# Patient Record
Sex: MALE | Race: WHITE | NOT HISPANIC OR LATINO | Employment: OTHER | URBAN - METROPOLITAN AREA
[De-identification: names, ages, dates, MRNs, and addresses within clinical notes are randomized per-mention and may not be internally consistent; named-entity substitution may affect disease eponyms.]

---

## 2017-01-19 DIAGNOSIS — G12.21 ALS (AMYOTROPHIC LATERAL SCLEROSIS): Primary | ICD-10-CM

## 2017-01-23 ENCOUNTER — HOSPITAL ENCOUNTER (OUTPATIENT)
Dept: INTERVENTIONAL RADIOLOGY/VASCULAR | Facility: HOSPITAL | Age: 47
Discharge: HOME | End: 2017-01-23
Attending: NURSE PRACTITIONER
Payer: MEDICARE

## 2017-01-23 DIAGNOSIS — G12.21 ALS (AMYOTROPHIC LATERAL SCLEROSIS): ICD-10-CM

## 2017-01-23 PROCEDURE — 49450 REPLACE G/C TUBE PERC: CPT

## 2017-01-23 PROCEDURE — 49450 REPLACE G/C TUBE PERC: CPT | Mod: ,,, | Performed by: RADIOLOGY

## 2017-01-23 PROCEDURE — 25500020 PHARM REV CODE 255: Performed by: RADIOLOGY

## 2017-01-23 RX ADMIN — IOHEXOL 10 ML: 300 INJECTION, SOLUTION INTRAVENOUS at 11:01

## 2017-01-23 NOTE — PROGRESS NOTES
Image guided gastrostomy tube exchange complete. Pt tolerated well. No signs or symptoms of distress noted. Discharge instructions provided. Pt verbalized understanding. Left facility via wheelchair.

## 2017-01-23 NOTE — PROGRESS NOTES
Per Dr Jordan, peg tube check will now be a image guided gastrostomy tube exchange. Pt and caregivers aware and agreeable of the same. WCTM.

## 2017-01-23 NOTE — H&P
Radiology History & Physical      SUBJECTIVE:     Chief Complaint: Gastrostomy tube exchange.    History of Present Illness:  Bayron Delgado is a 46 y.o. male who presents for image guided gastrostomy tube exchange.    Past Medical History   Diagnosis Date    ALS (amyotrophic lateral sclerosis)     Atrial fibrillation     CHF (congestive heart failure)     Neuropathy      Past Surgical History   Procedure Laterality Date    Appendectomy      Hernia repair         Home Meds:   Prior to Admission medications    Medication Sig Start Date End Date Taking? Authorizing Provider   atovaquone-proguanil (MALARONE) 250-100 mg Tab once daily.  4/25/16   Historical Provider, MD   BICILLIN L-A 2,400,000 unit/4 mL Syrg  5/25/16   Historical Provider, MD   carvedilol (COREG) 3.125 MG tablet Take 3.125 mg by mouth 2 (two) times daily.  4/4/16   Historical Provider, MD   CYANOCOBALAMIN/MECOBALAMIN (CYANOCOBALAMIN-METHYLCOBALAMIN SL) Inject 25 mg/day as directed once daily.    Historical Provider, MD   duloxetine (CYMBALTA) 60 MG capsule Take 60 mg by mouth. 9/1/15   Historical Provider, MD   furosemide (LASIX) 20 MG tablet TAKE 1 TABLET(20 MG) BY MOUTH EVERY DAY 6/29/16   Kike Fuentes MD   gabapentin (NEURONTIN) 300 MG capsule 600 mg 3 (three) times daily.  4/26/16   Historical Provider, MD   hydrocodone-acetaminophen 5-325mg (NORCO) 5-325 mg per tablet  5/7/16   Historical Provider, MD   loratadine (CLARITIN) 10 mg tablet Take 10 mg by mouth.    Historical Provider, MD   naloxegol (MOVANTIK) 12.5 mg Tab Take 12.5 mg by mouth once daily. 8/18/16   Kike Fuentes MD   potassium chloride SA (K-DUR,KLOR-CON) 10 MEQ tablet Take 2 tablets (20 mEq total) by mouth once daily. 6/1/16   Kike Fuentes MD     Anticoagulants/Antiplatelets: no anticoagulation    Allergies:   Review of patient's allergies indicates:   Allergen Reactions    Penicillins Other (See Comments)     Per wife- his mother stated he was allergic to  it     Sedation History:  no adverse reactions    Review of Systems:   Hematological: no known coagulopathies  Respiratory: no shortness of breath  Cardiovascular: no chest pain  Gastrointestinal: no abdominal pain  Genito-Urinary: no dysuria  Musculoskeletal: negative  Neurological: no TIA or stroke symptoms         OBJECTIVE:     Vital Signs (Most Recent)       Physical Exam:  ASA: 2  Mallampati: 2    General: no acute distress  Mental Status: alert and oriented to person, place and time  HEENT: normocephalic, atraumatic  Chest: unlabored breathing  Heart: regular heart rate  Abdomen: nondistended  Extremity: moves all extremities    Laboratory  Lab Results   Component Value Date    INR 1.1 07/29/2016       Lab Results   Component Value Date    WBC 5.86 07/25/2016    HGB 13.3 (L) 07/25/2016    HCT 38.9 (L) 07/25/2016    MCV 86 07/25/2016     07/25/2016      Lab Results   Component Value Date    GLU 94 07/25/2016     07/25/2016    K 3.8 07/25/2016     07/25/2016    CO2 24 07/25/2016    BUN 17 07/25/2016    CREATININE 0.5 07/25/2016    CALCIUM 9.9 07/25/2016    MG 2.0 07/25/2016    ALT 45 (H) 07/25/2016    AST 23 07/25/2016    ALBUMIN 3.4 (L) 07/25/2016    BILITOT 0.4 07/25/2016       ASSESSMENT/PLAN:     Sedation Plan: Local only.  Patient will undergo image guided gastrostomy tube exchange.    Juan Burton  Radiology  PGY-3

## 2017-01-23 NOTE — IP AVS SNAPSHOT
[facility]  [address]  [facility] [address]  Phone: [phone]           Patient Discharge Instructions     Our goal is to set you up for success. This packet includes information on your condition, medications, and your home care. It will help you to care for yourself so you don't get sicker and need to go back to the hospital.     Please ask your nurse if you have any questions.        There are many details to remember when preparing to leave the hospital. Here is what you will need to do:    1. Take your medicine. If you are prescribed medications, review your Medication List in the following pages. You may have new medications to  at the pharmacy and others that you'll need to stop taking. Review the instructions for how and when to take your medications. Talk with your doctor or nurses if you are unsure of what to do.     2. Go to your follow-up appointments. Specific follow-up information is listed in the following pages. Your may be contacted by a transition nurse or clinical provider about future appointments. Be sure we have all of the phone numbers to reach you, if needed. Please contact your provider's office if you are unable to make an appointment.     3. Watch for warning signs. Your doctor or nurse will give you detailed warning signs to watch for and when to call for assistance. These instructions may also include educational information about your condition. If you experience any of warning signs to your health, call your doctor.               [facility]  Unless otherwise directed by your provider, please contact [facility], our nurse care line that is available for 24/7 assistance.     [phone] (toll-free)    Registered nurses in the [facility] provide clinical advisement, health education, appointment booking, and other advisory services.                    ** Verify the list of medication(s) below is accurate and up  to date. Carry this with you in case of emergency. If your medications have changed, please notify your healthcare provider.             Medication List      TAKE these medications        Additional Info                      atovaquone-proguanil 250-100 mg Tab   Commonly known as:  MALARONE   Refills:  0    Instructions:  once daily.     Begin Date    AM    Noon    PM    Bedtime       BICILLIN L-A 2,400,000 unit/4 mL Syrg   Refills:  0   Generic drug:  penicillin G benzathine      Begin Date    AM    Noon    PM    Bedtime       carvedilol 3.125 MG tablet   Commonly known as:  COREG   Refills:  0   Dose:  3.125 mg    Instructions:  Take 3.125 mg by mouth 2 (two) times daily.     Begin Date    AM    Noon    PM    Bedtime       CYANOCOBALAMIN-METHYLCOBALAMIN SL   Refills:  0   Dose:  25 mg/day    Instructions:  Inject 25 mg/day as directed once daily.     Begin Date    AM    Noon    PM    Bedtime       duloxetine 60 MG capsule   Commonly known as:  CYMBALTA   Refills:  0   Dose:  60 mg    Instructions:  Take 60 mg by mouth.     Begin Date    AM    Noon    PM    Bedtime       furosemide 20 MG tablet   Commonly known as:  LASIX   Quantity:  90 tablet   Refills:  11   Comments:  **Patient requests 90 days supply**    Instructions:  TAKE 1 TABLET(20 MG) BY MOUTH EVERY DAY     Begin Date    AM    Noon    PM    Bedtime       gabapentin 300 MG capsule   Commonly known as:  NEURONTIN   Refills:  5   Dose:  600 mg   Indications:  Neuropathic Pain    Instructions:  600 mg 3 (three) times daily.     Begin Date    AM    Noon    PM    Bedtime       hydrocodone-acetaminophen 5-325mg 5-325 mg per tablet   Commonly known as:  NORCO   Refills:  0      Begin Date    AM    Noon    PM    Bedtime       loratadine 10 mg tablet   Commonly known as:  CLARITIN   Refills:  0   Dose:  10 mg    Instructions:  Take 10 mg by mouth.     Begin Date    AM    Noon    PM    Bedtime       naloxegol 12.5 mg Tab   Commonly known as:  MOVANTIK   Quantity:   30 tablet   Refills:  11   Dose:  12.5 mg    Instructions:  Take 12.5 mg by mouth once daily.     Begin Date    AM    Noon    PM    Bedtime       potassium chloride SA 10 MEQ tablet   Commonly known as:  K-DESMOND CRISTINAOR-CON   Quantity:  30 tablet   Refills:  11   Dose:  20 mEq    Instructions:  Take 2 tablets (20 mEq total) by mouth once daily.     Begin Date    AM    Noon    PM    Bedtime                  Please bring to all follow up appointments:    1. A copy of your discharge instructions.  2. All medicines you are currently taking in their original bottles.  3. Identification and insurance card.    Please arrive 15 minutes ahead of scheduled appointment time.    Please call 24 hours in advance if you must reschedule your appointment and/or time.            Admission Information     Date & Time Provider Department CSN    1/23/2017 10:30 AM CHUY Gonzalez [facility] [id number]      Care Providers     Provider Role Specialty Primary office phone    CHUY Gonzalez Attending Provider Radiology [phone]      Recent Lab Values     No lab values to display.      Allergies as of 1/23/2017        Reactions    Penicillins Other (See Comments)    Per wife- his mother stated he was allergic to it      Advance Directives     An advance directive is a document which, in the event you are no longer able to make decisions for yourself, tells your healthcare team what kind of treatment you do or do not want to receive, or who you would like to make those decisions for you.  If you do not currently have an advance directive, [facility] encourages you to create one.  For more information call:  [phone]), [phone] ([phone]),  or log on to [email or web address].        Language Assistance Services     ATTENTION: Language assistance services are available, free of charge. Please call [phone].      ATENCIÓN: Si charissala tino, tiene a griffith disposición servicios gratuitos  de asistencia lingüística. Stefania staples [phone].     ANDREW Ý: N?u b?n nói Ti?ng Vi?t, có các d?ch v? h? tr? ngôn ng? mi?n phí dành cho b?n. G?i s? [phone].         [facility] complies with applicable Federal civil rights laws and does not discriminate on the basis of race, color, national origin, age, disability, or sex.

## 2017-04-11 RX ORDER — POTASSIUM CHLORIDE 750 MG/1
TABLET, EXTENDED RELEASE ORAL
Qty: 30 TABLET | Refills: 0 | Status: SHIPPED | OUTPATIENT
Start: 2017-04-11 | End: 2017-04-29 | Stop reason: SDUPTHER

## 2017-04-29 RX ORDER — POTASSIUM CHLORIDE 750 MG/1
TABLET, EXTENDED RELEASE ORAL
Qty: 30 TABLET | Refills: 0 | Status: SHIPPED | OUTPATIENT
Start: 2017-04-29 | End: 2017-05-18 | Stop reason: SDUPTHER

## 2017-05-23 RX ORDER — POTASSIUM CHLORIDE 750 MG/1
TABLET, EXTENDED RELEASE ORAL
Qty: 30 TABLET | Refills: 0 | Status: SHIPPED | OUTPATIENT
Start: 2017-05-23 | End: 2017-07-25

## 2017-07-17 RX ORDER — POTASSIUM CHLORIDE 750 MG/1
TABLET, EXTENDED RELEASE ORAL
Qty: 30 TABLET | Refills: 0 | Status: SHIPPED | OUTPATIENT
Start: 2017-07-17 | End: 2017-07-25

## 2017-07-25 ENCOUNTER — TELEPHONE (OUTPATIENT)
Dept: PULMONOLOGY | Facility: CLINIC | Age: 47
End: 2017-07-25

## 2017-07-25 RX ORDER — FUROSEMIDE 20 MG/1
TABLET ORAL
Qty: 90 TABLET | Refills: 11 | Status: ON HOLD | OUTPATIENT
Start: 2017-07-25 | End: 2018-03-22 | Stop reason: HOSPADM

## 2017-07-25 RX ORDER — POTASSIUM CHLORIDE 750 MG/1
10 CAPSULE, EXTENDED RELEASE ORAL DAILY
Qty: 30 CAPSULE | Refills: 11 | Status: SHIPPED | OUTPATIENT
Start: 2017-07-25 | End: 2017-07-25 | Stop reason: SDUPTHER

## 2017-07-26 RX ORDER — POTASSIUM CHLORIDE 750 MG/1
CAPSULE, EXTENDED RELEASE ORAL
Qty: 90 CAPSULE | Refills: 11 | Status: ON HOLD | OUTPATIENT
Start: 2017-07-26 | End: 2018-03-22 | Stop reason: HOSPADM

## 2018-03-13 ENCOUNTER — HOSPITAL ENCOUNTER (EMERGENCY)
Facility: HOSPITAL | Age: 48
Discharge: ANOTHER HOSPITAL | End: 2018-03-14
Attending: EMERGENCY MEDICINE
Payer: MEDICARE

## 2018-03-13 VITALS
WEIGHT: 193 LBS | TEMPERATURE: 100 F | BODY MASS INDEX: 29.25 KG/M2 | HEIGHT: 68 IN | RESPIRATION RATE: 20 BRPM | SYSTOLIC BLOOD PRESSURE: 136 MMHG | DIASTOLIC BLOOD PRESSURE: 81 MMHG | OXYGEN SATURATION: 100 % | HEART RATE: 120 BPM

## 2018-03-13 DIAGNOSIS — J18.9 PNEUMONIA OF LEFT LOWER LOBE DUE TO INFECTIOUS ORGANISM: ICD-10-CM

## 2018-03-13 DIAGNOSIS — G12.21 ALS (AMYOTROPHIC LATERAL SCLEROSIS): Primary | ICD-10-CM

## 2018-03-13 DIAGNOSIS — A41.9 SEPSIS: ICD-10-CM

## 2018-03-13 DIAGNOSIS — J96.12 CHRONIC RESPIRATORY FAILURE WITH HYPERCAPNIA: ICD-10-CM

## 2018-03-13 PROBLEM — J96.90 RESPIRATORY FAILURE: Status: ACTIVE | Noted: 2018-03-13

## 2018-03-13 LAB
ALBUMIN SERPL BCP-MCNC: 3.2 G/DL
ALP SERPL-CCNC: 98 U/L
ALT SERPL W/O P-5'-P-CCNC: 86 U/L
ANION GAP SERPL CALC-SCNC: 9 MMOL/L
APTT BLDCRRT: 29.9 SEC
AST SERPL-CCNC: 35 U/L
BACTERIA #/AREA URNS HPF: ABNORMAL /HPF
BASOPHILS # BLD AUTO: 0 K/UL
BASOPHILS NFR BLD: 0.1 %
BILIRUB SERPL-MCNC: 1 MG/DL
BILIRUB UR QL STRIP: NEGATIVE
BNP SERPL-MCNC: 77 PG/ML
BUN SERPL-MCNC: 9 MG/DL
CALCIUM SERPL-MCNC: 9.9 MG/DL
CHLORIDE SERPL-SCNC: 98 MMOL/L
CLARITY UR: ABNORMAL
CO2 SERPL-SCNC: 30 MMOL/L
COLOR UR: YELLOW
CREAT SERPL-MCNC: 0.4 MG/DL
DIFFERENTIAL METHOD: ABNORMAL
EOSINOPHIL # BLD AUTO: 0.1 K/UL
EOSINOPHIL NFR BLD: 0.8 %
ERYTHROCYTE [DISTWIDTH] IN BLOOD BY AUTOMATED COUNT: 14.2 %
EST. GFR  (AFRICAN AMERICAN): >60 ML/MIN/1.73 M^2
EST. GFR  (NON AFRICAN AMERICAN): >60 ML/MIN/1.73 M^2
GLUCOSE SERPL-MCNC: 142 MG/DL
GLUCOSE UR QL STRIP: NEGATIVE
HCT VFR BLD AUTO: 33 %
HGB BLD-MCNC: 11 G/DL
HGB UR QL STRIP: ABNORMAL
HYALINE CASTS #/AREA URNS LPF: 0 /LPF
INR PPP: 1.1
KETONES UR QL STRIP: NEGATIVE
LACTATE SERPL-SCNC: 0.6 MMOL/L
LACTATE SERPL-SCNC: 1.3 MMOL/L
LEUKOCYTE ESTERASE UR QL STRIP: ABNORMAL
LYMPHOCYTES # BLD AUTO: 1.2 K/UL
LYMPHOCYTES NFR BLD: 9.4 %
MAGNESIUM SERPL-MCNC: 2.1 MG/DL
MCH RBC QN AUTO: 28.9 PG
MCHC RBC AUTO-ENTMCNC: 33.3 G/DL
MCV RBC AUTO: 87 FL
MICROSCOPIC COMMENT: ABNORMAL
MONOCYTES # BLD AUTO: 1.1 K/UL
MONOCYTES NFR BLD: 9.1 %
NEUTROPHILS # BLD AUTO: 10.1 K/UL
NEUTROPHILS NFR BLD: 80.6 %
NITRITE UR QL STRIP: NEGATIVE
PH UR STRIP: >8 [PH] (ref 5–8)
PHOSPHATE SERPL-MCNC: 1.7 MG/DL
PLATELET # BLD AUTO: 257 K/UL
PMV BLD AUTO: 8.1 FL
POTASSIUM SERPL-SCNC: 4 MMOL/L
PROCALCITONIN SERPL IA-MCNC: 0.42 NG/ML
PROT SERPL-MCNC: 6.9 G/DL
PROT UR QL STRIP: ABNORMAL
PROTHROMBIN TIME: 11 SEC
RBC # BLD AUTO: 3.8 M/UL
RBC #/AREA URNS HPF: 40 /HPF (ref 0–4)
SODIUM SERPL-SCNC: 137 MMOL/L
SP GR UR STRIP: 1.01 (ref 1–1.03)
SQUAMOUS #/AREA URNS HPF: 5 /HPF
TROPONIN I SERPL DL<=0.01 NG/ML-MCNC: 0.02 NG/ML
URN SPEC COLLECT METH UR: ABNORMAL
UROBILINOGEN UR STRIP-ACNC: 1 EU/DL
WBC # BLD AUTO: 12.5 K/UL
WBC #/AREA URNS HPF: 15 /HPF (ref 0–5)

## 2018-03-13 PROCEDURE — 51702 INSERT TEMP BLADDER CATH: CPT

## 2018-03-13 PROCEDURE — 85025 COMPLETE CBC W/AUTO DIFF WBC: CPT

## 2018-03-13 PROCEDURE — 80053 COMPREHEN METABOLIC PANEL: CPT

## 2018-03-13 PROCEDURE — 87070 CULTURE OTHR SPECIMN AEROBIC: CPT

## 2018-03-13 PROCEDURE — 87088 URINE BACTERIA CULTURE: CPT

## 2018-03-13 PROCEDURE — 93010 ELECTROCARDIOGRAM REPORT: CPT | Mod: ,,, | Performed by: INTERNAL MEDICINE

## 2018-03-13 PROCEDURE — 84100 ASSAY OF PHOSPHORUS: CPT

## 2018-03-13 PROCEDURE — 87040 BLOOD CULTURE FOR BACTERIA: CPT

## 2018-03-13 PROCEDURE — 96367 TX/PROPH/DG ADDL SEQ IV INF: CPT | Mod: 59

## 2018-03-13 PROCEDURE — 96375 TX/PRO/DX INJ NEW DRUG ADDON: CPT

## 2018-03-13 PROCEDURE — 96365 THER/PROPH/DIAG IV INF INIT: CPT

## 2018-03-13 PROCEDURE — 84484 ASSAY OF TROPONIN QUANT: CPT

## 2018-03-13 PROCEDURE — 83605 ASSAY OF LACTIC ACID: CPT

## 2018-03-13 PROCEDURE — 87077 CULTURE AEROBIC IDENTIFY: CPT

## 2018-03-13 PROCEDURE — 96361 HYDRATE IV INFUSION ADD-ON: CPT

## 2018-03-13 PROCEDURE — 87086 URINE CULTURE/COLONY COUNT: CPT

## 2018-03-13 PROCEDURE — 84145 PROCALCITONIN (PCT): CPT

## 2018-03-13 PROCEDURE — 81000 URINALYSIS NONAUTO W/SCOPE: CPT

## 2018-03-13 PROCEDURE — 83880 ASSAY OF NATRIURETIC PEPTIDE: CPT

## 2018-03-13 PROCEDURE — 36415 COLL VENOUS BLD VENIPUNCTURE: CPT

## 2018-03-13 PROCEDURE — 87186 SC STD MICRODIL/AGAR DIL: CPT

## 2018-03-13 PROCEDURE — 93005 ELECTROCARDIOGRAM TRACING: CPT

## 2018-03-13 PROCEDURE — 85730 THROMBOPLASTIN TIME PARTIAL: CPT

## 2018-03-13 PROCEDURE — 96366 THER/PROPH/DIAG IV INF ADDON: CPT

## 2018-03-13 PROCEDURE — 25000003 PHARM REV CODE 250: Performed by: EMERGENCY MEDICINE

## 2018-03-13 PROCEDURE — 63600175 PHARM REV CODE 636 W HCPCS: Performed by: EMERGENCY MEDICINE

## 2018-03-13 PROCEDURE — 87205 SMEAR GRAM STAIN: CPT

## 2018-03-13 PROCEDURE — 99285 EMERGENCY DEPT VISIT HI MDM: CPT | Mod: 25

## 2018-03-13 PROCEDURE — 85610 PROTHROMBIN TIME: CPT

## 2018-03-13 PROCEDURE — 83735 ASSAY OF MAGNESIUM: CPT

## 2018-03-13 PROCEDURE — S0073 INJECTION, AZTREONAM, 500 MG: HCPCS | Performed by: EMERGENCY MEDICINE

## 2018-03-13 RX ORDER — HYDROXYZINE HYDROCHLORIDE 25 MG/1
25 TABLET, FILM COATED ORAL 4 TIMES DAILY
Status: ON HOLD | COMMUNITY
End: 2018-03-22 | Stop reason: HOSPADM

## 2018-03-13 RX ORDER — POLYETHYLENE GLYCOL 3350 17 G/17G
17 POWDER, FOR SOLUTION ORAL 2 TIMES DAILY
Status: ON HOLD | COMMUNITY
End: 2018-03-22 | Stop reason: HOSPADM

## 2018-03-13 RX ORDER — ACETAMINOPHEN 500 MG
500 TABLET ORAL EVERY 6 HOURS PRN
Status: ON HOLD | COMMUNITY
End: 2018-03-22 | Stop reason: HOSPADM

## 2018-03-13 RX ORDER — TEMAZEPAM 15 MG/1
30 CAPSULE ORAL NIGHTLY
COMMUNITY
End: 2019-11-21 | Stop reason: SDUPTHER

## 2018-03-13 RX ORDER — DOXEPIN HYDROCHLORIDE 75 MG/1
75 CAPSULE ORAL 2 TIMES DAILY
Status: ON HOLD | COMMUNITY
End: 2018-03-22 | Stop reason: HOSPADM

## 2018-03-13 RX ORDER — ONDANSETRON HYDROCHLORIDE 4 MG/5ML
4 SOLUTION ORAL 4 TIMES DAILY
Status: ON HOLD | COMMUNITY
End: 2018-03-22 | Stop reason: HOSPADM

## 2018-03-13 RX ORDER — DIPHENHYDRAMINE HCL 50 MG
50 CAPSULE ORAL NIGHTLY
COMMUNITY
End: 2018-06-20

## 2018-03-13 RX ORDER — CLONAZEPAM 0.5 MG/1
0.5 TABLET ORAL 3 TIMES DAILY
Status: ON HOLD | COMMUNITY
End: 2018-03-22 | Stop reason: HOSPADM

## 2018-03-13 RX ORDER — SENNOSIDES 8.6 MG/1
7 TABLET ORAL 2 TIMES DAILY
COMMUNITY
End: 2018-06-20

## 2018-03-13 RX ORDER — DEXTROSE MONOHYDRATE, SODIUM CHLORIDE, AND POTASSIUM CHLORIDE 50; 1.49; 4.5 G/1000ML; G/1000ML; G/1000ML
1000 INJECTION, SOLUTION INTRAVENOUS
Status: COMPLETED | OUTPATIENT
Start: 2018-03-13 | End: 2018-03-13

## 2018-03-13 RX ORDER — LIDOCAINE 50 MG/G
1 PATCH TOPICAL DAILY PRN
Status: ON HOLD | COMMUNITY
End: 2018-03-22 | Stop reason: HOSPADM

## 2018-03-13 RX ORDER — PREGABALIN 100 MG/1
100 CAPSULE ORAL 3 TIMES DAILY
Status: ON HOLD | COMMUNITY
End: 2018-03-22 | Stop reason: HOSPADM

## 2018-03-13 RX ORDER — DIPHENHYDRAMINE HCL 25 MG
25 CAPSULE ORAL EVERY MORNING
COMMUNITY
End: 2018-06-20

## 2018-03-13 RX ORDER — MORPHINE SULFATE 20 MG/ML
0.25 SOLUTION ORAL
COMMUNITY
End: 2018-06-20

## 2018-03-13 RX ADMIN — VANCOMYCIN HYDROCHLORIDE 1750 MG: 1 INJECTION, POWDER, LYOPHILIZED, FOR SOLUTION INTRAVENOUS at 03:03

## 2018-03-13 RX ADMIN — DEXTROSE, SODIUM CHLORIDE, AND POTASSIUM CHLORIDE 1000 ML: 5; .45; .15 INJECTION INTRAVENOUS at 05:03

## 2018-03-13 RX ADMIN — SODIUM CHLORIDE 1000 ML: 0.9 INJECTION, SOLUTION INTRAVENOUS at 02:03

## 2018-03-13 RX ADMIN — AZTREONAM 2000 MG: 2 INJECTION, POWDER, LYOPHILIZED, FOR SOLUTION INTRAMUSCULAR; INTRAVENOUS at 03:03

## 2018-03-13 NOTE — ED NOTES
[facility] states that patient is waiting for bed assignment at [facility]. Family has been updated.

## 2018-03-13 NOTE — ED NOTES
[facility] reports that 2 patients are being moved out of ICU and will call back when patient has a room. Family has been updated. No needs or questions at this time.

## 2018-03-13 NOTE — ED NOTES
Jessica Garcia to come and obtain blood cultures x2 on patient. Antibiotics will be started after.

## 2018-03-13 NOTE — ED NOTES
Presents to the ER with c/o worsening respiratory failure that is secondary to ALS. Family reports that patient was diagnosed 4 years ago and was sent home recently from [facility] on hospice. Patient refused to have a trach placed. Patient is able to shake his head yes and no when asked questions and family is able to communicate when patient attempts to talk. Patient communicated with family that he wants to rescind his hospice and is requesting to be transferred to [facility] to have a trach place. Mucous membranes are pink and moist. Skin is warm, dry and intact. Decreased breath sounds to left base, respirations are regular and unlabored. Patient is on continuous bipap at home.  Denies cough, congestion,or rhinorrhea.  BS active x4, no tenderness with palpation, abd is soft and not distended. Pep tube to LUQ.  S1S2, capillary refill is < 2 seconds. Patient has 20 fr munoz catheter in place.

## 2018-03-13 NOTE — ED PROVIDER NOTES
"Encounter Date: 3/13/2018    SCRIBE #1 NOTE: I, Carla Strong, am scribing for, and in the presence of, Dr. Barraza.       History     Chief Complaint   Patient presents with    General Illness     ALS patient / was on hospice , bipap        03/13/2018 1:01 PM     Chief complaint: Respiratory failure      Bayron Delgado is a 47 y.o. male with CHF, A-fib, and ALS who presents to the ED via EMS on BiPAP with an onset of worsening respiratory failure. Per EMS, the family spoke with the patient, who was on Hospice but wanted to renew his ALS treatment plan. The family reports that his symptoms have progressively worsened over the last few days. He is able to nod his head "yes" and shake his head "no." The patient was suppose to have a tracheostomy but refused it at the time and wanted to go home on Hospice. As of today, he rescinded his Hospice care and would now like to have trache placement. The EMS care team transferred the patient here from [place], [place] and were not allowed to transfer the patient to [facility] as per instructed by their supervisor. His Pulmonologist is Dr. Kike Fuentes. No pertinent SHx noted.  HPI/ROS is limited as the patient is nonverbal secondary to ALS.      The history is provided by the EMS personnel, a relative and the patient (brother & sister).     Review of patient's allergies indicates:   Allergen Reactions    Penicillins Other (See Comments)     Per wife- his mother stated he was allergic to it     Past Medical History:   Diagnosis Date    ALS (amyotrophic lateral sclerosis)     Atrial fibrillation     CHF (congestive heart failure)     Neuropathy      Past Surgical History:   Procedure Laterality Date    APPENDECTOMY      HERNIA REPAIR       History reviewed. No pertinent family history.  Social History   Substance Use Topics    Smoking status: Never Smoker    Smokeless tobacco: Not on file    Alcohol use No     Review of Systems   Unable to perform ROS: Patient " nonverbal (secondary to ALS)     Physical Exam     Vitals:    03/13/18 1307   BP: 117/80   Pulse: (!) 123   Resp:    Temp:        Physical Exam    Nursing note and vitals reviewed.  Constitutional: He appears well-developed and well-nourished. No distress.   On BiPAP.   HENT:   Head: Normocephalic and atraumatic.   Eyes: Conjunctivae and EOM are normal. Pupils are equal, round, and reactive to light.   Cardiovascular: Regular rhythm and normal heart sounds. Tachycardia present.  Exam reveals no gallop and no friction rub.    No murmur heard.  Mild tachycardia.    Pulmonary/Chest: No respiratory distress. He has decreased breath sounds. He has no wheezes. He has no rhonchi. He has no rales.   Decreased breath sounds in the left lower lobe.    Abdominal: Soft. Bowel sounds are normal. He exhibits no distension. There is no tenderness.   Genitourinary:   Genitourinary Comments: Urinary catheter in place with lots of sediment. Stool in diaper.    Musculoskeletal: He exhibits edema.   Generalized edema.    Neurological: He is alert and oriented to person, place, and time.   Skin: Skin is warm and dry.       ED Course   Procedures  Labs Reviewed   CBC W/ AUTO DIFFERENTIAL - Abnormal; Notable for the following:        Result Value    RBC 3.80 (*)     Hemoglobin 11.0 (*)     Hematocrit 33.0 (*)     MPV 8.1 (*)     Gran # (ANC) 10.1 (*)     Mono # 1.1 (*)     Gran% 80.6 (*)     Lymph% 9.4 (*)     All other components within normal limits   COMPREHENSIVE METABOLIC PANEL - Abnormal; Notable for the following:     CO2 30 (*)     Glucose 142 (*)     Creatinine 0.4 (*)     Albumin 3.2 (*)     ALT 86 (*)     All other components within normal limits   PHOSPHORUS - Abnormal; Notable for the following:     Phosphorus 1.7 (*)     All other components within normal limits   CULTURE, BLOOD   CULTURE, BLOOD   CULTURE, URINE   CULTURE, RESPIRATORY   LACTIC ACID, PLASMA   MAGNESIUM   APTT   PROTIME-INR   B-TYPE NATRIURETIC PEPTIDE    TROPONIN I   URINALYSIS   PROCALCITONIN     Imaging Results          X-Ray Chest AP Portable (Final result)  Result time 03/13/18 14:05:23    Final result by Camille Guerrier MD (03/13/18 14:05:23)                 Impression:      Left lung infiltrate consistent with pneumonia.      Electronically signed by: Camille Guerrier MD  Date:    03/13/2018  Time:    14:05             Narrative:    EXAMINATION:  XR CHEST AP PORTABLE    CLINICAL HISTORY:  Sepsis;    TECHNIQUE:  Single frontal view of the chest was performed.    COMPARISON:  7/30/2016    FINDINGS:  There is new left lung infiltrate with left parahilar and basilar consolidation consistent with pneumonia.  There is right basilar opacification suggesting discoid like atelectasis right lung base.  The cardiomediastinal silhouette appears stable                              EKG Readings: (Independently Interpreted)   Initial Reading: No STEMI.   Sinus tachycardia at a rate of 120 bpm with normal axis, normal intervals, and mild ST depressions in V2-6 only a 0.5 mm. No ST segment elevation of depression.      X-Rays:   Independently Interpreted Readings:   Chest X-Ray: Left lower lobe infiltrate.      Medical Decision Making:   History:   Old Medical Records: I decided to obtain old medical records.  Clinical Tests:   Lab Tests: Ordered and Reviewed  Radiological Study: Reviewed and Ordered  Medical Tests: Reviewed and Ordered  Unfortunately, the patient has pneumonia in the left lower lobe.  I gave him broad spectrum antibiotics to include coverage for Pseudomonas.  He doesn't need to be intubated at this time.  He wishes to undergo a tracheotomy.  I sense there or issues between the patient and his wife who is his HCPOA and his family regarding a tracheotomy.  I encouraged the patient's brother to talk to Dr. Fuentes as well.   The patient's wife never showed up to the ER.  He'll be transferred to [facility] ICU to be under the care of his pulmonologist,   [facility].  He does not appear to be in septic shock at this time.  He also doesn't appear to be septic, but he is at high risk for developing sepsis.            Scribe Attestation:   Scribe #1: I performed the above scribed service and the documentation accurately describes the services I performed. I attest to the accuracy of the note.    Attending Attestation:         Attending Critical Care:   Critical Care Times:   Direct Patient Care (initial evaluation, reassessments, and time considering the case)................................................................15 minutes.   Additional History from reviewing old medical records or taking additional history from the family, EMS, PCP, etc.......................10 minutes.   Ordering, Reviewing, and Interpreting Diagnostic Studies...............................................................................................................7 minutes.   Documentation..................................................................................................................................................................................10 minutes.   Consultation with other Physicians. .................................................................................................................................................8 minutes.   Consultation with the patient's family directly relating to the patient's condition, care, and DNR status (when patient unable)......10 minutes.   ==============================================================  · Total Critical Care Time - exclusive of procedural time: 60 minutes.  ==============================================================      Attending ED Notes:   1:28 PM  Spoke with Pulmonologist, Dr. Kike Fuentes; the patient is accepted at [facility] for respiratory failure in the setting of ALS with potential tracheostomy.    1:35 PM  Spoke with the patient's brother; he reports that the patient made multiple  "statements this morning via "yes" and "no" responses that he wanted to have a tracheostomy. The bother is no the health care power of ; the health care power of  is the wife, who I have not spoken to yet. The brother is concerned that the patient's wishes are not being met.   I, Dr. Navjot Barraza personally performed the services described in this documentation. All medical record entries made by the scribe were at my direction and in my presence.  I have reviewed the chart and agree that the record reflects my personal performance and is accurate and complete. Navjot Barraza MD.  3:47 PM 03/13/2018    DISCLAIMER: This note was prepared with Dragon NaturallySpeaking voice recognition transcription software. Garbled syntax, mangled pronouns, and other bizarre constructions may be attributed to that software system            Clinical Impression:     1. ALS (amyotrophic lateral sclerosis)    2. Sepsis    3. Chronic respiratory failure with hypercapnia    4. Pneumonia of left lower lobe due to infectious organism      Disposition:   Disposition: Transferred                        Navjot Barraza MD  03/13/18 1547    "

## 2018-03-14 ENCOUNTER — HOSPITAL ENCOUNTER (INPATIENT)
Facility: HOSPITAL | Age: 48
LOS: 8 days | Discharge: NURSING, REHAB OR LONG-TERM CARE | DRG: 004 | End: 2018-03-22
Attending: INTERNAL MEDICINE | Admitting: INTERNAL MEDICINE
Payer: MEDICARE

## 2018-03-14 DIAGNOSIS — J96.20 ACUTE ON CHRONIC RESPIRATORY FAILURE, UNSPECIFIED WHETHER WITH HYPOXIA OR HYPERCAPNIA: ICD-10-CM

## 2018-03-14 DIAGNOSIS — G12.21 ALS (AMYOTROPHIC LATERAL SCLEROSIS): ICD-10-CM

## 2018-03-14 DIAGNOSIS — Z86.79 HISTORY OF ATRIAL FIBRILLATION: ICD-10-CM

## 2018-03-14 DIAGNOSIS — J96.90 RESPIRATORY FAILURE: ICD-10-CM

## 2018-03-14 DIAGNOSIS — J96.01 ACUTE RESPIRATORY FAILURE WITH HYPOXIA: ICD-10-CM

## 2018-03-14 DIAGNOSIS — I50.9 CHF (CONGESTIVE HEART FAILURE): ICD-10-CM

## 2018-03-14 DIAGNOSIS — Z86.79 HISTORY OF HEART DISEASE: ICD-10-CM

## 2018-03-14 PROBLEM — R60.1 ANASARCA: Status: ACTIVE | Noted: 2018-03-14

## 2018-03-14 LAB
ALBUMIN SERPL BCP-MCNC: 3.1 G/DL
ALP SERPL-CCNC: 95 U/L
ALT SERPL W/O P-5'-P-CCNC: 70 U/L
ANION GAP SERPL CALC-SCNC: 10 MMOL/L
ANION GAP SERPL CALC-SCNC: 10 MMOL/L
ANION GAP SERPL CALC-SCNC: 12 MMOL/L
AST SERPL-CCNC: 30 U/L
BASOPHILS # BLD AUTO: 0.03 K/UL
BASOPHILS NFR BLD: 0.3 %
BILIRUB SERPL-MCNC: 0.8 MG/DL
BNP SERPL-MCNC: 33 PG/ML
BUN SERPL-MCNC: 12 MG/DL
BUN SERPL-MCNC: 8 MG/DL
BUN SERPL-MCNC: 8 MG/DL
CALCIUM SERPL-MCNC: 10 MG/DL
CALCIUM SERPL-MCNC: 9.2 MG/DL
CALCIUM SERPL-MCNC: 9.9 MG/DL
CHLORIDE SERPL-SCNC: 100 MMOL/L
CHLORIDE SERPL-SCNC: 101 MMOL/L
CHLORIDE SERPL-SCNC: 103 MMOL/L
CO2 SERPL-SCNC: 26 MMOL/L
CO2 SERPL-SCNC: 30 MMOL/L
CO2 SERPL-SCNC: 34 MMOL/L
CREAT SERPL-MCNC: 0.4 MG/DL
CREAT SERPL-MCNC: 0.5 MG/DL
CREAT SERPL-MCNC: 0.5 MG/DL
DIASTOLIC DYSFUNCTION: NO
DIFFERENTIAL METHOD: ABNORMAL
EOSINOPHIL # BLD AUTO: 0.1 K/UL
EOSINOPHIL NFR BLD: 0.5 %
ERYTHROCYTE [DISTWIDTH] IN BLOOD BY AUTOMATED COUNT: 14 %
EST. GFR  (AFRICAN AMERICAN): >60 ML/MIN/1.73 M^2
EST. GFR  (NON AFRICAN AMERICAN): >60 ML/MIN/1.73 M^2
ESTIMATED PA SYSTOLIC PRESSURE: 34
GLUCOSE SERPL-MCNC: 163 MG/DL
GLUCOSE SERPL-MCNC: 167 MG/DL
GLUCOSE SERPL-MCNC: 187 MG/DL
HCT VFR BLD AUTO: 29.5 %
HGB BLD-MCNC: 9.9 G/DL
IMM GRANULOCYTES # BLD AUTO: 0.07 K/UL
IMM GRANULOCYTES NFR BLD AUTO: 0.7 %
LACTATE SERPL-SCNC: 1 MMOL/L
LYMPHOCYTES # BLD AUTO: 0.9 K/UL
LYMPHOCYTES NFR BLD: 9.3 %
MAGNESIUM SERPL-MCNC: 1.8 MG/DL
MAGNESIUM SERPL-MCNC: 2 MG/DL
MCH RBC QN AUTO: 29.4 PG
MCHC RBC AUTO-ENTMCNC: 33.6 G/DL
MCV RBC AUTO: 88 FL
MONOCYTES # BLD AUTO: 0.7 K/UL
MONOCYTES NFR BLD: 7.3 %
NEUTROPHILS # BLD AUTO: 8.1 K/UL
NEUTROPHILS NFR BLD: 81.9 %
NRBC BLD-RTO: 0 /100 WBC
PHOSPHATE SERPL-MCNC: 2.2 MG/DL
PHOSPHATE SERPL-MCNC: 2.9 MG/DL
PLATELET # BLD AUTO: 211 K/UL
PMV BLD AUTO: 10.6 FL
POCT GLUCOSE: 183 MG/DL (ref 70–110)
POCT GLUCOSE: 212 MG/DL (ref 70–110)
POTASSIUM SERPL-SCNC: 2.8 MMOL/L
POTASSIUM SERPL-SCNC: 3.7 MMOL/L
POTASSIUM SERPL-SCNC: 4 MMOL/L
PROCALCITONIN SERPL IA-MCNC: 0.35 NG/ML
PROT SERPL-MCNC: 6.8 G/DL
RBC # BLD AUTO: 3.37 M/UL
RETIRED EF AND QEF - SEE NOTES: 68 (ref 55–65)
SODIUM SERPL-SCNC: 138 MMOL/L
SODIUM SERPL-SCNC: 143 MMOL/L
SODIUM SERPL-SCNC: 145 MMOL/L
TRICUSPID VALVE REGURGITATION: NORMAL
TSH SERPL DL<=0.005 MIU/L-ACNC: 1.42 UIU/ML
VANCOMYCIN SERPL-MCNC: 8.7 UG/ML
WBC # BLD AUTO: 9.95 K/UL

## 2018-03-14 PROCEDURE — 84145 PROCALCITONIN (PCT): CPT

## 2018-03-14 PROCEDURE — 99900035 HC TECH TIME PER 15 MIN (STAT)

## 2018-03-14 PROCEDURE — 87070 CULTURE OTHR SPECIMN AEROBIC: CPT

## 2018-03-14 PROCEDURE — 27000221 HC OXYGEN, UP TO 24 HOURS

## 2018-03-14 PROCEDURE — 25000003 PHARM REV CODE 250: Performed by: INTERNAL MEDICINE

## 2018-03-14 PROCEDURE — S0073 INJECTION, AZTREONAM, 500 MG: HCPCS | Performed by: STUDENT IN AN ORGANIZED HEALTH CARE EDUCATION/TRAINING PROGRAM

## 2018-03-14 PROCEDURE — 80048 BASIC METABOLIC PNL TOTAL CA: CPT | Mod: 91

## 2018-03-14 PROCEDURE — 87205 SMEAR GRAM STAIN: CPT

## 2018-03-14 PROCEDURE — C1751 CATH, INF, PER/CENT/MIDLINE: HCPCS

## 2018-03-14 PROCEDURE — 83735 ASSAY OF MAGNESIUM: CPT | Mod: 91

## 2018-03-14 PROCEDURE — 80053 COMPREHEN METABOLIC PANEL: CPT

## 2018-03-14 PROCEDURE — 63600175 PHARM REV CODE 636 W HCPCS: Performed by: STUDENT IN AN ORGANIZED HEALTH CARE EDUCATION/TRAINING PROGRAM

## 2018-03-14 PROCEDURE — 80048 BASIC METABOLIC PNL TOTAL CA: CPT

## 2018-03-14 PROCEDURE — 93306 TTE W/DOPPLER COMPLETE: CPT | Mod: 26,,, | Performed by: INTERNAL MEDICINE

## 2018-03-14 PROCEDURE — 83735 ASSAY OF MAGNESIUM: CPT

## 2018-03-14 PROCEDURE — 80202 ASSAY OF VANCOMYCIN: CPT

## 2018-03-14 PROCEDURE — 84100 ASSAY OF PHOSPHORUS: CPT | Mod: 91

## 2018-03-14 PROCEDURE — 85025 COMPLETE CBC W/AUTO DIFF WBC: CPT

## 2018-03-14 PROCEDURE — 25000003 PHARM REV CODE 250: Performed by: STUDENT IN AN ORGANIZED HEALTH CARE EDUCATION/TRAINING PROGRAM

## 2018-03-14 PROCEDURE — 84443 ASSAY THYROID STIM HORMONE: CPT

## 2018-03-14 PROCEDURE — 94761 N-INVAS EAR/PLS OXIMETRY MLT: CPT

## 2018-03-14 PROCEDURE — 83605 ASSAY OF LACTIC ACID: CPT

## 2018-03-14 PROCEDURE — 94660 CPAP INITIATION&MGMT: CPT

## 2018-03-14 PROCEDURE — 84100 ASSAY OF PHOSPHORUS: CPT

## 2018-03-14 PROCEDURE — 93306 TTE W/DOPPLER COMPLETE: CPT

## 2018-03-14 PROCEDURE — 93010 ELECTROCARDIOGRAM REPORT: CPT | Mod: ,,, | Performed by: INTERNAL MEDICINE

## 2018-03-14 PROCEDURE — 99291 CRITICAL CARE FIRST HOUR: CPT | Mod: ,,, | Performed by: INTERNAL MEDICINE

## 2018-03-14 PROCEDURE — 63600175 PHARM REV CODE 636 W HCPCS: Performed by: INTERNAL MEDICINE

## 2018-03-14 PROCEDURE — 36569 INSJ PICC 5 YR+ W/O IMAGING: CPT

## 2018-03-14 PROCEDURE — 76937 US GUIDE VASCULAR ACCESS: CPT

## 2018-03-14 PROCEDURE — 83880 ASSAY OF NATRIURETIC PEPTIDE: CPT

## 2018-03-14 PROCEDURE — 20000000 HC ICU ROOM

## 2018-03-14 RX ORDER — HYDROCODONE BITARTRATE AND ACETAMINOPHEN 10; 325 MG/1; MG/1
1 TABLET ORAL 3 TIMES DAILY
Status: DISCONTINUED | OUTPATIENT
Start: 2018-03-14 | End: 2018-03-14

## 2018-03-14 RX ORDER — LANOLIN ALCOHOL/MO/W.PET/CERES
800 CREAM (GRAM) TOPICAL
Status: DISCONTINUED | OUTPATIENT
Start: 2018-03-14 | End: 2018-03-23 | Stop reason: HOSPADM

## 2018-03-14 RX ORDER — HYDROXYZINE HYDROCHLORIDE 25 MG/1
25 TABLET, FILM COATED ORAL 4 TIMES DAILY
Status: DISCONTINUED | OUTPATIENT
Start: 2018-03-14 | End: 2018-03-23 | Stop reason: HOSPADM

## 2018-03-14 RX ORDER — POTASSIUM CHLORIDE 20 MEQ/15ML
60 SOLUTION ORAL
Status: DISCONTINUED | OUTPATIENT
Start: 2018-03-14 | End: 2018-03-14

## 2018-03-14 RX ORDER — FUROSEMIDE 40 MG/1
80 TABLET ORAL 2 TIMES DAILY
Status: DISCONTINUED | OUTPATIENT
Start: 2018-03-14 | End: 2018-03-15

## 2018-03-14 RX ORDER — DIPHENHYDRAMINE HCL 12.5MG/5ML
25 ELIXIR ORAL EVERY 6 HOURS PRN
Status: DISCONTINUED | OUTPATIENT
Start: 2018-03-14 | End: 2018-03-23 | Stop reason: HOSPADM

## 2018-03-14 RX ORDER — ONDANSETRON HYDROCHLORIDE 4 MG/5ML
4 SOLUTION ORAL EVERY 6 HOURS
Status: DISCONTINUED | OUTPATIENT
Start: 2018-03-14 | End: 2018-03-14

## 2018-03-14 RX ORDER — CEFEPIME HYDROCHLORIDE 1 G/1
1 INJECTION, POWDER, FOR SOLUTION INTRAMUSCULAR; INTRAVENOUS
Status: DISCONTINUED | OUTPATIENT
Start: 2018-03-14 | End: 2018-03-14

## 2018-03-14 RX ORDER — CARVEDILOL 3.12 MG/1
3.12 TABLET ORAL 2 TIMES DAILY
Status: DISCONTINUED | OUTPATIENT
Start: 2018-03-14 | End: 2018-03-20

## 2018-03-14 RX ORDER — SODIUM,POTASSIUM PHOSPHATES 280-250MG
2 POWDER IN PACKET (EA) ORAL
Status: DISCONTINUED | OUTPATIENT
Start: 2018-03-14 | End: 2018-03-23 | Stop reason: HOSPADM

## 2018-03-14 RX ORDER — FUROSEMIDE 40 MG/1
80 TABLET ORAL 2 TIMES DAILY
Status: DISCONTINUED | OUTPATIENT
Start: 2018-03-14 | End: 2018-03-14

## 2018-03-14 RX ORDER — POTASSIUM CHLORIDE 20 MEQ/15ML
60 SOLUTION ORAL
Status: CANCELLED | OUTPATIENT
Start: 2018-03-14

## 2018-03-14 RX ORDER — GLYCOPYRROLATE 1 MG/5ML
0.3 SOLUTION ORAL 3 TIMES DAILY
Status: DISCONTINUED | OUTPATIENT
Start: 2018-03-14 | End: 2018-03-15

## 2018-03-14 RX ORDER — POTASSIUM CHLORIDE 20 MEQ/15ML
40 SOLUTION ORAL
Status: DISCONTINUED | OUTPATIENT
Start: 2018-03-14 | End: 2018-03-23 | Stop reason: HOSPADM

## 2018-03-14 RX ORDER — SCOLOPAMINE TRANSDERMAL SYSTEM 1 MG/1
1 PATCH, EXTENDED RELEASE TRANSDERMAL
Status: DISCONTINUED | OUTPATIENT
Start: 2018-03-14 | End: 2018-03-23 | Stop reason: HOSPADM

## 2018-03-14 RX ORDER — PREGABALIN 50 MG/1
100 CAPSULE ORAL 3 TIMES DAILY
Status: DISCONTINUED | OUTPATIENT
Start: 2018-03-14 | End: 2018-03-23 | Stop reason: HOSPADM

## 2018-03-14 RX ORDER — HYDROCODONE BITARTRATE AND ACETAMINOPHEN 10; 325 MG/1; MG/1
1 TABLET ORAL 3 TIMES DAILY
Status: DISCONTINUED | OUTPATIENT
Start: 2018-03-14 | End: 2018-03-15

## 2018-03-14 RX ORDER — HYDROCODONE BITARTRATE AND ACETAMINOPHEN 10; 325 MG/1; MG/1
1 TABLET ORAL EVERY 6 HOURS PRN
Status: DISCONTINUED | OUTPATIENT
Start: 2018-03-14 | End: 2018-03-14

## 2018-03-14 RX ORDER — DOXEPIN HYDROCHLORIDE 75 MG/1
75 CAPSULE ORAL 2 TIMES DAILY
Status: DISCONTINUED | OUTPATIENT
Start: 2018-03-14 | End: 2018-03-23 | Stop reason: HOSPADM

## 2018-03-14 RX ORDER — CLONAZEPAM 0.5 MG/1
0.5 TABLET ORAL 3 TIMES DAILY
Status: DISCONTINUED | OUTPATIENT
Start: 2018-03-14 | End: 2018-03-23 | Stop reason: HOSPADM

## 2018-03-14 RX ORDER — DEXTROSE MONOHYDRATE, SODIUM CHLORIDE, AND POTASSIUM CHLORIDE 50; 1.49; 4.5 G/1000ML; G/1000ML; G/1000ML
INJECTION, SOLUTION INTRAVENOUS
Status: DISCONTINUED
Start: 2018-03-14 | End: 2018-03-14 | Stop reason: HOSPADM

## 2018-03-14 RX ORDER — DIPHENHYDRAMINE HCL 12.5MG/5ML
50 ELIXIR ORAL NIGHTLY
Status: DISCONTINUED | OUTPATIENT
Start: 2018-03-14 | End: 2018-03-23 | Stop reason: HOSPADM

## 2018-03-14 RX ORDER — GLYCOPYRROLATE 0.2 MG/ML
0.1 INJECTION INTRAMUSCULAR; INTRAVENOUS 3 TIMES DAILY
Status: DISCONTINUED | OUTPATIENT
Start: 2018-03-14 | End: 2018-03-14

## 2018-03-14 RX ORDER — CEFEPIME HYDROCHLORIDE 2 G/1
2 INJECTION, POWDER, FOR SOLUTION INTRAVENOUS
Status: DISCONTINUED | OUTPATIENT
Start: 2018-03-14 | End: 2018-03-14

## 2018-03-14 RX ORDER — LIDOCAINE 50 MG/G
1 PATCH TOPICAL
Status: DISCONTINUED | OUTPATIENT
Start: 2018-03-14 | End: 2018-03-16

## 2018-03-14 RX ORDER — METRONIDAZOLE 500 MG/100ML
500 INJECTION, SOLUTION INTRAVENOUS
Status: DISCONTINUED | OUTPATIENT
Start: 2018-03-14 | End: 2018-03-14

## 2018-03-14 RX ORDER — AZTREONAM 1 G/1
2000 INJECTION, POWDER, LYOPHILIZED, FOR SOLUTION INTRAMUSCULAR; INTRAVENOUS
Status: DISCONTINUED | OUTPATIENT
Start: 2018-03-14 | End: 2018-03-21

## 2018-03-14 RX ORDER — POLYETHYLENE GLYCOL 3350 17 G/17G
17 POWDER, FOR SOLUTION ORAL 2 TIMES DAILY
Status: DISCONTINUED | OUTPATIENT
Start: 2018-03-14 | End: 2018-03-23 | Stop reason: HOSPADM

## 2018-03-14 RX ORDER — DULOXETIN HYDROCHLORIDE 30 MG/1
60 CAPSULE, DELAYED RELEASE ORAL DAILY
Status: DISCONTINUED | OUTPATIENT
Start: 2018-03-14 | End: 2018-03-23 | Stop reason: HOSPADM

## 2018-03-14 RX ORDER — ONDANSETRON HYDROCHLORIDE 4 MG/5ML
4 SOLUTION ORAL EVERY 6 HOURS
Status: DISCONTINUED | OUTPATIENT
Start: 2018-03-14 | End: 2018-03-23 | Stop reason: HOSPADM

## 2018-03-14 RX ORDER — ENOXAPARIN SODIUM 100 MG/ML
40 INJECTION SUBCUTANEOUS EVERY 24 HOURS
Status: DISCONTINUED | OUTPATIENT
Start: 2018-03-14 | End: 2018-03-23 | Stop reason: HOSPADM

## 2018-03-14 RX ORDER — SODIUM CHLORIDE FOR INHALATION 10 %
7 VIAL, NEBULIZER (ML) INHALATION ONCE
Status: DISCONTINUED | OUTPATIENT
Start: 2018-03-14 | End: 2018-03-14

## 2018-03-14 RX ORDER — SODIUM CHLORIDE FOR INHALATION 7 %
4 VIAL, NEBULIZER (ML) INHALATION ONCE
Status: DISCONTINUED | OUTPATIENT
Start: 2018-03-14 | End: 2018-03-15

## 2018-03-14 RX ORDER — ACETAMINOPHEN 325 MG/1
650 TABLET ORAL EVERY 6 HOURS PRN
Status: DISCONTINUED | OUTPATIENT
Start: 2018-03-14 | End: 2018-03-23 | Stop reason: HOSPADM

## 2018-03-14 RX ORDER — POTASSIUM CHLORIDE 20 MEQ/15ML
60 SOLUTION ORAL ONCE
Status: COMPLETED | OUTPATIENT
Start: 2018-03-14 | End: 2018-03-14

## 2018-03-14 RX ORDER — AMOXICILLIN 250 MG
2 CAPSULE ORAL 2 TIMES DAILY
Status: DISCONTINUED | OUTPATIENT
Start: 2018-03-14 | End: 2018-03-23 | Stop reason: HOSPADM

## 2018-03-14 RX ADMIN — MAGNESIUM OXIDE TAB 400 MG (241.3 MG ELEMENTAL MG) 800 MG: 400 (241.3 MG) TAB at 05:03

## 2018-03-14 RX ADMIN — ACETAMINOPHEN 650 MG: 325 TABLET, FILM COATED ORAL at 10:03

## 2018-03-14 RX ADMIN — HYDROXYZINE HYDROCHLORIDE 25 MG: 25 TABLET, FILM COATED ORAL at 04:03

## 2018-03-14 RX ADMIN — PREGABALIN 100 MG: 50 CAPSULE ORAL at 08:03

## 2018-03-14 RX ADMIN — CARVEDILOL 3.12 MG: 3.12 TABLET, FILM COATED ORAL at 03:03

## 2018-03-14 RX ADMIN — SCOPALAMINE 1 PATCH: 1 PATCH, EXTENDED RELEASE TRANSDERMAL at 06:03

## 2018-03-14 RX ADMIN — VANCOMYCIN HYDROCHLORIDE 1500 MG: 1 INJECTION, POWDER, LYOPHILIZED, FOR SOLUTION INTRAVENOUS at 08:03

## 2018-03-14 RX ADMIN — VANCOMYCIN HYDROCHLORIDE 1500 MG: 1 INJECTION, POWDER, LYOPHILIZED, FOR SOLUTION INTRAVENOUS at 04:03

## 2018-03-14 RX ADMIN — HYDROCODONE BITARTRATE AND ACETAMINOPHEN 1 TABLET: 10; 325 TABLET ORAL at 04:03

## 2018-03-14 RX ADMIN — POLYETHYLENE GLYCOL 3350 17 G: 17 POWDER, FOR SOLUTION ORAL at 10:03

## 2018-03-14 RX ADMIN — DOXEPIN HYDROCHLORIDE 75 MG: 75 CAPSULE ORAL at 08:03

## 2018-03-14 RX ADMIN — Medication 4 MG: at 08:03

## 2018-03-14 RX ADMIN — Medication 0.3 MG: at 04:03

## 2018-03-14 RX ADMIN — CLONAZEPAM 0.5 MG: 0.5 TABLET ORAL at 05:03

## 2018-03-14 RX ADMIN — Medication 0.3 MG: at 10:03

## 2018-03-14 RX ADMIN — DULOXETINE 60 MG: 30 CAPSULE, DELAYED RELEASE ORAL at 09:03

## 2018-03-14 RX ADMIN — PREGABALIN 100 MG: 50 CAPSULE ORAL at 10:03

## 2018-03-14 RX ADMIN — DIPHENHYDRAMINE HYDROCHLORIDE 50 MG: 25 SOLUTION ORAL at 08:03

## 2018-03-14 RX ADMIN — ACETAMINOPHEN 650 MG: 325 TABLET, FILM COATED ORAL at 04:03

## 2018-03-14 RX ADMIN — ENOXAPARIN SODIUM 40 MG: 100 INJECTION SUBCUTANEOUS at 04:03

## 2018-03-14 RX ADMIN — LIDOCAINE 1 PATCH: 50 PATCH TOPICAL at 10:03

## 2018-03-14 RX ADMIN — Medication 4 MG: at 12:03

## 2018-03-14 RX ADMIN — POTASSIUM CHLORIDE 60 MEQ: 20 SOLUTION ORAL at 05:03

## 2018-03-14 RX ADMIN — CLONAZEPAM 0.5 MG: 0.5 TABLET ORAL at 10:03

## 2018-03-14 RX ADMIN — AZTREONAM 2000 MG: 1 INJECTION, POWDER, LYOPHILIZED, FOR SOLUTION INTRAMUSCULAR; INTRAVENOUS at 08:03

## 2018-03-14 RX ADMIN — CEFEPIME HYDROCHLORIDE 2 G: 2 INJECTION, POWDER, FOR SOLUTION INTRAVENOUS at 04:03

## 2018-03-14 RX ADMIN — POTASSIUM CHLORIDE 40 MEQ: 20 SOLUTION ORAL at 11:03

## 2018-03-14 RX ADMIN — CARVEDILOL 3.12 MG: 3.12 TABLET, FILM COATED ORAL at 08:03

## 2018-03-14 RX ADMIN — HYDROCODONE BITARTRATE AND ACETAMINOPHEN 1 TABLET: 10; 325 TABLET ORAL at 08:03

## 2018-03-14 RX ADMIN — FUROSEMIDE 80 MG: 40 TABLET ORAL at 08:03

## 2018-03-14 RX ADMIN — HYDROXYZINE HYDROCHLORIDE 25 MG: 25 TABLET, FILM COATED ORAL at 12:03

## 2018-03-14 RX ADMIN — STANDARDIZED SENNA CONCENTRATE AND DOCUSATE SODIUM 2 TABLET: 8.6; 5 TABLET, FILM COATED ORAL at 08:03

## 2018-03-14 RX ADMIN — STANDARDIZED SENNA CONCENTRATE AND DOCUSATE SODIUM 2 TABLET: 8.6; 5 TABLET, FILM COATED ORAL at 10:03

## 2018-03-14 RX ADMIN — CLONAZEPAM 0.5 MG: 0.5 TABLET ORAL at 08:03

## 2018-03-14 RX ADMIN — HYDROXYZINE HYDROCHLORIDE 25 MG: 25 TABLET, FILM COATED ORAL at 08:03

## 2018-03-14 RX ADMIN — DIBASIC SODIUM PHOSPHATE, MONOBASIC POTASSIUM PHOSPHATE AND MONOBASIC SODIUM PHOSPHATE 2 TABLET: 852; 155; 130 TABLET ORAL at 08:03

## 2018-03-14 RX ADMIN — CEFEPIME 1 G: 1 INJECTION, POWDER, FOR SOLUTION INTRAMUSCULAR; INTRAVENOUS at 12:03

## 2018-03-14 RX ADMIN — POLYETHYLENE GLYCOL 3350 17 G: 17 POWDER, FOR SOLUTION ORAL at 08:03

## 2018-03-14 RX ADMIN — DOXEPIN HYDROCHLORIDE 75 MG: 75 CAPSULE ORAL at 10:03

## 2018-03-14 RX ADMIN — FUROSEMIDE 80 MG: 40 TABLET ORAL at 06:03

## 2018-03-14 RX ADMIN — DIBASIC SODIUM PHOSPHATE, MONOBASIC POTASSIUM PHOSPHATE AND MONOBASIC SODIUM PHOSPHATE 2 TABLET: 852; 155; 130 TABLET ORAL at 12:03

## 2018-03-14 RX ADMIN — Medication 0.3 MG: at 08:03

## 2018-03-14 RX ADMIN — MAGNESIUM OXIDE TAB 400 MG (241.3 MG ELEMENTAL MG) 800 MG: 400 (241.3 MG) TAB at 11:03

## 2018-03-14 RX ADMIN — PREGABALIN 100 MG: 50 CAPSULE ORAL at 04:03

## 2018-03-14 NOTE — ASSESSMENT & PLAN NOTE
CXR at OSH concerning for possible L lower lobe asp PNA   Given 1 L NS, 1750mg vanc x 1, aztreonam 2g x 1 at OSH  Patient at high risk of asp given progression of his ALS  On 5 L on trilogy machine, wean O2 as tolerated  C/w vanc and started on cefepime and flagyl  Hx penicillin allergy, however, patient has received rocephin in the past  F/u vanc trough  F/u procalcitonin, lactate, blood cx (from OSH), urine cx (OSH), sputum cx (given nebulizer to induce sample)  UA at OSH positive, however, patient w/ chronic indwelling munoz therefore difficult to interpret (also unsure how sample was collected)  Per brother family conflict regarding patient's decision to undergo tracheostomy as wife is not agreeable to this (per brother)

## 2018-03-14 NOTE — SIGNIFICANT EVENT
Cough assist machine is unavailable @ this time.Respiratory lead tech notified and she is actively trying to locate one.

## 2018-03-14 NOTE — ASSESSMENT & PLAN NOTE
C/w home trilogy machine  Cough assist device bid  C/w chronic tammy munoz changed at OSH yesterday per brother

## 2018-03-14 NOTE — SUBJECTIVE & OBJECTIVE
Interval History/Significant Events:     Review of Systems   Unable to perform ROS: Other     Objective:     Vital Signs (Most Recent):  Temp: 99.2 °F (37.3 °C) (03/14/18 0300)  Pulse: 79 (03/14/18 0500)  Resp: 19 (03/14/18 0500)  BP: (!) 183/87 (03/14/18 0500)  SpO2: 100 % (03/14/18 0500) Vital Signs (24h Range):  Temp:  [99.2 °F (37.3 °C)-99.5 °F (37.5 °C)] 99.2 °F (37.3 °C)  Pulse:  [] 79  Resp:  [17-22] 19  SpO2:  [94 %-100 %] 100 %  BP: (117-183)/(70-87) 183/87   Weight: 105 kg (231 lb 7.7 oz)  Body mass index is 33.21 kg/m².      Intake/Output Summary (Last 24 hours) at 03/14/18 0523  Last data filed at 03/14/18 0500   Gross per 24 hour   Intake              250 ml   Output             1715 ml   Net            -1465 ml       Physical Exam   Constitutional: No distress.   HENT:   Head: Normocephalic and atraumatic.   Eyes: Conjunctivae are normal. No scleral icterus.   Neck: Neck supple.   Cardiovascular: Normal rate and intact distal pulses.  Exam reveals no friction rub.    No murmur heard.  Pulmonary/Chest: No respiratory distress. He has no wheezes.   Trilogy nasal cannula in place   Abdominal: Soft. He exhibits no distension. There is no tenderness. There is no guarding.   PEG tube in RUQ in place, w/ some skin irritation surrounding tube   Genitourinary:   Genitourinary Comments: Michele in place draining yellow urine   Musculoskeletal: He exhibits edema (2+ pitting edema hands b/l, +2 LE edema).   Lymphadenopathy:     He has no cervical adenopathy.   Neurological: He is alert.   Sensation intact  Quadriplegic, frontalis muscle intact, some intact function of orbicularis oris muscle   Skin: No rash noted. No erythema.       Vents:     Lines/Drains/Airways     Drain                 Gastrostomy/Enterostomy 01/23/17 1113 Percutaneous endoscopic gastrostomy (PEG) LUQ feeding 414 days         Urethral Catheter 03/13/18 1441 Non-latex 20 Fr. less than 1 day          Peripheral Intravenous Line                  Peripheral IV - Single Lumen 03/13/18 1412 Left Antecubital less than 1 day         Peripheral IV - Single Lumen 03/14/18 0300 Right Antecubital less than 1 day              Significant Labs:    CBC/Anemia Profile:    Recent Labs  Lab 03/13/18  1413 03/14/18  0300   WBC 12.50 9.95   HGB 11.0* 9.9*   HCT 33.0* 29.5*    211   MCV 87 88   RDW 14.2 14.0        Chemistries:    Recent Labs  Lab 03/13/18  1412 03/14/18  0300     --    K 4.0  --    CL 98  --    CO2 30*  --    BUN 9  --    CREATININE 0.4*  --    CALCIUM 9.9  --    ALBUMIN 3.2*  --    PROT 6.9  --    BILITOT 1.0  --    ALKPHOS 98  --    ALT 86*  --    AST 35  --    MG 2.1 2.0   PHOS 1.7* 2.2*       Blood Culture:   Recent Labs  Lab 03/13/18  1508   LABBLOO No Growth to date  No Growth to date     CMP:   Recent Labs  Lab 03/13/18  1412      K 4.0   CL 98   CO2 30*   *   BUN 9   CREATININE 0.4*   CALCIUM 9.9   PROT 6.9   ALBUMIN 3.2*   BILITOT 1.0   ALKPHOS 98   AST 35   ALT 86*   ANIONGAP 9   EGFRNONAA >60     Coagulation:   Recent Labs  Lab 03/13/18  1412   INR 1.1   APTT 29.9     Lactic Acid:   Recent Labs  Lab 03/13/18  1412 03/13/18  1830 03/14/18  0300   LACTATE 1.3 0.6 1.0     Respiratory Culture:   Recent Labs  Lab 03/13/18  1455   GSRESP >10 epithelial cells per low power field  Many WBC's  Many Gram negative rods  Moderate Gram positive cocci     Urine Culture: No results for input(s): LABURIN in the last 48 hours.  Urine Studies:   Recent Labs  Lab 03/13/18  1600   COLORU Yellow   APPEARANCEUA Cloudy*   PHUR >8.0*   SPECGRAV 1.015   PROTEINUA 1+*   GLUCUA Negative   KETONESU Negative   BILIRUBINUA Negative   OCCULTUA 3+*   NITRITE Negative   UROBILINOGEN 1.0   LEUKOCYTESUR 2+*   RBCUA 40*   WBCUA 15*   BACTERIA Moderate*   SQUAMEPITHEL 5   HYALINECASTS 0       Significant Imaging:  CXR: I have reviewed all pertinent results/findings within the past 24 hours and my personal findings are:  OSH CXR concerning for L lower  lobe consolidation

## 2018-03-14 NOTE — ASSESSMENT & PLAN NOTE
?hx CHF, however, TTE 2015 EF 60-65%, no diastolic dysfunction  No evidence of kidney injury  Started on lasix 80mg bid (due to latex allergy)  F/u TTE  F/u BNP

## 2018-03-14 NOTE — PROGRESS NOTES
Patient transferred to [facility] for tracheostomy procedure (ALS complication).Patient is currently using a trilogy vent for mechanical support via nasal mask.No further intervention needed @ this time.

## 2018-03-14 NOTE — NURSING
Dr Fuentes at [facility] - pt is finally sleeping.  Dr Fuentes instructed this RN to leave lights out andlet pt sleep for a few hours if possible.  Instructed RN to delay all meds that were not absolutely as well as any labs/care until pt has a chance to sleep for a few hours. Orders modify as documented.

## 2018-03-14 NOTE — CONSULTS
Single lumen 18 x 10 midline placed to right cephalic vein.  Max dwell date 4/12/18. Lot# [id number]. Needle advanced using realtime ultrasound guidance. Image recorded and saved.

## 2018-03-14 NOTE — NURSING
Dr. Marie notified that pt has become reddened in the face, upper arms, chest and pale red tones to bilat legs (no real tone change to abdomen.)  Reviewed meds given with Dr Marie and then Dr Sullivan.  Informed MDs that pt is to receive the next dose of Vanc at 1730.  Also discussed 4.3L urine pt has produced since 0700.  Dr Marie will address with Dr Sullivan and let this RN know if both the vanc and the lasix is to be given at 1730 and 1800 respectfully.

## 2018-03-14 NOTE — ED NOTES
Family is aware that [facility] called to give an update that they are unsure when bed will be available.

## 2018-03-14 NOTE — NURSING
[facility] Connected to bedside monitor - cardiac monitoring and continuous pulse oximetry applied. Call bell within reach, side rails raised x 2, bed locked and in lowest position. Primary service notified of patient arrival. Patient in no acute distress. Will continue to monitor.

## 2018-03-14 NOTE — PLAN OF CARE
Scott Puckett Jr, MD  [address] [facility] [address]    [facility] - [place], [place] - [address] AT [facility] [address]  [address]  [place] [address]  Phone: [phone] Fax: [phone]    [facility] - [place], [place] - [address] AT [facility] [address]  [address]  [place] [address]  Phone: [phone] Fax: [phone]    Payor: MEDICARE / Plan: MEDICARE PART A & B / Product Type: Government /     No future appointments.     Extended Emergency Contact Information  Primary Emergency Contact: Perlita Delgado  Address: [address]           [place] [address] [place]  Mobile Phone: [phone]  Relation: Spouse  Secondary Emergency Contact: Alberto Delgado   [facility]  Home Phone: [phone]  Relation: Brother       03/14/18 1347   Discharge Assessment   Assessment Type Discharge Planning Assessment   Confirmed/corrected address and phone number on facesheet? Yes   Assessment information obtained from? Medical Record;Caregiver   Expected Length of Stay (days) 4   Communicated expected length of stay with patient/caregiver no   Prior to hospitilization cognitive status: Alert/Oriented   Prior to hospitalization functional status: Assistive Equipment;Needs Assistance;Wheelchair Bound   Current cognitive status: Alert/Oriented   Current Functional Status: Assistive Equipment;Needs Assistance;Wheelchair Bound   Facility Arrived From: [facility]   Lives With child(kermit), dependent;spouse   Able to Return to Prior Arrangements unable to determine at this time (comments)   Is patient able to care for self after discharge? No   Who are your caregiver(s) and their phone number(s)? Perlita Delgado (Spouse) [phone]   Patient's perception of discharge disposition home health;hospice/home   Readmission Within The Last 30 Days no previous admission in last 30 days   Patient currently being followed by  outpatient case management? No   Patient currently receives any other outside agency services? Yes   Equipment Currently Used at Home lift device;power chair;shower chair;ventilator   Do you have any problems affording any of your prescribed medications? No   Is the patient taking medications as prescribed? yes   Does the patient have transportation home? Yes   Transportation Available family or friend will provide   Does the patient receive services at the [facility]? No   Discharge Plan A Home with family;Home Health   Discharge Plan B Hospice/home   Patient/Family In Agreement With Plan unable to assess   Zakiya Lopez RN, BSN  Case Management  [facility]  Ext. [phone]

## 2018-03-14 NOTE — HPI
48 y/o M w/ Afib, ALS (diagnosed 2014) who presented to [facility] s/p transfer from [facility] via EMS on BiPAP 2/2 worsening respiratory failure. Per brother patient had been on home hospice for progression of his ALS, however, on 3/12 brother states patient experienced an episode of hypoxia as pt became more tachypneic w/ cyanosis surrounding lips. Per brother patient had a fever around this time as well. This episode resolved by 3/13 and at this time hospice agency re-visited patient's decision to explore life sustaining measures such as tracheostomy. According to brother, patient decided he would undergo trach placement, he was transported by EMS to [facility] where he was noted to have L lower lobe consolidation on CXR. He was started on vanc/ aztreonam due to penicillin allergy.and was subsequently transferred to [facility]. Initially patient had refused tracheostomy after being evaluated by ENT (Dr. Horner) in 2016.   Per brother patient's wife was unhappy w/ patient's decision to proceed w/ trach placement.   Patient communicates mostly by shaking his head to answer yes/ no. He is dependent on his trilogy machine for ventilation.  History obtained from brother at bedside as hx difficult to obtain from patient (nonverbal secondary to ALS).

## 2018-03-14 NOTE — ED NOTES
Family and patient are aware that [facility] does not currently have a bed for patient and he will be kept in the ER until a bed available.

## 2018-03-14 NOTE — H&P
[facility]  Critical Care Medicine  History & Physical    Patient Name: Bayron Delgado  MRN: [id number]  Admission Date: 3/14/2018  Hospital Length of Stay: 0 days  Code Status: Full Code  Attending Physician: Kike Fuentes MD   Primary Care Provider: Scott Puckett Jr, MD   Principal Problem: Respiratory failure    Subjective:     HPI:  48 y/o M w/ Afib, ALS (diagnosed 2014) who [place] [facility] [place] transfer from [facility] via EMS on BiPAP 2/2 worsening respiratory failure. Per brother patient had been on home hospice for progression of his ALS, however, on 3/12 brother states patient experienced an episode of hypoxia as pt became more tachypneic w/ cyanosis surrounding lips. Per brother patient had a fever around this time as well. This episode resolved by 3/13 and at this time hospice agency re-visited patient's decision to explore life sustaining measures such as tracheostomy. According to brother, patient decided he would undergo trach placement, he was transported by EMS to [facility] where he was noted to have L lower lobe consolidation on CXR. He was started on vanc/ aztreonam due to penicillin allergy.and was subsequently transferred to [facility]. Initially patient had refused tracheostomy after being evaluated by ENT (Dr. Horner) in 2016.   Per brother patient's wife was unhappy w/ patient's decision to proceed w/ trach placement.   Patient communicates mostly by shaking his head to answer yes/ no. He is dependent on his trilogy machine for ventilation.  History obtained from brother at bedside as hx difficult to obtain from patient (nonverbal secondary to ALS).    Hospital/ICU Course:  No notes on file     Past Medical History:   Diagnosis Date    ALS (amyotrophic lateral sclerosis)     Atrial fibrillation     CHF (congestive heart failure)     Neuropathy        Past Surgical History:   Procedure Laterality Date    APPENDECTOMY      HERNIA REPAIR         Review of patient's  allergies indicates:   Allergen Reactions    Latex, natural rubber     Nsaids (non-steroidal anti-inflammatory drug)      Causes patient to go into afib per wife    Penicillins Other (See Comments)     Per wife- his mother stated he was allergic to it       Family History     None        Social History Main Topics    Smoking status: Never Smoker    Smokeless tobacco: Not on file    Alcohol use No    Drug use: No    Sexual activity: Yes     Partners: Female      Review of Systems  Objective:     Vital Signs (Most Recent):  Temp: 99.2 °F (37.3 °C) (03/14/18 0300)  Pulse: 79 (03/14/18 0500)  Resp: 19 (03/14/18 0500)  BP: (!) 183/87 (03/14/18 0500)  SpO2: 100 % (03/14/18 0500) Vital Signs (24h Range):  Temp:  [99.2 °F (37.3 °C)-99.5 °F (37.5 °C)] 99.2 °F (37.3 °C)  Pulse:  [] 79  Resp:  [17-22] 19  SpO2:  [94 %-100 %] 100 %  BP: (117-183)/(70-87) 183/87   Weight: 105 kg (231 lb 7.7 oz)  Body mass index is 33.21 kg/m².      Intake/Output Summary (Last 24 hours) at 03/14/18 0534  Last data filed at 03/14/18 0500   Gross per 24 hour   Intake              250 ml   Output             1715 ml   Net            -1465 ml       Physical Exam  Constitutional: No distress.   HENT:   Head: Normocephalic and atraumatic.   Eyes: Conjunctivae are normal. No scleral icterus.   Neck: Neck supple.   Cardiovascular: Normal rate and intact distal pulses.  Exam reveals no friction rub.    No murmur heard.  Pulmonary/Chest: No respiratory distress. He has no wheezes.   Trilogy nasal cannula in place   Abdominal: Soft. He exhibits no distension. There is no tenderness. There is no guarding.   PEG tube in RUQ in place, w/ some skin irritation surrounding tube   Genitourinary:   Genitourinary Comments: Michele in place draining yellow urine   Musculoskeletal: He exhibits edema (2+ pitting edema hands b/l, +2 LE edema).   Lymphadenopathy:     He has no cervical adenopathy.   Neurological: He is alert.   Sensation intact  Quadriplegic,  frontalis muscle intact, some intact function of orbicularis oris muscle   Skin: No rash noted. No erythema.   Vents:     Lines/Drains/Airways     Drain                 Gastrostomy/Enterostomy 01/23/17 1113 Percutaneous endoscopic gastrostomy (PEG) LUQ feeding 414 days         Urethral Catheter 03/13/18 1441 Non-latex 20 Fr. less than 1 day          Peripheral Intravenous Line                 Peripheral IV - Single Lumen 03/13/18 1412 Left Antecubital less than 1 day         Peripheral IV - Single Lumen 03/14/18 0300 Right Antecubital less than 1 day              Significant Labs:    CBC/Anemia Profile:    Recent Labs  Lab 03/13/18  1413 03/14/18  0300   WBC 12.50 9.95   HGB 11.0* 9.9*   HCT 33.0* 29.5*    211   MCV 87 88   RDW 14.2 14.0        Chemistries:    Recent Labs  Lab 03/13/18  1412 03/14/18  0300     --    K 4.0  --    CL 98  --    CO2 30*  --    BUN 9  --    CREATININE 0.4*  --    CALCIUM 9.9  --    ALBUMIN 3.2*  --    PROT 6.9  --    BILITOT 1.0  --    ALKPHOS 98  --    ALT 86*  --    AST 35  --    MG 2.1 2.0   PHOS 1.7* 2.2*       Blood Culture:   Recent Labs  Lab 03/13/18  1508   LABBLOO No Growth to date  No Growth to date     CMP:   Recent Labs  Lab 03/13/18  1412      K 4.0   CL 98   CO2 30*   *   BUN 9   CREATININE 0.4*   CALCIUM 9.9   PROT 6.9   ALBUMIN 3.2*   BILITOT 1.0   ALKPHOS 98   AST 35   ALT 86*   ANIONGAP 9   EGFRNONAA >60     Coagulation:   Recent Labs  Lab 03/13/18  1412   INR 1.1   APTT 29.9     Lactic Acid:   Recent Labs  Lab 03/13/18  1412 03/13/18  1830 03/14/18  0300   LACTATE 1.3 0.6 1.0     Respiratory Culture:   Recent Labs  Lab 03/13/18  1455   GSRESP >10 epithelial cells per low power field  Many WBC's  Many Gram negative rods  Moderate Gram positive cocci     Urine Culture: No results for input(s): LABURIN in the last 48 hours.  Urine Studies:   Recent Labs  Lab 03/13/18  1600   COLORU Yellow   APPEARANCEUA Cloudy*   PHUR >8.0*   SPECGRAV 1.015    PROTEINUA 1+*   GLUCUA Negative   KETONESU Negative   BILIRUBINUA Negative   OCCULTUA 3+*   NITRITE Negative   UROBILINOGEN 1.0   LEUKOCYTESUR 2+*   RBCUA 40*   WBCUA 15*   BACTERIA Moderate*   SQUAMEPITHEL 5   HYALINECASTS 0       Significant Imaging: CXR: I have reviewed all pertinent results/findings within the past 24 hours and my personal findings are:  OSH CXR concerning for L lower lobe PNA    Assessment/Plan:     Neuro   ALS (amyotrophic lateral sclerosis)    C/w home trilogy machine  Cough assist device bid  C/w chronic munoz, munoz changed at OSH yesterday per brother        Psychiatric   Depression    C/w home cymbalta        Pulmonary   * Respiratory failure    CXR at OSH concerning for possible L lower lobe asp PNA   Given 1 L NS, 1750mg vanc x 1, aztreonam 2g x 1 at OSH  Patient at high risk of asp given progression of his ALS  On 5 L on trilogy machine, wean O2 as tolerated  C/w vanc and started on cefepime and flagyl  Hx penicillin allergy, however, patient has received rocephin in the past  F/u vanc trough  F/u procalcitonin, lactate, blood cx (from OSH), urine cx (OSH), sputum cx (given nebulizer to induce sample)  UA at OSH positive, however, patient w/ chronic indwelling munoz therefore difficult to interpret (also unsure how sample was collected)  Per brother family conflict regarding patient's decision to undergo tracheostomy as wife is not agreeable to this (per brother)        Cardiac/Vascular   Atrial fibrillation    [id number] 2  C/w home coreg  Not on any anti-coagulation        Other   Anasarca    ?hx CHF, however, TTE 2015 EF 60-65%, no diastolic dysfunction  No evidence of kidney injury  Started on lasix 80mg bid (due to latex allergy)  F/u TTE  F/u BNP            Critical Care Daily Checklist:    A: Awake: RASS Goal/Actual Goal:    Actual: Paige Agitation Sedation Scale (RASS): Alert and calm   B: Spontaneous Breathing Trial Performed?     C: SAT & SBT Coordinated?            D:  Delirium: CAM-ICU Overall CAM-ICU: Negative   E: Early Mobility Performed? No   F: Feeding Goal:    Status:     Current Diet Order   Procedures    Diet NPO      AS: Analgesia/Sedation Tylenol, norco   T: Thromboembolic Prophylaxis Lovenox pending plans for trach   H: HOB > 300 Yes   U: Stress Ulcer Prophylaxis (if needed)    G: Glucose Control    B: Bowel Function Stool Occurrence: 1   I: Indwelling Catheter (Lines & Munoz) Necessity Chronic munoz   D: De-escalation of Antimicrobials/Pharmacotherapies Vanc/ cefepime    Plan for the day/ETD Discuss w/ patient and family tracheostomy    Code Status:  Family/Goals of Care: Full Code         Critical secondary to Patient has a condition that poses threat to life and bodily function: ALS dependent on trilogy     Critical care was time spent personally by me on the following activities: development of treatment plan with patient or surrogate and bedside caregivers, discussions with consultants, evaluation of patient's response to treatment, examination of patient, ordering and performing treatments and interventions, ordering and review of laboratory studies, ordering and review of radiographic studies, pulse oximetry, re-evaluation of patient's condition. This critical care time did not overlap with that of any other provider or involve time for any procedures.     Derrick Nye MD  Critical Care Medicine  [facility]

## 2018-03-14 NOTE — SUBJECTIVE & OBJECTIVE
Past Medical History:   Diagnosis Date    ALS (amyotrophic lateral sclerosis)     Atrial fibrillation     CHF (congestive heart failure)     Neuropathy        Past Surgical History:   Procedure Laterality Date    APPENDECTOMY      HERNIA REPAIR         Review of patient's allergies indicates:   Allergen Reactions    Latex, natural rubber     Nsaids (non-steroidal anti-inflammatory drug)      Causes patient to go into afib per wife    Penicillins Other (See Comments)     Per wife- his mother stated he was allergic to it       Family History     None        Social History Main Topics    Smoking status: Never Smoker    Smokeless tobacco: Not on file    Alcohol use No    Drug use: No    Sexual activity: Yes     Partners: Female      Review of Systems  Objective:     Vital Signs (Most Recent):  Temp: 99.2 °F (37.3 °C) (03/14/18 0300)  Pulse: 79 (03/14/18 0500)  Resp: 19 (03/14/18 0500)  BP: (!) 183/87 (03/14/18 0500)  SpO2: 100 % (03/14/18 0500) Vital Signs (24h Range):  Temp:  [99.2 °F (37.3 °C)-99.5 °F (37.5 °C)] 99.2 °F (37.3 °C)  Pulse:  [] 79  Resp:  [17-22] 19  SpO2:  [94 %-100 %] 100 %  BP: (117-183)/(70-87) 183/87   Weight: 105 kg (231 lb 7.7 oz)  Body mass index is 33.21 kg/m².      Intake/Output Summary (Last 24 hours) at 03/14/18 0534  Last data filed at 03/14/18 0500   Gross per 24 hour   Intake              250 ml   Output             1715 ml   Net            -1465 ml       Physical Exam  Constitutional: No distress.   HENT:   Head: Normocephalic and atraumatic.   Eyes: Conjunctivae are normal. No scleral icterus.   Neck: Neck supple.   Cardiovascular: Normal rate and intact distal pulses.  Exam reveals no friction rub.    No murmur heard.  Pulmonary/Chest: No respiratory distress. He has no wheezes.   Trilogy nasal cannula in place   Abdominal: Soft. He exhibits no distension. There is no tenderness. There is no guarding.   PEG tube in RUQ in place, w/ some skin irritation surrounding  tube   Genitourinary:   Genitourinary Comments: Michele in place draining yellow urine   Musculoskeletal: He exhibits edema (2+ pitting edema hands b/l, +2 LE edema).   Lymphadenopathy:     He has no cervical adenopathy.   Neurological: He is alert.   Sensation intact  Quadriplegic, frontalis muscle intact, some intact function of orbicularis oris muscle   Skin: No rash noted. No erythema.   Vents:     Lines/Drains/Airways     Drain                 Gastrostomy/Enterostomy 01/23/17 1113 Percutaneous endoscopic gastrostomy (PEG) LUQ feeding 414 days         Urethral Catheter 03/13/18 1441 Non-latex 20 Fr. less than 1 day          Peripheral Intravenous Line                 Peripheral IV - Single Lumen 03/13/18 1412 Left Antecubital less than 1 day         Peripheral IV - Single Lumen 03/14/18 0300 Right Antecubital less than 1 day              Significant Labs:    CBC/Anemia Profile:    Recent Labs  Lab 03/13/18  1413 03/14/18  0300   WBC 12.50 9.95   HGB 11.0* 9.9*   HCT 33.0* 29.5*    211   MCV 87 88   RDW 14.2 14.0        Chemistries:    Recent Labs  Lab 03/13/18  1412 03/14/18  0300     --    K 4.0  --    CL 98  --    CO2 30*  --    BUN 9  --    CREATININE 0.4*  --    CALCIUM 9.9  --    ALBUMIN 3.2*  --    PROT 6.9  --    BILITOT 1.0  --    ALKPHOS 98  --    ALT 86*  --    AST 35  --    MG 2.1 2.0   PHOS 1.7* 2.2*       Blood Culture:   Recent Labs  Lab 03/13/18  1508   LABBLOO No Growth to date  No Growth to date     CMP:   Recent Labs  Lab 03/13/18  1412      K 4.0   CL 98   CO2 30*   *   BUN 9   CREATININE 0.4*   CALCIUM 9.9   PROT 6.9   ALBUMIN 3.2*   BILITOT 1.0   ALKPHOS 98   AST 35   ALT 86*   ANIONGAP 9   EGFRNONAA >60     Coagulation:   Recent Labs  Lab 03/13/18  1412   INR 1.1   APTT 29.9     Lactic Acid:   Recent Labs  Lab 03/13/18  1412 03/13/18  1830 03/14/18  0300   LACTATE 1.3 0.6 1.0     Respiratory Culture:   Recent Labs  Lab 03/13/18  1455   GSRESP >10 epithelial cells  per low power field  Many WBC's  Many Gram negative rods  Moderate Gram positive cocci     Urine Culture: No results for input(s): LABURIN in the last 48 hours.  Urine Studies:   Recent Labs  Lab 03/13/18  1600   COLORU Yellow   APPEARANCEUA Cloudy*   PHUR >8.0*   SPECGRAV 1.015   PROTEINUA 1+*   GLUCUA Negative   KETONESU Negative   BILIRUBINUA Negative   OCCULTUA 3+*   NITRITE Negative   UROBILINOGEN 1.0   LEUKOCYTESUR 2+*   RBCUA 40*   WBCUA 15*   BACTERIA Moderate*   SQUAMEPITHEL 5   HYALINECASTS 0       Significant Imaging: CXR: I have reviewed all pertinent results/findings within the past 24 hours and my personal findings are:  OSH CXR concerning for L lower lobe PNA

## 2018-03-15 PROBLEM — N30.00 ACUTE CYSTITIS WITHOUT HEMATURIA: Status: ACTIVE | Noted: 2018-03-15

## 2018-03-15 PROBLEM — F41.9 ANXIETY: Status: ACTIVE | Noted: 2018-03-15

## 2018-03-15 LAB
ABO + RH BLD: NORMAL
ALBUMIN SERPL BCP-MCNC: 3.1 G/DL
ALP SERPL-CCNC: 89 U/L
ALT SERPL W/O P-5'-P-CCNC: 64 U/L
ANION GAP SERPL CALC-SCNC: 10 MMOL/L
AST SERPL-CCNC: 25 U/L
BASOPHILS # BLD AUTO: 0.06 K/UL
BASOPHILS NFR BLD: 0.7 %
BILIRUB SERPL-MCNC: 0.6 MG/DL
BLD GP AB SCN CELLS X3 SERPL QL: NORMAL
BUN SERPL-MCNC: 12 MG/DL
CALCIUM SERPL-MCNC: 9.6 MG/DL
CHLORIDE SERPL-SCNC: 102 MMOL/L
CO2 SERPL-SCNC: 33 MMOL/L
CREAT SERPL-MCNC: 0.4 MG/DL
DIFFERENTIAL METHOD: ABNORMAL
EOSINOPHIL # BLD AUTO: 0.1 K/UL
EOSINOPHIL NFR BLD: 1.2 %
ERYTHROCYTE [DISTWIDTH] IN BLOOD BY AUTOMATED COUNT: 13.7 %
EST. GFR  (AFRICAN AMERICAN): >60 ML/MIN/1.73 M^2
EST. GFR  (NON AFRICAN AMERICAN): >60 ML/MIN/1.73 M^2
GLUCOSE SERPL-MCNC: 156 MG/DL
HCT VFR BLD AUTO: 29.1 %
HGB BLD-MCNC: 9.6 G/DL
IMM GRANULOCYTES # BLD AUTO: 0.07 K/UL
IMM GRANULOCYTES NFR BLD AUTO: 0.9 %
LYMPHOCYTES # BLD AUTO: 1.2 K/UL
LYMPHOCYTES NFR BLD: 14.8 %
MAGNESIUM SERPL-MCNC: 2.1 MG/DL
MCH RBC QN AUTO: 28.7 PG
MCHC RBC AUTO-ENTMCNC: 33 G/DL
MCV RBC AUTO: 87 FL
MONOCYTES # BLD AUTO: 0.9 K/UL
MONOCYTES NFR BLD: 10.7 %
NEUTROPHILS # BLD AUTO: 5.9 K/UL
NEUTROPHILS NFR BLD: 71.7 %
NRBC BLD-RTO: 0 /100 WBC
PHOSPHATE SERPL-MCNC: 2.6 MG/DL
PLATELET # BLD AUTO: 235 K/UL
PMV BLD AUTO: 9.9 FL
POCT GLUCOSE: 139 MG/DL (ref 70–110)
POCT GLUCOSE: 156 MG/DL (ref 70–110)
POCT GLUCOSE: 162 MG/DL (ref 70–110)
POCT GLUCOSE: 178 MG/DL (ref 70–110)
POTASSIUM SERPL-SCNC: 3.4 MMOL/L
POTASSIUM SERPL-SCNC: 3.6 MMOL/L
PROT SERPL-MCNC: 6.6 G/DL
RBC # BLD AUTO: 3.35 M/UL
SODIUM SERPL-SCNC: 145 MMOL/L
VANCOMYCIN TROUGH SERPL-MCNC: 11.3 UG/ML
WBC # BLD AUTO: 8.2 K/UL

## 2018-03-15 PROCEDURE — 25000003 PHARM REV CODE 250: Performed by: INTERNAL MEDICINE

## 2018-03-15 PROCEDURE — 0BH18EZ INSERTION OF ENDOTRACHEAL AIRWAY INTO TRACHEA, VIA NATURAL OR ARTIFICIAL OPENING ENDOSCOPIC: ICD-10-PCS | Performed by: INTERNAL MEDICINE

## 2018-03-15 PROCEDURE — 99900026 HC AIRWAY MAINTENANCE (STAT)

## 2018-03-15 PROCEDURE — 87070 CULTURE OTHR SPECIMN AEROBIC: CPT

## 2018-03-15 PROCEDURE — S0028 INJECTION, FAMOTIDINE, 20 MG: HCPCS | Performed by: INTERNAL MEDICINE

## 2018-03-15 PROCEDURE — 63600175 PHARM REV CODE 636 W HCPCS: Performed by: INTERNAL MEDICINE

## 2018-03-15 PROCEDURE — 80053 COMPREHEN METABOLIC PANEL: CPT

## 2018-03-15 PROCEDURE — S0073 INJECTION, AZTREONAM, 500 MG: HCPCS | Performed by: STUDENT IN AN ORGANIZED HEALTH CARE EDUCATION/TRAINING PROGRAM

## 2018-03-15 PROCEDURE — 84100 ASSAY OF PHOSPHORUS: CPT

## 2018-03-15 PROCEDURE — 99900035 HC TECH TIME PER 15 MIN (STAT)

## 2018-03-15 PROCEDURE — 63600175 PHARM REV CODE 636 W HCPCS: Performed by: STUDENT IN AN ORGANIZED HEALTH CARE EDUCATION/TRAINING PROGRAM

## 2018-03-15 PROCEDURE — 87205 SMEAR GRAM STAIN: CPT

## 2018-03-15 PROCEDURE — 32551 INSERTION OF CHEST TUBE: CPT

## 2018-03-15 PROCEDURE — 80202 ASSAY OF VANCOMYCIN: CPT

## 2018-03-15 PROCEDURE — 25000003 PHARM REV CODE 250: Performed by: STUDENT IN AN ORGANIZED HEALTH CARE EDUCATION/TRAINING PROGRAM

## 2018-03-15 PROCEDURE — 31500 INSERT EMERGENCY AIRWAY: CPT | Mod: GC,,, | Performed by: INTERNAL MEDICINE

## 2018-03-15 PROCEDURE — 31500 INSERT EMERGENCY AIRWAY: CPT

## 2018-03-15 PROCEDURE — 83735 ASSAY OF MAGNESIUM: CPT

## 2018-03-15 PROCEDURE — 27000221 HC OXYGEN, UP TO 24 HOURS

## 2018-03-15 PROCEDURE — 99291 CRITICAL CARE FIRST HOUR: CPT | Mod: 25,,, | Performed by: INTERNAL MEDICINE

## 2018-03-15 PROCEDURE — 0W9930Z DRAINAGE OF RIGHT PLEURAL CAVITY WITH DRAINAGE DEVICE, PERCUTANEOUS APPROACH: ICD-10-PCS | Performed by: INTERNAL MEDICINE

## 2018-03-15 PROCEDURE — 27201073 HC S PNEUMOTHORAX SET

## 2018-03-15 PROCEDURE — 20000000 HC ICU ROOM

## 2018-03-15 PROCEDURE — 84132 ASSAY OF SERUM POTASSIUM: CPT

## 2018-03-15 PROCEDURE — 5A1955Z RESPIRATORY VENTILATION, GREATER THAN 96 CONSECUTIVE HOURS: ICD-10-PCS | Performed by: INTERNAL MEDICINE

## 2018-03-15 PROCEDURE — 85025 COMPLETE CBC W/AUTO DIFF WBC: CPT

## 2018-03-15 PROCEDURE — 63600175 PHARM REV CODE 636 W HCPCS

## 2018-03-15 PROCEDURE — 94761 N-INVAS EAR/PLS OXIMETRY MLT: CPT

## 2018-03-15 PROCEDURE — 94002 VENT MGMT INPAT INIT DAY: CPT

## 2018-03-15 PROCEDURE — 27201992 HC TUBE, CHEST W/ VALVE

## 2018-03-15 PROCEDURE — 86901 BLOOD TYPING SEROLOGIC RH(D): CPT

## 2018-03-15 RX ORDER — CHLORHEXIDINE GLUCONATE ORAL RINSE 1.2 MG/ML
15 SOLUTION DENTAL 2 TIMES DAILY
Status: DISCONTINUED | OUTPATIENT
Start: 2018-03-15 | End: 2018-03-23 | Stop reason: HOSPADM

## 2018-03-15 RX ORDER — POTASSIUM CHLORIDE 20 MEQ/15ML
40 SOLUTION ORAL ONCE
Status: COMPLETED | OUTPATIENT
Start: 2018-03-15 | End: 2018-03-15

## 2018-03-15 RX ORDER — FAMOTIDINE 10 MG/ML
20 INJECTION INTRAVENOUS 2 TIMES DAILY
Status: DISCONTINUED | OUTPATIENT
Start: 2018-03-15 | End: 2018-03-16

## 2018-03-15 RX ORDER — ETOMIDATE 2 MG/ML
20 INJECTION INTRAVENOUS ONCE
Status: COMPLETED | OUTPATIENT
Start: 2018-03-15 | End: 2018-03-15

## 2018-03-15 RX ORDER — FUROSEMIDE 40 MG/1
80 TABLET ORAL DAILY
Status: COMPLETED | OUTPATIENT
Start: 2018-03-15 | End: 2018-03-16

## 2018-03-15 RX ORDER — PROPOFOL 10 MG/ML
5 INJECTION, EMULSION INTRAVENOUS CONTINUOUS
Status: DISCONTINUED | OUTPATIENT
Start: 2018-03-15 | End: 2018-03-20

## 2018-03-15 RX ORDER — FENTANYL CITRATE 50 UG/ML
50 INJECTION, SOLUTION INTRAMUSCULAR; INTRAVENOUS EVERY 30 MIN PRN
Status: DISCONTINUED | OUTPATIENT
Start: 2018-03-15 | End: 2018-03-15

## 2018-03-15 RX ORDER — LORAZEPAM 2 MG/ML
2 INJECTION INTRAMUSCULAR ONCE
Status: COMPLETED | OUTPATIENT
Start: 2018-03-15 | End: 2018-03-15

## 2018-03-15 RX ORDER — FENTANYL CITRATE-0.9 % NACL/PF 10 MCG/ML
25 PLASTIC BAG, INJECTION (ML) INTRAVENOUS CONTINUOUS
Status: DISCONTINUED | OUTPATIENT
Start: 2018-03-15 | End: 2018-03-15

## 2018-03-15 RX ORDER — FENTANYL CITRATE-0.9 % NACL/PF 10 MCG/ML
PLASTIC BAG, INJECTION (ML) INTRAVENOUS CONTINUOUS
Status: DISCONTINUED | OUTPATIENT
Start: 2018-03-15 | End: 2018-03-20

## 2018-03-15 RX ORDER — ROCURONIUM BROMIDE 10 MG/ML
100 INJECTION, SOLUTION INTRAVENOUS ONCE
Status: COMPLETED | OUTPATIENT
Start: 2018-03-15 | End: 2018-03-15

## 2018-03-15 RX ORDER — GLYCOPYRROLATE 1 MG/5ML
0.3 SOLUTION ORAL 3 TIMES DAILY
Status: DISCONTINUED | OUTPATIENT
Start: 2018-03-15 | End: 2018-03-23 | Stop reason: HOSPADM

## 2018-03-15 RX ORDER — FENTANYL CITRATE 50 UG/ML
50 INJECTION, SOLUTION INTRAMUSCULAR; INTRAVENOUS
Status: DISCONTINUED | OUTPATIENT
Start: 2018-03-15 | End: 2018-03-15

## 2018-03-15 RX ORDER — LORAZEPAM 2 MG/ML
INJECTION INTRAMUSCULAR
Status: COMPLETED
Start: 2018-03-15 | End: 2018-03-15

## 2018-03-15 RX ADMIN — AZTREONAM 2000 MG: 1 INJECTION, POWDER, LYOPHILIZED, FOR SOLUTION INTRAMUSCULAR; INTRAVENOUS at 09:03

## 2018-03-15 RX ADMIN — POTASSIUM CHLORIDE 40 MEQ: 20 SOLUTION ORAL at 06:03

## 2018-03-15 RX ADMIN — FAMOTIDINE 20 MG: 10 INJECTION, SOLUTION INTRAVENOUS at 09:03

## 2018-03-15 RX ADMIN — PREGABALIN 100 MG: 50 CAPSULE ORAL at 09:03

## 2018-03-15 RX ADMIN — DOXEPIN HYDROCHLORIDE 75 MG: 75 CAPSULE ORAL at 09:03

## 2018-03-15 RX ADMIN — ACETAMINOPHEN 650 MG: 325 TABLET, FILM COATED ORAL at 04:03

## 2018-03-15 RX ADMIN — Medication 4 MG: at 05:03

## 2018-03-15 RX ADMIN — DIBASIC SODIUM PHOSPHATE, MONOBASIC POTASSIUM PHOSPHATE AND MONOBASIC SODIUM PHOSPHATE 2 TABLET: 852; 155; 130 TABLET ORAL at 09:03

## 2018-03-15 RX ADMIN — ENOXAPARIN SODIUM 40 MG: 100 INJECTION SUBCUTANEOUS at 04:03

## 2018-03-15 RX ADMIN — PROPOFOL 15 MCG/KG/MIN: 10 INJECTION, EMULSION INTRAVENOUS at 10:03

## 2018-03-15 RX ADMIN — AZTREONAM 2000 MG: 1 INJECTION, POWDER, LYOPHILIZED, FOR SOLUTION INTRAMUSCULAR; INTRAVENOUS at 04:03

## 2018-03-15 RX ADMIN — PROPOFOL 40 MCG/KG/MIN: 10 INJECTION, EMULSION INTRAVENOUS at 04:03

## 2018-03-15 RX ADMIN — PROPOFOL 40 MCG/KG/MIN: 10 INJECTION, EMULSION INTRAVENOUS at 10:03

## 2018-03-15 RX ADMIN — LORAZEPAM 2 MG: 2 INJECTION, SOLUTION INTRAMUSCULAR; INTRAVENOUS at 10:03

## 2018-03-15 RX ADMIN — VANCOMYCIN HYDROCHLORIDE 1500 MG: 1 INJECTION, POWDER, LYOPHILIZED, FOR SOLUTION INTRAVENOUS at 09:03

## 2018-03-15 RX ADMIN — HYDROXYZINE HYDROCHLORIDE 25 MG: 25 TABLET, FILM COATED ORAL at 12:03

## 2018-03-15 RX ADMIN — POLYETHYLENE GLYCOL 3350 17 G: 17 POWDER, FOR SOLUTION ORAL at 09:03

## 2018-03-15 RX ADMIN — AZTREONAM 2000 MG: 1 INJECTION, POWDER, LYOPHILIZED, FOR SOLUTION INTRAMUSCULAR; INTRAVENOUS at 12:03

## 2018-03-15 RX ADMIN — Medication 0.3 MG: at 03:03

## 2018-03-15 RX ADMIN — CLONAZEPAM 0.5 MG: 0.5 TABLET ORAL at 09:03

## 2018-03-15 RX ADMIN — Medication 4 MG: at 06:03

## 2018-03-15 RX ADMIN — PREGABALIN 100 MG: 50 CAPSULE ORAL at 03:03

## 2018-03-15 RX ADMIN — DIBASIC SODIUM PHOSPHATE, MONOBASIC POTASSIUM PHOSPHATE AND MONOBASIC SODIUM PHOSPHATE 2 TABLET: 852; 155; 130 TABLET ORAL at 05:03

## 2018-03-15 RX ADMIN — Medication 4 MG: at 12:03

## 2018-03-15 RX ADMIN — HYDROXYZINE HYDROCHLORIDE 25 MG: 25 TABLET, FILM COATED ORAL at 09:03

## 2018-03-15 RX ADMIN — FAMOTIDINE 20 MG: 10 INJECTION, SOLUTION INTRAVENOUS at 12:03

## 2018-03-15 RX ADMIN — HYDROCODONE BITARTRATE AND ACETAMINOPHEN 1 TABLET: 10; 325 TABLET ORAL at 09:03

## 2018-03-15 RX ADMIN — ETOMIDATE 20 MG: 2 INJECTION, SOLUTION INTRAVENOUS at 11:03

## 2018-03-15 RX ADMIN — VANCOMYCIN HYDROCHLORIDE 1500 MG: 1 INJECTION, POWDER, LYOPHILIZED, FOR SOLUTION INTRAVENOUS at 12:03

## 2018-03-15 RX ADMIN — ROCURONIUM BROMIDE 100 MG: 10 INJECTION, SOLUTION INTRAVENOUS at 10:03

## 2018-03-15 RX ADMIN — STANDARDIZED SENNA CONCENTRATE AND DOCUSATE SODIUM 2 TABLET: 8.6; 5 TABLET, FILM COATED ORAL at 09:03

## 2018-03-15 RX ADMIN — CARVEDILOL 3.12 MG: 3.12 TABLET, FILM COATED ORAL at 09:03

## 2018-03-15 RX ADMIN — DULOXETINE 60 MG: 30 CAPSULE, DELAYED RELEASE ORAL at 09:03

## 2018-03-15 RX ADMIN — Medication 0.3 MG: at 09:03

## 2018-03-15 RX ADMIN — LORAZEPAM 2 MG: 2 INJECTION INTRAMUSCULAR at 10:03

## 2018-03-15 RX ADMIN — DIPHENHYDRAMINE HYDROCHLORIDE 50 MG: 25 SOLUTION ORAL at 09:03

## 2018-03-15 RX ADMIN — DIBASIC SODIUM PHOSPHATE, MONOBASIC POTASSIUM PHOSPHATE AND MONOBASIC SODIUM PHOSPHATE 2 TABLET: 852; 155; 130 TABLET ORAL at 06:03

## 2018-03-15 RX ADMIN — HYDROXYZINE HYDROCHLORIDE 25 MG: 25 TABLET, FILM COATED ORAL at 04:03

## 2018-03-15 RX ADMIN — CHLORHEXIDINE GLUCONATE 15 ML: 1.2 RINSE ORAL at 09:03

## 2018-03-15 RX ADMIN — FUROSEMIDE 80 MG: 40 TABLET ORAL at 09:03

## 2018-03-15 RX ADMIN — CLONAZEPAM 0.5 MG: 0.5 TABLET ORAL at 03:03

## 2018-03-15 RX ADMIN — Medication 50 MCG: at 05:03

## 2018-03-15 RX ADMIN — DIBASIC SODIUM PHOSPHATE, MONOBASIC POTASSIUM PHOSPHATE AND MONOBASIC SODIUM PHOSPHATE 2 TABLET: 852; 155; 130 TABLET ORAL at 12:03

## 2018-03-15 RX ADMIN — CHLORHEXIDINE GLUCONATE 15 ML: 1.2 RINSE ORAL at 12:03

## 2018-03-15 RX ADMIN — LIDOCAINE 1 PATCH: 50 PATCH TOPICAL at 11:03

## 2018-03-15 NOTE — PROGRESS NOTES
Pt intubated with size 8.0 ETT secured 23 @ lip with commercial tube borrego.  Pt placed on ventilator 16/420/+8/100%.  Following intubation, pt's SpO2 dropped to 78%.  Pt was manually ventilated via ambu bag and lavaged with saline.  Multiple mucus plugs suctioned from ETT.  Will continue to monitor.

## 2018-03-15 NOTE — EICU
Called to bedside for emergent intubation.  Present are MDs, RNs, and RT.  Pt being bagged per MD with 100% FiO2, SaO2 45%.    1032 - SaO2 increased t 100%.  . B/P 183/90  1035 - etomidate 20 mg given IVP  1036 - rocuronium 100 mg given IVP  1037 - intubated under direct supervision of MD Fuentes with #8.0 ETT secured at 23 cm to lip.  Glidoscope used for intubation.  Color change noted to ETCO2 detector.  Bedside confirms bilat air exchange auscultated.  PCXR ordered per bedside team.  Pt tolerated procedure well.  1045 - saO2 noted low 80's.  MD at bedside - aware.  Auscultated with confirmation of bilat breath sounds.  Removed from vent and bagged with 100% FiO2 to ETT and suctioned with retrieval of copious amounts of secretions.    1047 - SaO2 100%. Placed to vent  1050 - SaO2 100% on vent  1055 - SaO2 99% on vent.

## 2018-03-15 NOTE — PLAN OF CARE
Problem: Patient Care Overview  Goal: Plan of Care Review  Outcome: Ongoing (interventions implemented as appropriate)  Pt is alert, able to nod appropriately. Bilateral SCDs and heel protectors on. No ulcer. Home BiPAP overnight, no respiratory issues noted, 02 sat 97%. Michele care performed, UOP ~ 1.6 L. Refused bath. No acute events throughout shift, POC: Goals of care discussion in am with family and possibility of trach placement. VS and assessment per flow sheet, patient progressing towards goals as tolerated, plan of care reviewed with Bayron Delgado and family, all concerns addressed, will continue to monitor.

## 2018-03-15 NOTE — SUBJECTIVE & OBJECTIVE
Interval History/Significant Events: No acute overnight events, afebrile. Yesterday patient noted to have diffuse erythema likely 2/2 medication given. Erythema resolved this Am. Urine cx positive for Enterococcus. Sputum sample w/ many gram positive cocci and rods, moderate gram negative however poor sample. Patient responded to lasix w/ >6L urine output over 24 hrs and improvement of edema.    Review of Systems   Unable to perform ROS: Other     Objective:     Vital Signs (Most Recent):  Temp: 99.4 °F (37.4 °C) (03/15/18 0705)  Pulse: 89 (03/15/18 0753)  Resp: 20 (03/15/18 0753)  BP: (!) 146/82 (03/15/18 0600)  SpO2: 95 % (03/15/18 0753) Vital Signs (24h Range):  Temp:  [98.2 °F (36.8 °C)-99.4 °F (37.4 °C)] 99.4 °F (37.4 °C)  Pulse:  [] 89  Resp:  [16-24] 20  SpO2:  [94 %-100 %] 95 %  BP: ()/(54-83) 146/82   Weight: 103.7 kg (228 lb 9.9 oz)  Body mass index is 32.8 kg/m².      Intake/Output Summary (Last 24 hours) at 03/15/18 0811  Last data filed at 03/15/18 0600   Gross per 24 hour   Intake              865 ml   Output             4840 ml   Net            -3975 ml       Physical Exam   Constitutional: No distress.   HENT:   Head: Normocephalic and atraumatic.   Eyes: Conjunctivae are normal. No scleral icterus.   Cardiovascular: Normal rate, regular rhythm and intact distal pulses.  Exam reveals no friction rub.    Pulmonary/Chest: No respiratory distress. He has no wheezes. He has no rales.   Nasal mask in place for trilogy   Abdominal: Soft. He exhibits no distension. There is no tenderness. There is no guarding.   PEG tube in place, w/ some skin irritation surrounding tube    Genitourinary:   Genitourinary Comments: Michele in place   Musculoskeletal: He exhibits edema (+1 b/l LE edema markedly improved, trace edema in hands b/l).   Neurological: He is alert. No sensory deficit.   Skin: Skin is warm. No rash noted. He is not diaphoretic.       Vents:  Oxygen Concentration (%): 28 (03/15/18  0753)  Lines/Drains/Airways     Drain                 Gastrostomy/Enterostomy 01/23/17 1113 Percutaneous endoscopic gastrostomy (PEG) LUQ feeding 415 days         Urethral Catheter 03/13/18 1441 Non-latex 20 Fr. 1 day          Peripheral Intravenous Line                 Peripheral IV - Single Lumen 03/13/18 1412 Left Antecubital 1 day         Peripheral IV - Single Lumen 03/14/18 0300 Right Antecubital 1 day         Midline Catheter Insertion/Assessment  - Single Lumen 03/14/18 1545 Right cephalic vein (lateral side of arm) 18g x 10cm less than 1 day              Significant Labs:    CBC/Anemia Profile:    Recent Labs  Lab 03/13/18  1413 03/14/18  0300 03/15/18  0327   WBC 12.50 9.95 8.20   HGB 11.0* 9.9* 9.6*   HCT 33.0* 29.5* 29.1*    211 235   MCV 87 88 87   RDW 14.2 14.0 13.7        Chemistries:    Recent Labs  Lab 03/13/18 1412 03/14/18  0300 03/14/18  1056 03/14/18  2138 03/15/18  0327    138 145 143 145   K 4.0 4.0 2.8* 3.7 3.6   CL 98 100 101 103 102   CO2 30* 26 34* 30* 33*   BUN 9 8 8 12 12   CREATININE 0.4* 0.4* 0.5 0.5 0.4*   CALCIUM 9.9 9.9 10.0 9.2 9.6   ALBUMIN 3.2* 3.1*  --   --  3.1*   PROT 6.9 6.8  --   --  6.6   BILITOT 1.0 0.8  --   --  0.6   ALKPHOS 98 95  --   --  89   ALT 86* 70*  --   --  64*   AST 35 30  --   --  25   MG 2.1 2.0 1.8  --  2.1   PHOS 1.7* 2.2* 2.9  --  2.6*       CMP:   Recent Labs  Lab 03/13/18  1412 03/14/18  0300 03/14/18  1056 03/14/18  2138 03/15/18  0327    138 145 143 145   K 4.0 4.0 2.8* 3.7 3.6   CL 98 100 101 103 102   CO2 30* 26 34* 30* 33*   * 187* 163* 167* 156*   BUN 9 8 8 12 12   CREATININE 0.4* 0.4* 0.5 0.5 0.4*   CALCIUM 9.9 9.9 10.0 9.2 9.6   PROT 6.9 6.8  --   --  6.6   ALBUMIN 3.2* 3.1*  --   --  3.1*   BILITOT 1.0 0.8  --   --  0.6   ALKPHOS 98 95  --   --  89   AST 35 30  --   --  25   ALT 86* 70*  --   --  64*   ANIONGAP 9 12 10 10 10   EGFRNONAA >60 >60.0 >60.0 >60.0 >60.0     POCT Glucose:   Recent Labs  Lab 03/14/18  1140  03/15/18  0118 03/15/18  0739   POCTGLUCOSE 183* 178* 156*     Urine Culture:   Recent Labs  Lab 03/13/18  1600   LABURIN ENTEROCOCCUS SPECIES>100,000 cfu/mlIdentification and susceptibility pending  GRAM NEGATIVE ROD50,000 - 99,999 cfu/mlIdentification and susceptibility pending     Urine Studies:   Recent Labs  Lab 03/13/18  1600   COLORU Yellow   APPEARANCEUA Cloudy*   PHUR >8.0*   SPECGRAV 1.015   PROTEINUA 1+*   GLUCUA Negative   KETONESU Negative   BILIRUBINUA Negative   OCCULTUA 3+*   NITRITE Negative   UROBILINOGEN 1.0   LEUKOCYTESUR 2+*   RBCUA 40*   WBCUA 15*   BACTERIA Moderate*   SQUAMEPITHEL 5   HYALINECASTS 0       Significant Imaging:  CXR: I have reviewed all pertinent results/findings within the past 24 hours and my personal findings are:  airspace consolidation in the left lower lung zone

## 2018-03-15 NOTE — PHYSICIAN QUERY
"PT Name: Bayron Delgado  MR #: [id number]    Physician Query Form - Heart  Condition Clarification     CDS/: Tiarra Musa RN CDI       Contact information: [email or web address]  This form is a permanent document in the medical record.     Query Date: March 15, 2018    By submitting this query, we are merely seeking further clarification of documentation. Please utilize your independent clinical judgment when addressing the question(s) below.    The medical record contains the following   Indicators     Supporting Clinical Findings Location in Medical Record   X BNP 33 Labs 3/14   X EF 65 - 70% Echo 3/14   X Radiology findings Small amount of pleural fluid blunts the right costophrenic sulcus.  No pneumothorax. Chest xray 3/14   X Echo Results   1 - Normal left ventricular systolic function (EF 65-70%).     2 - Concentric remodeling.     3 - No wall motion abnormalities.     4 - Normal left ventricular diastolic function.     5 - Normal right ventricular systolic function .     6 - Trivial to mild tricuspid regurgitation.     7 - Trivial pulmonic regurgitation.     8 - The estimated PA systolic pressure is greater than 34 mmHg.    Echo 3/14    "Ascites" documented     X "SOB" or "HAGER" documented NIV dependent - stable with oxygen supplementation at this time   H&P    "Hypoxia" documented      Heart Failure documented Hx. CHF   H&P    "Edema" documented      Diuretics/Meds Carvedilol 3.125 mg 2 times daily /14 03153   Furosemide 80 mg g tube 2 times daily 3/14 0548, 1844       Treatment:      Other:          Provider, please specify diagnosis or diagnoses associated with above clinical findings.                                 [  ] Chronic Systolic Heart Failure (EF < 40)*  [  ] Chronic Diastolic Heart Failure (EF > 40)*  [  ] Chronic Combined Systolic and Diastolic Heart Failure  [  ] Other Type of Heart Failure (please specify type): _________________________  [  ] Heart Failure Ruled Out  [  ] Other (please " specify): ___________________________________  [ X ] Clinically Undetermined            *[employer]                                                                                                          Please document in your progress notes daily for the duration of treatment until resolved and include in your discharge summary.

## 2018-03-15 NOTE — ASSESSMENT & PLAN NOTE
C/w home trilogy machine  Cough assist device bid  Started on scopolamine patch and glycopyrrolate for secretions

## 2018-03-15 NOTE — PROGRESS NOTES
At bedside for R sided chest tube insertion by MD Moran and MD Fuentes. VSS. Will continue to monitor.

## 2018-03-15 NOTE — ASSESSMENT & PLAN NOTE
CXR at OSH concerning for possible L lower lobe asp PNA   Given 1 L NS, 1750mg vanc x 1, aztreonam 2g x 1 at OSH  Patient at high risk of asp given progression of his ALS  Now on 2L via trilogy machine (down from 5L), wean O2 as tolerated  C/w vanc  Cefepime d/c and started on aztreonam (as patient w/ diffuse erythema and penicillin allergy) (although pt has received rocephin in the past)  F/u vanc trough  procalcitonin, lactate wnl, blood cx (from OSH), urine cx (OSH), sputum cx (given nebulizer to induce sample)  Per brother family conflict regarding patient's decision to undergo tracheostomy as wife is not agreeable to this (per brother)

## 2018-03-15 NOTE — PROGRESS NOTES
[facility]  Critical Care Medicine  Progress Note    Patient Name: Bayron Delgado  MRN: [id number]  Admission Date: 3/14/2018  Hospital Length of Stay: 1 days  Code Status: Full Code  Attending Provider: Kike Fuentes MD  Primary Care Provider: Scott Puckett Jr, MD   Principal Problem: Respiratory failure    Subjective:     HPI:  46 y/o M w/ Afib, ALS (diagnosed 2014) [place] [facility] [place] [facility] via EMS on BiPAP 2/2 worsening respiratory failure. Per brother patient had been on home hospice for progression of his ALS, however, on 3/12 brother states patient experienced an episode of hypoxia as pt became more tachypneic w/ cyanosis surrounding lips. Per brother patient had a fever around this time as well. This episode resolved by 3/13 and at this time hospice agency re-visited patient's decision to explore life sustaining measures such as tracheostomy. According to brother, patient decided he would undergo trach placement, he was transported by EMS to [facility] where he was noted to have L lower lobe consolidation on CXR. He was started on vanc/ aztreonam due to penicillin allergy.and was subsequently [place] [facility] [place] Initially patient had refused tracheostomy after being evaluated by ENT (Dr. Horner) in 2016.   Per brother patient's wife was unhappy w/ patient's decision to proceed w/ trach placement.   Patient communicates mostly by shaking his head to answer yes/ no. He is dependent on his trilogy machine for ventilation.  History obtained from brother at bedside as hx difficult to obtain from patient (nonverbal secondary to ALS).    Hospital/ICU Course:  No notes on file    Interval History/Significant Events: No acute overnight events, afebrile. Yesterday patient noted to have diffuse erythema likely 2/2 medication given. Erythema resolved this Am. Urine cx positive for Enterococcus. Sputum sample w/ many gram positive cocci and rods, moderate gram  negative however poor sample. Patient responded to lasix w/ >6L urine output over 24 hrs and improvement of edema.    Review of Systems   Unable to perform ROS: Other     Objective:     Vital Signs (Most Recent):  Temp: 99.4 °F (37.4 °C) (03/15/18 0705)  Pulse: 89 (03/15/18 0753)  Resp: 20 (03/15/18 0753)  BP: (!) 146/82 (03/15/18 0600)  SpO2: 95 % (03/15/18 0753) Vital Signs (24h Range):  Temp:  [98.2 °F (36.8 °C)-99.4 °F (37.4 °C)] 99.4 °F (37.4 °C)  Pulse:  [] 89  Resp:  [16-24] 20  SpO2:  [94 %-100 %] 95 %  BP: ()/(54-83) 146/82   Weight: 103.7 kg (228 lb 9.9 oz)  Body mass index is 32.8 kg/m².      Intake/Output Summary (Last 24 hours) at 03/15/18 0811  Last data filed at 03/15/18 0600   Gross per 24 hour   Intake              865 ml   Output             4840 ml   Net            -3975 ml       Physical Exam   Constitutional: No distress.   HENT:   Head: Normocephalic and atraumatic.   Eyes: Conjunctivae are normal. No scleral icterus.   Cardiovascular: Normal rate, regular rhythm and intact distal pulses.  Exam reveals no friction rub.    Pulmonary/Chest: No respiratory distress. He has no wheezes. He has no rales.   Nasal mask in place for trilogy   Abdominal: Soft. He exhibits no distension. There is no tenderness. There is no guarding.   PEG tube in place, w/ some skin irritation surrounding tube    Genitourinary:   Genitourinary Comments: Michele in place   Musculoskeletal: He exhibits edema (+1 b/l LE edema markedly improved, trace edema in hands b/l).   Neurological: He is alert. No sensory deficit.   Skin: Skin is warm. No rash noted. He is not diaphoretic.       Vents:  Oxygen Concentration (%): 28 (03/15/18 0753)  Lines/Drains/Airways     Drain                 Gastrostomy/Enterostomy 01/23/17 1113 Percutaneous endoscopic gastrostomy (PEG) LUQ feeding 415 days         Urethral Catheter 03/13/18 1441 Non-latex 20 Fr. 1 day          Peripheral Intravenous Line                 Peripheral IV -  Single Lumen 03/13/18 1412 Left Antecubital 1 day         Peripheral IV - Single Lumen 03/14/18 0300 Right Antecubital 1 day         Midline Catheter Insertion/Assessment  - Single Lumen 03/14/18 1545 Right cephalic vein (lateral side of arm) 18g x 10cm less than 1 day              Significant Labs:    CBC/Anemia Profile:    Recent Labs  Lab 03/13/18  1413 03/14/18  0300 03/15/18  0327   WBC 12.50 9.95 8.20   HGB 11.0* 9.9* 9.6*   HCT 33.0* 29.5* 29.1*    211 235   MCV 87 88 87   RDW 14.2 14.0 13.7        Chemistries:    Recent Labs  Lab 03/13/18  1412 03/14/18  0300 03/14/18  1056 03/14/18  2138 03/15/18  0327    138 145 143 145   K 4.0 4.0 2.8* 3.7 3.6   CL 98 100 101 103 102   CO2 30* 26 34* 30* 33*   BUN 9 8 8 12 12   CREATININE 0.4* 0.4* 0.5 0.5 0.4*   CALCIUM 9.9 9.9 10.0 9.2 9.6   ALBUMIN 3.2* 3.1*  --   --  3.1*   PROT 6.9 6.8  --   --  6.6   BILITOT 1.0 0.8  --   --  0.6   ALKPHOS 98 95  --   --  89   ALT 86* 70*  --   --  64*   AST 35 30  --   --  25   MG 2.1 2.0 1.8  --  2.1   PHOS 1.7* 2.2* 2.9  --  2.6*       CMP:   Recent Labs  Lab 03/13/18  1412 03/14/18  0300 03/14/18  1056 03/14/18  2138 03/15/18  0327    138 145 143 145   K 4.0 4.0 2.8* 3.7 3.6   CL 98 100 101 103 102   CO2 30* 26 34* 30* 33*   * 187* 163* 167* 156*   BUN 9 8 8 12 12   CREATININE 0.4* 0.4* 0.5 0.5 0.4*   CALCIUM 9.9 9.9 10.0 9.2 9.6   PROT 6.9 6.8  --   --  6.6   ALBUMIN 3.2* 3.1*  --   --  3.1*   BILITOT 1.0 0.8  --   --  0.6   ALKPHOS 98 95  --   --  89   AST 35 30  --   --  25   ALT 86* 70*  --   --  64*   ANIONGAP 9 12 10 10 10   EGFRNONAA >60 >60.0 >60.0 >60.0 >60.0     POCT Glucose:   Recent Labs  Lab 03/14/18  1140 03/15/18  0118 03/15/18  0739   POCTGLUCOSE 183* 178* 156*     Urine Culture:   Recent Labs  Lab 03/13/18  1600   LABURIN ENTEROCOCCUS SPECIES>100,000 cfu/mlIdentification and susceptibility pending  GRAM NEGATIVE ROD50,000 - 99,999 cfu/mlIdentification and susceptibility pending      Urine Studies:   Recent Labs  Lab 03/13/18  1600   COLORU Yellow   APPEARANCEUA Cloudy*   PHUR >8.0*   SPECGRAV 1.015   PROTEINUA 1+*   GLUCUA Negative   KETONESU Negative   BILIRUBINUA Negative   OCCULTUA 3+*   NITRITE Negative   UROBILINOGEN 1.0   LEUKOCYTESUR 2+*   RBCUA 40*   WBCUA 15*   BACTERIA Moderate*   SQUAMEPITHEL 5   HYALINECASTS 0       Significant Imaging:  CXR: I have reviewed all pertinent results/findings within the past 24 hours and my personal findings are:  airspace consolidation in the left lower lung zone     Assessment/Plan:     Neuro   ALS (amyotrophic lateral sclerosis)    C/w home trilogy machine  Cough assist device bid  Started on scopolamine patch and glycopyrrolate for secretions        Psychiatric   Anxiety    C/w home clonazepam for anxiety        Depression    C/w home cymbalta  C/w home clonazepam for anxiety  C/w home doxepin        Pulmonary   * Respiratory failure    CXR at OSH concerning for possible L lower lobe asp PNA   Given 1 L NS, 1750mg vanc x 1, aztreonam 2g x 1 at OSH  Patient at high risk of asp given progression of his ALS  Now on 2L via trilogy machine (down from 5L), wean O2 as tolerated  C/w vanc  Cefepime d/c and started on aztreonam (as patient w/ diffuse erythema and penicillin allergy) (although pt has received rocephin in the past)  F/u vanc trough  procalcitonin, lactate wnl, blood cx NGTD  Per brother family conflict regarding patient's decision to undergo tracheostomy as wife is not agreeable to this (per brother)  CXR 3/14 improved w/ diuresis, no consolidation noted        Cardiac/Vascular   Atrial fibrillation    [id number] 2  C/w home coreg for rate control  Not on any anti-coagulation        Renal/   Acute cystitis without hematuria    UA at OSH positive, urine cx positive for enterococcus (>100,000) and gram negative rods (50-99K)  C/w chronic munoz, changed on 3/13 at OSH  Fever likely 2/2 UTI        Other   Anasarca    Markedly improved  Lasix  80mg PO bid (due to latex allergy) switched to daily  TTE EF 68%, no DD           Critical Care Daily Checklist:    A: Awake: RASS Goal/Actual Goal: RASS Goal: 0-->alert and calm  Actual: Paige Agitation Sedation Scale (RASS): Alert and calm   B: Spontaneous Breathing Trial Performed?     C: SAT & SBT Coordinated?  Not intubated        D: Delirium: CAM-ICU Overall CAM-ICU: Negative   E: Early Mobility Performed? No, ALS   F: Feeding Goal:    Status:     Current Diet Order   Procedures    Diet NPO      AS: Analgesia/Sedation Not sedated   T: Thromboembolic Prophylaxis    H: HOB > 300 Yes   U: Stress Ulcer Prophylaxis (if needed)    G: Glucose Control    B: Bowel Function Stool Occurrence: 0   I: Indwelling Catheter (Lines & Munoz) Necessity munoz   D: De-escalation of Antimicrobials/Pharmacotherapies Vanc, aztreonam    Plan for the day/ETD Family discussion    Code Status:  Family/Goals of Care: Full Code         Critical secondary to Patient has a condition that poses threat to life and bodily function: resp failure, ALS      Critical care was time spent personally by me on the following activities: development of treatment plan with patient or surrogate and bedside caregivers, discussions with consultants, evaluation of patient's response to treatment, examination of patient, ordering and performing treatments and interventions, ordering and review of laboratory studies, ordering and review of radiographic studies, pulse oximetry, re-evaluation of patient's condition. This critical care time did not overlap with that of any other provider or involve time for any procedures.     Derrick Nye MD  Critical Care Medicine  [facility]

## 2018-03-15 NOTE — ASSESSMENT & PLAN NOTE
UA at OSH positive, however, patient w/ chronic indwelling munoz therefore difficult to interpret (also unsure how sample was collected)  C/w chronic munoz, changed on 3/13 at OSH  Patient already on antibiotics as above

## 2018-03-15 NOTE — PROCEDURES
"Bayron Delgado is a 47 y.o. male patient.    Temp: 98.3 °F (36.8 °C) (03/15/18 1100)  Pulse: (!) 125 (03/15/18 1132)  Resp: 16 (03/15/18 1132)  BP: 131/80 (03/15/18 1132)  SpO2: 99 % (03/15/18 1132)  Weight: 103.7 kg (228 lb 9.9 oz) (03/15/18 0300)  Height: 5' 10" (177.8 cm) (03/14/18 0335)       Intubation  Date/Time: 3/15/2018 12:15 PM  Location procedure was performed: [facility] CRITICAL CARE MEDICINE  Performed by: TAURUS MARQUES  Authorized by: TAURUS MARQUES   Assisting provider: UZIEL NEWELL  Pre-operative diagnosis: hypoxic Respiratory failure  Post-operative diagnosis: Hypoxic respiratory failure  Consent Done: Emergent Situation  Indications: respiratory failure and  hypoxemia  Description of findings: CL view 1   Intubation method: video-assisted  Patient status: paralyzed (RSI)  Preoxygenation: BVM  Sedatives: etomidate  Paralytic: rocuronium  Laryngoscope size: Mac 3  Tube size: 8.0 mm  Tube type: cuffed  Number of attempts: 1  Cricoid pressure: no  Cords visualized: yes  Post-procedure assessment: chest rise,  ETCO2 monitor,  CO2 detector and esophageal detector  Breath sounds: rales/crackles,  equal and absent over the epigastrium  Cuff inflated: yes  ETT to lip: 23 cm  Tube secured with: ETT borrego  Chest x-ray interpreted by me.  Chest x-ray findings: endotracheal tube in appropriate position (Right sided pneumothorax)  Complications: Yes (right sided pneumothorax)  Comments: A chest tube was placed to relieve PTX, see procedure note          Hillary Marques  3/15/2018  "

## 2018-03-15 NOTE — PLAN OF CARE
Problem: Patient Care Overview  Goal: Plan of Care Review  Outcome: Ongoing (interventions implemented as appropriate)  No acute events throughout day. See vital signs and assessments in flowsheets. See below for updates on today's progress.     Pulmonary: profound hypoxia noted this AM - required emergent intubation following bag/directional tip catheter suctioning r/t mucus plug with subsequent pneumothorax requiring chest tube; SpO2 >95%    Cardiovascular: in beginning of shift HR 80s-90s, since intubation now 100s-120s; SBP 120s-150s    Neurological: tetraplegic; eye movement communication noted; Tmax 100.6; medically sedation with propofol    Gastrointestinal: TF via PEG started after intubation; fecal pouch applied to rectum with large liquid BM noted    Genitourinary: munoz in place with good UOP    Endocrine: BG 130s-150s    Integumentary/Other: moderate edema noted though decreasing from AM    Infusions: Propofol and abx    Patient progressing towards goals as tolerated, plan of care communicated and reviewed with Bayron Delgado and family. All concerns addressed. Will continue to monitor.

## 2018-03-15 NOTE — PROGRESS NOTES
0945: Pt with noted large BM - attempting to clean pt with RNx2, PCT. As pt turned on R side, SpO2 reading 87% while on nasal BiPap. Suddenly pts sats dropping to 50-60% with noted cyanosis to lips/face. Pt laid flat on back, HOB raised, and LPM increased. RT called to bedside. After approximately 1-2 mins, pt returned to SpO2 90%. Incontinence change finished quickly and returned to supine position with HOB 30-45 degrees. Pt SpO2 while in room 91%.   1010: SpO2 noted on monitor 65% and cyanotic in lips/face. CCS and RT called to bedside. Pt immediately bagged per RT. MD Fuentes and MD Moran at bedside for emergent intubation. Family at bedside informed of critical situation.   1040: After intubation with FiO2 100%, SpO2 dropping to 70s. Suctioned through ETT to remove mucus plug. Suctioned successfully with SpO2 returned to 90s.  1100: CXR done showing pneumothorax. MD Moran at bedside preparing for chest tube insertion. Will continue to monitor.

## 2018-03-15 NOTE — SIGNIFICANT EVENT
Noted that he had been having issues of desaturation throughout the late morning.  He had no signs of respiratory distress, however he is not capable of doing so.  He had saturations as low as 45% and needed Bag valve mask to return oxygenation to 100%.  He had course breath sounds throughout.  Due to his inability to clear secretions, it was decided to emergently intubate.  See procedure note.    Afterwards, imaging revealed a pneumothorax on the right chest.  A small bore chest tube was placed, see note.      He remains on sedation, hemodynamically stable for now.  Family has been updated.

## 2018-03-15 NOTE — PROCEDURES
"Bayron Delgado is a 47 y.o. male patient.    Temp: 98.3 °F (36.8 °C) (03/15/18 1100)  Pulse: (!) 125 (03/15/18 1132)  Resp: 16 (03/15/18 1132)  BP: 131/80 (03/15/18 1132)  SpO2: 99 % (03/15/18 1132)  Weight: 103.7 kg (228 lb 9.9 oz) (03/15/18 0300)  Height: 5' 10" (177.8 cm) (03/14/18 0335)       Chest Tube Insertion  Date/Time: 3/15/2018 12:18 PM  Location procedure was performed: [facility] CRITICAL CARE MEDICINE  Performed by: TAURUS SULLIVAN  Authorized by: TAURUS SULLIVAN   Assisting provider: UZIEL NEWELL  Post-operative diagnosis: Right sided pneumothorax  Pre-operative diagnosis: Right sided pneumothorax  Consent Done: Emergent Situation  Indications: pneumothorax  Patient sedated: no (Patient had been on sedation since intubation, not procedural sedation)  Anesthesia: local infiltration    Anesthesia:  Local Anesthetic: lidocaine 1% with epinephrine  Anesthetic total: 10 mL  Preparation: skin prepped with ChloraPrep  Placement location: right anterior  Scalpel size: 11  Tube size (Romanian): 8.5 pigtail.  Ultrasound guidance: yes  Tension pneumothorax heard: no  Tube connected to: suction  Drainage characteristics: air returned and then stopped shortly after.  Suture material: 0 silk  Dressing: 4x4 sterile gauze  Patient tolerance: Patient tolerated the procedure well with no immediate complications  Technical procedures used: Patient prepped and draped in the usual sterile fashion.  Skin and deeper tissues were infiltrated with lidocaine.  A hollow needle was introduced in to the chest air was returned.  A guidewire was threaded through the needle.  The skin was dilated using a dilator and a 8.5 Russian chest tube was placed.  This was placed to the chest tube set up and air was evacuated.    Estimated blood loss (mL): 0      Chest Xray showed left sided increased markings, right lung resolved pneumothorax.      Hillary Sullivan  3/15/2018  "

## 2018-03-16 PROBLEM — J96.01 ACUTE RESPIRATORY FAILURE WITH HYPOXIA: Status: ACTIVE | Noted: 2018-03-13

## 2018-03-16 LAB
ANION GAP SERPL CALC-SCNC: 8 MMOL/L
BACTERIA SPEC AEROBE CULT: NORMAL
BASOPHILS # BLD AUTO: 0.08 K/UL
BASOPHILS NFR BLD: 0.7 %
BUN SERPL-MCNC: 11 MG/DL
CALCIUM SERPL-MCNC: 9.6 MG/DL
CHLORIDE SERPL-SCNC: 102 MMOL/L
CO2 SERPL-SCNC: 34 MMOL/L
CREAT SERPL-MCNC: 0.5 MG/DL
DIFFERENTIAL METHOD: ABNORMAL
EOSINOPHIL # BLD AUTO: 0.1 K/UL
EOSINOPHIL NFR BLD: 0.8 %
ERYTHROCYTE [DISTWIDTH] IN BLOOD BY AUTOMATED COUNT: 14.1 %
EST. GFR  (AFRICAN AMERICAN): >60 ML/MIN/1.73 M^2
EST. GFR  (NON AFRICAN AMERICAN): >60 ML/MIN/1.73 M^2
GLUCOSE SERPL-MCNC: 187 MG/DL
GRAM STN SPEC: NORMAL
HCT VFR BLD AUTO: 30.4 %
HGB BLD-MCNC: 9.5 G/DL
IMM GRANULOCYTES # BLD AUTO: 0.09 K/UL
IMM GRANULOCYTES NFR BLD AUTO: 0.8 %
LYMPHOCYTES # BLD AUTO: 1 K/UL
LYMPHOCYTES NFR BLD: 9.4 %
MAGNESIUM SERPL-MCNC: 2 MG/DL
MCH RBC QN AUTO: 28.7 PG
MCHC RBC AUTO-ENTMCNC: 31.3 G/DL
MCV RBC AUTO: 92 FL
MONOCYTES # BLD AUTO: 1.3 K/UL
MONOCYTES NFR BLD: 11.7 %
NEUTROPHILS # BLD AUTO: 8.2 K/UL
NEUTROPHILS NFR BLD: 76.6 %
NRBC BLD-RTO: 0 /100 WBC
PHOSPHATE SERPL-MCNC: 2.9 MG/DL
PLATELET # BLD AUTO: 249 K/UL
PMV BLD AUTO: 9.9 FL
POCT GLUCOSE: 118 MG/DL (ref 70–110)
POCT GLUCOSE: 160 MG/DL (ref 70–110)
POCT GLUCOSE: 206 MG/DL (ref 70–110)
POTASSIUM SERPL-SCNC: 3.4 MMOL/L
POTASSIUM SERPL-SCNC: 3.5 MMOL/L
RBC # BLD AUTO: 3.31 M/UL
SODIUM SERPL-SCNC: 144 MMOL/L
WBC # BLD AUTO: 10.73 K/UL

## 2018-03-16 PROCEDURE — 25000003 PHARM REV CODE 250: Performed by: STUDENT IN AN ORGANIZED HEALTH CARE EDUCATION/TRAINING PROGRAM

## 2018-03-16 PROCEDURE — 27000221 HC OXYGEN, UP TO 24 HOURS

## 2018-03-16 PROCEDURE — 63600175 PHARM REV CODE 636 W HCPCS: Performed by: INTERNAL MEDICINE

## 2018-03-16 PROCEDURE — 25000003 PHARM REV CODE 250: Performed by: INTERNAL MEDICINE

## 2018-03-16 PROCEDURE — 84100 ASSAY OF PHOSPHORUS: CPT

## 2018-03-16 PROCEDURE — 94761 N-INVAS EAR/PLS OXIMETRY MLT: CPT

## 2018-03-16 PROCEDURE — 31624 DX BRONCHOSCOPE/LAVAGE: CPT

## 2018-03-16 PROCEDURE — 31500 INSERT EMERGENCY AIRWAY: CPT

## 2018-03-16 PROCEDURE — 0BC48ZZ EXTIRPATION OF MATTER FROM RIGHT UPPER LOBE BRONCHUS, VIA NATURAL OR ARTIFICIAL OPENING ENDOSCOPIC: ICD-10-PCS | Performed by: INTERNAL MEDICINE

## 2018-03-16 PROCEDURE — 85025 COMPLETE CBC W/AUTO DIFF WBC: CPT

## 2018-03-16 PROCEDURE — 94003 VENT MGMT INPAT SUBQ DAY: CPT

## 2018-03-16 PROCEDURE — 0BC88ZZ EXTIRPATION OF MATTER FROM LEFT UPPER LOBE BRONCHUS, VIA NATURAL OR ARTIFICIAL OPENING ENDOSCOPIC: ICD-10-PCS | Performed by: INTERNAL MEDICINE

## 2018-03-16 PROCEDURE — 32551 INSERTION OF CHEST TUBE: CPT

## 2018-03-16 PROCEDURE — 87070 CULTURE OTHR SPECIMN AEROBIC: CPT

## 2018-03-16 PROCEDURE — 0BC58ZZ EXTIRPATION OF MATTER FROM RIGHT MIDDLE LOBE BRONCHUS, VIA NATURAL OR ARTIFICIAL OPENING ENDOSCOPIC: ICD-10-PCS | Performed by: INTERNAL MEDICINE

## 2018-03-16 PROCEDURE — 80048 BASIC METABOLIC PNL TOTAL CA: CPT

## 2018-03-16 PROCEDURE — 84132 ASSAY OF SERUM POTASSIUM: CPT

## 2018-03-16 PROCEDURE — 99900035 HC TECH TIME PER 15 MIN (STAT)

## 2018-03-16 PROCEDURE — 87205 SMEAR GRAM STAIN: CPT

## 2018-03-16 PROCEDURE — 99233 SBSQ HOSP IP/OBS HIGH 50: CPT | Mod: 25,GC,, | Performed by: INTERNAL MEDICINE

## 2018-03-16 PROCEDURE — 0BCB8ZZ EXTIRPATION OF MATTER FROM LEFT LOWER LOBE BRONCHUS, VIA NATURAL OR ARTIFICIAL OPENING ENDOSCOPIC: ICD-10-PCS | Performed by: INTERNAL MEDICINE

## 2018-03-16 PROCEDURE — 63600175 PHARM REV CODE 636 W HCPCS: Performed by: STUDENT IN AN ORGANIZED HEALTH CARE EDUCATION/TRAINING PROGRAM

## 2018-03-16 PROCEDURE — 99900026 HC AIRWAY MAINTENANCE (STAT)

## 2018-03-16 PROCEDURE — 0BC68ZZ EXTIRPATION OF MATTER FROM RIGHT LOWER LOBE BRONCHUS, VIA NATURAL OR ARTIFICIAL OPENING ENDOSCOPIC: ICD-10-PCS | Performed by: INTERNAL MEDICINE

## 2018-03-16 PROCEDURE — 83735 ASSAY OF MAGNESIUM: CPT

## 2018-03-16 PROCEDURE — S0073 INJECTION, AZTREONAM, 500 MG: HCPCS | Performed by: STUDENT IN AN ORGANIZED HEALTH CARE EDUCATION/TRAINING PROGRAM

## 2018-03-16 PROCEDURE — 99900025 HC BRONCHOSCOPY-ASST (STAT)

## 2018-03-16 PROCEDURE — 20000000 HC ICU ROOM

## 2018-03-16 PROCEDURE — 97802 MEDICAL NUTRITION INDIV IN: CPT

## 2018-03-16 RX ORDER — FAMOTIDINE 20 MG/1
20 TABLET, FILM COATED ORAL 2 TIMES DAILY
Status: DISCONTINUED | OUTPATIENT
Start: 2018-03-16 | End: 2018-03-23 | Stop reason: HOSPADM

## 2018-03-16 RX ORDER — LIDOCAINE 50 MG/G
3 PATCH TOPICAL
Status: DISCONTINUED | OUTPATIENT
Start: 2018-03-16 | End: 2018-03-23 | Stop reason: HOSPADM

## 2018-03-16 RX ORDER — INSULIN ASPART 100 [IU]/ML
0-5 INJECTION, SOLUTION INTRAVENOUS; SUBCUTANEOUS EVERY 6 HOURS PRN
Status: DISCONTINUED | OUTPATIENT
Start: 2018-03-16 | End: 2018-03-23 | Stop reason: HOSPADM

## 2018-03-16 RX ORDER — GLUCAGON 1 MG
1 KIT INJECTION
Status: DISCONTINUED | OUTPATIENT
Start: 2018-03-16 | End: 2018-03-23 | Stop reason: HOSPADM

## 2018-03-16 RX ADMIN — VANCOMYCIN HYDROCHLORIDE 1500 MG: 1 INJECTION, POWDER, LYOPHILIZED, FOR SOLUTION INTRAVENOUS at 09:03

## 2018-03-16 RX ADMIN — LIDOCAINE 1 PATCH: 50 PATCH TOPICAL at 10:03

## 2018-03-16 RX ADMIN — AZTREONAM 2000 MG: 1 INJECTION, POWDER, LYOPHILIZED, FOR SOLUTION INTRAMUSCULAR; INTRAVENOUS at 08:03

## 2018-03-16 RX ADMIN — DULOXETINE 60 MG: 30 CAPSULE, DELAYED RELEASE ORAL at 10:03

## 2018-03-16 RX ADMIN — Medication 0.3 MG: at 08:03

## 2018-03-16 RX ADMIN — ENOXAPARIN SODIUM 40 MG: 100 INJECTION SUBCUTANEOUS at 05:03

## 2018-03-16 RX ADMIN — AZTREONAM 2000 MG: 1 INJECTION, POWDER, LYOPHILIZED, FOR SOLUTION INTRAMUSCULAR; INTRAVENOUS at 04:03

## 2018-03-16 RX ADMIN — CHLORHEXIDINE GLUCONATE 15 ML: 1.2 RINSE ORAL at 08:03

## 2018-03-16 RX ADMIN — HYDROXYZINE HYDROCHLORIDE 25 MG: 25 TABLET, FILM COATED ORAL at 01:03

## 2018-03-16 RX ADMIN — PREGABALIN 100 MG: 50 CAPSULE ORAL at 08:03

## 2018-03-16 RX ADMIN — Medication 4 MG: at 01:03

## 2018-03-16 RX ADMIN — FAMOTIDINE 20 MG: 20 TABLET, FILM COATED ORAL at 08:03

## 2018-03-16 RX ADMIN — CLONAZEPAM 0.5 MG: 0.5 TABLET ORAL at 08:03

## 2018-03-16 RX ADMIN — Medication 0.3 MG: at 10:03

## 2018-03-16 RX ADMIN — STANDARDIZED SENNA CONCENTRATE AND DOCUSATE SODIUM 2 TABLET: 8.6; 5 TABLET, FILM COATED ORAL at 10:03

## 2018-03-16 RX ADMIN — PREGABALIN 100 MG: 50 CAPSULE ORAL at 10:03

## 2018-03-16 RX ADMIN — CARVEDILOL 3.12 MG: 3.12 TABLET, FILM COATED ORAL at 08:03

## 2018-03-16 RX ADMIN — PREGABALIN 100 MG: 50 CAPSULE ORAL at 03:03

## 2018-03-16 RX ADMIN — PROPOFOL 20 MCG/KG/MIN: 10 INJECTION, EMULSION INTRAVENOUS at 01:03

## 2018-03-16 RX ADMIN — CARVEDILOL 3.12 MG: 3.12 TABLET, FILM COATED ORAL at 10:03

## 2018-03-16 RX ADMIN — Medication 4 MG: at 12:03

## 2018-03-16 RX ADMIN — Medication 4 MG: at 06:03

## 2018-03-16 RX ADMIN — CLONAZEPAM 0.5 MG: 0.5 TABLET ORAL at 10:03

## 2018-03-16 RX ADMIN — LIDOCAINE 2 PATCH: 50 PATCH TOPICAL at 11:03

## 2018-03-16 RX ADMIN — DIPHENHYDRAMINE HYDROCHLORIDE 50 MG: 25 SOLUTION ORAL at 08:03

## 2018-03-16 RX ADMIN — HYDROXYZINE HYDROCHLORIDE 25 MG: 25 TABLET, FILM COATED ORAL at 08:03

## 2018-03-16 RX ADMIN — POTASSIUM CHLORIDE 40 MEQ: 20 SOLUTION ORAL at 02:03

## 2018-03-16 RX ADMIN — HYDROXYZINE HYDROCHLORIDE 25 MG: 25 TABLET, FILM COATED ORAL at 05:03

## 2018-03-16 RX ADMIN — CHLORHEXIDINE GLUCONATE 15 ML: 1.2 RINSE ORAL at 10:03

## 2018-03-16 RX ADMIN — FAMOTIDINE 20 MG: 20 TABLET, FILM COATED ORAL at 10:03

## 2018-03-16 RX ADMIN — PROPOFOL 15 MCG/KG/MIN: 10 INJECTION, EMULSION INTRAVENOUS at 08:03

## 2018-03-16 RX ADMIN — AZTREONAM 2000 MG: 1 INJECTION, POWDER, LYOPHILIZED, FOR SOLUTION INTRAMUSCULAR; INTRAVENOUS at 01:03

## 2018-03-16 RX ADMIN — POLYETHYLENE GLYCOL 3350 17 G: 17 POWDER, FOR SOLUTION ORAL at 08:03

## 2018-03-16 RX ADMIN — DOXEPIN HYDROCHLORIDE 75 MG: 75 CAPSULE ORAL at 11:03

## 2018-03-16 RX ADMIN — STANDARDIZED SENNA CONCENTRATE AND DOCUSATE SODIUM 2 TABLET: 8.6; 5 TABLET, FILM COATED ORAL at 08:03

## 2018-03-16 RX ADMIN — CLONAZEPAM 0.5 MG: 0.5 TABLET ORAL at 03:03

## 2018-03-16 RX ADMIN — POTASSIUM CHLORIDE 40 MEQ: 20 SOLUTION ORAL at 04:03

## 2018-03-16 RX ADMIN — POLYETHYLENE GLYCOL 3350 17 G: 17 POWDER, FOR SOLUTION ORAL at 10:03

## 2018-03-16 RX ADMIN — DIBASIC SODIUM PHOSPHATE, MONOBASIC POTASSIUM PHOSPHATE AND MONOBASIC SODIUM PHOSPHATE 2 TABLET: 852; 155; 130 TABLET ORAL at 12:03

## 2018-03-16 RX ADMIN — FUROSEMIDE 80 MG: 40 TABLET ORAL at 10:03

## 2018-03-16 RX ADMIN — Medication 4 MG: at 05:03

## 2018-03-16 RX ADMIN — HYDROXYZINE HYDROCHLORIDE 25 MG: 25 TABLET, FILM COATED ORAL at 10:03

## 2018-03-16 RX ADMIN — Medication 0.3 MG: at 03:03

## 2018-03-16 NOTE — PROCEDURES
"Bayron Delgado is a 47 y.o. male patient.    Temp: 98.7 °F (37.1 °C) (03/16/18 1500)  Pulse: (!) 111 (03/16/18 1520)  Resp: 16 (03/16/18 1500)  BP: 98/62 (03/16/18 1500)  SpO2: 100 % (03/16/18 1520)  Weight: 103.7 kg (228 lb 9.9 oz) (03/16/18 1000)  Height: 5' 10" (177.8 cm) (03/16/18 1000)       Procedures  Bronchoscopy Procedure Note    Location: ICU    Date of Operation : 3/16/2018      Pre-op Diagnosis   : Pneumonia    Post-op Diagnosis: Pneumonia    Surgeon: Segundo Sullivan    Assistants: Felipe Fuentes    Anesthesia : intubated in ICU with Fentanyl and Propofol    Operation: Flexible fiberoptic bronchoscopy, diagnostic and lavage    Findings: Copious secretions throughout    Specimen: Sputum aspiration    Estimated Blood Loss: less than 50       Complications: None    Indications and History:    The patient is a 47 y.o.  male  with ALS and Pneumonia.  The risks, benefits, complications, treatment options and expected outcomes were discussed with the patient's wife.  The possibilities of reaction to medication, pulmonary aspiration, perforation of a viscus, bleeding, failure to diagnose a condition and creating a complication requiring transfusion or operation were discussed with the patient who freely signed the consent.      Description of Procedure:  The patient was identified as Bayron Delgado and the procedure verified as Flexible Fiberoptic Bronchoscopy.  A Time Out was held and the above information confirmed.   The patient remained in the bed on his back and the bronchoscope was passed through the ET tube . The  1% plain lidocaine 2 ml was topically placed onto the ET tube. The scope was then passed into the trachea.  1% plain lidocaine 6 ml was used topically on the donna.  Careful inspection of the tacheal lumen was accomplished. The scope was sequentially passed into the left main and then left upper and lower bronchi and segmental bronchi. Aspiration and sputum removal was done and there was 10 mL " specimen removed.     The scope was then withdrawn and advanced into the right main bronchus and then into the RUL, RML, and RLL bronchi and segmental bronchi. Aspiration was done.     The Bronchoscope was removed and directional suction catheter was introduced with normal saline to help clear secretions.  The bronchoscope was re-entered and then respiratory secretions were removed from the left upper and lower lobe.  After several rounds of aspiration the left bronchi were able to be well visualized.        Trachea: Normal mucosa  Padmini: Normal mucosa with copious mucus  Right main bronchus: Normal mucosa  Right upper lobe bronchus: Normal mucosa  Right middle lobe bronchus: Normal mucosa  Right lower lobe bronchus: Normal mucosa  Left main bronchus: Collapse with mucus plugging, cleared after aspiration  Left upper lobe bronchus: Collapse with mucus plugging, cleared after aspiration  Left lower lobe bronchus: Collapse with mucus plugging, cleared after aspiration        Attestation: I performed the procedure.    Segundo Sullivan MD  Pulmonary Critical Care Fellow    3/16/2018

## 2018-03-16 NOTE — PROGRESS NOTES
"  [facility]  Adult Nutrition  Consult Note    SUMMARY     Recommendations    1. TF recommendations:    - While on Propofol, recommend Impact Peptide @ 50 mL/hr to provide 2127 calories, 113 g of protein, 924 mL fluid.    - Propofol currently providing 327 calories.   - When Propofol discontinued, recommend Isosource 1.5 @ 65 mL/hr to provide 2340 calories, 106 g of protein, 1192 mL fluid.    - Hold for residuals >500 mL; additional fluid per MD.  2. If high residuals continue, consider adding prokinetic agent to aid in tolerance/absorption.  3. RD to monitor & follow-up.    Goals: Meet % EEN, EPN  Nutrition Goal Status: new  Communication of RD Recs: reviewed with RN    Reason for Assessment    Reason for Assessment: new tube feeding  Diagnosis: other (see comments) (Resp. fx)  Relevant Medical History: CHF, ALS  Interdisciplinary Rounds: attended    General Information Comments: Pt intubated, sedated. TF held overnight 2/2 high residuals.  Nutrition Discharge Planning: Tolerance of TF    Nutrition/Diet History    Patient Reported Diet/Restrictions/Preferences: other (see comments) (SHERON)  Do you have any cultural, spiritual, Congregation conflicts, given your current situation?: none  Factors Affecting Nutritional Intake: NPO, on mechanical ventilation    Anthropometrics    Temp: 99 °F (37.2 °C)  Height Method: Stated  Height: 5' 10" (177.8 cm)  Height (inches): 70 in  Weight Method: Bed Scale  Weight: 103.7 kg (228 lb 9.9 oz)  Weight (lb): 228.62 lb  Ideal Body Weight (IBW), Male: 166 lb  % Ideal Body Weight, Male (lb): 137.72 lb  BMI (Calculated): 32.9  BMI Grade: 30 - 34.9- obesity - grade I    Lab/Procedures/Meds    Pertinent Labs Reviewed: reviewed  Pertinent Labs Comments: Gluc 187  Pertinent Medications Reviewed: reviewed  Pertinent Medications Comments: Fentanyl, Propofol, IVF    Physical Findings/Assessment    Overall Physical Appearance: overweight, on ventilator support  Tubes: " "gastrostomy tube  Oral/Mouth Cavity: WDL  Skin: intact    Estimated/Assessed Needs    Weight Used For Calorie Calculations: 103.7 kg (228 lb 9.9 oz)  Height: 5' 10" (177.8 cm)    Energy Calorie Requirements (kcal): 2206 kcal/d  Energy Need Method: [facility]    Protein Requirements: 113-151 g/d (1.5-2 g/kg IBW)  Weight Used For Protein Calculations: 75.5 kg (166 lb 7.2 oz)    Fluid Need Method: other (see comments) (Per MD or 1 mL/kcal)    Nutrition Prescription Ordered    Current Diet Order: NPO  Current Nutrition Support Formula Ordered: Isosource 1.5    Evaluation of Received Nutrient/Fluid Intake    Other Calories (kcal): 327 (Propofol)  IV Fluid (mL): 120    Nutrition Risk    Level of Risk/Frequency of Follow-up: high     Assessment and Plan    ALS (amyotrophic lateral sclerosis)      Nutrition Problem  Inadequate energy intake    Related to (etiology):   Inability to consume sufficient energy    Signs and Symptoms (as evidenced by):   NPO with no alternate means of nutrition     Nutrition Diagnosis Status:   New         Monitor and Evaluation    Food and Nutrient Intake: enteral nutrition intake  Food and Nutrient Adminstration: enteral and parenteral nutrition administration  Physical Activity and Function: nutrition-related ADLs and IADLs  Anthropometric Measurements: weight, weight change  Biochemical Data, Medical Tests and Procedures: lipid profile, inflammatory profile, glucose/endocrine profile, gastrointestinal profile, electrolyte and renal panel  Nutrition-Focused Physical Findings: overall appearance     Nutrition Follow-Up    RD Follow-up?: Yes    "

## 2018-03-16 NOTE — ASSESSMENT & PLAN NOTE
Nutrition Problem  Inadequate energy intake    Related to (etiology):   Inability to consume sufficient energy    Signs and Symptoms (as evidenced by):   NPO with no alternate means of nutrition     Nutrition Diagnosis Status:   New

## 2018-03-16 NOTE — ASSESSMENT & PLAN NOTE
Markedly improved  TTE EF 68%, no DD  Renal function normal  Diuresing with lasix 80mg PO (latex allergy); BD --> QD yesterday  Continue to respond well with UO 2l, net neg

## 2018-03-16 NOTE — SUBJECTIVE & OBJECTIVE
Interval History/Significant Events: Intubated yesterday secondary to destaturation of SaO2 45%. Pneumothorax noted on CXR with chest tube placement. Slept well overnight, no events.    Review of Systems   Unable to perform ROS: Intubated     Objective:     Vital Signs (Most Recent):  Temp: 99.5 °F (37.5 °C) (03/16/18 1100)  Pulse: 100 (03/16/18 1315)  Resp: 16 (03/16/18 1300)  BP: 97/61 (03/16/18 1300)  SpO2: (!) 94 % (03/16/18 1315) Vital Signs (24h Range):  Temp:  [98.1 °F (36.7 °C)-101.4 °F (38.6 °C)] 99.5 °F (37.5 °C)  Pulse:  [] 100  Resp:  [16-22] 16  SpO2:  [92 %-100 %] 94 %  BP: ()/(61-91) 97/61   Weight: 103.7 kg (228 lb 9.9 oz)  Body mass index is 32.8 kg/m².      Intake/Output Summary (Last 24 hours) at 03/16/18 1415  Last data filed at 03/16/18 1300   Gross per 24 hour   Intake           1229.5 ml   Output             2545 ml   Net          -1315.5 ml       Physical Exam   Constitutional: No distress.   HENT:   Head: Normocephalic and atraumatic.   Eyes: Conjunctivae are normal. No scleral icterus.   Neck: No JVD present.   Cardiovascular: Normal rate, regular rhythm and intact distal pulses.  Exam reveals no friction rub.    No murmur heard.  Pulmonary/Chest: He has no wheezes. He has no rales.   Intubated  Chest tube right chest   Abdominal: Soft. Bowel sounds are normal. He exhibits no distension. There is no tenderness. There is no guarding.   PEG tube in place, w/ some skin irritation surrounding tube    Genitourinary:   Genitourinary Comments: Michele in place   Musculoskeletal: He exhibits edema (+1 b/l LE edema markedly improved, trace edema in hands b/l ).   Neurological: He is alert. No sensory deficit.   Skin: Skin is warm. No rash noted. He is not diaphoretic. No erythema.       Vents:  Vent Mode: A/C (03/16/18 1315)  Ventilator Initiated: Yes (03/15/18 1050)  Set Rate: 16 bmp (03/16/18 1315)  Vt Set: 340 mL (03/16/18 1315)  PEEP/CPAP: 5 cmH20 (03/16/18 1315)  Oxygen Concentration  (%): 30 (03/16/18 1315)  Peak Airway Pressure: 32 cmH2O (03/16/18 1315)  Plateau Pressure: 24 cmH20 (03/15/18 1903)  Total Ve: 5.83 mL (03/16/18 1315)  F/VT Ratio<105 (RSBI): (!) 59.01 (03/16/18 0539)  Lines/Drains/Airways     Drain                 Gastrostomy/Enterostomy 01/23/17 1113 Percutaneous endoscopic gastrostomy (PEG) LUQ feeding 417 days         Fecal Incontinence  03/14/18 2 days         Urethral Catheter 03/13/18 1441 Non-latex 20 Fr. 2 days         Chest Tube 03/15/18 1200 1 Anterior Pleural 8.5 Fr. 1 day          Airway                 Airway - Non-Surgical 03/15/18 1038 Endotracheal Tube-Hi/Lo 1 day          Peripheral Intravenous Line                 Peripheral IV - Single Lumen 03/13/18 1412 Left Antecubital 3 days         Peripheral IV - Single Lumen 03/14/18 0300 Right Antecubital 2 days         Midline Catheter Insertion/Assessment  - Single Lumen 03/14/18 1545 Right cephalic vein (lateral side of arm) 18g x 10cm 1 day              Significant Labs:    CBC/Anemia Profile:    Recent Labs  Lab 03/15/18  0327 03/16/18  0312   WBC 8.20 10.73   HGB 9.6* 9.5*   HCT 29.1* 30.4*    249   MCV 87 92   RDW 13.7 14.1        Chemistries:    Recent Labs  Lab 03/14/18  2138 03/15/18  0327 03/15/18  1709 03/16/18  0022 03/16/18  0312    145  --   --  144   K 3.7 3.6 3.4* 3.4* 3.5    102  --   --  102   CO2 30* 33*  --   --  34*   BUN 12 12  --   --  11   CREATININE 0.5 0.4*  --   --  0.5   CALCIUM 9.2 9.6  --   --  9.6   ALBUMIN  --  3.1*  --   --   --    PROT  --  6.6  --   --   --    BILITOT  --  0.6  --   --   --    ALKPHOS  --  89  --   --   --    ALT  --  64*  --   --   --    AST  --  25  --   --   --    MG  --  2.1  --   --  2.0   PHOS  --  2.6*  --   --  2.9       CMP:     Recent Labs  Lab 03/14/18  2138 03/15/18  0327 03/15/18  1709 03/16/18  0022 03/16/18  0312    145  --   --  144   K 3.7 3.6 3.4* 3.4* 3.5    102  --   --  102   CO2 30* 33*  --   --  34*   GLU  167* 156*  --   --  187*   BUN 12 12  --   --  11   CREATININE 0.5 0.4*  --   --  0.5   CALCIUM 9.2 9.6  --   --  9.6   PROT  --  6.6  --   --   --    ALBUMIN  --  3.1*  --   --   --    BILITOT  --  0.6  --   --   --    ALKPHOS  --  89  --   --   --    AST  --  25  --   --   --    ALT  --  64*  --   --   --    ANIONGAP 10 10  --   --  8   EGFRNONAA >60.0 >60.0  --   --  >60.0     POCT Glucose:     Recent Labs  Lab 03/16/18  0016 03/16/18  0605 03/16/18  1344   POCTGLUCOSE 206* 160* 118*     Urine Culture: No results for input(s): LABURIN in the last 48 hours.  Urine Studies: No results for input(s): COLORU, APPEARANCEUA, PHUR, SPECGRAV, PROTEINUA, GLUCUA, KETONESU, BILIRUBINUA, OCCULTUA, NITRITE, UROBILINOGEN, LEUKOCYTESUR, RBCUA, WBCUA, BACTERIA, SQUAMEPITHEL, HYALINECASTS in the last 48 hours.    Invalid input(s): WRIGHTSUR    Significant Imaging:  CXR: I have reviewed all pertinent results/findings within the past 24 hours and my personal findings are:  airspace consolidation in the left lower lung zone

## 2018-03-16 NOTE — PLAN OF CARE
Problem: Patient Care Overview  Goal: Plan of Care Review  Outcome: Ongoing (interventions implemented as appropriate)  No acute events throughout shift, VS and assessment per flow sheet, patient progressing towards goals as tolerated. Remained in sinus tachycardia throughout night, BP WNL. ETT in place, patient with thick secretions. Propofol and fentanyl gtts infusing. UO WNL per munoz, rectal pouch in place. Tube feeds held due to high residual. Electrolytes replaced as needed. Plan of care reviewed with Bayronlexsu Delgado and family, all concerns addressed, will continue to monitor.

## 2018-03-16 NOTE — PHYSICIAN QUERY
PT Name: Bayron Delgado  MR #: [id number]    Physician Query Form - Respiratory Condition Clarification      CDS/: Tiarra Musa RN CDI    Contact information: [email or web address]    This form is a permanent document in the medical record.    Query Date: March 16, 2018    By submitting this query, we are merely seeking further clarification of documentation. Please utilize your independent clinical judgment when addressing the question(s) below.    The Medical record contains the following   Indicators   Supporting Clinical Findings Location in Medical Record   X   SOB, HAGER, Wheezing, Productive Cough, Use of Accessory Muscles, etc. 46 y/o M w/ Afib, ALS (diagnosed 2014) who presented to [facility] [place] s/p transfer from [facility] via EMS on BiPAP 2/2 worsening respiratory failure. Per brother patient had been on home hospice for progression of his ALS, however, on 3/12 brother states patient experienced an episode of hypoxia as pt became more tachypneic w/ cyanosis surrounding lips. Per brother patient had a fever around this time as well. This episode resolved by 3/13 and at this time hospice agency re-visited patient's decision to explore life sustaining measures such as tracheostomy.    CC progress note 3/15 923 pm   X   Acute/Chronic Illness ALS (amyotrophic lateral sclerosis)    Atrial fibrillation    CHF (congestive heart failure)    Neuropathy    3/14 620 am   X   Radiology Findings Interval development of a right-sided pneumothorax.  There is increased opacification at the right lung base likely accentuated by the pneumothorax.  Defibrillator pads in place.  Some parahilar alveolar filling increased opacification at the left base.   3/15 Chest xray   X   Respiratory Distress or Failure transfer from [facility] via EMS on BiPAP 2/2 worsening respiratory failure  Principal Problem: Respiratory failure    Assessment and Plan:  Acute on chronic respiratory failure   H&P        Progress Note  3/15 923 pm    X   Hypoxia or Hypercapnia Pulmonary: profound hypoxia noted this AM - required emergent intubation following bag/directional tip catheter suctioning r/t mucus plug with subsequent pneumothorax requiring chest tube; SpO2 >95%   Progress note  3/15 356 pm   X   RR         ABGs         O2 sat RR - 18  Sat - 100%  On Bipap   Initial Admit vitals   X   BiPAP/Intubation Acute decompensation today- required intubation 3/15 923 pm   X   Supplemental O2 Pt intubated with size 8.0 ETT secured 23 @ lip with commercial tube borrego.  Pt placed on ventilator 16/420/+8/100%.    Oxygen Concentration (%): 28 (03/15/18 0753)    NIV dependent - stable with oxygen supplementation at this time   Progress note  3/15 1050      progress note   3/15 911 am  Progress Note   3/14 344 pm   X   Home O2, Oxygen Dependence Advanced ALS  NIV dependent - stable with oxygen supplementation at this time   Progress Note   3/14 344 pm   X   Treatment Cough assist device bid Progress Note   3/14 623 am   X   Other Comments: A chest tube was placed to relieve PTX, see procedure note   Progress note   3/15 1215      Provider, please specify diagnosis or diagnoses associated with above clinical findings.    [  ] Acute Respiratory Failure with Hypoxia  [  ] Acute Respiratory Failure with Hypercapnia  [  ] Acute Respiratory Failure with Hypoxia and Hypercapnia  [  ] Other Acute Respiratory Failure  [X  ] Acute and (on) Chronic Respiratory Failure with Hypoxia  [  ] Acute and (on) Chronic Respiratory Failure with Hypercapnia  [  ] Acute and (on) Chronic Respiratory Failure with Hypoxia and Hypercapnia  [  ] Other Acute and (on) Chronic Respiratory Failure  [  ] Chronic Respiratory Failure with Hypoxia  [  ] Chronic Respiratory Failure with Hypercapnia  [  ] Chronic Respiratory Failure with Hypoxia and Hypercapnia  [  ] Other Chronic Respiratory Failure  [  ] Other Respiratory Diagnosis (please specify): _______________________________  [  ] Clinically  Undetermined    Please document in your progress notes daily for the duration of treatment until resolved and include in your discharge summary.

## 2018-03-16 NOTE — H&P
Otolaryngology - Head and Neck Surgery  Consultation Report    Consultation From: Critical care     Chief Complaint: tracheostomy     History of Present Illness: 47 y.o. male with PMH significant for ALS presents as a transfer from an OSH for worsening respiratory failure. He was found to have a LLL PNA and UTI, which is currently being treated. According to the critical care physician, the patient has made his wish for a tracheostomy very clear and has withdrawn from hospice. Thus, ENT has been consulted.     Of note, he was intubated yesterday for respiratory distress and desaturation down to 45%. After this, he was found to have  PTX so a right chest tube was placed. Most recent EF on TTE was 68%. No previous history of neck surgery. The patient was intubated and sedated when seen so history was obtained from the chart and the critical care physician.     Review of Systems - Unable to obtain due to intubation and sedation without family members at bedside.     Past Medical History: Patient has a past medical history of ALS (amyotrophic lateral sclerosis); Atrial fibrillation; CHF (congestive heart failure); and Neuropathy.    Past Surgical History: Patient has a past surgical history that includes Appendectomy and Hernia repair.    Social History: Patient reports that he has never smoked. He does not have any smokeless tobacco history on file. He reports that he does not drink alcohol or use drugs.    Family History: family history is not on file.    Medications:    aztreonam  2,000 mg Intravenous Q8H    carvedilol  3.125 mg Per G Tube BID    chlorhexidine  15 mL Mouth/Throat BID    clonazePAM  0.5 mg Per G Tube TID    diphenhydrAMINE  50 mg Per G Tube QHS    doxepin  75 mg Per G Tube BID    DULoxetine  60 mg Per G Tube Daily    enoxaparin  40 mg Subcutaneous Daily    famotidine  20 mg Per G Tube BID    glycopyrrolate  0.3 mg Per G Tube TID    hydrOXYzine HCl  25 mg Per G Tube QID    lidocaine  3  patch Transdermal Q24H    ondansetron  4 mg Per G Tube Q6H    polyethylene glycol  17 g Per G Tube BID    pregabalin  100 mg Per G Tube TID    scopolamine  1 patch Transdermal Q3 Days    senna-docusate 8.6-50 mg  2 tablet Per G Tube BID    vancomycin (VANCOCIN) IVPB  15 mg/kg Intravenous Q12H       Allergies: Patient is allergic to latex, natural rubber; nsaids (non-steroidal anti-inflammatory drug); and penicillins.    Physical Exam:  Temp:  [98.1 °F (36.7 °C)-99.5 °F (37.5 °C)] 98.7 °F (37.1 °C)  Pulse:  [] 98  Resp:  [16-22] 16  SpO2:  [92 %-100 %] 97 %  BP: ()/(58-88) 108/70    Constitutional: Ill appearing, intubated and sedated   Eyes: PERRLA   Head/Face: Normocephalic. Salivary glands WNL.  Unable to assess facial movement.   Right Ear: External Auditory Canal WNL,TM w/o masses/lesions/perforations.  Auricle WNL.  Left Ear: External Auditory Canal WNL,TM w/o masses/lesions/perforations. Auricle WNL.  Nose: No gross nasal septal deviation. Inferior Turbinates 2+ bilaterally. No septal perforation. No masses/lesions. External nasal skin without masses/lesions.  Oral Cavity: Gingiva/lips WNL. Intubated.   Neck/Lymphatic: No LAD I-VI bilaterally.  No thyromegaly.  No masses noted on exam. Large amount of subcutaneous tissue. Landmarks palpable. No visible scars.   Respiratory: Intubated on ventilator. A/C VC rate 16, FiO2 30%, PEEP 5.0, Vt 340, Ppeak 20-21      CBC    Recent Labs  Lab 03/16/18  0312   WBC 10.73   HGB 9.5*   HCT 30.4*   MCV 92        BMP    Recent Labs  Lab 03/16/18  0312   *      K 3.5      CO2 34*   BUN 11   CREATININE 0.5   CALCIUM 9.6   MG 2.0       Assessment: 48 y/o male with PMH of ALS transferred for worsening respiratory failure. Patient desires tracheostomy placement.     Plan:   -continue care per primary team  -case added on for Monday w/ Dr. Horner   -will obtain consent from wife Saturday or Sunday   -continue treatment for PNA and UTI    -ENT will follow, please call with any questions       Elsie Wiseman MD  Otolaryngology - Head & Neck Surgery   [phone]

## 2018-03-16 NOTE — CONSULTS
Otolaryngology - Head and Neck Surgery  Consultation Report     Consultation From: Critical care      Chief Complaint: tracheostomy      History of Present Illness: 47 y.o. male with PMH significant for ALS presents as a transfer from an OSH for worsening respiratory failure. He was found to have a LLL PNA and UTI, which is currently being treated. According to the critical care physician, the patient has made his wish for a tracheostomy very clear and has withdrawn from hospice. Thus, ENT has been consulted.      Of note, he was intubated yesterday for respiratory distress and desaturation down to 45%. After this, he was found to have  PTX so a right chest tube was placed. Most recent EF on TTE was 68%. No previous history of neck surgery. The patient was intubated and sedated when seen so history was obtained from the chart and the critical care physician.      Review of Systems - Unable to obtain due to intubation and sedation without family members at bedside.      Past Medical History: Patient has a past medical history of ALS (amyotrophic lateral sclerosis); Atrial fibrillation; CHF (congestive heart failure); and Neuropathy.     Past Surgical History: Patient has a past surgical history that includes Appendectomy and Hernia repair.     Social History: Patient reports that he has never smoked. He does not have any smokeless tobacco history on file. He reports that he does not drink alcohol or use drugs.     Family History: family history is not on file.     Medications:    aztreonam  2,000 mg Intravenous Q8H    carvedilol  3.125 mg Per G Tube BID    chlorhexidine  15 mL Mouth/Throat BID    clonazePAM  0.5 mg Per G Tube TID    diphenhydrAMINE  50 mg Per G Tube QHS    doxepin  75 mg Per G Tube BID    DULoxetine  60 mg Per G Tube Daily    enoxaparin  40 mg Subcutaneous Daily    famotidine  20 mg Per G Tube BID    glycopyrrolate  0.3 mg Per G Tube TID    hydrOXYzine HCl  25 mg Per G Tube QID     lidocaine  3 patch Transdermal Q24H    ondansetron  4 mg Per G Tube Q6H    polyethylene glycol  17 g Per G Tube BID    pregabalin  100 mg Per G Tube TID    scopolamine  1 patch Transdermal Q3 Days    senna-docusate 8.6-50 mg  2 tablet Per G Tube BID    vancomycin (VANCOCIN) IVPB  15 mg/kg Intravenous Q12H         Allergies: Patient is allergic to latex, natural rubber; nsaids (non-steroidal anti-inflammatory drug); and penicillins.     Physical Exam:  Temp:  [98.1 °F (36.7 °C)-99.5 °F (37.5 °C)] 98.7 °F (37.1 °C)  Pulse:  [] 98  Resp:  [16-22] 16  SpO2:  [92 %-100 %] 97 %  BP: ()/(58-88) 108/70     Constitutional: Ill appearing, intubated and sedated   Eyes: PERRLA   Head/Face: Normocephalic. Salivary glands WNL.  Unable to assess facial movement.   Right Ear: External Auditory Canal WNL,TM w/o masses/lesions/perforations.  Auricle WNL.  Left Ear: External Auditory Canal WNL,TM w/o masses/lesions/perforations. Auricle WNL.  Nose: No gross nasal septal deviation. Inferior Turbinates 2+ bilaterally. No septal perforation. No masses/lesions. External nasal skin without masses/lesions.  Oral Cavity: Gingiva/lips WNL. Intubated.   Neck/Lymphatic: No LAD I-VI bilaterally.  No thyromegaly.  No masses noted on exam. Large amount of subcutaneous tissue. Landmarks palpable. No visible scars.   Respiratory: Intubated on ventilator. A/C VC rate 16, FiO2 30%, PEEP 5.0, Vt 340, Ppeak 20-21        CBC     Recent Labs  Lab 03/16/18  0312   WBC 10.73   HGB 9.5*   HCT 30.4*   MCV 92         BMP     Recent Labs  Lab 03/16/18  0312   *      K 3.5      CO2 34*   BUN 11   CREATININE 0.5   CALCIUM 9.6   MG 2.0         Assessment: 48 y/o male with PMH of ALS transferred for worsening respiratory failure. Patient desires tracheostomy placement.      Plan:   -continue care per primary team  -case added on for Monday w/ Dr. Horner   -will obtain consent from wife Saturday or Sunday   -continue  treatment for PNA and UTI   -ENT will follow, please call with any questions         Elsie Wiseman MD  Otolaryngology - Head & Neck Surgery   [phone]

## 2018-03-16 NOTE — ASSESSMENT & PLAN NOTE
-UA with enterococcus & pseudomonas  -C/w chronic munoz, changed on 3/13 at OSH  -Patient already on antibiotics as above

## 2018-03-16 NOTE — PROGRESS NOTES
[facility]  Critical Care Medicine  Progress Note    Patient Name: Bayron Delgado  MRN: [id number]  Admission Date: 3/14/2018  Hospital Length of Stay: 2 days  Code Status: Full Code  Attending Provider: Kike Fuentes MD  Primary Care Provider: Scott Puckett Jr, MD   Principal Problem: Acute respiratory failure with hypoxia    Subjective:     HPI:  46 y/o M w/ Afib, ALS (diagnosed 2014) who presented to [facility] s/p transfer from [facility] via EMS on BiPAP 2/2 worsening respiratory failure. Per brother patient had been on home hospice for progression of his ALS, however, on 3/12 brother states patient experienced an episode of hypoxia as pt became more tachypneic w/ cyanosis surrounding lips. Per brother patient had a fever around this time as well. This episode resolved by 3/13 and at this time hospice agency re-visited patient's decision to explore life sustaining measures such as tracheostomy. According to brother, patient decided he would undergo trach placement, he was transported by EMS to [facility] where he was noted to have L lower lobe consolidation on CXR. He was started on vanc/ aztreonam due to penicillin allergy.and was subsequently transferred to [facility]. Initially patient had refused tracheostomy after being evaluated by ENT (Dr. Horner) in 2016.   Per brother patient's wife was unhappy w/ patient's decision to proceed w/ trach placement.   Patient communicates mostly by shaking his head to answer yes/ no. He is dependent on his trilogy machine for ventilation.  History obtained from brother at bedside as hx difficult to obtain from patient (nonverbal secondary to ALS).    Hospital/ICU Course:  Bayron Delgado is a 47 y.o. male with CHF, A-fib, and ALS who presents to the ED via EMS on BiPAP with an onset of worsening respiratory failure on 03/14/2018. The EMS care team transferred the patient here from [place], [place] and were not allowed to transfer the patient to [facility] as per instructed by their supervisor. Patient revoked hospice with worsening respiratory status yesterday.    CXR with LLL PNA, concern for aspiration in setting of patient's ALS. UA with enterococcus & pseudomonas. Respiratory cultures with GPR, gram negative diplococci, and gram positive rods. Starting vancomycin and aztreonam (started on vanc & aztreonam at OSH.) Presents with anasarca, normal TTE & renal function. Diurese with IV lasix with adequate urine output. On the morning of 03/15, moted to have issues of desaturation as low as 45%, needed Bag valve mask to return oxygenation to 100%.  He had course breath sounds throughout.  Due to his inability to clear secretions, it was decided to emergently intubate. Afterwards, imaging revealed a pneumothorax on the right chest.  A small bore chest tube was placed    Interval History/Significant Events: Intubated yesterday secondary to destaturation of SaO2 45%. Pneumothorax noted on CXR with chest tube placement. Slept well overnight, no events.    Review of Systems   Unable to perform ROS: Intubated     Objective:     Vital Signs (Most Recent):  Temp: 99.5 °F (37.5 °C) (03/16/18 1100)  Pulse: 100 (03/16/18 1315)  Resp: 16 (03/16/18 1300)  BP: 97/61 (03/16/18 1300)  SpO2: (!) 94 % (03/16/18 1315) Vital Signs (24h Range):  Temp:  [98.1 °F (36.7 °C)-101.4 °F (38.6 °C)] 99.5 °F (37.5 °C)  Pulse:  [] 100  Resp:  [16-22] 16  SpO2:  [92 %-100 %] 94 %  BP: ()/(61-91) 97/61   Weight: 103.7 kg (228 lb 9.9 oz)  Body mass index is 32.8 kg/m².      Intake/Output Summary (Last 24 hours) at 03/16/18 1415  Last data filed at 03/16/18 1300   Gross per 24 hour   Intake           1229.5 ml   Output             2545 ml   Net          -1315.5 ml       Physical Exam   Constitutional: No distress.   HENT:   Head: Normocephalic and atraumatic.   Eyes: Conjunctivae are normal. No scleral icterus.   Neck: No JVD present.   Cardiovascular: Normal rate, regular rhythm and  intact distal pulses.  Exam reveals no friction rub.    No murmur heard.  Pulmonary/Chest: He has no wheezes. He has no rales.   Intubated  Chest tube right chest   Abdominal: Soft. Bowel sounds are normal. He exhibits no distension. There is no tenderness. There is no guarding.   PEG tube in place, w/ some skin irritation surrounding tube    Genitourinary:   Genitourinary Comments: Michele in place   Musculoskeletal: He exhibits edema (+1 b/l LE edema markedly improved, trace edema in hands b/l ).   Neurological: He is alert. No sensory deficit.   Skin: Skin is warm. No rash noted. He is not diaphoretic. No erythema.       Vents:  Vent Mode: A/C (03/16/18 1315)  Ventilator Initiated: Yes (03/15/18 1050)  Set Rate: 16 bmp (03/16/18 1315)  Vt Set: 340 mL (03/16/18 1315)  PEEP/CPAP: 5 cmH20 (03/16/18 1315)  Oxygen Concentration (%): 30 (03/16/18 1315)  Peak Airway Pressure: 32 cmH2O (03/16/18 1315)  Plateau Pressure: 24 cmH20 (03/15/18 1903)  Total Ve: 5.83 mL (03/16/18 1315)  F/VT Ratio<105 (RSBI): (!) 59.01 (03/16/18 0539)  Lines/Drains/Airways     Drain                 Gastrostomy/Enterostomy 01/23/17 1113 Percutaneous endoscopic gastrostomy (PEG) LUQ feeding 417 days         Fecal Incontinence  03/14/18 2 days         Urethral Catheter 03/13/18 1441 Non-latex 20 Fr. 2 days         Chest Tube 03/15/18 1200 1 Anterior Pleural 8.5 Fr. 1 day          Airway                 Airway - Non-Surgical 03/15/18 1038 Endotracheal Tube-Hi/Lo 1 day          Peripheral Intravenous Line                 Peripheral IV - Single Lumen 03/13/18 1412 Left Antecubital 3 days         Peripheral IV - Single Lumen 03/14/18 0300 Right Antecubital 2 days         Midline Catheter Insertion/Assessment  - Single Lumen 03/14/18 1545 Right cephalic vein (lateral side of arm) 18g x 10cm 1 day              Significant Labs:    CBC/Anemia Profile:    Recent Labs  Lab 03/15/18  0327 03/16/18  0312   WBC 8.20 10.73   HGB 9.6* 9.5*   HCT 29.1*  30.4*    249   MCV 87 92   RDW 13.7 14.1        Chemistries:    Recent Labs  Lab 03/14/18  2138 03/15/18  0327 03/15/18  1709 03/16/18  0022 03/16/18  0312    145  --   --  144   K 3.7 3.6 3.4* 3.4* 3.5    102  --   --  102   CO2 30* 33*  --   --  34*   BUN 12 12  --   --  11   CREATININE 0.5 0.4*  --   --  0.5   CALCIUM 9.2 9.6  --   --  9.6   ALBUMIN  --  3.1*  --   --   --    PROT  --  6.6  --   --   --    BILITOT  --  0.6  --   --   --    ALKPHOS  --  89  --   --   --    ALT  --  64*  --   --   --    AST  --  25  --   --   --    MG  --  2.1  --   --  2.0   PHOS  --  2.6*  --   --  2.9       CMP:     Recent Labs  Lab 03/14/18  2138 03/15/18  0327 03/15/18  1709 03/16/18  0022 03/16/18  0312    145  --   --  144   K 3.7 3.6 3.4* 3.4* 3.5    102  --   --  102   CO2 30* 33*  --   --  34*   * 156*  --   --  187*   BUN 12 12  --   --  11   CREATININE 0.5 0.4*  --   --  0.5   CALCIUM 9.2 9.6  --   --  9.6   PROT  --  6.6  --   --   --    ALBUMIN  --  3.1*  --   --   --    BILITOT  --  0.6  --   --   --    ALKPHOS  --  89  --   --   --    AST  --  25  --   --   --    ALT  --  64*  --   --   --    ANIONGAP 10 10  --   --  8   EGFRNONAA >60.0 >60.0  --   --  >60.0     POCT Glucose:     Recent Labs  Lab 03/16/18  0016 03/16/18  0605 03/16/18  1344   POCTGLUCOSE 206* 160* 118*     Urine Culture: No results for input(s): LABURIN in the last 48 hours.  Urine Studies: No results for input(s): COLORU, APPEARANCEUA, PHUR, SPECGRAV, PROTEINUA, GLUCUA, KETONESU, BILIRUBINUA, OCCULTUA, NITRITE, UROBILINOGEN, LEUKOCYTESUR, RBCUA, WBCUA, BACTERIA, SQUAMEPITHEL, HYALINECASTS in the last 48 hours.    Invalid input(s): WRIGHTSUR    Significant Imaging:  CXR: I have reviewed all pertinent results/findings within the past 24 hours and my personal findings are:  airspace consolidation in the left lower lung zone     Assessment/Plan:     Neuro   ALS (amyotrophic lateral sclerosis)    C/w home trilogy  machine  Cough assist device bid  Started on scopolamine patch and glycopyrrolate for secretions        Psychiatric   Anxiety    C/w home clonazepam for anxiety        Depression    C/w home cymbalta  C/w home clonazepam for anxiety  C/w home doxepin        Pulmonary   * Acute respiratory failure with hypoxia    -CXR with L lower lobe asp PNA, treating with vanc & aztreonam (penicillin allergy). Sputum cultures with gram positive cocci, GPR, and gram negative diplococci  -Con't vanc trough  -procalcitonin, lactate wnl, blood cx neg  -consulting ENT for trach placement (patient has revoked wish for hospice) (now medically stable.)  -Now intubated with chest tube secondary to pneumothorax        Cardiac/Vascular   Atrial fibrillation    [id number] 2  C/w home coreg for rate control  Not on any anti-coagulation        Renal/   Acute cystitis without hematuria    -UA with enterococcus & pseudomonas  -C/w chronic munoz, changed on 3/13 at OSH  -Patient already on antibiotics as above        Other   Anasarca    Markedly improved  TTE EF 68%, no DD  Renal function normal  Diuresing with lasix 80mg PO (latex allergy); BD --> QD yesterday  Continue to respond well with UO 2l, net neg                   Critical Care Daily Checklist:     A: Awake: RASS Goal/Actual Goal: RASS Goal: 0-->alert and calm  Actual: Paige Agitation Sedation Scale (RASS): Alert and calm   B: Spontaneous Breathing Trial Performed?    C: SAT & SBT Coordinated?  Intubated       D: Delirium: CAM-ICU Overall CAM-ICU: Negative   E: Early Mobility Performed? No, ALS   F: Feeding Goal:    Status:         Current Diet Order   Procedures    Diet NPO       AS: Analgesia/Sedation Propofol & fentanyl   T: Thromboembolic Prophylaxis     H: HOB > 300 Yes   U: Stress Ulcer Prophylaxis (if needed)     G: Glucose Control     B: Bowel Function Stool Occurrence: 0   I: Indwelling Catheter (Lines & Munoz) Necessity munoz   D: De-escalation of  Antimicrobials/Pharmacotherapies Vanc, aztreonam     Plan for the day/ETD Family discussion     Code Status:  Family/Goals of Care: Full Code           Critical secondary to Patient has a condition that poses threat to life and bodily function: Severe Respiratory Distress      Critical care was time spent personally by me on the following activities: development of treatment plan with patient or surrogate and bedside caregivers, discussions with consultants, evaluation of patient's response to treatment, examination of patient, ordering and performing treatments and interventions, ordering and review of laboratory studies, ordering and review of radiographic studies, pulse oximetry, re-evaluation of patient's condition. This critical care time did not overlap with that of any other provider or involve time for any procedures.     Meena Ortega MD  Critical Care Medicine  [facility]

## 2018-03-16 NOTE — PLAN OF CARE
Problem: Patient Care Overview  Goal: Plan of Care Review  Outcome: Ongoing (interventions implemented as appropriate)  Recommendations     1. TF recommendations:               - While on Propofol, recommend Impact Peptide @ 50 mL/hr to provide 2127 calories, 113 g of protein, 924 mL fluid.                          - Propofol currently providing 327 calories.              - When Propofol discontinued, recommend Isosource 1.5 @ 65 mL/hr to provide 2340 calories, 106 g of protein, 1192 mL fluid.                          - Hold for residuals >500 mL; additional fluid per MD.  2. If high residuals continue, consider adding prokinetic agent to aid in tolerance/absorption.  3. RD to monitor & follow-up.

## 2018-03-16 NOTE — PLAN OF CARE
"Problem: Patient Care Overview  Goal: Plan of Care Review  Patient remains intubated and sedated. Able to raise eyebrows to "yes" questions and and shake head minimally to "no" questions on command. Pain controlled with fentanyl gtt as well as scheduled medication regimen. Patient continues to have moderate amount of red, thick secretions noted from ETT tube. Bronchoscopy performed this afternoon and cultures obtained and sent. Plan for possible trach next week. Patient, wife and family updated on plan of care. Questions answered, verbalized understanding. See flowsheet for assessment documentation and MAR for medication administration. Continue plan of care.       "

## 2018-03-16 NOTE — PHYSICIAN QUERY
PT Name: Bayron Delgado  MR #: [id number]    Physician Query Form - Atrial Fibrillation Specificity     CDS/: Tiarra Musa RN CDI     Contact information: [email or web address]     This form is a permanent document in the medical record.     Query Date: March 16, 2018    By submitting this query, we are merely seeking further clarification of documentation. Please utilize your independent clinical judgment when addressing the question(s) below.    The medical record contains the following:   Indicators     Supporting Clinical Findings Location in Medical Record   X Atrial Fibrillation Cardiac/Vascular  Atrial fibrillation    [id number]  C/w home coreg  Not on any anti-coagulation   H&P   X EKG results Sinus rhythm with marked sinus arrhythmia   3/14 EKG   X Medication Carvedilol 3.125 mg g tube 2 times daily 3/14 0315   Both inpatient and home   Medication sheets    Treatment      Other         Provider, please further specify the Atrial Fibrillation diagnosis.    [  ] Chronic  [ X ] Paroxysmal  [  ] Permanent  [  ] Persistent  [  ] Other (please specify): ____________________________  [  ] Clinically Undetermined    Please document in your progress notes daily for the duration of treatment until resolved, and include in your discharge summary.

## 2018-03-16 NOTE — HOSPITAL COURSE
Bayron Delgado is a 47 y.o. male with CHF, A-fib, and ALS who presents to the ED via EMS on BiPAP with an onset of worsening respiratory failure on 03/14/2018. The EMS care team transferred the patient here from [place], [place] and were not allowed to transfer the patient to [facility] as per instructed by their supervisor. Patient revoked hospice with worsening respiratory status 3/13.    CXR with LLL PNA, concern for aspiration in setting of patient's ALS. UA with enterococcus & pseudomonas. Respiratory cultures with GPR, gram negative diplococci, and gram positive rods. Starting vancomycin and aztreonam (started on vanc & aztreonam at OSH.) Presents with anasarca, normal TTE & renal function. Diurese with IV lasix with adequate urine output. On the morning of 03/15, moted to have issues of desaturation as low as 45%, needed Bag valve mask to return oxygenation to 100%.  He had course breath sounds throughout.  Due to his inability to clear secretions, it was decided to emergently intubate. Afterwards, imaging revealed a pneumothorax on the right chest.  A small bore chest tube was placed on 3/15. Chest tube clamped 3/17.  03/18/2018 Run of afib w/ RVR overnight requiring x2 labetalol and amiodarone for termination. Now in 80s. Trach planned for tomorrow per ENT. Cough assist adjusted to Q6. CXR shows resolution of PTX. Will pull chest tube today. Some persistant peripheral edema noted, will continue daily lasix and reassess in the PM.   03/19/2018 Trach planned for today. Continuing abx.   03/20/2018 POD 1 Trach placement. No fistula visualized during trach placement. Consult placed to CTS for esophogram vs EGD for eval of possible tracheoesophageal fistula.  03/21/2018 POD#2 trach placement, doing well and does not have any complaints  03/22/2018 POD#3 trach placement. With intermittant SOB. Dessatting overnight, improvement with suction. Patient with fevers yesterday afternoon but afebrile overnight. UC,  BCs pending. Cont aztreonam and vanc. Vanc sub therapeutic, redosing.   3/23/18: all cx remain NGTD. Patient remained afebrile, was discharged to [place] [facility] in stable condition

## 2018-03-16 NOTE — ASSESSMENT & PLAN NOTE
-CXR with L lower lobe asp PNA, treating with vanc & aztreonam (penicillin allergy). Sputum cultures with gram positive cocci, GPR, and gram negative diplococci  -Con't vanc trough  -procalcitonin, lactate wnl, blood cx neg  -consulting ENT for trach placement (patient has revoked wish for hospice) (now medically stable.)  -Now intubated with chest tube secondary to pneumothorax

## 2018-03-17 LAB
ANION GAP SERPL CALC-SCNC: 11 MMOL/L
BASOPHILS # BLD AUTO: 0.09 K/UL
BASOPHILS NFR BLD: 0.9 %
BUN SERPL-MCNC: 12 MG/DL
CALCIUM SERPL-MCNC: 10 MG/DL
CHLORIDE SERPL-SCNC: 101 MMOL/L
CO2 SERPL-SCNC: 34 MMOL/L
CREAT SERPL-MCNC: 0.4 MG/DL
DIFFERENTIAL METHOD: ABNORMAL
EOSINOPHIL # BLD AUTO: 0.5 K/UL
EOSINOPHIL NFR BLD: 5 %
ERYTHROCYTE [DISTWIDTH] IN BLOOD BY AUTOMATED COUNT: 14.1 %
EST. GFR  (AFRICAN AMERICAN): >60 ML/MIN/1.73 M^2
EST. GFR  (NON AFRICAN AMERICAN): >60 ML/MIN/1.73 M^2
GLUCOSE SERPL-MCNC: 114 MG/DL
HCT VFR BLD AUTO: 28.6 %
HGB BLD-MCNC: 9.2 G/DL
IMM GRANULOCYTES # BLD AUTO: 0.13 K/UL
IMM GRANULOCYTES NFR BLD AUTO: 1.3 %
LYMPHOCYTES # BLD AUTO: 1.8 K/UL
LYMPHOCYTES NFR BLD: 17.9 %
MAGNESIUM SERPL-MCNC: 1.9 MG/DL
MCH RBC QN AUTO: 28.8 PG
MCHC RBC AUTO-ENTMCNC: 32.2 G/DL
MCV RBC AUTO: 89 FL
MONOCYTES # BLD AUTO: 1 K/UL
MONOCYTES NFR BLD: 9.5 %
NEUTROPHILS # BLD AUTO: 6.6 K/UL
NEUTROPHILS NFR BLD: 65.4 %
NRBC BLD-RTO: 0 /100 WBC
PHOSPHATE SERPL-MCNC: 2.8 MG/DL
PLATELET # BLD AUTO: 236 K/UL
PMV BLD AUTO: 9.7 FL
POCT GLUCOSE: 109 MG/DL (ref 70–110)
POTASSIUM SERPL-SCNC: 3.4 MMOL/L
RBC # BLD AUTO: 3.2 M/UL
SODIUM SERPL-SCNC: 146 MMOL/L
VANCOMYCIN TROUGH SERPL-MCNC: 17.4 UG/ML
WBC # BLD AUTO: 10.16 K/UL

## 2018-03-17 PROCEDURE — 25000003 PHARM REV CODE 250: Performed by: STUDENT IN AN ORGANIZED HEALTH CARE EDUCATION/TRAINING PROGRAM

## 2018-03-17 PROCEDURE — 83735 ASSAY OF MAGNESIUM: CPT

## 2018-03-17 PROCEDURE — 85025 COMPLETE CBC W/AUTO DIFF WBC: CPT

## 2018-03-17 PROCEDURE — 20000000 HC ICU ROOM

## 2018-03-17 PROCEDURE — 99900035 HC TECH TIME PER 15 MIN (STAT)

## 2018-03-17 PROCEDURE — 99900026 HC AIRWAY MAINTENANCE (STAT)

## 2018-03-17 PROCEDURE — 94761 N-INVAS EAR/PLS OXIMETRY MLT: CPT

## 2018-03-17 PROCEDURE — 80048 BASIC METABOLIC PNL TOTAL CA: CPT

## 2018-03-17 PROCEDURE — 27000221 HC OXYGEN, UP TO 24 HOURS

## 2018-03-17 PROCEDURE — 25000003 PHARM REV CODE 250: Performed by: INTERNAL MEDICINE

## 2018-03-17 PROCEDURE — 27200966 HC CLOSED SUCTION SYSTEM

## 2018-03-17 PROCEDURE — S0073 INJECTION, AZTREONAM, 500 MG: HCPCS | Performed by: STUDENT IN AN ORGANIZED HEALTH CARE EDUCATION/TRAINING PROGRAM

## 2018-03-17 PROCEDURE — 94003 VENT MGMT INPAT SUBQ DAY: CPT

## 2018-03-17 PROCEDURE — 80202 ASSAY OF VANCOMYCIN: CPT

## 2018-03-17 PROCEDURE — 63600175 PHARM REV CODE 636 W HCPCS: Performed by: STUDENT IN AN ORGANIZED HEALTH CARE EDUCATION/TRAINING PROGRAM

## 2018-03-17 PROCEDURE — 84100 ASSAY OF PHOSPHORUS: CPT

## 2018-03-17 PROCEDURE — 63600175 PHARM REV CODE 636 W HCPCS: Performed by: INTERNAL MEDICINE

## 2018-03-17 RX ORDER — PHENYLEPHRINE HCL IN 0.9% NACL 1 MG/10 ML
SYRINGE (ML) INTRAVENOUS
Status: DISCONTINUED
Start: 2018-03-17 | End: 2018-03-17 | Stop reason: WASHOUT

## 2018-03-17 RX ORDER — FUROSEMIDE 40 MG/1
40 TABLET ORAL DAILY
Status: DISCONTINUED | OUTPATIENT
Start: 2018-03-17 | End: 2018-03-23 | Stop reason: HOSPADM

## 2018-03-17 RX ORDER — POTASSIUM CHLORIDE 20 MEQ/15ML
40 SOLUTION ORAL ONCE
Status: COMPLETED | OUTPATIENT
Start: 2018-03-17 | End: 2018-03-17

## 2018-03-17 RX ADMIN — DULOXETINE 60 MG: 30 CAPSULE, DELAYED RELEASE ORAL at 08:03

## 2018-03-17 RX ADMIN — PROPOFOL 10 MCG/KG/MIN: 10 INJECTION, EMULSION INTRAVENOUS at 05:03

## 2018-03-17 RX ADMIN — CLONAZEPAM 0.5 MG: 0.5 TABLET ORAL at 08:03

## 2018-03-17 RX ADMIN — AZTREONAM 2000 MG: 1 INJECTION, POWDER, LYOPHILIZED, FOR SOLUTION INTRAMUSCULAR; INTRAVENOUS at 08:03

## 2018-03-17 RX ADMIN — POTASSIUM CHLORIDE 40 MEQ: 20 SOLUTION ORAL at 10:03

## 2018-03-17 RX ADMIN — PROPOFOL 15 MCG/KG/MIN: 10 INJECTION, EMULSION INTRAVENOUS at 07:03

## 2018-03-17 RX ADMIN — CLONAZEPAM 0.5 MG: 0.5 TABLET ORAL at 02:03

## 2018-03-17 RX ADMIN — Medication 4 MG: at 12:03

## 2018-03-17 RX ADMIN — STANDARDIZED SENNA CONCENTRATE AND DOCUSATE SODIUM 2 TABLET: 8.6; 5 TABLET, FILM COATED ORAL at 08:03

## 2018-03-17 RX ADMIN — ACETAMINOPHEN 650 MG: 325 TABLET, FILM COATED ORAL at 08:03

## 2018-03-17 RX ADMIN — DOXEPIN HYDROCHLORIDE 75 MG: 75 CAPSULE ORAL at 08:03

## 2018-03-17 RX ADMIN — Medication 75 MCG/HR: at 12:03

## 2018-03-17 RX ADMIN — DIPHENHYDRAMINE HYDROCHLORIDE 50 MG: 25 SOLUTION ORAL at 08:03

## 2018-03-17 RX ADMIN — AZTREONAM 2000 MG: 1 INJECTION, POWDER, LYOPHILIZED, FOR SOLUTION INTRAMUSCULAR; INTRAVENOUS at 12:03

## 2018-03-17 RX ADMIN — HYDROXYZINE HYDROCHLORIDE 25 MG: 25 TABLET, FILM COATED ORAL at 01:03

## 2018-03-17 RX ADMIN — POLYETHYLENE GLYCOL 3350 17 G: 17 POWDER, FOR SOLUTION ORAL at 08:03

## 2018-03-17 RX ADMIN — VANCOMYCIN HYDROCHLORIDE 1500 MG: 1 INJECTION, POWDER, LYOPHILIZED, FOR SOLUTION INTRAVENOUS at 09:03

## 2018-03-17 RX ADMIN — Medication 4 MG: at 05:03

## 2018-03-17 RX ADMIN — ENOXAPARIN SODIUM 40 MG: 100 INJECTION SUBCUTANEOUS at 04:03

## 2018-03-17 RX ADMIN — HYDROXYZINE HYDROCHLORIDE 25 MG: 25 TABLET, FILM COATED ORAL at 04:03

## 2018-03-17 RX ADMIN — LIDOCAINE 3 PATCH: 50 PATCH TOPICAL at 10:03

## 2018-03-17 RX ADMIN — CARVEDILOL 3.12 MG: 3.12 TABLET, FILM COATED ORAL at 08:03

## 2018-03-17 RX ADMIN — PREGABALIN 100 MG: 50 CAPSULE ORAL at 02:03

## 2018-03-17 RX ADMIN — DOXEPIN HYDROCHLORIDE 75 MG: 75 CAPSULE ORAL at 09:03

## 2018-03-17 RX ADMIN — CHLORHEXIDINE GLUCONATE 15 ML: 1.2 RINSE ORAL at 08:03

## 2018-03-17 RX ADMIN — PREGABALIN 100 MG: 50 CAPSULE ORAL at 08:03

## 2018-03-17 RX ADMIN — ACETAMINOPHEN 650 MG: 325 TABLET, FILM COATED ORAL at 04:03

## 2018-03-17 RX ADMIN — FAMOTIDINE 20 MG: 20 TABLET, FILM COATED ORAL at 08:03

## 2018-03-17 RX ADMIN — Medication 4 MG: at 04:03

## 2018-03-17 RX ADMIN — Medication 0.3 MG: at 02:03

## 2018-03-17 RX ADMIN — CHLORHEXIDINE GLUCONATE 15 ML: 1.2 RINSE ORAL at 09:03

## 2018-03-17 RX ADMIN — HYDROXYZINE HYDROCHLORIDE 25 MG: 25 TABLET, FILM COATED ORAL at 10:03

## 2018-03-17 RX ADMIN — AZTREONAM 2000 MG: 1 INJECTION, POWDER, LYOPHILIZED, FOR SOLUTION INTRAMUSCULAR; INTRAVENOUS at 04:03

## 2018-03-17 RX ADMIN — Medication 0.3 MG: at 08:03

## 2018-03-17 RX ADMIN — HYDROXYZINE HYDROCHLORIDE 25 MG: 25 TABLET, FILM COATED ORAL at 08:03

## 2018-03-17 RX ADMIN — VANCOMYCIN HYDROCHLORIDE 1500 MG: 1 INJECTION, POWDER, LYOPHILIZED, FOR SOLUTION INTRAVENOUS at 10:03

## 2018-03-17 RX ADMIN — FUROSEMIDE 40 MG: 40 TABLET ORAL at 12:03

## 2018-03-17 NOTE — SUBJECTIVE & OBJECTIVE
Interval History/Significant Events: Run of afib w/ RVR overnight requiring x2 labetalol and amiodarone for termination. Now in 80s. Trach planned for tomorrow per ENT. Cough assist adjusted to Q6. CXR shows resolution of PTX. Will pull chest tube today. Some persistant peripheral edema noted, will continue daily lasix and reassess in the PM.     Review of Systems   Unable to perform ROS: Other (intubated, ALS w/ vocal cord paralysis)     Objective:     Vitals:    03/18/18 0600 03/18/18 0615 03/18/18 0736 03/18/18 0912   BP: 99/64 (!) 93/58     Pulse: 72 81 91 90   Resp: 16 16     Temp:       TempSrc:       SpO2: 100% 95% (!) 93% 95%   Weight:       Height:         Physical Exam   Constitutional: No distress.   HENT:   Head: Normocephalic and atraumatic.   Eyes: Conjunctivae are normal. No scleral icterus.   Cardiovascular: Normal rate, regular rhythm and intact distal pulses.  Exam reveals no friction rub.    Pulmonary/Chest: No respiratory distress. He has no wheezes.   intubated   Abdominal: Soft. He exhibits no distension. There is no tenderness. There is no guarding.   PEG tube in place, w/ some skin irritation surrounding tube    Genitourinary:   Genitourinary Comments: Michele in place   Musculoskeletal: He exhibits edema (+2 b/l LE edema, +1 edema hands b/l).   Neurological: He is alert. No sensory deficit.   Skin: Skin is warm. No rash noted. He is not diaphoretic.       Vents:  Vent Mode: A/C (03/17/18 0940)  Ventilator Initiated: Yes (03/15/18 1050)  Set Rate: 16 bmp (03/17/18 0940)  Vt Set: 450 mL (03/17/18 0940)  PEEP/CPAP: 5 cmH20 (03/17/18 0940)  Oxygen Concentration (%): 30 (03/17/18 0545)  Peak Airway Pressure: 24 cmH2O (03/17/18 0940)  Plateau Pressure: 24 cmH20 (03/15/18 1903)  Total Ve: 7.5 mL (03/17/18 0940)  F/VT Ratio<105 (RSBI): (!) 34.26 (03/17/18 0940)  Lines/Drains/Airways     Drain                 Gastrostomy/Enterostomy 01/23/17 1113 Percutaneous endoscopic gastrostomy (PEG) LUQ feeding  417 days         Fecal Incontinence  03/14/18 3 days         Urethral Catheter 03/13/18 1441 Non-latex 20 Fr. 3 days         Chest Tube 03/15/18 1200 1 Anterior Pleural 8.5 Fr. 1 day          Airway                 Airway - Non-Surgical 03/15/18 1038 Endotracheal Tube-Hi/Lo 2 days          Peripheral Intravenous Line                 Peripheral IV - Single Lumen 03/13/18 1412 Left Antecubital 3 days         Peripheral IV - Single Lumen 03/14/18 0300 Right Antecubital 3 days         Midline Catheter Insertion/Assessment  - Single Lumen 03/14/18 1545 Right cephalic vein (lateral side of arm) 18g x 10cm 2 days              Significant Labs:      Recent Labs  Lab 03/18/18  0610   WBC 7.87   RBC 3.13*   HGB 8.9*   HCT 28.0*      MCV 90   MCH 28.4   MCHC 31.8*       Recent Labs  Lab 03/18/18  0610   CALCIUM 9.1      K 3.1*   CO2 32*      BUN 17   CREATININE 0.4*       Significant Imaging:  CXR: I have reviewed all pertinent results/findings within the past 24 hours and my personal findings are: Right-sided pigtail drainage catheter, bibasilar atelectasis or consolidation

## 2018-03-17 NOTE — NURSING
Critical care service Dr. Davis notified pt's HR remains in 110s and O2 sat in low-mid 90s, chest tube remains remains off suction, MD ordered for CXR stat, will continue to monitor patient at this time.

## 2018-03-17 NOTE — PLAN OF CARE
Problem: Patient Care Overview  Goal: Plan of Care Review  Outcome: Ongoing (interventions implemented as appropriate)  Pt remains on fentanyl and propofol. Pt also on antibiotics per MD orders. Pt remains orally intubated with vent settings on AC mode, with blood-tinge, thick medium amount of secretions. Pt remains on TF and given lasix per MD orders. VS and assessment per flow sheet. Patient progressing towards goals as tolerated. Plan of care reviewed with pt and pt's with all questions and concerns addressed, will continue to monitor. Plan for tracheostomy on Monday.

## 2018-03-17 NOTE — ASSESSMENT & PLAN NOTE
C/w home trilogy machine  Cough assist device q6  c/w scopolamine patch and glycopyrrolate for secretions

## 2018-03-17 NOTE — ASSESSMENT & PLAN NOTE
TTE EF 68%, no DD  Renal function normal  Diuresing with lasix PO (latex allergy); initially bid, now daily  Net neg 9L since admission

## 2018-03-17 NOTE — ASSESSMENT & PLAN NOTE
-CXR with L lower lobe asp PNA, treating with vanc & aztreonam (penicillin allergy). Sputum cultures with gram positive cocci, GPR, and gram negative diplococci  -Con't vanc trough  -procalcitonin, lactate wnl, blood cx neg  -consulting ENT for trach placement (patient has revoked wish for hospice) (now medically stable.)  -PTX resolved on CXR from AM, will pull chest tube today. Still with signs of pulmonary edema, will continue lasix.   -Trach planned for tomorrow

## 2018-03-17 NOTE — ASSESSMENT & PLAN NOTE
46 y/o male with PMH of ALS transferred for worsening respiratory failure. Patient desires tracheostomy placement.      Plan:   -continue care per primary team  -case added on for Monday w/ Dr. Horner   -will obtain consent from wife Saturday or Sunday   -continue treatment for PNA and UTI   -ENT will follow, please call with any questions

## 2018-03-17 NOTE — SUBJECTIVE & OBJECTIVE
Interval History: NAEON. Patient more awake this AM. Nods yes when asked if he wants a tracheostomy.     Medications:  Continuous Infusions:   fentanyl 7.5 mL/hr at 03/17/18 1000    propofol 10 mcg/kg/min (03/17/18 1000)     Scheduled Meds:   aztreonam  2,000 mg Intravenous Q8H    carvedilol  3.125 mg Per G Tube BID    chlorhexidine  15 mL Mouth/Throat BID    clonazePAM  0.5 mg Per G Tube TID    diphenhydrAMINE  50 mg Per G Tube QHS    doxepin  75 mg Per G Tube BID    DULoxetine  60 mg Per G Tube Daily    enoxaparin  40 mg Subcutaneous Daily    famotidine  20 mg Per G Tube BID    glycopyrrolate  0.3 mg Per G Tube TID    hydrOXYzine HCl  25 mg Per G Tube QID    lidocaine  3 patch Transdermal Q24H    ondansetron  4 mg Per G Tube Q6H    polyethylene glycol  17 g Per G Tube BID    pregabalin  100 mg Per G Tube TID    scopolamine  1 patch Transdermal Q3 Days    senna-docusate 8.6-50 mg  2 tablet Per G Tube BID    vancomycin (VANCOCIN) IVPB  15 mg/kg Intravenous Q12H     PRN Meds:acetaminophen, dextrose 50%, diphenhydrAMINE, glucagon (human recombinant), influenza, insulin aspart U-100, magnesium oxide, magnesium oxide, potassium chloride 10%, potassium chloride 10%, potassium, sodium phosphates, potassium, sodium phosphates, potassium, sodium phosphates     Review of patient's allergies indicates:   Allergen Reactions    Latex, natural rubber     Nsaids (non-steroidal anti-inflammatory drug)      Causes patient to go into afib per wife    Penicillins Other (See Comments)     Per wife- his mother stated he was allergic to it.  Had redness associated with cefepime 3/14/2018     Objective:     Vital Signs (24h Range):  Temp:  [98.6 °F (37 °C)-99.5 °F (37.5 °C)] 99.2 °F (37.3 °C)  Pulse:  [] 90  Resp:  [16-22] 16  SpO2:  [92 %-100 %] 98 %  BP: ()/(57-86) 135/80       Date 03/17/18 0700 - 03/18/18 0659   Shift 9795-0949 4407-7034 1266-9027 24 Hour Total   I  N  T  A  K  E   I.V.  (mL/kg)  83  (0.8)   83  (0.8)    NG/GT 10   10    IV Piggyback 500   500    Shift Total  (mL/kg) 593  (5.7)   593  (5.7)   O  U  T  P  U  T   Urine  (mL/kg/hr) 325   325    Shift Total  (mL/kg) 325  (3.1)   325  (3.1)   Weight (kg) 103.7 103.7 103.7 103.7     Lines/Drains/Airways     Drain                 Gastrostomy/Enterostomy 01/23/17 1113 Percutaneous endoscopic gastrostomy (PEG) LUQ feeding 417 days         Fecal Incontinence  03/14/18 3 days         Urethral Catheter 03/13/18 1441 Non-latex 20 Fr. 3 days         Chest Tube 03/15/18 1200 1 Anterior Pleural 8.5 Fr. 1 day          Airway                 Airway - Non-Surgical 03/15/18 1038 Endotracheal Tube-Hi/Lo 2 days          Peripheral Intravenous Line                 Peripheral IV - Single Lumen 03/13/18 1412 Left Antecubital 3 days         Peripheral IV - Single Lumen 03/14/18 0300 Right Antecubital 3 days         Midline Catheter Insertion/Assessment  - Single Lumen 03/14/18 1545 Right cephalic vein (lateral side of arm) 18g x 10cm 2 days                Physical Exam  NAD, more awake, nods appropriately   EOMI  Large amount of subcutaneous tissue. Landmarks palpable. No visible scars   Vent settings stable      Significant Labs:  BMP:   Recent Labs  Lab 03/17/18  0303   *      CO2 34*   BUN 12   CREATININE 0.4*   CALCIUM 10.0   MG 1.9     CBC:   Recent Labs  Lab 03/17/18  0303   WBC 10.16   RBC 3.20*   HGB 9.2*   HCT 28.6*      MCV 89   MCH 28.8   MCHC 32.2       Significant Diagnostics:  None

## 2018-03-17 NOTE — PROGRESS NOTES
[facility]  Otorhinolaryngology-Head & Neck Surgery  Progress Note    Subjective:     Post-Op Info:  Procedure(s) (LRB):  TRACHEOSTOMY (N/A)      Hospital Day: 4     Interval History: NAEON. Patient more awake this AM. Nods yes when asked if he wants a tracheostomy.     Medications:  Continuous Infusions:   fentanyl 7.5 mL/hr at 03/17/18 1000    propofol 10 mcg/kg/min (03/17/18 1000)     Scheduled Meds:   aztreonam  2,000 mg Intravenous Q8H    carvedilol  3.125 mg Per G Tube BID    chlorhexidine  15 mL Mouth/Throat BID    clonazePAM  0.5 mg Per G Tube TID    diphenhydrAMINE  50 mg Per G Tube QHS    doxepin  75 mg Per G Tube BID    DULoxetine  60 mg Per G Tube Daily    enoxaparin  40 mg Subcutaneous Daily    famotidine  20 mg Per G Tube BID    glycopyrrolate  0.3 mg Per G Tube TID    hydrOXYzine HCl  25 mg Per G Tube QID    lidocaine  3 patch Transdermal Q24H    ondansetron  4 mg Per G Tube Q6H    polyethylene glycol  17 g Per G Tube BID    pregabalin  100 mg Per G Tube TID    scopolamine  1 patch Transdermal Q3 Days    senna-docusate 8.6-50 mg  2 tablet Per G Tube BID    vancomycin (VANCOCIN) IVPB  15 mg/kg Intravenous Q12H     PRN Meds:acetaminophen, dextrose 50%, diphenhydrAMINE, glucagon (human recombinant), influenza, insulin aspart U-100, magnesium oxide, magnesium oxide, potassium chloride 10%, potassium chloride 10%, potassium, sodium phosphates, potassium, sodium phosphates, potassium, sodium phosphates     Review of patient's allergies indicates:   Allergen Reactions    Latex, natural rubber     Nsaids (non-steroidal anti-inflammatory drug)      Causes patient to go into afib per wife    Penicillins Other (See Comments)     Per wife- his mother stated he was allergic to it.  Had redness associated with cefepime 3/14/2018     Objective:     Vital Signs (24h Range):  Temp:  [98.6 °F (37 °C)-99.5 °F (37.5 °C)] 99.2 °F (37.3 °C)  Pulse:  [] 90  Resp:  [16-22]  16  SpO2:  [92 %-100 %] 98 %  BP: ()/(57-86) 135/80       Date 03/17/18 0700 - 03/18/18 0659   Shift 7721-2268 [id number] [id number] 24 Hour Total   I  N  T  A  K  E   I.V.  (mL/kg) 83  (0.8)   83  (0.8)    NG/GT 10   10    IV Piggyback 500   500    Shift Total  (mL/kg) 593  (5.7)   593  (5.7)   O  U  T  P  U  T   Urine  (mL/kg/hr) 325   325    Shift Total  (mL/kg) 325  (3.1)   325  (3.1)   Weight (kg) 103.7 103.7 103.7 103.7     Lines/Drains/Airways     Drain                 Gastrostomy/Enterostomy 01/23/17 1113 Percutaneous endoscopic gastrostomy (PEG) LUQ feeding 417 days         Fecal Incontinence  03/14/18 3 days         Urethral Catheter 03/13/18 1441 Non-latex 20 Fr. 3 days         Chest Tube 03/15/18 1200 1 Anterior Pleural 8.5 Fr. 1 day          Airway                 Airway - Non-Surgical 03/15/18 1038 Endotracheal Tube-Hi/Lo 2 days          Peripheral Intravenous Line                 Peripheral IV - Single Lumen 03/13/18 1412 Left Antecubital 3 days         Peripheral IV - Single Lumen 03/14/18 0300 Right Antecubital 3 days         Midline Catheter Insertion/Assessment  - Single Lumen 03/14/18 1545 Right cephalic vein (lateral side of arm) 18g x 10cm 2 days                Physical Exam  NAD, more awake, nods appropriately   EOMI  Large amount of subcutaneous tissue. Landmarks palpable. No visible scars   Vent settings stable      Significant Labs:  BMP:   Recent Labs  Lab 03/17/18  0303   *      CO2 34*   BUN 12   CREATININE 0.4*   CALCIUM 10.0   MG 1.9     CBC:   Recent Labs  Lab 03/17/18  0303   WBC 10.16   RBC 3.20*   HGB 9.2*   HCT 28.6*      MCV 89   MCH 28.8   MCHC 32.2       Significant Diagnostics:  None    Assessment/Plan:     ALS (amyotrophic lateral sclerosis)    46 y/o male with PMH of ALS transferred for worsening respiratory failure. Patient desires tracheostomy placement.      Plan:   -continue care per primary team  -case added on for Monday w/ Dr. Horner    -will obtain consent from wife Saturday or Sunday   -continue treatment for PNA and UTI   -ENT will follow, please call with any questions             Elsie Wiseman MD  Otorhinolaryngology-Head & Neck Surgery  [facility]

## 2018-03-18 ENCOUNTER — ANESTHESIA EVENT (OUTPATIENT)
Dept: SURGERY | Facility: HOSPITAL | Age: 48
DRG: 004 | End: 2018-03-18
Payer: MEDICARE

## 2018-03-18 LAB
ANION GAP SERPL CALC-SCNC: 10 MMOL/L
BACTERIA BLD CULT: NORMAL
BACTERIA BLD CULT: NORMAL
BASOPHILS # BLD AUTO: 0.06 K/UL
BASOPHILS NFR BLD: 0.8 %
BUN SERPL-MCNC: 17 MG/DL
CALCIUM SERPL-MCNC: 9.1 MG/DL
CHLORIDE SERPL-SCNC: 102 MMOL/L
CO2 SERPL-SCNC: 32 MMOL/L
CREAT SERPL-MCNC: 0.4 MG/DL
DIFFERENTIAL METHOD: ABNORMAL
EOSINOPHIL # BLD AUTO: 0.3 K/UL
EOSINOPHIL NFR BLD: 3.6 %
ERYTHROCYTE [DISTWIDTH] IN BLOOD BY AUTOMATED COUNT: 14.3 %
EST. GFR  (AFRICAN AMERICAN): >60 ML/MIN/1.73 M^2
EST. GFR  (NON AFRICAN AMERICAN): >60 ML/MIN/1.73 M^2
GLUCOSE SERPL-MCNC: 144 MG/DL
HCT VFR BLD AUTO: 28 %
HGB BLD-MCNC: 8.9 G/DL
IMM GRANULOCYTES # BLD AUTO: 0.13 K/UL
IMM GRANULOCYTES NFR BLD AUTO: 1.7 %
LYMPHOCYTES # BLD AUTO: 1.7 K/UL
LYMPHOCYTES NFR BLD: 21.7 %
MAGNESIUM SERPL-MCNC: 1.8 MG/DL
MCH RBC QN AUTO: 28.4 PG
MCHC RBC AUTO-ENTMCNC: 31.8 G/DL
MCV RBC AUTO: 90 FL
MONOCYTES # BLD AUTO: 0.6 K/UL
MONOCYTES NFR BLD: 7.8 %
NEUTROPHILS # BLD AUTO: 5.1 K/UL
NEUTROPHILS NFR BLD: 64.4 %
NRBC BLD-RTO: 0 /100 WBC
PHOSPHATE SERPL-MCNC: 2.7 MG/DL
PLATELET # BLD AUTO: 264 K/UL
PMV BLD AUTO: 10.2 FL
POCT GLUCOSE: 119 MG/DL (ref 70–110)
POTASSIUM SERPL-SCNC: 3.1 MMOL/L
RBC # BLD AUTO: 3.13 M/UL
SODIUM SERPL-SCNC: 144 MMOL/L
WBC # BLD AUTO: 7.87 K/UL

## 2018-03-18 PROCEDURE — 99900035 HC TECH TIME PER 15 MIN (STAT)

## 2018-03-18 PROCEDURE — 63600175 PHARM REV CODE 636 W HCPCS

## 2018-03-18 PROCEDURE — 94003 VENT MGMT INPAT SUBQ DAY: CPT

## 2018-03-18 PROCEDURE — 63600175 PHARM REV CODE 636 W HCPCS: Performed by: STUDENT IN AN ORGANIZED HEALTH CARE EDUCATION/TRAINING PROGRAM

## 2018-03-18 PROCEDURE — 25000003 PHARM REV CODE 250: Performed by: STUDENT IN AN ORGANIZED HEALTH CARE EDUCATION/TRAINING PROGRAM

## 2018-03-18 PROCEDURE — 99900026 HC AIRWAY MAINTENANCE (STAT)

## 2018-03-18 PROCEDURE — S0073 INJECTION, AZTREONAM, 500 MG: HCPCS | Performed by: STUDENT IN AN ORGANIZED HEALTH CARE EDUCATION/TRAINING PROGRAM

## 2018-03-18 PROCEDURE — 63600175 PHARM REV CODE 636 W HCPCS: Performed by: INTERNAL MEDICINE

## 2018-03-18 PROCEDURE — 84100 ASSAY OF PHOSPHORUS: CPT

## 2018-03-18 PROCEDURE — 85025 COMPLETE CBC W/AUTO DIFF WBC: CPT

## 2018-03-18 PROCEDURE — 25000003 PHARM REV CODE 250: Performed by: INTERNAL MEDICINE

## 2018-03-18 PROCEDURE — 83735 ASSAY OF MAGNESIUM: CPT

## 2018-03-18 PROCEDURE — 80048 BASIC METABOLIC PNL TOTAL CA: CPT

## 2018-03-18 PROCEDURE — 20000000 HC ICU ROOM

## 2018-03-18 PROCEDURE — 99231 SBSQ HOSP IP/OBS SF/LOW 25: CPT | Mod: ,,, | Performed by: OTOLARYNGOLOGY

## 2018-03-18 PROCEDURE — 99233 SBSQ HOSP IP/OBS HIGH 50: CPT | Mod: GC,,, | Performed by: INTERNAL MEDICINE

## 2018-03-18 PROCEDURE — 25000003 PHARM REV CODE 250

## 2018-03-18 RX ORDER — METOPROLOL TARTRATE 1 MG/ML
INJECTION, SOLUTION INTRAVENOUS
Status: COMPLETED
Start: 2018-03-18 | End: 2018-03-18

## 2018-03-18 RX ORDER — METOPROLOL TARTRATE 1 MG/ML
5 INJECTION, SOLUTION INTRAVENOUS ONCE
Status: COMPLETED | OUTPATIENT
Start: 2018-03-18 | End: 2018-03-18

## 2018-03-18 RX ORDER — POTASSIUM CHLORIDE 20 MEQ/15ML
40 SOLUTION ORAL ONCE
Status: COMPLETED | OUTPATIENT
Start: 2018-03-18 | End: 2018-03-18

## 2018-03-18 RX ORDER — MAGNESIUM SULFATE/D5W 2 G/50 ML
2 INTRAVENOUS SOLUTION, PIGGYBACK (ML) INTRAVENOUS ONCE
Status: COMPLETED | OUTPATIENT
Start: 2018-03-18 | End: 2018-03-18

## 2018-03-18 RX ADMIN — VANCOMYCIN HYDROCHLORIDE 1500 MG: 1 INJECTION, POWDER, LYOPHILIZED, FOR SOLUTION INTRAVENOUS at 10:03

## 2018-03-18 RX ADMIN — Medication 0.3 MG: at 03:03

## 2018-03-18 RX ADMIN — CARVEDILOL 3.12 MG: 3.12 TABLET, FILM COATED ORAL at 08:03

## 2018-03-18 RX ADMIN — POLYETHYLENE GLYCOL 3350 17 G: 17 POWDER, FOR SOLUTION ORAL at 08:03

## 2018-03-18 RX ADMIN — AZTREONAM 2000 MG: 1 INJECTION, POWDER, LYOPHILIZED, FOR SOLUTION INTRAMUSCULAR; INTRAVENOUS at 03:03

## 2018-03-18 RX ADMIN — FAMOTIDINE 20 MG: 20 TABLET, FILM COATED ORAL at 08:03

## 2018-03-18 RX ADMIN — Medication 0.3 MG: at 10:03

## 2018-03-18 RX ADMIN — CARVEDILOL 3.12 MG: 3.12 TABLET, FILM COATED ORAL at 10:03

## 2018-03-18 RX ADMIN — POTASSIUM CHLORIDE 40 MEQ: 20 SOLUTION ORAL at 08:03

## 2018-03-18 RX ADMIN — CLONAZEPAM 0.5 MG: 0.5 TABLET ORAL at 08:03

## 2018-03-18 RX ADMIN — CHLORHEXIDINE GLUCONATE 15 ML: 1.2 RINSE ORAL at 10:03

## 2018-03-18 RX ADMIN — AMIODARONE HYDROCHLORIDE 1 MG/MIN: 1.8 INJECTION, SOLUTION INTRAVENOUS at 10:03

## 2018-03-18 RX ADMIN — POTASSIUM CHLORIDE 40 MEQ: 20 SOLUTION ORAL at 10:03

## 2018-03-18 RX ADMIN — METOPROLOL TARTRATE 5 MG: 5 INJECTION INTRAVENOUS at 06:03

## 2018-03-18 RX ADMIN — AZTREONAM 2000 MG: 1 INJECTION, POWDER, LYOPHILIZED, FOR SOLUTION INTRAMUSCULAR; INTRAVENOUS at 12:03

## 2018-03-18 RX ADMIN — ACETAMINOPHEN 650 MG: 325 TABLET, FILM COATED ORAL at 10:03

## 2018-03-18 RX ADMIN — AZTREONAM 2000 MG: 1 INJECTION, POWDER, LYOPHILIZED, FOR SOLUTION INTRAMUSCULAR; INTRAVENOUS at 08:03

## 2018-03-18 RX ADMIN — ENOXAPARIN SODIUM 40 MG: 100 INJECTION SUBCUTANEOUS at 05:03

## 2018-03-18 RX ADMIN — AMIODARONE HYDROCHLORIDE 150 MG: 1.5 INJECTION, SOLUTION INTRAVENOUS at 05:03

## 2018-03-18 RX ADMIN — Medication 4 MG: at 12:03

## 2018-03-18 RX ADMIN — CLONAZEPAM 0.5 MG: 0.5 TABLET ORAL at 10:03

## 2018-03-18 RX ADMIN — Medication 2500 MCG: at 07:03

## 2018-03-18 RX ADMIN — STANDARDIZED SENNA CONCENTRATE AND DOCUSATE SODIUM 2 TABLET: 8.6; 5 TABLET, FILM COATED ORAL at 08:03

## 2018-03-18 RX ADMIN — PREGABALIN 100 MG: 50 CAPSULE ORAL at 08:03

## 2018-03-18 RX ADMIN — FAMOTIDINE 20 MG: 20 TABLET, FILM COATED ORAL at 10:03

## 2018-03-18 RX ADMIN — PREGABALIN 100 MG: 50 CAPSULE ORAL at 03:03

## 2018-03-18 RX ADMIN — LIDOCAINE 3 PATCH: 50 PATCH TOPICAL at 11:03

## 2018-03-18 RX ADMIN — DOXEPIN HYDROCHLORIDE 75 MG: 75 CAPSULE ORAL at 08:03

## 2018-03-18 RX ADMIN — DIPHENHYDRAMINE HYDROCHLORIDE 50 MG: 25 SOLUTION ORAL at 08:03

## 2018-03-18 RX ADMIN — CLONAZEPAM 0.5 MG: 0.5 TABLET ORAL at 03:03

## 2018-03-18 RX ADMIN — HYDROXYZINE HYDROCHLORIDE 25 MG: 25 TABLET, FILM COATED ORAL at 09:03

## 2018-03-18 RX ADMIN — Medication 4 MG: at 01:03

## 2018-03-18 RX ADMIN — Medication 4 MG: at 05:03

## 2018-03-18 RX ADMIN — HYDROXYZINE HYDROCHLORIDE 25 MG: 25 TABLET, FILM COATED ORAL at 01:03

## 2018-03-18 RX ADMIN — HYDROXYZINE HYDROCHLORIDE 25 MG: 25 TABLET, FILM COATED ORAL at 10:03

## 2018-03-18 RX ADMIN — HYDROXYZINE HYDROCHLORIDE 25 MG: 25 TABLET, FILM COATED ORAL at 05:03

## 2018-03-18 RX ADMIN — PREGABALIN 100 MG: 50 CAPSULE ORAL at 10:03

## 2018-03-18 RX ADMIN — MAGNESIUM OXIDE TAB 400 MG (241.3 MG ELEMENTAL MG) 800 MG: 400 (241.3 MG) TAB at 08:03

## 2018-03-18 RX ADMIN — DULOXETINE 60 MG: 30 CAPSULE, DELAYED RELEASE ORAL at 10:03

## 2018-03-18 RX ADMIN — Medication 0.3 MG: at 08:03

## 2018-03-18 RX ADMIN — FUROSEMIDE 40 MG: 40 TABLET ORAL at 10:03

## 2018-03-18 RX ADMIN — VANCOMYCIN HYDROCHLORIDE 1500 MG: 1 INJECTION, POWDER, LYOPHILIZED, FOR SOLUTION INTRAVENOUS at 08:03

## 2018-03-18 RX ADMIN — AMIODARONE HYDROCHLORIDE 1 MG/MIN: 1.8 INJECTION, SOLUTION INTRAVENOUS at 06:03

## 2018-03-18 RX ADMIN — Medication 2 G: at 12:03

## 2018-03-18 RX ADMIN — Medication 4 MG: at 06:03

## 2018-03-18 RX ADMIN — DOXEPIN HYDROCHLORIDE 75 MG: 75 CAPSULE ORAL at 10:03

## 2018-03-18 NOTE — PLAN OF CARE
Problem: Patient Care Overview  Goal: Plan of Care Review  Outcome: Ongoing (interventions implemented as appropriate)  See vital signs and assessments in flowsheets. See below for updates on today's progress.     Pulmonary: Vent, Ac, rate 16, FiO2:21%, PEEP 10, pt. Will desaturate when turned on to his right side, team notified, interventions as appropriate    Cardiovascular: ST 90's-110's    Neurological: Intermittently awake and oriented    Gastrointestinal: TF @ goal    Genitourinary: Michele in place     Endocrine: WNL    Integumentary/Other: Mepilex applied to sacral area    Infusions: KVO    Patient progressing towards goals as tolerated, plan of care communicated and reviewed with Bayron Danny and family. All concerns addressed. Will continue to monitor.

## 2018-03-18 NOTE — PROGRESS NOTES
[facility]  Otorhinolaryngology-Head & Neck Surgery  Progress Note    Subjective:     Post-Op Info:  Procedure(s) (LRB):  TRACHEOSTOMY (N/A)      Hospital Day: 5     Interval History: NAEON. Patient remains intubated.  In good spirits. Low grade fever this AM.     Medications:  Continuous Infusions:   amiodarone in dextrose 5% 1 mg/min (03/18/18 1059)    amiodarone in dextrose 5%      fentanyl 50 mcg/hr (03/17/18 2200)    propofol Stopped (03/17/18 2200)     Scheduled Meds:   amiodarone in dextrose  150 mg Intravenous Once    aztreonam  2,000 mg Intravenous Q8H    carvedilol  3.125 mg Per G Tube BID    chlorhexidine  15 mL Mouth/Throat BID    clonazePAM  0.5 mg Per G Tube TID    diphenhydrAMINE  50 mg Per G Tube QHS    doxepin  75 mg Per G Tube BID    DULoxetine  60 mg Per G Tube Daily    enoxaparin  40 mg Subcutaneous Daily    famotidine  20 mg Per G Tube BID    furosemide  40 mg Per G Tube Daily    glycopyrrolate  0.3 mg Per G Tube TID    hydrOXYzine HCl  25 mg Per G Tube QID    lidocaine  3 patch Transdermal Q24H    magnesium sulfate IVPB  2 g Intravenous Once    ondansetron  4 mg Per G Tube Q6H    polyethylene glycol  17 g Per G Tube BID    pregabalin  100 mg Per G Tube TID    scopolamine  1 patch Transdermal Q3 Days    senna-docusate 8.6-50 mg  2 tablet Per G Tube BID    vancomycin (VANCOCIN) IVPB  15 mg/kg Intravenous Q12H     PRN Meds:acetaminophen, dextrose 50%, diphenhydrAMINE, glucagon (human recombinant), influenza, insulin aspart U-100, magnesium oxide, magnesium oxide, potassium chloride 10%, potassium chloride 10%, potassium, sodium phosphates, potassium, sodium phosphates, potassium, sodium phosphates     Review of patient's allergies indicates:   Allergen Reactions    Latex, natural rubber     Nsaids (non-steroidal anti-inflammatory drug)      Causes patient to go into afib per wife    Penicillins Other (See Comments)     Per wife- his mother stated he  was allergic to it.  Had redness associated with cefepime 3/14/2018     Objective:     Vital Signs (24h Range):  Temp:  [98.6 °F (37 °C)-100.9 °F (38.3 °C)] 100.9 °F (38.3 °C)  Pulse:  [] 94  Resp:  [16-40] 16  SpO2:  [91 %-100 %] 93 %  BP: ()/(50-73) 107/63        Lines/Drains/Airways     Drain                 Gastrostomy/Enterostomy 01/23/17 1113 Percutaneous endoscopic gastrostomy (PEG) LUQ feeding 418 days         Fecal Incontinence  03/14/18 4 days         Urethral Catheter 03/13/18 1441 Non-latex 20 Fr. 4 days         Chest Tube 03/15/18 1200 1 Anterior Pleural 8.5 Fr. 3 days          Airway                 Airway - Non-Surgical 03/15/18 1038 Endotracheal Tube-Hi/Lo 3 days          Peripheral Intravenous Line                 Peripheral IV - Single Lumen 03/13/18 1412 Left Antecubital 4 days         Peripheral IV - Single Lumen 03/14/18 0300 Right Antecubital 4 days         Midline Catheter Insertion/Assessment  - Single Lumen 03/14/18 1545 Right cephalic vein (lateral side of arm) 18g x 10cm 3 days                Physical Exam  NAD, more awake, nods appropriately   EOMI  Large amount of subcutaneous tissue. Landmarks palpable. No visible scars   Vent settings stable      Significant Labs:  BMP:   Recent Labs  Lab 03/18/18  0610   *      CO2 32*   BUN 17   CREATININE 0.4*   CALCIUM 9.1   MG 1.8     CBC:   Recent Labs  Lab 03/18/18  0610   WBC 7.87   RBC 3.13*   HGB 8.9*   HCT 28.0*      MCV 90   MCH 28.4   MCHC 31.8*       Significant Diagnostics:  None    Assessment/Plan:     ALS (amyotrophic lateral sclerosis)    46 y/o male with PMH of ALS transferred for worsening respiratory failure. Patient desires tracheostomy placement.      Plan:   -continue care per primary team  -continue treatment for PNA and UTI. Low grade fever    Management per ICU  -Please optimize best for surgery   Ideal PEEP <5, FiO2 <40%     -case tentatively added on for Monday w/ Dr. Horner if HD  stable      -NPO w IVF midnight  -ENT will follow, please call with any questions             Eligio Bauer MD  Otorhinolaryngology-Head & Neck Surgery  [facility]

## 2018-03-18 NOTE — PROGRESS NOTES
Pt has als and continues to bite down on bite block causing pressure ulcers, tube should be moved q4   The pt emailed:I will try to incorporate an afternoon walk. I've been taking it easy since sciatica started up a few weeks ago.   I think one reason my numbers have gone up at lunch could be because I frequently miss my morning snack. By the time I eat lunch, I feel famished and am eating a large amount of food. I will try to work on this.       Replied: It's a plan!

## 2018-03-18 NOTE — SUBJECTIVE & OBJECTIVE
Interval History: NAEON. Patient remains intubated.  In good spirits. Low grade fever this AM.     Medications:  Continuous Infusions:   amiodarone in dextrose 5% 1 mg/min (03/18/18 1059)    amiodarone in dextrose 5%      fentanyl 50 mcg/hr (03/17/18 2200)    propofol Stopped (03/17/18 2200)     Scheduled Meds:   amiodarone in dextrose  150 mg Intravenous Once    aztreonam  2,000 mg Intravenous Q8H    carvedilol  3.125 mg Per G Tube BID    chlorhexidine  15 mL Mouth/Throat BID    clonazePAM  0.5 mg Per G Tube TID    diphenhydrAMINE  50 mg Per G Tube QHS    doxepin  75 mg Per G Tube BID    DULoxetine  60 mg Per G Tube Daily    enoxaparin  40 mg Subcutaneous Daily    famotidine  20 mg Per G Tube BID    furosemide  40 mg Per G Tube Daily    glycopyrrolate  0.3 mg Per G Tube TID    hydrOXYzine HCl  25 mg Per G Tube QID    lidocaine  3 patch Transdermal Q24H    magnesium sulfate IVPB  2 g Intravenous Once    ondansetron  4 mg Per G Tube Q6H    polyethylene glycol  17 g Per G Tube BID    pregabalin  100 mg Per G Tube TID    scopolamine  1 patch Transdermal Q3 Days    senna-docusate 8.6-50 mg  2 tablet Per G Tube BID    vancomycin (VANCOCIN) IVPB  15 mg/kg Intravenous Q12H     PRN Meds:acetaminophen, dextrose 50%, diphenhydrAMINE, glucagon (human recombinant), influenza, insulin aspart U-100, magnesium oxide, magnesium oxide, potassium chloride 10%, potassium chloride 10%, potassium, sodium phosphates, potassium, sodium phosphates, potassium, sodium phosphates     Review of patient's allergies indicates:   Allergen Reactions    Latex, natural rubber     Nsaids (non-steroidal anti-inflammatory drug)      Causes patient to go into afib per wife    Penicillins Other (See Comments)     Per wife- his mother stated he was allergic to it.  Had redness associated with cefepime 3/14/2018     Objective:     Vital Signs (24h Range):  Temp:  [98.6 °F (37 °C)-100.9 °F (38.3 °C)] 100.9 °F (38.3 °C)  Pulse:   [] 94  Resp:  [16-40] 16  SpO2:  [91 %-100 %] 93 %  BP: ()/(50-73) 107/63        Lines/Drains/Airways     Drain                 Gastrostomy/Enterostomy 01/23/17 1113 Percutaneous endoscopic gastrostomy (PEG) LUQ feeding 418 days         Fecal Incontinence  03/14/18 4 days         Urethral Catheter 03/13/18 1441 Non-latex 20 Fr. 4 days         Chest Tube 03/15/18 1200 1 Anterior Pleural 8.5 Fr. 3 days          Airway                 Airway - Non-Surgical 03/15/18 1038 Endotracheal Tube-Hi/Lo 3 days          Peripheral Intravenous Line                 Peripheral IV - Single Lumen 03/13/18 1412 Left Antecubital 4 days         Peripheral IV - Single Lumen 03/14/18 0300 Right Antecubital 4 days         Midline Catheter Insertion/Assessment  - Single Lumen 03/14/18 1545 Right cephalic vein (lateral side of arm) 18g x 10cm 3 days                Physical Exam  NAD, more awake, nods appropriately   EOMI  Large amount of subcutaneous tissue. Landmarks palpable. No visible scars   Vent settings stable      Significant Labs:  BMP:   Recent Labs  Lab 03/18/18  0610   *      CO2 32*   BUN 17   CREATININE 0.4*   CALCIUM 9.1   MG 1.8     CBC:   Recent Labs  Lab 03/18/18  0610   WBC 7.87   RBC 3.13*   HGB 8.9*   HCT 28.0*      MCV 90   MCH 28.4   MCHC 31.8*       Significant Diagnostics:  None

## 2018-03-18 NOTE — PROGRESS NOTES
[facility]  Critical Care Medicine  Progress Note    Patient Name: Bayron Delgado  MRN: [id number]  Admission Date: 3/14/2018  Hospital Length of Stay: 4 days  Code Status: Full Code  Attending Provider: Kike Fuentes MD  Primary Care Provider: Scott Puckett Jr, MD   Principal Problem: Acute respiratory failure with hypoxia    Subjective:     HPI:  46 y/o M w/ Afib, ALS (diagnosed 2014) who presented to [facility] [place]/p transfer from [facility] via EMS on BiPAP 2/2 worsening respiratory failure. Per brother patient had been on home hospice for progression of his ALS, however, on 3/12 brother states patient experienced an episode of hypoxia as pt became more tachypneic w/ cyanosis surrounding lips. Per brother patient had a fever around this time as well. This episode resolved by 3/13 and at this time hospice agency re-visited patient's decision to explore life sustaining measures such as tracheostomy. According to brother, patient decided he would undergo trach placement, he was transported by EMS to [facility] where he was noted to have L lower lobe consolidation on CXR. He was started on vanc/ aztreonam due to penicillin allergy.and was subsequently transferred to [facility]. Initially patient had refused tracheostomy after being evaluated by ENT (Dr. Horner) in 2016.   Per brother patient's wife was unhappy w/ patient's decision to proceed w/ trach placement.   Patient communicates mostly by shaking his head to answer yes/ no. He is dependent on his trilogy machine for ventilation.  History obtained from brother at bedside as hx difficult to obtain from patient (nonverbal secondary to ALS).    Hospital/ICU Course:  Bayron Delgado is a 47 y.o. male with CHF, A-fib, and ALS who presents to the ED via EMS on BiPAP with an onset of worsening respiratory failure on 03/14/2018. The EMS care team transferred the patient here from [place], [place] and were not allowed to transfer the patient to [facility] as per instructed by their supervisor. Patient revoked hospice with worsening respiratory status 3/13.    CXR with LLL PNA, concern for aspiration in setting of patient's ALS. UA with enterococcus & pseudomonas. Respiratory cultures with GPR, gram negative diplococci, and gram positive rods. Starting vancomycin and aztreonam (started on vanc & aztreonam at OSH.) Presents with anasarca, normal TTE & renal function. Diurese with IV lasix with adequate urine output. On the morning of 03/15, moted to have issues of desaturation as low as 45%, needed Bag valve mask to return oxygenation to 100%.  He had course breath sounds throughout.  Due to his inability to clear secretions, it was decided to emergently intubate. Afterwards, imaging revealed a pneumothorax on the right chest.  A small bore chest tube was placed on 3/15. Chest tube clamped 3/17.  03/18/2018 Run of afib w/ RVR overnight requiring x2 labetalol and amiodarone for termination. Now in 80s. Trach planned for tomorrow per ENT. Cough assist adjusted to Q6. CXR shows resolution of PTX. Will pull chest tube today. Some persistant peripheral edema noted, will continue daily lasix and reassess in the PM.     Interval History/Significant Events: Run of afib w/ RVR overnight requiring x2 labetalol and amiodarone for termination. Now in 80s. Trach planned for tomorrow per ENT. Cough assist adjusted to Q6. CXR shows resolution of PTX. Will pull chest tube today. Some persistant peripheral edema noted, will continue daily lasix and reassess in the PM.     Review of Systems   Unable to perform ROS: Other (intubated, ALS w/ vocal cord paralysis)     Objective:     Vitals:    03/18/18 0600 03/18/18 0615 03/18/18 0736 03/18/18 0912   BP: 99/64 (!) 93/58     Pulse: 72 81 91 90   Resp: 16 16     Temp:       TempSrc:       SpO2: 100% 95% (!) 93% 95%   Weight:       Height:         Physical Exam   Constitutional: No distress.   HENT:   Head: Normocephalic and  atraumatic.   Eyes: Conjunctivae are normal. No scleral icterus.   Cardiovascular: Normal rate, regular rhythm and intact distal pulses.  Exam reveals no friction rub.    Pulmonary/Chest: No respiratory distress. He has no wheezes.   intubated   Abdominal: Soft. He exhibits no distension. There is no tenderness. There is no guarding.   PEG tube in place, w/ some skin irritation surrounding tube    Genitourinary:   Genitourinary Comments: Michele in place   Musculoskeletal: He exhibits edema (+2 b/l LE edema, +1 edema hands b/l).   Neurological: He is alert. No sensory deficit.   Skin: Skin is warm. No rash noted. He is not diaphoretic.       Vents:  Vent Mode: A/C (03/17/18 0940)  Ventilator Initiated: Yes (03/15/18 1050)  Set Rate: 16 bmp (03/17/18 0940)  Vt Set: 450 mL (03/17/18 0940)  PEEP/CPAP: 5 cmH20 (03/17/18 0940)  Oxygen Concentration (%): 30 (03/17/18 0545)  Peak Airway Pressure: 24 cmH2O (03/17/18 0940)  Plateau Pressure: 24 cmH20 (03/15/18 1903)  Total Ve: 7.5 mL (03/17/18 0940)  F/VT Ratio<105 (RSBI): (!) 34.26 (03/17/18 0940)  Lines/Drains/Airways     Drain                 Gastrostomy/Enterostomy 01/23/17 1113 Percutaneous endoscopic gastrostomy (PEG) LUQ feeding 417 days         Fecal Incontinence  03/14/18 3 days         Urethral Catheter 03/13/18 1441 Non-latex 20 Fr. 3 days         Chest Tube 03/15/18 1200 1 Anterior Pleural 8.5 Fr. 1 day          Airway                 Airway - Non-Surgical 03/15/18 1038 Endotracheal Tube-Hi/Lo 2 days          Peripheral Intravenous Line                 Peripheral IV - Single Lumen 03/13/18 1412 Left Antecubital 3 days         Peripheral IV - Single Lumen 03/14/18 0300 Right Antecubital 3 days         Midline Catheter Insertion/Assessment  - Single Lumen 03/14/18 1545 Right cephalic vein (lateral side of arm) 18g x 10cm 2 days              Significant Labs:      Recent Labs  Lab 03/18/18  0610   WBC 7.87   RBC 3.13*   HGB 8.9*   HCT 28.0*      MCV 90    MCH 28.4   MCHC 31.8*       Recent Labs  Lab 03/18/18  0610   CALCIUM 9.1      K 3.1*   CO2 32*      BUN 17   CREATININE 0.4*       Significant Imaging:  CXR: I have reviewed all pertinent results/findings within the past 24 hours and my personal findings are: Right-sided pigtail drainage catheter, bibasilar atelectasis or consolidation     Assessment/Plan:     Neuro   ALS (amyotrophic lateral sclerosis)    C/w home trilogy machine  Cough assist device q6  c/w scopolamine patch and glycopyrrolate for secretions        Psychiatric   Anxiety    C/w home clonazepam for anxiety        Depression    C/w home cymbalta  C/w home clonazepam for anxiety  C/w home doxepin        Pulmonary   * Acute respiratory failure with hypoxia    -CXR with L lower lobe asp PNA, treating with vanc & aztreonam (penicillin allergy). Sputum cultures with gram positive cocci, GPR, and gram negative diplococci  -Con't vanc trough  -procalcitonin, lactate wnl, blood cx neg  -consulting ENT for trach placement (patient has revoked wish for hospice) (now medically stable.)  -PTX resolved on CXR from AM, will pull chest tube today. Still with signs of pulmonary edema, will continue lasix.   -Trach planned for tomorrow        Cardiac/Vascular   Atrial fibrillation    [id number] 2. Not on any anti-coagulation. Rate controlled with coreg 3.125 BID.     With a run of afib rvr last night, given x2 labetalol and amiodarone, now converted.     - Cont home coreg        Renal/   Acute cystitis without hematuria    -UA with enterococcus & pseudomonas  -C/w chronic munoz, changed on 3/13 at OSH  -Patient already on antibiotics as above        Other   Anasarca    TTE EF 68%, no DD  Renal function normal  Diuresing with lasix PO (latex allergy); initially bid, now daily  Net neg 9L since admission                  Critical Care Daily Checklist:     A: Awake: RASS Goal/Actual Goal: RASS Goal: 0-->alert and calm  Actual: Paige Agitation  Sedation Scale (RASS): Alert and calm   B: Spontaneous Breathing Trial Performed?     C: SAT & SBT Coordinated?  Intubated       D: Delirium: CAM-ICU Overall CAM-ICU: Negative   E: Early Mobility Performed? No, ALS   F: Feeding Goal:    Status:           Current Diet Order   Procedures    Diet NPO       AS: Analgesia/Sedation fentanyl   T: Thromboembolic Prophylaxis     H: HOB > 300 Yes   U: Stress Ulcer Prophylaxis (if needed)     G: Glucose Control     B: Bowel Function Stool Occurrence: 0   I: Indwelling Catheter (Lines & Munoz) Necessity munoz   D: De-escalation of Antimicrobials/Pharmacotherapies Vanc, aztreonam     Plan for the day/ETD Family discussion     Code Status:  Family/Goals of Care: Full Code            Critical secondary to Patient has a condition that poses threat to life and bodily function: Severe Respiratory Distress           Critical care was time spent personally by me on the following activities: development of treatment plan with patient or surrogate and bedside caregivers, discussions with consultants, evaluation of patient's response to treatment, examination of patient, ordering and performing treatments and interventions, ordering and review of laboratory studies, ordering and review of radiographic studies, pulse oximetry, re-evaluation of patient's condition. This critical care time did not overlap with that of any other provider or involve time for any procedures.     Bob Knight MD  Critical Care Medicine  [facility]

## 2018-03-18 NOTE — ASSESSMENT & PLAN NOTE
[id number] 2. Not on any anti-coagulation. Rate controlled with coreg 3.125 BID.     With a run of afib rvr last night, given x2 labetalol and amiodarone, now converted.     - Cont home coreg

## 2018-03-18 NOTE — ANESTHESIA PREPROCEDURE EVALUATION
[facility]  Anesthesia Pre-Operative Evaluation         Patient Name: Bayron Delgado  YOB: 1970  MRN: [id number]    SUBJECTIVE:     Pre-operative evaluation for Procedure(s) (LRB):  TRACHEOSTOMY (N/A)     03/18/2018    Bayron Delgado is a 47 y.o. male w/ a significant PMHx of ALS, AFib and CHF  who was transferred for worsening respiratory failure and LLL PNA. Patient was hospice but this has been reversed and he would like a tracheostomy.     Patient currently intubated, was found to have PTX after intubation on 3/15 by medical ICU.    Patient now presents for the above procedure(s).      LDA:        Peripheral IV - Single Lumen 03/13/18 1412 Left Antecubital (Active)   Site Assessment Clean;Dry;Intact;No redness;No swelling 3/18/2018  3:00 PM   Line Status Infusing 3/18/2018  3:00 PM   Dressing Status Clean;Intact;Dry 3/18/2018  3:00 PM   Dressing Change Due 03/18/18 3/18/2018  3:00 PM   Site Change Due 03/18/18 3/18/2018  7:45 AM   Reason Not Rotated Not due 3/17/2018 11:00 PM   Number of days: 5            Peripheral IV - Single Lumen 03/14/18 0300 Right Antecubital (Active)   Site Assessment Clean;Dry;No redness;No swelling;Intact 3/18/2018  3:00 PM   Line Status Infusing;Flushed 3/18/2018  3:00 PM   Dressing Status Clean;Dry;Intact 3/18/2018  3:00 PM   Dressing Intervention New dressing 3/17/2018 11:00 PM   Dressing Change Due 03/18/18 3/18/2018  3:00 PM   Site Change Due 03/18/18 3/18/2018  7:45 AM   Reason Not Rotated Not due 3/17/2018 11:00 PM   Number of days: 4            Midline Catheter Insertion/Assessment  - Single Lumen 03/14/18 1545 Right cephalic vein (lateral side of arm) 18g x 10cm (Active)   Site Assessment Clean;Dry;Intact;No redness;No swelling 3/18/2018  3:00 PM   IV Device Securement catheter securement device 3/18/2018  3:00 PM   Line Status Blood return noted 3/18/2018  3:00 PM   Dressing Status Biopatch in place 3/18/2018  3:00 PM   Dressing Intervention New  dressing 3/18/2018  3:00 PM   Dressing Change Due 03/21/18 3/18/2018  3:00 PM   Site Change Due 03/21/18 3/18/2018  7:45 AM   Reason Not Rotated Not due 3/17/2018 11:00 PM   Number of days: 4            Chest Tube 03/15/18 1200 1 Anterior Pleural 8.5 Fr. (Active)   Chest Tube WDL ex 3/18/2018  7:45 AM   Function Other (Comment) 3/18/2018  3:00 PM   Air Leak/Fluctuation fluctuation present 3/17/2018 11:00 PM   Care drainage unit changed 3/17/2018 11:00 PM   Safety all tubing connections taped;2 rubber-tipped hemostats w/ patient;all connections secured 3/18/2018  3:00 PM   Securement tubing anchored to body distal to insertion site w/ anchoring device 3/18/2018  3:00 PM   Patency Intervention Tip/tilt 3/17/2018 11:00 PM   Dressing Appearance occlusive gauze dressing intact 3/18/2018  3:00 PM   Dressing Care occlusive petroleum gauze dressing applied 3/18/2018  3:00 PM   Left Subcutaneous Emphysema none present 3/18/2018  3:00 PM   Right Subcutaneous Emphysema none present 3/18/2018  3:00 PM   Site Assessment Clean;Dry;Intact;No redness;No swelling 3/18/2018  3:00 PM   Surrounding Skin Dry;Intact 3/18/2018  3:00 PM   Output (mL) 0 mL 3/18/2018  7:45 AM   Number of days: 3            Gastrostomy/Enterostomy 01/23/17 1113 Percutaneous endoscopic gastrostomy (PEG) LUQ feeding (Active)   Securement anchored to abdomen w/ adhesive device 3/18/2018  3:00 PM   Interventions Prior to Feeding residual checked 3/18/2018  3:00 PM   Feeding Type continuous 3/18/2018  3:00 PM   Clamp Status/Tolerance unclamped 3/18/2018  3:00 PM   Feeding Action feeding continued 3/18/2018  3:00 PM   Dressing dry and intact 3/18/2018  3:00 PM   Site Care sterile precut T-slit dressing applied 3/18/2018  3:00 PM   Flush/Irrigation flushed w/;water 3/18/2018  3:00 PM   Dressing Change Due 03/17/18 3/16/2018  3:31 PM   Current Rate (mL/hr) 30 mL/hr 3/18/2018  7:45 AM   Goal Rate (mL/hr) 50 mL/hr 3/18/2018  7:45 AM   Intake (mL) 60 mL 3/17/2018  7:00  "PM   Water Bolus (mL) 100 mL 3/14/2018  5:00 PM   Intake (mL) - Breast Milk Tube Feeding 10 3/16/2018  6:00 PM   Tube Feeding Intake (mL) 40 3/18/2018  3:00 PM   Residual Amount (ml) 20 ml 3/18/2018 11:00 AM   Number of days: 419            Urethral Catheter 03/13/18 1441 Non-latex 20 Fr. (Active)   Site Assessment Clean;Intact;Dry 3/18/2018  3:00 PM   Collection Container Urimeter 3/18/2018  3:00 PM   Securement Method secured to top of thigh w/ adhesive device 3/18/2018  3:00 PM   Catheter Care Performed yes 3/18/2018  3:00 PM   Reason for Continuing Urinary Catheterization Critically ill in ICU requiring intensive monitoring 3/18/2018  3:00 PM   CAUTI Prevention Bundle StatLock in place w 1" slack 3/18/2018  7:45 AM   Output (mL) 80 mL 3/18/2018  3:00 PM   Number of days: 5            Fecal Incontinence  03/14/18 (Active)   Application fecal incontinence  removed 3/18/2018  3:00 PM   Drainage Method attached to drainage bag 3/17/2018 11:00 PM   Securement to gravity 3/17/2018 11:00 PM   Skin no breakdown 3/17/2018 11:00 PM   Tolerance no signs/symptoms of discomfort 3/17/2018 11:00 PM   Number of days: 4       Prev airway: RSI, video assisted Mac 3    Drips:    fentanyl 5 mL/hr at 03/18/18 1300    propofol Stopped (03/17/18 2200)       Patient Active Problem List   Diagnosis    ALS (amyotrophic lateral sclerosis)    Atrial fibrillation    Lyme disease    Depression    Bartonella infection    Paroxysmal atrial fibrillation    Aspiration into airway    Acute hypoxemic respiratory failure    Acute respiratory failure with hypoxia    Anasarca    Acute cystitis without hematuria    Anxiety       Review of patient's allergies indicates:   Allergen Reactions    Latex, natural rubber     Nsaids (non-steroidal anti-inflammatory drug)      Causes patient to go into afib per wife    Penicillins Other (See Comments)     Per wife- his mother stated he was allergic to it.  Had redness " associated with cefepime 3/14/2018       Current Inpatient Medications:   amiodarone in dextrose  150 mg Intravenous Once    aztreonam  2,000 mg Intravenous Q8H    carvedilol  3.125 mg Per G Tube BID    chlorhexidine  15 mL Mouth/Throat BID    clonazePAM  0.5 mg Per G Tube TID    diphenhydrAMINE  50 mg Per G Tube QHS    doxepin  75 mg Per G Tube BID    DULoxetine  60 mg Per G Tube Daily    enoxaparin  40 mg Subcutaneous Daily    famotidine  20 mg Per G Tube BID    furosemide  40 mg Per G Tube Daily    glycopyrrolate  0.3 mg Per G Tube TID    hydrOXYzine HCl  25 mg Per G Tube QID    lidocaine  3 patch Transdermal Q24H    ondansetron  4 mg Per G Tube Q6H    polyethylene glycol  17 g Per G Tube BID    pregabalin  100 mg Per G Tube TID    scopolamine  1 patch Transdermal Q3 Days    senna-docusate 8.6-50 mg  2 tablet Per G Tube BID    vancomycin (VANCOCIN) IVPB  15 mg/kg Intravenous Q12H       No current facility-administered medications on file prior to encounter.      Current Outpatient Prescriptions on File Prior to Encounter   Medication Sig Dispense Refill    acetaminophen (TYLENOL) 500 MG tablet Take 500 mg by mouth every 6 (six) hours as needed for Temperature greater than.      carvedilol (COREG) 3.125 MG tablet 3.125 mg by Per G Tube route 2 (two) times daily.       clonazePAM (KLONOPIN) 0.5 MG tablet 0.5 mg by Per G Tube route 3 (three) times daily.      diphenhydrAMINE (BENADRYL) 25 mg capsule 25 mg by Per G Tube route every morning. 4PM      diphenhydrAMINE (BENADRYL) 50 MG capsule 50 mg by Per G Tube route every evening.      doxepin (SINEQUAN) 75 MG capsule 75 mg by Per G Tube route 2 (two) times daily.      duloxetine (CYMBALTA) 60 MG capsule Take 60 mg by mouth every morning.       furosemide (LASIX) 20 MG tablet TAKE 1 TABLET(20 MG) BY MOUTH EVERY DAY (Patient taking differently: 20 mg by Per G Tube route once daily. ) 90 tablet 11    hydrocodone-acetaminophen 10-325mg  (NORCO)  mg Tab 1 tablet by Per G Tube route 3 (three) times daily.       hydrOXYzine HCl (ATARAX) 25 MG tablet 25 mg by Per G Tube route 4 (four) times daily.      LACTOSE-REDUCED FOOD/FIBER (ISOSOURCE 1.5 YURY ORAL) by Per G Tube route 5 (five) times daily.      lidocaine (LIDODERM) 5 % Place 1 patch onto the skin daily as needed (neck and both knees). Remove & Discard patch within 12 hours or as directed by MD      morphine 100 mg/5 mL (20 mg/mL) concentrated solution Take 0.25 mg by mouth every 2 (two) hours as needed for Pain.      morphine 30 mg TRer 30 mg by Per G Tube route every 6 (six) hours while awake.      ondansetron (ZOFRAN) 4 mg/5 mL solution 4 mg by Per G Tube route 4 (four) times daily.      polyethylene glycol (GLYCOLAX) 17 gram PwPk 17 g by Per G Tube route 2 (two) times daily. AM and NOON      potassium chloride (MICRO-K) 10 MEQ CpSR TAKE ONE CAPSULE BY MOUTH DAILY (Patient taking differently: TAKE ONE CAPSULE BY G TUBE DAILY) 90 capsule 11    pregabalin (LYRICA) 100 MG capsule 100 mg by Per G Tube route 3 (three) times daily.      senna (SENOKOT) 8.6 mg tablet 7 tablets by Per G Tube route 2 (two) times daily. AM and NOON       temazepam (RESTORIL) 15 mg Cap 15 mg by Per G Tube route every evening.         Past Surgical History:   Procedure Laterality Date    APPENDECTOMY      HERNIA REPAIR         Social History     Social History    Marital status:      Spouse name: N/A    Number of children: N/A    Years of education: N/A     Occupational History    Not on file.     Social History Main Topics    Smoking status: Never Smoker    Smokeless tobacco: Not on file    Alcohol use No    Drug use: No    Sexual activity: Yes     Partners: Female     Other Topics Concern    Not on file     Social History Narrative    No narrative on file       OBJECTIVE:     Vital Signs Range (Last 24H):  Temp:  [37 °C (98.6 °F)-38.3 °C (100.9 °F)]   Pulse:  []   Resp:  [16-40]    BP: ()/(50-75)   SpO2:  [93 %-100 %]       CBC:   Recent Labs      03/17/18   0303  03/18/18   0610   WBC  10.16  7.87   RBC  3.20*  3.13*   HGB  9.2*  8.9*   HCT  28.6*  28.0*   PLT  236  264   MCV  89  90   MCH  28.8  28.4   MCHC  32.2  31.8*       CMP:   Recent Labs      03/17/18   0303  03/18/18   0610   NA  146*  144   K  3.4*  3.1*   CL  101  102   CO2  34*  32*   BUN  12  17   CREATININE  0.4*  0.4*   GLU  114*  144*   MG  1.9  1.8   PHOS  2.8  2.7   CALCIUM  10.0  9.1       INR:  No results for input(s): PT, INR, PROTIME, APTT in the last 72 hours.    Diagnostic Studies: CXR 3/18/2018  Endotracheal tube terminates in the mid trachea.  Right-sided pleural drainage catheter terminates in the upper lung zone.  Right basilar consolidation unchanged.  Left basilar consolidation improved.  Small lead left pleural effusion noted.  No pneumothorax.  Cardiomediastinal silhouette is unremarkable.    EKG: 3/18/2018  Sinus rhythm with short CA  Rightward axis  Nonspecific ST abnormality  Abnormal ECG  When compared with ECG of 18-MAR-2018 05:15,  Significant changes have occurred    2D ECHO:  Results for orders placed or performed during the hospital encounter of 03/14/18   2D echo with color flow doppler   Result Value Ref Range    EF 68 55 - 65    Diastolic Dysfunction No     Est. PA Systolic Pressure 34     Tricuspid Valve Regurgitation TRIVIAL          ASSESSMENT/PLAN:         Anesthesia Evaluation    I have reviewed the Patient Summary Reports.     I have reviewed the Medications.     Review of Systems  Anesthesia Hx:  No problems with previous Anesthesia  History of prior surgery of interest to airway management or planning:  Denies Personal Hx of Anesthesia complications.   Cardiovascular:   Dysrhythmias atrial fibrillation CHF    Pulmonary:   LLL  PTX   Neurological:   Neuromuscular Disease, (ALS)    Endocrine:  Endocrine Normal    Psych:   depression          Physical Exam  General:  Obesity     Airway/Jaw/Neck:  Airway Findings: Pre-Existing Airway Tube(s): Oral Endotracheal tube     Dental:  Dental Findings:   Chest/Lungs:  Chest/Lungs Findings: Normal Respiratory Rate, Decreased Breath Sounds Bilateral         Mental Status:  Mental Status Findings: (sedated but responsive)         Anesthesia Plan  Type of Anesthesia, risks & benefits discussed:  Anesthesia Type:  general  Patient's Preference:   Intra-op Monitoring Plan: standard ASA monitors  Intra-op Monitoring Plan Comments:   Post Op Pain Control Plan: per primary service following discharge from PACU, multimodal analgesia and IV/PO Opioids PRN  Post Op Pain Control Plan Comments:   Induction:   IV  Beta Blocker:  Patient is on a Beta-Blocker and has received one dose within the past 24 hours (No further documentation required).       Informed Consent: Patient representative understands risks and agrees with Anesthesia plan.  Questions answered. Anesthesia consent signed with patient representative.  ASA Score: 4     Day of Surgery Review of History & Physical:    H&P update referred to the surgeon.         Ready For Surgery From Anesthesia Perspective.

## 2018-03-18 NOTE — PLAN OF CARE
Problem: Patient Care Overview  Goal: Plan of Care Review  Outcome: Ongoing (interventions implemented as appropriate)  Patient tolerated all therapies with no complaints.  Turned Q2H.  Bed bath and linens changed.      Approximately 0500 this am while bathing the patient, he went into Afib with RVR.  EKG confirmed and MD notified.  Lopressor pushes and an amiodarone bolus was given.  The patient converted to NSR and was started on an amiodarone drip.

## 2018-03-18 NOTE — ASSESSMENT & PLAN NOTE
48 y/o male with PMH of ALS transferred for worsening respiratory failure. Patient desires tracheostomy placement.      Plan:   -continue care per primary team  -continue treatment for PNA and UTI. Low grade fever    Management per ICU  -Please optimize best for surgery   Ideal PEEP <5, FiO2 <40%     -case tentatively added on for Monday w/ Dr. Horner if HD stable      -NPO w IVF midnight  -ENT will follow, please call with any questions

## 2018-03-19 ENCOUNTER — SURGERY (OUTPATIENT)
Age: 48
End: 2018-03-19

## 2018-03-19 ENCOUNTER — ANESTHESIA (OUTPATIENT)
Dept: SURGERY | Facility: HOSPITAL | Age: 48
DRG: 004 | End: 2018-03-19
Payer: MEDICARE

## 2018-03-19 LAB
ANION GAP SERPL CALC-SCNC: 9 MMOL/L
BACTERIA SPEC AEROBE CULT: NORMAL
BACTERIA UR CULT: NORMAL
BACTERIA UR CULT: NORMAL
BASOPHILS # BLD AUTO: 0.07 K/UL
BASOPHILS NFR BLD: 0.9 %
BUN SERPL-MCNC: 18 MG/DL
CALCIUM SERPL-MCNC: 9.4 MG/DL
CHLORIDE SERPL-SCNC: 102 MMOL/L
CO2 SERPL-SCNC: 30 MMOL/L
CREAT SERPL-MCNC: 0.4 MG/DL
DIFFERENTIAL METHOD: ABNORMAL
EOSINOPHIL # BLD AUTO: 0.3 K/UL
EOSINOPHIL NFR BLD: 4.1 %
ERYTHROCYTE [DISTWIDTH] IN BLOOD BY AUTOMATED COUNT: 14.2 %
EST. GFR  (AFRICAN AMERICAN): >60 ML/MIN/1.73 M^2
EST. GFR  (NON AFRICAN AMERICAN): >60 ML/MIN/1.73 M^2
GLUCOSE SERPL-MCNC: 180 MG/DL
GRAM STN SPEC: NORMAL
HCT VFR BLD AUTO: 27.4 %
HGB BLD-MCNC: 8.6 G/DL
IMM GRANULOCYTES # BLD AUTO: 0.18 K/UL
IMM GRANULOCYTES NFR BLD AUTO: 2.3 %
LYMPHOCYTES # BLD AUTO: 1.6 K/UL
LYMPHOCYTES NFR BLD: 19.8 %
MAGNESIUM SERPL-MCNC: 2.3 MG/DL
MCH RBC QN AUTO: 28.3 PG
MCHC RBC AUTO-ENTMCNC: 31.4 G/DL
MCV RBC AUTO: 90 FL
MONOCYTES # BLD AUTO: 0.8 K/UL
MONOCYTES NFR BLD: 9.6 %
NEUTROPHILS # BLD AUTO: 5.1 K/UL
NEUTROPHILS NFR BLD: 63.3 %
NRBC BLD-RTO: 0 /100 WBC
PHOSPHATE SERPL-MCNC: 2.6 MG/DL
PLATELET # BLD AUTO: 260 K/UL
PMV BLD AUTO: 10.1 FL
POCT GLUCOSE: 110 MG/DL (ref 70–110)
POCT GLUCOSE: 183 MG/DL (ref 70–110)
POCT GLUCOSE: 85 MG/DL (ref 70–110)
POTASSIUM SERPL-SCNC: 3.6 MMOL/L
RBC # BLD AUTO: 3.04 M/UL
SODIUM SERPL-SCNC: 141 MMOL/L
VANCOMYCIN SERPL-MCNC: 18.3 UG/ML
VANCOMYCIN TROUGH SERPL-MCNC: 13.8 UG/ML
WBC # BLD AUTO: 7.99 K/UL

## 2018-03-19 PROCEDURE — 25000003 PHARM REV CODE 250: Performed by: OTOLARYNGOLOGY

## 2018-03-19 PROCEDURE — D9220A PRA ANESTHESIA: Mod: ANES,,, | Performed by: ANESTHESIOLOGY

## 2018-03-19 PROCEDURE — 94761 N-INVAS EAR/PLS OXIMETRY MLT: CPT

## 2018-03-19 PROCEDURE — 20000000 HC ICU ROOM

## 2018-03-19 PROCEDURE — 0B110F4 BYPASS TRACHEA TO CUTANEOUS WITH TRACHEOSTOMY DEVICE, OPEN APPROACH: ICD-10-PCS | Performed by: OTOLARYNGOLOGY

## 2018-03-19 PROCEDURE — S0073 INJECTION, AZTREONAM, 500 MG: HCPCS | Performed by: STUDENT IN AN ORGANIZED HEALTH CARE EDUCATION/TRAINING PROGRAM

## 2018-03-19 PROCEDURE — 63600175 PHARM REV CODE 636 W HCPCS: Performed by: INTERNAL MEDICINE

## 2018-03-19 PROCEDURE — 25000003 PHARM REV CODE 250: Performed by: STUDENT IN AN ORGANIZED HEALTH CARE EDUCATION/TRAINING PROGRAM

## 2018-03-19 PROCEDURE — 99900035 HC TECH TIME PER 15 MIN (STAT)

## 2018-03-19 PROCEDURE — 31615 TRCHEOBRNCHSC EST TRACHS INC: CPT | Mod: 51,GC,, | Performed by: OTOLARYNGOLOGY

## 2018-03-19 PROCEDURE — 63600175 PHARM REV CODE 636 W HCPCS: Performed by: STUDENT IN AN ORGANIZED HEALTH CARE EDUCATION/TRAINING PROGRAM

## 2018-03-19 PROCEDURE — 63600175 PHARM REV CODE 636 W HCPCS: Performed by: NURSE ANESTHETIST, CERTIFIED REGISTERED

## 2018-03-19 PROCEDURE — 0DJ08ZZ INSPECTION OF UPPER INTESTINAL TRACT, VIA NATURAL OR ARTIFICIAL OPENING ENDOSCOPIC: ICD-10-PCS | Performed by: OTOLARYNGOLOGY

## 2018-03-19 PROCEDURE — 31526 DX LARYNGOSCOPY W/OPER SCOPE: CPT | Mod: 59,GC,, | Performed by: OTOLARYNGOLOGY

## 2018-03-19 PROCEDURE — 37000009 HC ANESTHESIA EA ADD 15 MINS: Performed by: OTOLARYNGOLOGY

## 2018-03-19 PROCEDURE — 43191 ESOPHAGOSCOPY RIGID TRNSO DX: CPT | Mod: 51,GC,, | Performed by: OTOLARYNGOLOGY

## 2018-03-19 PROCEDURE — 99900026 HC AIRWAY MAINTENANCE (STAT)

## 2018-03-19 PROCEDURE — 27201423 OPTIME MED/SURG SUP & DEVICES STERILE SUPPLY: Performed by: OTOLARYNGOLOGY

## 2018-03-19 PROCEDURE — 80202 ASSAY OF VANCOMYCIN: CPT

## 2018-03-19 PROCEDURE — 83735 ASSAY OF MAGNESIUM: CPT

## 2018-03-19 PROCEDURE — 85025 COMPLETE CBC W/AUTO DIFF WBC: CPT

## 2018-03-19 PROCEDURE — 27000221 HC OXYGEN, UP TO 24 HOURS

## 2018-03-19 PROCEDURE — 0BJ18ZZ INSPECTION OF TRACHEA, VIA NATURAL OR ARTIFICIAL OPENING ENDOSCOPIC: ICD-10-PCS | Performed by: OTOLARYNGOLOGY

## 2018-03-19 PROCEDURE — 80048 BASIC METABOLIC PNL TOTAL CA: CPT

## 2018-03-19 PROCEDURE — D9220A PRA ANESTHESIA: Mod: CRNA,,, | Performed by: NURSE ANESTHETIST, CERTIFIED REGISTERED

## 2018-03-19 PROCEDURE — 31600 PLANNED TRACHEOSTOMY: CPT | Mod: GC,,, | Performed by: OTOLARYNGOLOGY

## 2018-03-19 PROCEDURE — 99233 SBSQ HOSP IP/OBS HIGH 50: CPT | Mod: ,,, | Performed by: INTERNAL MEDICINE

## 2018-03-19 PROCEDURE — 36000709 HC OR TIME LEV III EA ADD 15 MIN: Performed by: OTOLARYNGOLOGY

## 2018-03-19 PROCEDURE — 0CJS8ZZ INSPECTION OF LARYNX, VIA NATURAL OR ARTIFICIAL OPENING ENDOSCOPIC: ICD-10-PCS | Performed by: OTOLARYNGOLOGY

## 2018-03-19 PROCEDURE — 94003 VENT MGMT INPAT SUBQ DAY: CPT

## 2018-03-19 PROCEDURE — 25000003 PHARM REV CODE 250: Performed by: NURSE ANESTHETIST, CERTIFIED REGISTERED

## 2018-03-19 PROCEDURE — 36000708 HC OR TIME LEV III 1ST 15 MIN: Performed by: OTOLARYNGOLOGY

## 2018-03-19 PROCEDURE — 25000003 PHARM REV CODE 250: Performed by: INTERNAL MEDICINE

## 2018-03-19 PROCEDURE — 80202 ASSAY OF VANCOMYCIN: CPT | Mod: 91

## 2018-03-19 PROCEDURE — 37000008 HC ANESTHESIA 1ST 15 MINUTES: Performed by: OTOLARYNGOLOGY

## 2018-03-19 PROCEDURE — 84100 ASSAY OF PHOSPHORUS: CPT

## 2018-03-19 RX ORDER — LIDOCAINE HYDROCHLORIDE AND EPINEPHRINE 10; 10 MG/ML; UG/ML
INJECTION, SOLUTION INFILTRATION; PERINEURAL
Status: DISCONTINUED | OUTPATIENT
Start: 2018-03-19 | End: 2018-03-19 | Stop reason: HOSPADM

## 2018-03-19 RX ORDER — FENTANYL CITRATE 50 UG/ML
INJECTION, SOLUTION INTRAMUSCULAR; INTRAVENOUS
Status: DISCONTINUED | OUTPATIENT
Start: 2018-03-19 | End: 2018-03-19

## 2018-03-19 RX ORDER — MIDAZOLAM HYDROCHLORIDE 1 MG/ML
INJECTION, SOLUTION INTRAMUSCULAR; INTRAVENOUS
Status: DISCONTINUED | OUTPATIENT
Start: 2018-03-19 | End: 2018-03-19

## 2018-03-19 RX ORDER — ROCURONIUM BROMIDE 10 MG/ML
INJECTION, SOLUTION INTRAVENOUS
Status: DISCONTINUED | OUTPATIENT
Start: 2018-03-19 | End: 2018-03-19

## 2018-03-19 RX ORDER — PHENYLEPHRINE HYDROCHLORIDE 10 MG/ML
INJECTION INTRAVENOUS
Status: DISCONTINUED | OUTPATIENT
Start: 2018-03-19 | End: 2018-03-19

## 2018-03-19 RX ORDER — KETAMINE HYDROCHLORIDE 100 MG/ML
INJECTION, SOLUTION INTRAMUSCULAR; INTRAVENOUS
Status: DISCONTINUED | OUTPATIENT
Start: 2018-03-19 | End: 2018-03-19

## 2018-03-19 RX ADMIN — AZTREONAM 2000 MG: 1 INJECTION, POWDER, LYOPHILIZED, FOR SOLUTION INTRAMUSCULAR; INTRAVENOUS at 05:03

## 2018-03-19 RX ADMIN — Medication 0.3 MG: at 09:03

## 2018-03-19 RX ADMIN — AZTREONAM 2000 MG: 1 INJECTION, POWDER, LYOPHILIZED, FOR SOLUTION INTRAMUSCULAR; INTRAVENOUS at 07:03

## 2018-03-19 RX ADMIN — HYDROXYZINE HYDROCHLORIDE 25 MG: 25 TABLET, FILM COATED ORAL at 12:03

## 2018-03-19 RX ADMIN — DIPHENHYDRAMINE HYDROCHLORIDE 50 MG: 25 SOLUTION ORAL at 09:03

## 2018-03-19 RX ADMIN — DOXEPIN HYDROCHLORIDE 75 MG: 75 CAPSULE ORAL at 08:03

## 2018-03-19 RX ADMIN — PREGABALIN 100 MG: 50 CAPSULE ORAL at 09:03

## 2018-03-19 RX ADMIN — CARVEDILOL 3.12 MG: 3.12 TABLET, FILM COATED ORAL at 08:03

## 2018-03-19 RX ADMIN — PREGABALIN 100 MG: 50 CAPSULE ORAL at 08:03

## 2018-03-19 RX ADMIN — ENOXAPARIN SODIUM 40 MG: 100 INJECTION SUBCUTANEOUS at 06:03

## 2018-03-19 RX ADMIN — Medication 4 MG: at 05:03

## 2018-03-19 RX ADMIN — FAMOTIDINE 20 MG: 20 TABLET, FILM COATED ORAL at 08:03

## 2018-03-19 RX ADMIN — EPHEDRINE SULFATE 10 MG: 50 INJECTION, SOLUTION INTRAMUSCULAR; INTRAVENOUS; SUBCUTANEOUS at 02:03

## 2018-03-19 RX ADMIN — PREGABALIN 100 MG: 50 CAPSULE ORAL at 03:03

## 2018-03-19 RX ADMIN — KETAMINE HYDROCHLORIDE 20 MG: 100 INJECTION, SOLUTION, CONCENTRATE INTRAMUSCULAR; INTRAVENOUS at 01:03

## 2018-03-19 RX ADMIN — Medication 4 MG: at 06:03

## 2018-03-19 RX ADMIN — LIDOCAINE 3 PATCH: 50 PATCH TOPICAL at 03:03

## 2018-03-19 RX ADMIN — Medication 4 MG: at 12:03

## 2018-03-19 RX ADMIN — HYDROXYZINE HYDROCHLORIDE 25 MG: 25 TABLET, FILM COATED ORAL at 08:03

## 2018-03-19 RX ADMIN — CHLORHEXIDINE GLUCONATE 15 ML: 1.2 RINSE ORAL at 09:03

## 2018-03-19 RX ADMIN — CLONAZEPAM 0.5 MG: 0.5 TABLET ORAL at 03:03

## 2018-03-19 RX ADMIN — PHENYLEPHRINE HYDROCHLORIDE 200 MCG: 10 INJECTION INTRAVENOUS at 01:03

## 2018-03-19 RX ADMIN — CLONAZEPAM 0.5 MG: 0.5 TABLET ORAL at 09:03

## 2018-03-19 RX ADMIN — CHLORHEXIDINE GLUCONATE 15 ML: 1.2 RINSE ORAL at 08:03

## 2018-03-19 RX ADMIN — HYDROXYZINE HYDROCHLORIDE 25 MG: 25 TABLET, FILM COATED ORAL at 06:03

## 2018-03-19 RX ADMIN — FENTANYL CITRATE 100 MCG: 50 INJECTION, SOLUTION INTRAMUSCULAR; INTRAVENOUS at 01:03

## 2018-03-19 RX ADMIN — MIDAZOLAM HYDROCHLORIDE 2 MG: 1 INJECTION, SOLUTION INTRAMUSCULAR; INTRAVENOUS at 01:03

## 2018-03-19 RX ADMIN — DULOXETINE 60 MG: 30 CAPSULE, DELAYED RELEASE ORAL at 08:03

## 2018-03-19 RX ADMIN — FUROSEMIDE 40 MG: 40 TABLET ORAL at 08:03

## 2018-03-19 RX ADMIN — STANDARDIZED SENNA CONCENTRATE AND DOCUSATE SODIUM 2 TABLET: 8.6; 5 TABLET, FILM COATED ORAL at 09:03

## 2018-03-19 RX ADMIN — POTASSIUM CHLORIDE 40 MEQ: 20 SOLUTION ORAL at 06:03

## 2018-03-19 RX ADMIN — CLONAZEPAM 0.5 MG: 0.5 TABLET ORAL at 08:03

## 2018-03-19 RX ADMIN — Medication 0.3 MG: at 08:03

## 2018-03-19 RX ADMIN — POTASSIUM & SODIUM PHOSPHATES POWDER PACK 280-160-250 MG 2 PACKET: 280-160-250 PACK at 06:03

## 2018-03-19 RX ADMIN — CARVEDILOL 3.12 MG: 3.12 TABLET, FILM COATED ORAL at 09:03

## 2018-03-19 RX ADMIN — Medication 10 MCG: at 09:03

## 2018-03-19 RX ADMIN — ROCURONIUM BROMIDE 30 MG: 10 INJECTION, SOLUTION INTRAVENOUS at 01:03

## 2018-03-19 RX ADMIN — LIDOCAINE HYDROCHLORIDE AND EPINEPHRINE 3 ML: 10; 10 INJECTION, SOLUTION INFILTRATION; PERINEURAL at 01:03

## 2018-03-19 RX ADMIN — VANCOMYCIN HYDROCHLORIDE 1500 MG: 1 INJECTION, POWDER, LYOPHILIZED, FOR SOLUTION INTRAVENOUS at 09:03

## 2018-03-19 RX ADMIN — Medication 0.3 MG: at 03:03

## 2018-03-19 RX ADMIN — DOXEPIN HYDROCHLORIDE 75 MG: 75 CAPSULE ORAL at 09:03

## 2018-03-19 RX ADMIN — ROCURONIUM BROMIDE 20 MG: 10 INJECTION, SOLUTION INTRAVENOUS at 02:03

## 2018-03-19 RX ADMIN — AZTREONAM 2000 MG: 1 INJECTION, POWDER, LYOPHILIZED, FOR SOLUTION INTRAMUSCULAR; INTRAVENOUS at 12:03

## 2018-03-19 RX ADMIN — HYDROXYZINE HYDROCHLORIDE 25 MG: 25 TABLET, FILM COATED ORAL at 09:03

## 2018-03-19 RX ADMIN — SODIUM CHLORIDE, SODIUM GLUCONATE, SODIUM ACETATE, POTASSIUM CHLORIDE, MAGNESIUM CHLORIDE, SODIUM PHOSPHATE, DIBASIC, AND POTASSIUM PHOSPHATE: .53; .5; .37; .037; .03; .012; .00082 INJECTION, SOLUTION INTRAVENOUS at 01:03

## 2018-03-19 RX ADMIN — POLYETHYLENE GLYCOL 3350 17 G: 17 POWDER, FOR SOLUTION ORAL at 09:03

## 2018-03-19 RX ADMIN — POTASSIUM & SODIUM PHOSPHATES POWDER PACK 280-160-250 MG 2 PACKET: 280-160-250 PACK at 10:03

## 2018-03-19 RX ADMIN — FAMOTIDINE 20 MG: 20 TABLET, FILM COATED ORAL at 09:03

## 2018-03-19 NOTE — ASSESSMENT & PLAN NOTE
[id number] 2. Not on any anti-coagulation. Rate controlled with coreg 3.125 BID.     With a run of afib rvr last night, given x2 labetalol and amiodarone, now converted.     - Cont home coreg

## 2018-03-19 NOTE — PLAN OF CARE
Problem: Patient Care Overview  Goal: Plan of Care Review  Outcome: Ongoing (interventions implemented as appropriate)  The patient and family acknowledge an understanding of the plan of care.  Tolerated all therapies with no complication.  Fentanyl has controlled pain.  Brother at the bedside the entire shift.  Tube feeding stopped at midnight.  Adequate urine output.  Turned Q2H.  There is a persistent cuff leak with the ET tube.  RT has worked to keep volumes and SpO2 at normal ranges.  Pot. And Phos replaced this morning.

## 2018-03-19 NOTE — PLAN OF CARE
Patient remains on vent support.  Plan for trach with ENT today.  Treating UTI.  CM will continue to follow for disposition planning as patient becomes medically appropriate.         03/19/18 1133   Right Care Assessment   Can the patient answer the patient profile reliably? Yes, cognitively intact   How often would a person be available to care for the patient? Whenever needed   Describe the patient's ability to walk at the present time. Does not walk or unable to take any steps at all   How does the patient rate their overall health at the present time? Poor   Number of comorbid conditions (as recorded on the chart) Two   During the past month, has the patient often been bothered by feeling down, depressed or hopeless? Yes   Have you felt down, depressed, or hopeless? 1   During the past month, has the patient often been bothered by little interest or pleasure in doing things? Yes   Have you felt little interest or pleasure in doing things? 1   Zakiya Lopez RN, BSN  Case Management  [facility]  Ext. [phone]

## 2018-03-19 NOTE — ASSESSMENT & PLAN NOTE
46 y/o male with PMH of ALS transferred for worsening respiratory failure. Patient desires tracheostomy placement.      Plan:   -Please medically optimize     -case consented and tentatively added on for trach Monday w/ Dr. Horner if HD stable      -NPO w IVF now

## 2018-03-19 NOTE — SUBJECTIVE & OBJECTIVE
Interval History: NAEON.    Medications:  Continuous Infusions:   fentanyl 5 mL/hr at 03/19/18 0600    propofol Stopped (03/17/18 2200)     Scheduled Meds:   amiodarone in dextrose  150 mg Intravenous Once    aztreonam  2,000 mg Intravenous Q8H    carvedilol  3.125 mg Per G Tube BID    chlorhexidine  15 mL Mouth/Throat BID    clonazePAM  0.5 mg Per G Tube TID    diphenhydrAMINE  50 mg Per G Tube QHS    doxepin  75 mg Per G Tube BID    DULoxetine  60 mg Per G Tube Daily    enoxaparin  40 mg Subcutaneous Daily    famotidine  20 mg Per G Tube BID    furosemide  40 mg Per G Tube Daily    glycopyrrolate  0.3 mg Per G Tube TID    hydrOXYzine HCl  25 mg Per G Tube QID    lidocaine  3 patch Transdermal Q24H    ondansetron  4 mg Per G Tube Q6H    polyethylene glycol  17 g Per G Tube BID    pregabalin  100 mg Per G Tube TID    scopolamine  1 patch Transdermal Q3 Days    senna-docusate 8.6-50 mg  2 tablet Per G Tube BID    vancomycin (VANCOCIN) IVPB  15 mg/kg Intravenous Q12H     PRN Meds:acetaminophen, dextrose 50%, diphenhydrAMINE, glucagon (human recombinant), influenza, insulin aspart U-100, magnesium oxide, magnesium oxide, potassium chloride 10%, potassium chloride 10%, potassium, sodium phosphates, potassium, sodium phosphates, potassium, sodium phosphates     Review of patient's allergies indicates:   Allergen Reactions    Latex, natural rubber     Nsaids (non-steroidal anti-inflammatory drug)      Causes patient to go into afib per wife    Penicillins Other (See Comments)     Per wife- his mother stated he was allergic to it.  Had redness associated with cefepime 3/14/2018     Objective:     Vital Signs (24h Range):  Temp:  [98.8 °F (37.1 °C)-100.9 °F (38.3 °C)] 98.8 °F (37.1 °C)  Pulse:  [] 93  Resp:  [16-26] 16  SpO2:  [90 %-100 %] 97 %  BP: (100-123)/(55-75) 110/69        Lines/Drains/Airways     Drain                 Gastrostomy/Enterostomy 01/23/17 1113 Percutaneous endoscopic  gastrostomy (PEG) LUQ feeding 419 days         Fecal Incontinence  03/14/18 5 days         Urethral Catheter 03/13/18 1441 Non-latex 20 Fr. 5 days          Airway                 Airway - Non-Surgical 03/15/18 1038 Endotracheal Tube-Hi/Lo 3 days          Peripheral Intravenous Line                 Peripheral IV - Single Lumen 03/13/18 1412 Left Antecubital 5 days         Peripheral IV - Single Lumen 03/14/18 0300 Right Antecubital 5 days         Midline Catheter Insertion/Assessment  - Single Lumen 03/14/18 1545 Right cephalic vein (lateral side of arm) 18g x 10cm 4 days              Vent Mode: A/C  Oxygen Concentration (%):  [] 28  Resp Rate Total:  [16 br/min-50 br/min] 16 br/min  Vt Set:  [450 mL] 450 mL  PEEP/CPAP:  [10 cmH20] 10 cmH20  Mean Airway Pressure:  [14 gfS09-29 cmH20] 14 cmH20      Physical Exam    NAD, more awake, nods appropriately   EOMI  Thick subcutaneous tissue. Landmarks palpable. No visible scars   Vent settings stable      Significant Labs:  BMP:     Recent Labs  Lab 03/19/18  0339   *      CO2 30*   BUN 18   CREATININE 0.4*   CALCIUM 9.4   MG 2.3     CBC:     Recent Labs  Lab 03/19/18  0339   WBC 7.99   RBC 3.04*   HGB 8.6*   HCT 27.4*      MCV 90   MCH 28.3   MCHC 31.4*       Significant Diagnostics:  None

## 2018-03-19 NOTE — PLAN OF CARE
Problem: Patient Care Overview  Goal: Plan of Care Review  Outcome: Ongoing (interventions implemented as appropriate)   See vital signs and assessments in flowsheets. See below for updates on today's progress.     Pulmonary: Tracheostomy tube placed today. Vent settings AC ,FiO2 25%, rate 16, PEEP 10. CT neck and chest done to r/o ET fistula, audible leak; team notified.    Cardiovascular: NSR, 's-160's, afebrile    Neurological: Responds to voice, pt. Is able to nod head    Gastrointestinal: PEG tube LUQ, 60 cc bilious residual, BM x1    Genitourinary: Michele in place     Endocrine: WNL    Integumentary/Other: Mepilex applied to sacrum     Infusions: Fentanyl    Patient progressing towards goals as tolerated, plan of care communicated and reviewed with Bayron Delgado and family. All concerns addressed. Will continue to monitor.

## 2018-03-19 NOTE — TRANSFER OF CARE
"Anesthesia Transfer of Care Note    Patient: Bayron Delgado    Procedure(s) Performed: Procedure(s) (LRB):  TRACHEOSTOMY (N/A)  LARYNGOSCOPY  ESOPHAGOSCOPY (N/A)    Patient location: ICU    Anesthesia Type: general    Transport from OR: Transported from OR on 6-10 L/min O2 by face mask with adequate spontaneous ventilation    Post pain: adequate analgesia    Post assessment: no apparent anesthetic complications and tolerated procedure well    Post vital signs: stable    Level of consciousness: sedated    Nausea/Vomiting: no nausea/vomiting    Complications: none    Transfer of care protocol was followed      Last vitals:   Visit Vitals  /85   Pulse 84   Temp 37 °C (98.6 °F) (Oral)   Resp 15   Ht 5' 10" (1.778 m)   Wt 103.7 kg (228 lb 9.9 oz)   SpO2 98%   BMI 32.80 kg/m²     "

## 2018-03-19 NOTE — SUBJECTIVE & OBJECTIVE
Interval History/Significant Events: Trach for today. Continuing abx. Afebrile overnight.     Review of Systems   Unable to perform ROS: Other (intubated, ALS w/ vocal cord paralysis)     Objective:     Vital Signs (Most Recent):  Temp: 98.8 °F (37.1 °C) (03/19/18 0300)  Pulse: 85 (03/19/18 0749)  Resp: 16 (03/19/18 0600)  BP: 110/69 (03/19/18 0600)  SpO2: 99 % (03/19/18 0749) Vital Signs (24h Range):  Temp:  [98.8 °F (37.1 °C)-100.9 °F (38.3 °C)] 98.8 °F (37.1 °C)  Pulse:  [] 85  Resp:  [16-20] 16  SpO2:  [90 %-100 %] 99 %  BP: (101-123)/(55-75) 110/69   Weight: 103.7 kg (228 lb 9.9 oz)  Body mass index is 32.8 kg/m².      Intake/Output Summary (Last 24 hours) at 03/19/18 0848  Last data filed at 03/19/18 0700   Gross per 24 hour   Intake          1727.81 ml   Output              910 ml   Net           817.81 ml       Physical Exam   Constitutional: No distress.   HENT:   Head: Normocephalic and atraumatic.   Eyes: Conjunctivae are normal. No scleral icterus.   Cardiovascular: Normal rate, regular rhythm and intact distal pulses.  Exam reveals no friction rub.    Pulmonary/Chest: No respiratory distress. He has no wheezes.   intubated   Abdominal: Soft. He exhibits no distension. There is no tenderness. There is no guarding.   PEG tube in place, w/ some skin irritation surrounding tube    Genitourinary:   Genitourinary Comments: Michele in place   Musculoskeletal: He exhibits edema (+2 b/l LE edema, +1 edema hands b/l).   Neurological: He is alert. No sensory deficit.   Skin: Skin is warm. No rash noted. He is not diaphoretic.       Vents:  Vent Mode: A/C (03/19/18 0749)  Ventilator Initiated: Yes (03/15/18 1050)  Set Rate: 16 bmp (03/19/18 0749)  Vt Set: 450 mL (03/19/18 0749)  PEEP/CPAP: 10 cmH20 (03/19/18 0749)  Oxygen Concentration (%): 25 (03/19/18 0749)  Peak Airway Pressure: 26 cmH2O (03/19/18 0749)  Plateau Pressure: 24 cmH20 (03/15/18 1903)  Total Ve: 7.47 mL (03/19/18 0749)  F/VT Ratio<105 (RSBI): (!)  39.08 (03/19/18 0100)  Lines/Drains/Airways     Drain                 Gastrostomy/Enterostomy 01/23/17 1113 Percutaneous endoscopic gastrostomy (PEG) LUQ feeding 419 days         Fecal Incontinence  03/14/18 5 days         Urethral Catheter 03/13/18 1441 Non-latex 20 Fr. 5 days          Airway                 Airway - Non-Surgical 03/15/18 1038 Endotracheal Tube-Hi/Lo 3 days          Peripheral Intravenous Line                 Peripheral IV - Single Lumen 03/13/18 1412 Left Antecubital 5 days         Peripheral IV - Single Lumen 03/14/18 0300 Right Antecubital 5 days         Midline Catheter Insertion/Assessment  - Single Lumen 03/14/18 1545 Right cephalic vein (lateral side of arm) 18g x 10cm 4 days              Significant Labs:    CBC/Anemia Profile:    Recent Labs  Lab 03/18/18  0610 03/19/18  0339   WBC 7.87 7.99   HGB 8.9* 8.6*   HCT 28.0* 27.4*    260   MCV 90 90   RDW 14.3 14.2        Chemistries:    Recent Labs  Lab 03/18/18  0610 03/19/18  0339    141   K 3.1* 3.6    102   CO2 32* 30*   BUN 17 18   CREATININE 0.4* 0.4*   CALCIUM 9.1 9.4   MG 1.8 2.3   PHOS 2.7 2.6*       All pertinent labs within the past 24 hours have been reviewed.    Significant Imaging:  I have reviewed and interpreted all pertinent imaging results/findings within the past 24 hours.

## 2018-03-19 NOTE — BRIEF OP NOTE
[facility]  Surgery Department  Operative Note    SUMMARY     Date of Procedure: 3/19/2018     Procedure: Procedure(s) (LRB):  TRACHEOSTOMY (N/A)  LARYNGOSCOPY  ESOPHAGOSCOPY (N/A)     Surgeon(s) and Role:     * Ewa Yee MD - Resident - Assisting     * Daniel Horner MD - Primary        Pre-Operative Diagnosis: Respiratory failure [J96.90]    Post-Operative Diagnosis: Post-Op Diagnosis Codes:     * Respiratory failure [J96.90]    Anesthesia: General    Technical Procedures Used: see full operative note     Description of the Findings of the Procedure: 8-0 shiley cuffed tracheostomy tube placed without incident. Direct laryngoscopy, tracheoscopy confirmed correct placement of the tube. No tracheoesophageal fistula visualized       Complications: No    Estimated Blood Loss (EBL): 10cc            Implants: * No implants in log *    Specimens:   Specimen (12h ago through future)    None                  Condition: Stable    Disposition: ICU - intubated and hemodynamically stable.    Attestation: I was present and scrubbed for the entire procedure.

## 2018-03-19 NOTE — ASSESSMENT & PLAN NOTE
-CXR with L lower lobe asp PNA, treating with vanc & aztreonam (penicillin allergy). Sputum cultures with gram positive cocci, GPR, and gram negative diplococci  -Con't vanc trough  -procalcitonin, lactate wnl, blood cx neg  -consulting ENT for trach placement (patient has revoked wish for hospice) (now medically stable.)  -PTX resolved on CXR from AM, will pull chest tube today. Still with signs of pulmonary edema, will continue lasix.   -Trach planned for today

## 2018-03-19 NOTE — PROGRESS NOTES
[facility]  Otorhinolaryngology-Head & Neck Surgery  Progress Note    Subjective:     Post-Op Info:  Procedure(s) (LRB):  TRACHEOSTOMY (N/A)      Hospital Day: 6     Interval History: XENIA.    Medications:  Continuous Infusions:   fentanyl 5 mL/hr at 03/19/18 0600    propofol Stopped (03/17/18 2200)     Scheduled Meds:   amiodarone in dextrose  150 mg Intravenous Once    aztreonam  2,000 mg Intravenous Q8H    carvedilol  3.125 mg Per G Tube BID    chlorhexidine  15 mL Mouth/Throat BID    clonazePAM  0.5 mg Per G Tube TID    diphenhydrAMINE  50 mg Per G Tube QHS    doxepin  75 mg Per G Tube BID    DULoxetine  60 mg Per G Tube Daily    enoxaparin  40 mg Subcutaneous Daily    famotidine  20 mg Per G Tube BID    furosemide  40 mg Per G Tube Daily    glycopyrrolate  0.3 mg Per G Tube TID    hydrOXYzine HCl  25 mg Per G Tube QID    lidocaine  3 patch Transdermal Q24H    ondansetron  4 mg Per G Tube Q6H    polyethylene glycol  17 g Per G Tube BID    pregabalin  100 mg Per G Tube TID    scopolamine  1 patch Transdermal Q3 Days    senna-docusate 8.6-50 mg  2 tablet Per G Tube BID    vancomycin (VANCOCIN) IVPB  15 mg/kg Intravenous Q12H     PRN Meds:acetaminophen, dextrose 50%, diphenhydrAMINE, glucagon (human recombinant), influenza, insulin aspart U-100, magnesium oxide, magnesium oxide, potassium chloride 10%, potassium chloride 10%, potassium, sodium phosphates, potassium, sodium phosphates, potassium, sodium phosphates     Review of patient's allergies indicates:   Allergen Reactions    Latex, natural rubber     Nsaids (non-steroidal anti-inflammatory drug)      Causes patient to go into afib per wife    Penicillins Other (See Comments)     Per wife- his mother stated he was allergic to it.  Had redness associated with cefepime 3/14/2018     Objective:     Vital Signs (24h Range):  Temp:  [98.8 °F (37.1 °C)-100.9 °F (38.3 °C)] 98.8 °F (37.1 °C)  Pulse:  [] 93  Resp:   [16-26] 16  SpO2:  [90 %-100 %] 97 %  BP: (100-123)/(55-75) 110/69        Lines/Drains/Airways     Drain                 Gastrostomy/Enterostomy 01/23/17 1113 Percutaneous endoscopic gastrostomy (PEG) LUQ feeding 419 days         Fecal Incontinence  03/14/18 5 days         Urethral Catheter 03/13/18 1441 Non-latex 20 Fr. 5 days          Airway                 Airway - Non-Surgical 03/15/18 1038 Endotracheal Tube-Hi/Lo 3 days          Peripheral Intravenous Line                 Peripheral IV - Single Lumen 03/13/18 1412 Left Antecubital 5 days         Peripheral IV - Single Lumen 03/14/18 0300 Right Antecubital 5 days         Midline Catheter Insertion/Assessment  - Single Lumen 03/14/18 1545 Right cephalic vein (lateral side of arm) 18g x 10cm 4 days              Vent Mode: A/C  Oxygen Concentration (%):  [] 28  Resp Rate Total:  [16 br/min-50 br/min] 16 br/min  Vt Set:  [450 mL] 450 mL  PEEP/CPAP:  [10 cmH20] 10 cmH20  Mean Airway Pressure:  [14 mkR22-16 cmH20] 14 cmH20      Physical Exam    NAD, more awake, nods appropriately   EOMI  Thick subcutaneous tissue. Landmarks palpable. No visible scars   Vent settings stable      Significant Labs:  BMP:     Recent Labs  Lab 03/19/18  0339   *      CO2 30*   BUN 18   CREATININE 0.4*   CALCIUM 9.4   MG 2.3     CBC:     Recent Labs  Lab 03/19/18  0339   WBC 7.99   RBC 3.04*   HGB 8.6*   HCT 27.4*      MCV 90   MCH 28.3   MCHC 31.4*       Significant Diagnostics:  None    Assessment/Plan:     ALS (amyotrophic lateral sclerosis)    46 y/o male with PMH of ALS transferred for worsening respiratory failure. Patient desires tracheostomy placement.      Plan:   -Please medically optimize     -case consented and tentatively added on for trach Monday w/ Dr. Horner if HD stable      -NPO w IVF now            Marco Bishop MD  Otorhinolaryngology-Head & Neck Surgery  [facility]

## 2018-03-19 NOTE — PROGRESS NOTES
[facility]  Critical Care Medicine  Progress Note    Patient Name: Bayron Delgado  MRN: [id number]  Admission Date: 3/14/2018  Hospital Length of Stay: 5 days  Code Status: Full Code  Attending Provider: Jo Ann Fierro MD  Primary Care Provider: Scott Puckett Jr, MD   Principal Problem: Acute respiratory failure with hypoxia    Subjective:     HPI:  46 y/o M w/ Afib, ALS (diagnosed 2014) who presented to [facility] s/p transfer from [facility] via EMS on BiPAP 2/2 worsening respiratory failure. Per brother patient had been on home hospice for progression of his ALS, however, on 3/12 brother states patient experienced an episode of hypoxia as pt became more tachypneic w/ cyanosis surrounding lips. Per brother patient had a fever around this time as well. This episode resolved by 3/13 and at this time hospice agency re-visited patient's decision to explore life sustaining measures such as tracheostomy. According to brother, patient decided he would undergo trach placement, he was transported by EMS to [facility] where he was noted to have L lower lobe consolidation on CXR. He was started on vanc/ aztreonam due to penicillin allergy.and was subsequently transferred to [facility] [place]. Initially patient had refused tracheostomy after being evaluated by ENT (Dr. Horner) in 2016.   Per brother patient's wife was unhappy w/ patient's decision to proceed w/ trach placement.   Patient communicates mostly by shaking his head to answer yes/ no. He is dependent on his trilogy machine for ventilation.  History obtained from brother at bedside as hx difficult to obtain from patient (nonverbal secondary to ALS).    Hospital/ICU Course:  Bayron Delgado is a 47 y.o. male with CHF, A-fib, and ALS who presents to the ED via EMS on BiPAP with an onset of worsening respiratory failure on 03/14/2018. The EMS care team transferred the patient here from [place], [place] and were not allowed to transfer the patient to [facility]  as per instructed by their supervisor. Patient revoked hospice with worsening respiratory status 3/13.    CXR with LLL PNA, concern for aspiration in setting of patient's ALS. UA with enterococcus & pseudomonas. Respiratory cultures with GPR, gram negative diplococci, and gram positive rods. Starting vancomycin and aztreonam (started on vanc & aztreonam at OSH.) Presents with anasarca, normal TTE & renal function. Diurese with IV lasix with adequate urine output. On the morning of 03/15, moted to have issues of desaturation as low as 45%, needed Bag valve mask to return oxygenation to 100%.  He had course breath sounds throughout.  Due to his inability to clear secretions, it was decided to emergently intubate. Afterwards, imaging revealed a pneumothorax on the right chest.  A small bore chest tube was placed on 3/15. Chest tube clamped 3/17.  03/18/2018 Run of afib w/ RVR overnight requiring x2 labetalol and amiodarone for termination. Now in 80s. Trach planned for tomorrow per ENT. Cough assist adjusted to Q6. CXR shows resolution of PTX. Will pull chest tube today. Some persistant peripheral edema noted, will continue daily lasix and reassess in the PM.   03/19/2018 Trach planned for today. Continuing abx.     Interval History/Significant Events: Trach for today. Continuing abx. Afebrile overnight.     Review of Systems   Unable to perform ROS: Other (intubated, ALS w/ vocal cord paralysis)     Objective:     Vital Signs (Most Recent):  Temp: 98.8 °F (37.1 °C) (03/19/18 0300)  Pulse: 85 (03/19/18 0749)  Resp: 16 (03/19/18 0600)  BP: 110/69 (03/19/18 0600)  SpO2: 99 % (03/19/18 0749) Vital Signs (24h Range):  Temp:  [98.8 °F (37.1 °C)-100.9 °F (38.3 °C)] 98.8 °F (37.1 °C)  Pulse:  [] 85  Resp:  [16-20] 16  SpO2:  [90 %-100 %] 99 %  BP: (101-123)/(55-75) 110/69   Weight: 103.7 kg (228 lb 9.9 oz)  Body mass index is 32.8 kg/m².      Intake/Output Summary (Last 24 hours) at 03/19/18 0848  Last data filed at  03/19/18 0700   Gross per 24 hour   Intake          1727.81 ml   Output              910 ml   Net           817.81 ml       Physical Exam   Constitutional: No distress.   HENT:   Head: Normocephalic and atraumatic.   Eyes: Conjunctivae are normal. No scleral icterus.   Cardiovascular: Normal rate, regular rhythm and intact distal pulses.  Exam reveals no friction rub.    Pulmonary/Chest: No respiratory distress. He has no wheezes.   intubated   Abdominal: Soft. He exhibits no distension. There is no tenderness. There is no guarding.   PEG tube in place, w/ some skin irritation surrounding tube    Genitourinary:   Genitourinary Comments: Michele in place   Musculoskeletal: He exhibits edema (+2 b/l LE edema, +1 edema hands b/l).   Neurological: He is alert. No sensory deficit.   Skin: Skin is warm. No rash noted. He is not diaphoretic.       Vents:  Vent Mode: A/C (03/19/18 0749)  Ventilator Initiated: Yes (03/15/18 1050)  Set Rate: 16 bmp (03/19/18 0749)  Vt Set: 450 mL (03/19/18 0749)  PEEP/CPAP: 10 cmH20 (03/19/18 0749)  Oxygen Concentration (%): 25 (03/19/18 0749)  Peak Airway Pressure: 26 cmH2O (03/19/18 0749)  Plateau Pressure: 24 cmH20 (03/15/18 1903)  Total Ve: 7.47 mL (03/19/18 0749)  F/VT Ratio<105 (RSBI): (!) 39.08 (03/19/18 0100)  Lines/Drains/Airways     Drain                 Gastrostomy/Enterostomy 01/23/17 1113 Percutaneous endoscopic gastrostomy (PEG) LUQ feeding 419 days         Fecal Incontinence  03/14/18 5 days         Urethral Catheter 03/13/18 1441 Non-latex 20 Fr. 5 days          Airway                 Airway - Non-Surgical 03/15/18 1038 Endotracheal Tube-Hi/Lo 3 days          Peripheral Intravenous Line                 Peripheral IV - Single Lumen 03/13/18 1412 Left Antecubital 5 days         Peripheral IV - Single Lumen 03/14/18 0300 Right Antecubital 5 days         Midline Catheter Insertion/Assessment  - Single Lumen 03/14/18 1545 Right cephalic vein (lateral side of arm) 18g x 10cm  4 days              Significant Labs:    CBC/Anemia Profile:    Recent Labs  Lab 03/18/18  0610 03/19/18  0339   WBC 7.87 7.99   HGB 8.9* 8.6*   HCT 28.0* 27.4*    260   MCV 90 90   RDW 14.3 14.2        Chemistries:    Recent Labs  Lab 03/18/18  0610 03/19/18  0339    141   K 3.1* 3.6    102   CO2 32* 30*   BUN 17 18   CREATININE 0.4* 0.4*   CALCIUM 9.1 9.4   MG 1.8 2.3   PHOS 2.7 2.6*       All pertinent labs within the past 24 hours have been reviewed.    Significant Imaging:  I have reviewed and interpreted all pertinent imaging results/findings within the past 24 hours.    Assessment/Plan:     Neuro   ALS (amyotrophic lateral sclerosis)    C/w home trilogy machine  Cough assist device q6  c/w scopolamine patch and glycopyrrolate for secretions        Psychiatric   Anxiety    C/w home clonazepam for anxiety        Depression    C/w home cymbalta  C/w home clonazepam for anxiety  C/w home doxepin        Pulmonary   * Acute respiratory failure with hypoxia    -CXR with L lower lobe asp PNA, treating with vanc & aztreonam (penicillin allergy). Sputum cultures with gram positive cocci, GPR, and gram negative diplococci  -Con't vanc trough  -procalcitonin, lactate wnl, blood cx neg  -consulting ENT for trach placement (patient has revoked wish for hospice) (now medically stable.)  -PTX resolved on CXR from AM, will pull chest tube today. Still with signs of pulmonary edema, will continue lasix.   -Trach planned for today        Cardiac/Vascular   Atrial fibrillation    [id number] 2. Not on any anti-coagulation. Rate controlled with coreg 3.125 BID.     With a run of afib rvr last night, given x2 labetalol and amiodarone, now converted.     - Cont home coreg        Renal/   Acute cystitis without hematuria    -UA with enterococcus & pseudomonas  -C/w chronic munoz, changed on 3/13 at OSH  -Patient already on antibiotics as above        Other   Anasarca    TTE EF 68%, no DD  Renal function  normal  Diuresing with lasix PO (latex allergy); initially bid, now daily  Net neg 9L since admission                   Critical Care Daily Checklist:     A: Awake: RASS Goal/Actual Goal: RASS Goal: 0-->alert and calm  Actual: Paige Agitation Sedation Scale (RASS): Alert and calm   B: Spontaneous Breathing Trial Performed?     C: SAT & SBT Coordinated?  Intubated       D: Delirium: CAM-ICU Overall CAM-ICU: Negative   E: Early Mobility Performed? No, ALS   F: Feeding Goal:    Status:           Current Diet Order   Procedures    Diet NPO       AS: Analgesia/Sedation fentanyl   T: Thromboembolic Prophylaxis     H: HOB > 300 Yes   U: Stress Ulcer Prophylaxis (if needed)     G: Glucose Control     B: Bowel Function Stool Occurrence: 0   I: Indwelling Catheter (Lines & Munoz) Necessity munoz   D: De-escalation of Antimicrobials/Pharmacotherapies Vanc, aztreonam     Plan for the day/ETD Family discussion     Code Status:  Family/Goals of Care: Full Code            Critical secondary to Patient has a condition that poses threat to life and bodily function: Severe Respiratory Distress              Critical care was time spent personally by me on the following activities: development of treatment plan with patient or surrogate and bedside caregivers, discussions with consultants, evaluation of patient's response to treatment, examination of patient, ordering and performing treatments and interventions, ordering and review of laboratory studies, ordering and review of radiographic studies, pulse oximetry, re-evaluation of patient's condition. This critical care time did not overlap with that of any other provider or involve time for any procedures.     Bob Knight MD  Critical Care Medicine  [facility]

## 2018-03-20 LAB
ANION GAP SERPL CALC-SCNC: 11 MMOL/L
BASOPHILS # BLD AUTO: 0.04 K/UL
BASOPHILS NFR BLD: 0.5 %
BUN SERPL-MCNC: 14 MG/DL
CALCIUM SERPL-MCNC: 9.7 MG/DL
CHLORIDE SERPL-SCNC: 103 MMOL/L
CO2 SERPL-SCNC: 27 MMOL/L
CREAT SERPL-MCNC: 0.4 MG/DL
DIFFERENTIAL METHOD: ABNORMAL
EOSINOPHIL # BLD AUTO: 0 K/UL
EOSINOPHIL NFR BLD: 0.1 %
ERYTHROCYTE [DISTWIDTH] IN BLOOD BY AUTOMATED COUNT: 13.4 %
EST. GFR  (AFRICAN AMERICAN): >60 ML/MIN/1.73 M^2
EST. GFR  (NON AFRICAN AMERICAN): >60 ML/MIN/1.73 M^2
GLUCOSE SERPL-MCNC: 151 MG/DL
HCT VFR BLD AUTO: 28.5 %
HGB BLD-MCNC: 9.3 G/DL
IMM GRANULOCYTES # BLD AUTO: 0.35 K/UL
IMM GRANULOCYTES NFR BLD AUTO: 4.5 %
LYMPHOCYTES # BLD AUTO: 1.3 K/UL
LYMPHOCYTES NFR BLD: 17 %
MAGNESIUM SERPL-MCNC: 2 MG/DL
MCH RBC QN AUTO: 28.8 PG
MCHC RBC AUTO-ENTMCNC: 32.6 G/DL
MCV RBC AUTO: 88 FL
MONOCYTES # BLD AUTO: 0.4 K/UL
MONOCYTES NFR BLD: 5.5 %
NEUTROPHILS # BLD AUTO: 5.7 K/UL
NEUTROPHILS NFR BLD: 72.4 %
NRBC BLD-RTO: 0 /100 WBC
PHOSPHATE SERPL-MCNC: 3.1 MG/DL
PLATELET # BLD AUTO: 277 K/UL
PMV BLD AUTO: 10.2 FL
POCT GLUCOSE: 100 MG/DL (ref 70–110)
POCT GLUCOSE: 115 MG/DL (ref 70–110)
POCT GLUCOSE: 154 MG/DL (ref 70–110)
POTASSIUM SERPL-SCNC: 3.8 MMOL/L
RBC # BLD AUTO: 3.23 M/UL
SODIUM SERPL-SCNC: 141 MMOL/L
VANCOMYCIN TROUGH SERPL-MCNC: 22.3 UG/ML
WBC # BLD AUTO: 7.86 K/UL

## 2018-03-20 PROCEDURE — 25000003 PHARM REV CODE 250: Performed by: INTERNAL MEDICINE

## 2018-03-20 PROCEDURE — 25000003 PHARM REV CODE 250: Performed by: STUDENT IN AN ORGANIZED HEALTH CARE EDUCATION/TRAINING PROGRAM

## 2018-03-20 PROCEDURE — 80048 BASIC METABOLIC PNL TOTAL CA: CPT

## 2018-03-20 PROCEDURE — 94003 VENT MGMT INPAT SUBQ DAY: CPT

## 2018-03-20 PROCEDURE — 84100 ASSAY OF PHOSPHORUS: CPT

## 2018-03-20 PROCEDURE — 99233 SBSQ HOSP IP/OBS HIGH 50: CPT | Mod: ,,, | Performed by: INTERNAL MEDICINE

## 2018-03-20 PROCEDURE — 63600175 PHARM REV CODE 636 W HCPCS: Performed by: STUDENT IN AN ORGANIZED HEALTH CARE EDUCATION/TRAINING PROGRAM

## 2018-03-20 PROCEDURE — 63600175 PHARM REV CODE 636 W HCPCS: Performed by: INTERNAL MEDICINE

## 2018-03-20 PROCEDURE — 99900026 HC AIRWAY MAINTENANCE (STAT)

## 2018-03-20 PROCEDURE — 83735 ASSAY OF MAGNESIUM: CPT

## 2018-03-20 PROCEDURE — S0073 INJECTION, AZTREONAM, 500 MG: HCPCS | Performed by: STUDENT IN AN ORGANIZED HEALTH CARE EDUCATION/TRAINING PROGRAM

## 2018-03-20 PROCEDURE — 85025 COMPLETE CBC W/AUTO DIFF WBC: CPT

## 2018-03-20 PROCEDURE — 80202 ASSAY OF VANCOMYCIN: CPT

## 2018-03-20 PROCEDURE — 27000221 HC OXYGEN, UP TO 24 HOURS

## 2018-03-20 PROCEDURE — 94761 N-INVAS EAR/PLS OXIMETRY MLT: CPT

## 2018-03-20 PROCEDURE — 99900035 HC TECH TIME PER 15 MIN (STAT)

## 2018-03-20 PROCEDURE — 25000003 PHARM REV CODE 250

## 2018-03-20 PROCEDURE — 20000000 HC ICU ROOM

## 2018-03-20 PROCEDURE — 97803 MED NUTRITION INDIV SUBSEQ: CPT

## 2018-03-20 RX ORDER — ESMOLOL HYDROCHLORIDE 20 MG/ML
50 INJECTION, SOLUTION INTRAVENOUS CONTINUOUS
Status: DISCONTINUED | OUTPATIENT
Start: 2018-03-20 | End: 2018-03-21

## 2018-03-20 RX ORDER — METOPROLOL TARTRATE 1 MG/ML
INJECTION, SOLUTION INTRAVENOUS
Status: COMPLETED
Start: 2018-03-20 | End: 2018-03-20

## 2018-03-20 RX ORDER — OXYCODONE AND ACETAMINOPHEN 5; 325 MG/1; MG/1
1 TABLET ORAL ONCE
Status: COMPLETED | OUTPATIENT
Start: 2018-03-20 | End: 2018-03-20

## 2018-03-20 RX ORDER — METOPROLOL TARTRATE 1 MG/ML
5 INJECTION, SOLUTION INTRAVENOUS EVERY 5 MIN PRN
Status: COMPLETED | OUTPATIENT
Start: 2018-03-20 | End: 2018-03-20

## 2018-03-20 RX ORDER — CARVEDILOL 6.25 MG/1
6.25 TABLET ORAL 2 TIMES DAILY
Status: DISCONTINUED | OUTPATIENT
Start: 2018-03-20 | End: 2018-03-23 | Stop reason: HOSPADM

## 2018-03-20 RX ORDER — OXYCODONE AND ACETAMINOPHEN 5; 325 MG/1; MG/1
1 TABLET ORAL EVERY 8 HOURS PRN
Status: DISCONTINUED | OUTPATIENT
Start: 2018-03-20 | End: 2018-03-20

## 2018-03-20 RX ORDER — FENTANYL CITRATE-0.9 % NACL/PF 10 MCG/ML
PLASTIC BAG, INJECTION (ML) INTRAVENOUS CONTINUOUS
Status: DISCONTINUED | OUTPATIENT
Start: 2018-03-20 | End: 2018-03-22

## 2018-03-20 RX ORDER — ESMOLOL HYDROCHLORIDE 20 MG/ML
INJECTION, SOLUTION INTRAVENOUS
Status: COMPLETED
Start: 2018-03-20 | End: 2018-03-20

## 2018-03-20 RX ADMIN — ENOXAPARIN SODIUM 40 MG: 100 INJECTION SUBCUTANEOUS at 05:03

## 2018-03-20 RX ADMIN — CHLORHEXIDINE GLUCONATE 15 ML: 1.2 RINSE ORAL at 09:03

## 2018-03-20 RX ADMIN — PREGABALIN 100 MG: 50 CAPSULE ORAL at 03:03

## 2018-03-20 RX ADMIN — OXYCODONE HYDROCHLORIDE AND ACETAMINOPHEN 1 TABLET: 5; 325 TABLET ORAL at 01:03

## 2018-03-20 RX ADMIN — Medication 0.3 MG: at 09:03

## 2018-03-20 RX ADMIN — HYDROXYZINE HYDROCHLORIDE 25 MG: 25 TABLET, FILM COATED ORAL at 01:03

## 2018-03-20 RX ADMIN — Medication 4 MG: at 06:03

## 2018-03-20 RX ADMIN — HYDROXYZINE HYDROCHLORIDE 25 MG: 25 TABLET, FILM COATED ORAL at 05:03

## 2018-03-20 RX ADMIN — Medication 1250 MG: at 11:03

## 2018-03-20 RX ADMIN — Medication 25 MCG/HR: at 05:03

## 2018-03-20 RX ADMIN — PREGABALIN 100 MG: 50 CAPSULE ORAL at 08:03

## 2018-03-20 RX ADMIN — CARVEDILOL 3.12 MG: 3.12 TABLET, FILM COATED ORAL at 09:03

## 2018-03-20 RX ADMIN — AZTREONAM 2000 MG: 1 INJECTION, POWDER, LYOPHILIZED, FOR SOLUTION INTRAMUSCULAR; INTRAVENOUS at 03:03

## 2018-03-20 RX ADMIN — METOPROLOL TARTRATE 5 MG: 5 INJECTION, SOLUTION INTRAVENOUS at 03:03

## 2018-03-20 RX ADMIN — AZTREONAM 2000 MG: 1 INJECTION, POWDER, LYOPHILIZED, FOR SOLUTION INTRAMUSCULAR; INTRAVENOUS at 11:03

## 2018-03-20 RX ADMIN — FAMOTIDINE 20 MG: 20 TABLET, FILM COATED ORAL at 09:03

## 2018-03-20 RX ADMIN — CLONAZEPAM 0.5 MG: 0.5 TABLET ORAL at 09:03

## 2018-03-20 RX ADMIN — DOXEPIN HYDROCHLORIDE 75 MG: 75 CAPSULE ORAL at 09:03

## 2018-03-20 RX ADMIN — Medication 0.3 MG: at 11:03

## 2018-03-20 RX ADMIN — CARVEDILOL 6.25 MG: 6.25 TABLET, FILM COATED ORAL at 08:03

## 2018-03-20 RX ADMIN — LIDOCAINE 3 PATCH: 50 PATCH TOPICAL at 09:03

## 2018-03-20 RX ADMIN — HYDROXYZINE HYDROCHLORIDE 25 MG: 25 TABLET, FILM COATED ORAL at 09:03

## 2018-03-20 RX ADMIN — Medication 0.3 MG: at 03:03

## 2018-03-20 RX ADMIN — DOXEPIN HYDROCHLORIDE 75 MG: 75 CAPSULE ORAL at 08:03

## 2018-03-20 RX ADMIN — DIPHENHYDRAMINE HYDROCHLORIDE 50 MG: 25 SOLUTION ORAL at 08:03

## 2018-03-20 RX ADMIN — HYDROXYZINE HYDROCHLORIDE 25 MG: 25 TABLET, FILM COATED ORAL at 08:03

## 2018-03-20 RX ADMIN — FUROSEMIDE 40 MG: 40 TABLET ORAL at 09:03

## 2018-03-20 RX ADMIN — ESMOLOL HYDROCHLORIDE 50 MCG/KG/MIN: 20 INJECTION INTRAVENOUS at 03:03

## 2018-03-20 RX ADMIN — CHLORHEXIDINE GLUCONATE 15 ML: 1.2 RINSE ORAL at 08:03

## 2018-03-20 RX ADMIN — SCOPALAMINE 1 PATCH: 1 PATCH, EXTENDED RELEASE TRANSDERMAL at 06:03

## 2018-03-20 RX ADMIN — Medication 1250 MG: at 10:03

## 2018-03-20 RX ADMIN — AZTREONAM 2000 MG: 1 INJECTION, POWDER, LYOPHILIZED, FOR SOLUTION INTRAMUSCULAR; INTRAVENOUS at 08:03

## 2018-03-20 RX ADMIN — ESMOLOL HYDROCHLORIDE 50 MCG/KG/MIN: 20 INJECTION, SOLUTION INTRAVENOUS at 03:03

## 2018-03-20 RX ADMIN — SODIUM CHLORIDE 250 ML: 9 INJECTION, SOLUTION INTRAVENOUS at 10:03

## 2018-03-20 RX ADMIN — PREGABALIN 100 MG: 50 CAPSULE ORAL at 09:03

## 2018-03-20 RX ADMIN — FAMOTIDINE 20 MG: 20 TABLET, FILM COATED ORAL at 08:03

## 2018-03-20 RX ADMIN — DULOXETINE 60 MG: 30 CAPSULE, DELAYED RELEASE ORAL at 09:03

## 2018-03-20 RX ADMIN — STANDARDIZED SENNA CONCENTRATE AND DOCUSATE SODIUM 2 TABLET: 8.6; 5 TABLET, FILM COATED ORAL at 09:03

## 2018-03-20 RX ADMIN — CLONAZEPAM 0.5 MG: 0.5 TABLET ORAL at 08:03

## 2018-03-20 RX ADMIN — CLONAZEPAM 0.5 MG: 0.5 TABLET ORAL at 03:03

## 2018-03-20 RX ADMIN — POLYETHYLENE GLYCOL 3350 17 G: 17 POWDER, FOR SOLUTION ORAL at 09:03

## 2018-03-20 NOTE — ASSESSMENT & PLAN NOTE
-CXR with L lower lobe asp PNA  -3/12 start vanc & aztreonam (penicillin allergy) Day 8 of 15 (also received 2 days of cefepime)  - 3/13 Sputum cultures with pansensitive Pseudomonas aeruginosa, ciprofloxacin resistant Proteus mirabilis  -3/13 Urine culture tetracycline resistant Enterococcus faecalis, ciprofloxacin resistant Pseudomonas aeruginosa  -Con't vanc trough  -procalcitonin, lactate wnl, blood cx neg  -3/19 ENT trach placement (patient has revoked wish for hospice) (now medically stable.)  -PTX resolved on CXR. chest tube pulled. Still with signs of pulmonary edema, will continue lasix.

## 2018-03-20 NOTE — HPI
47 year old male with PMH of ALS presents with bilateral pneumonia, UTI and worsening respiratory status. Patient was previously under hospice care but has withdrew from hospice prior to hospital admission.  Patient was intubated on 3/15/18 due to respiratory distress. Multiple mucus plugs suctioned from ETT. Post-intubation CXR showed small right pneumothorax. Anterior pigtail placed and removed on 3/17/18. Yesterday patient had 8-0 shiley cuffed tracheostomy tube placed without incident. Direct laryngoscopy, tracheoscopy confirmed correct placement of the tube. No tracheoesophageal fistula visualized at that time. CT Neck last night showed some pneumomediastinum. Thoracic surgery is consulted due to concern for TE fistula. Today he is comfortable on FiO2 21% and PEEP 10 with adequate tidal volumes. VSS. Responding appropriately.

## 2018-03-20 NOTE — CONSULTS
[facility]  Thoracic Surgery  Consult Note    Patient Name: Bayron Delgado  MRN: [id number]  Code Status: Full Code  Admission Date: 3/14/2018  Hospital Length of Stay: 6 days  Consult Requesting Physician: Dr. Fierro  Consulting Physician: Dr. Arreguin  Primary Care Provider: Scott Puckett Jr, MD    Inpatient consult to Cardiothoracic Surgery  Consult performed by: LANE MAIN  Consult ordered by: WANDY MCGREGOR  Reason for consult: TE fistula        Subjective:     Reason for Consult: Rule out TE Fistula     History of Present Illness: 47 year old male with PMH of ALS presents with bilateral pneumonia, UTI and worsening respiratory status. Patient was previously under hospice care but has withdrew from hospice prior to hospital admission.  Patient was intubated on 3/15/18 due to respiratory distress. Multiple mucus plugs suctioned from ETT. Post-intubation CXR showed small right pneumothorax. Anterior pigtail placed and removed on 3/17/18. Yesterday patient had 8-0 shiley cuffed tracheostomy tube placed without incident. Direct laryngoscopy, tracheoscopy confirmed correct placement of the tube. No tracheoesophageal fistula visualized at that time. CT Neck last night showed some pneumomediastinum. Thoracic surgery is consulted due to concern for TE fistula. Today he is comfortable on FiO2 21% and PEEP 10 with adequate tidal volumes. VSS. Responding appropriately.     No current facility-administered medications on file prior to encounter.      Current Outpatient Prescriptions on File Prior to Encounter   Medication Sig    acetaminophen (TYLENOL) 500 MG tablet Take 500 mg by mouth every 6 (six) hours as needed for Temperature greater than.    carvedilol (COREG) 3.125 MG tablet 3.125 mg by Per G Tube route 2 (two) times daily.     clonazePAM (KLONOPIN) 0.5 MG tablet 0.5 mg by Per G Tube route 3 (three) times daily.    diphenhydrAMINE (BENADRYL) 25 mg capsule 25 mg by Per G Tube route every  morning. 4PM    diphenhydrAMINE (BENADRYL) 50 MG capsule 50 mg by Per G Tube route every evening.    doxepin (SINEQUAN) 75 MG capsule 75 mg by Per G Tube route 2 (two) times daily.    duloxetine (CYMBALTA) 60 MG capsule Take 60 mg by mouth every morning.     furosemide (LASIX) 20 MG tablet TAKE 1 TABLET(20 MG) BY MOUTH EVERY DAY (Patient taking differently: 20 mg by Per G Tube route once daily. )    hydrocodone-acetaminophen 10-325mg (NORCO)  mg Tab 1 tablet by Per G Tube route 3 (three) times daily.     hydrOXYzine HCl (ATARAX) 25 MG tablet 25 mg by Per G Tube route 4 (four) times daily.    LACTOSE-REDUCED FOOD/FIBER (ISOSOURCE 1.5 YURY ORAL) by Per G Tube route 5 (five) times daily.    lidocaine (LIDODERM) 5 % Place 1 patch onto the skin daily as needed (neck and both knees). Remove & Discard patch within 12 hours or as directed by MD    morphine 100 mg/5 mL (20 mg/mL) concentrated solution Take 0.25 mg by mouth every 2 (two) hours as needed for Pain.    morphine 30 mg TRer 30 mg by Per G Tube route every 6 (six) hours while awake.    ondansetron (ZOFRAN) 4 mg/5 mL solution 4 mg by Per G Tube route 4 (four) times daily.    polyethylene glycol (GLYCOLAX) 17 gram PwPk 17 g by Per G Tube route 2 (two) times daily. AM and NOON    potassium chloride (MICRO-K) 10 MEQ CpSR TAKE ONE CAPSULE BY MOUTH DAILY (Patient taking differently: TAKE ONE CAPSULE BY G TUBE DAILY)    pregabalin (LYRICA) 100 MG capsule 100 mg by Per G Tube route 3 (three) times daily.    senna (SENOKOT) 8.6 mg tablet 7 tablets by Per G Tube route 2 (two) times daily. AM and NOON     temazepam (RESTORIL) 15 mg Cap 15 mg by Per G Tube route every evening.       Review of patient's allergies indicates:   Allergen Reactions    Latex, natural rubber     Nsaids (non-steroidal anti-inflammatory drug)      Causes patient to go into afib per wife    Penicillins Other (See Comments)     Per wife- his mother stated he was allergic to  it.  Had redness associated with cefepime 3/14/2018       Past Medical History:   Diagnosis Date    ALS (amyotrophic lateral sclerosis)     Atrial fibrillation     CHF (congestive heart failure)     Neuropathy      Past Surgical History:   Procedure Laterality Date    APPENDECTOMY      HERNIA REPAIR       Family History     None        Social History Main Topics    Smoking status: Never Smoker    Smokeless tobacco: Not on file    Alcohol use No    Drug use: No    Sexual activity: Yes     Partners: Female     Review of Systems   Unable to perform ROS: Patient nonverbal     Objective:     Vital Signs (Most Recent):  Temp: 99 °F (37.2 °C) (03/20/18 0701)  Pulse: 89 (03/20/18 1127)  Resp: 19 (03/20/18 1127)  BP: 121/77 (03/20/18 1000)  SpO2: 98 % (03/20/18 1127) Vital Signs (24h Range):  Temp:  [98.6 °F (37 °C)-99 °F (37.2 °C)] 99 °F (37.2 °C)  Pulse:  [] 89  Resp:  [15-22] 19  SpO2:  [96 %-100 %] 98 %  BP: ()/(50-93) 121/77     Weight: 103.7 kg (228 lb 9.9 oz)  Body mass index is 32.8 kg/m².    Intake/Output - Last 3 Shifts       03/18 0700 - 03/19 0659 03/19 0700 - 03/20 0659 03/20 0700 - 03/21 0659    I.V. (mL/kg) 742.1 (7.2) 219.7 (2.1) 39.5 (0.4)    NG/ 90 60    IV Piggyback 750 500     Total Intake(mL/kg) 2192.1 (21.1) 809.7 (7.8) 99.5 (1)    Urine (mL/kg/hr) 995 (0.4) 2150 (0.9) 100 (0.2)    Stool 0 (0) 0 (0)     Chest Tube 0 (0)      Total Output 995 2150 100    Net +1197.1 -1340.3 -0.6           Stool Occurrence 2 x 2 x           SpO2: 98 %  O2 Device (Oxygen Therapy): ventilator    Physical Exam   Constitutional: No distress.   HENT:   Head: Normocephalic and atraumatic.   Eyes: Conjunctivae are normal. No scleral icterus.   Neck: Normal range of motion.   Trach in place.   Cardiovascular: Normal rate, regular rhythm and intact distal pulses.  Exam reveals no friction rub.    Pulmonary/Chest: No respiratory distress. He has no wheezes.   Coarse breath sounds on vent.    Abdominal:  Soft. He exhibits no distension. There is no tenderness. There is no guarding.   PEG tube in place, w/ some skin irritation surrounding tube    Genitourinary:   Genitourinary Comments: Michele in place   Musculoskeletal: He exhibits edema (+2 b/l LE edema, +1 edema hands b/l).   Neurological: He is alert.   Skin: Skin is warm. No rash noted. He is not diaphoretic.       Significant Labs:  ABGs: No results for input(s): PH, PCO2, PO2, HCO3, POCSATURATED, BE in the last 48 hours.  CBC:   Recent Labs  Lab 03/20/18  0240   WBC 7.86   RBC 3.23*   HGB 9.3*   HCT 28.5*      MCV 88   MCH 28.8   MCHC 32.6     CMP:   Recent Labs  Lab 03/20/18  0240   *   CALCIUM 9.7      K 3.8   CO2 27      BUN 14   CREATININE 0.4*     Coagulation: No results for input(s): PT, INR, APTT in the last 48 hours.    Significant Diagnostics:  CXR: I have reviewed all pertinent results/findings within the past 24 hours    VTE Risk Mitigation         Ordered     enoxaparin injection 40 mg  Daily     Route:  Subcutaneous        03/14/18 9464        Assessment/Plan:     ALS (amyotrophic lateral sclerosis)    47 year old male with PMH of ALS now POD1 s/p tracheostomy.     - Pneumomediastinum likely from procedures yesterday. TE fistula less likely as he has adequate tidal volumes on ventilator support today. Patient is already PEG dependent and NPO. Even if patient had a small TE fistula, would recommend continuing supportive care. No surgical intervention would be warranted in this patient.   - Thoracic surgery will sign off . Please call with any questions.             Thank you for your consult. I will sign off. Please contact us if you have any additional questions.    Destiny Ruiz PA-C  Thoracic Surgery  [id number]

## 2018-03-20 NOTE — SUBJECTIVE & OBJECTIVE
Interval History: XENIA, continues to have air leak. No bleeding.     Medications:  Continuous Infusions:   esmolol 25 mcg/kg/min (03/20/18 0701)    fentanyl 10 mL/hr at 03/20/18 0701    propofol Stopped (03/17/18 2200)     Scheduled Meds:   aztreonam  2,000 mg Intravenous Q8H    carvedilol  3.125 mg Per G Tube BID    chlorhexidine  15 mL Mouth/Throat BID    clonazePAM  0.5 mg Per G Tube TID    diphenhydrAMINE  50 mg Per G Tube QHS    doxepin  75 mg Per G Tube BID    DULoxetine  60 mg Per G Tube Daily    enoxaparin  40 mg Subcutaneous Daily    famotidine  20 mg Per G Tube BID    furosemide  40 mg Per G Tube Daily    glycopyrrolate  0.3 mg Per G Tube TID    hydrOXYzine HCl  25 mg Per G Tube QID    lidocaine  3 patch Transdermal Q24H    ondansetron  4 mg Per G Tube Q6H    polyethylene glycol  17 g Per G Tube BID    pregabalin  100 mg Per G Tube TID    scopolamine  1 patch Transdermal Q3 Days    senna-docusate 8.6-50 mg  2 tablet Per G Tube BID    vancomycin (VANCOCIN) IVPB  15 mg/kg Intravenous Q12H     PRN Meds:acetaminophen, dextrose 50%, diphenhydrAMINE, glucagon (human recombinant), influenza, insulin aspart U-100, magnesium oxide, magnesium oxide, potassium chloride 10%, potassium chloride 10%, potassium, sodium phosphates, potassium, sodium phosphates, potassium, sodium phosphates     Review of patient's allergies indicates:   Allergen Reactions    Latex, natural rubber     Nsaids (non-steroidal anti-inflammatory drug)      Causes patient to go into afib per wife    Penicillins Other (See Comments)     Per wife- his mother stated he was allergic to it.  Had redness associated with cefepime 3/14/2018     Objective:     Vital Signs (24h Range):  Temp:  [98.6 °F (37 °C)-99.2 °F (37.3 °C)] 99 °F (37.2 °C)  Pulse:  [] 74  Resp:  [15-22] 18  SpO2:  [96 %-100 %] 99 %  BP: ()/(50-93) 96/65       Date 03/20/18 0700 - 03/21/18 0659   Shift 8403-3120 1518-5054 9193-9300 24 Hour Total    I  N  T  A  K  E   I.V.  (mL/kg) 26.1  (0.3)   26.1  (0.3)    Shift Total  (mL/kg) 26.1  (0.3)   26.1  (0.3)   O  U  T  P  U  T   Shift Total  (mL/kg)       Weight (kg) 103.7 103.7 103.7 103.7     Lines/Drains/Airways     Drain                 Gastrostomy/Enterostomy 01/23/17 1113 Percutaneous endoscopic gastrostomy (PEG) LUQ feeding 420 days         Urethral Catheter 03/13/18 1441 Non-latex 20 Fr. 6 days          Airway                 Surgical Airway 03/19/18 1355 Shiley Cuffed less than 1 day          Peripheral Intravenous Line                 Peripheral IV - Single Lumen 03/14/18 0300 Right Antecubital 6 days         Midline Catheter Insertion/Assessment  - Single Lumen 03/14/18 1545 Right cephalic vein (lateral side of arm) 18g x 10cm 5 days                Physical Exam  Vent Mode: A/C  Oxygen Concentration (%):  [25] 25  Resp Rate Total:  [16 br/min-19 br/min] 16 br/min  Vt Set:  [0 mL-450 mL] 0 mL  PEEP/CPAP:  [10 cmH20] 10 cmH20  Mean Airway Pressure:  [13 cfI19-70 cmH20] 13 cmH20    NAD A&O x 3  Neck soft no crepitus, 8-0 Shiley cuffed trach in place secured with suture.   Mechanical breath sounds       Significant Labs:  CBC:   Recent Labs  Lab 03/20/18  0240   WBC 7.86   RBC 3.23*   HGB 9.3*   HCT 28.5*      MCV 88   MCH 28.8   MCHC 32.6     CMP:   Recent Labs  Lab 03/15/18  0327  03/20/18  0240   *  < > 151*   CALCIUM 9.6  < > 9.7   ALBUMIN 3.1*  --   --    PROT 6.6  --   --      < > 141   K 3.6  < > 3.8   CO2 33*  < > 27     < > 103   BUN 12  < > 14   CREATININE 0.4*  < > 0.4*   ALKPHOS 89  --   --    ALT 64*  --   --    AST 25  --   --    BILITOT 0.6  --   --    < > = values in this interval not displayed.

## 2018-03-20 NOTE — ASSESSMENT & PLAN NOTE
48 y/o male with PMH of ALS transferred for worsening respiratory failure, POD# 1 s/p uneventful tracheostomy, direct laryngoscopy, and rigid esophagoscopy   Plan:   - routine trach care   - will plan for change POD# 5   - Continues to have air leak, unchanged from prior to tracheostomy   - If patient can participate, recommend esophagram to evaluate for tracheoesophageal fistula  - consider direct visualization with endoscopy

## 2018-03-20 NOTE — PROGRESS NOTES
"  [facility]  Adult Nutrition  Consult Note    SUMMARY     Recommendations    1. TF recommendations: Isosource 1.5 @ 65 mL/hr to provide 2340 calories (105% EEN), 106 g of protein (94% EPN), 1192 mL fluid.   - Hold for residuals >500 mL; additional fluid per MD.  2. RD to monitor & follow-up.    Goals: Meet % EEN, EPN  Nutrition Goal Status: goal not met  Communication of RD Recs: reviewed with RN    Reason for Assessment    Reason for Assessment: RD follow-up  Diagnosis: other (see comments) (Resp. fx)  Relevant Medical History: CHF, ALS  Interdisciplinary Rounds: attended    General Information Comments: Pt remains intubated, sedated. TF off at time of visit. 7 days post-op trach, laryngoscopy.   Nutrition Discharge Planning: Tolerance of TF    Nutrition Risk Screen    Nutrition Risk Screen: tube feeding or parenteral nutrition    Nutrition/Diet History    Patient Reported Diet/Restrictions/Preferences: other (see comments) (SHERON)  Do you have any cultural, spiritual, Yazdanism conflicts, given your current situation?: none  Factors Affecting Nutritional Intake: NPO, on mechanical ventilation    Anthropometrics    Temp: 98.6 °F (37 °C)  Height Method: Estimated  Height: 5' 10" (177.8 cm)  Height (inches): 70 in  Weight Method: Bed Scale  Weight: 103.7 kg (228 lb 9.9 oz)  Weight (lb): 228.62 lb  Ideal Body Weight (IBW), Male: 166 lb  % Ideal Body Weight, Male (lb): 137.72 lb  BMI (Calculated): 32.9  BMI Grade: 30 - 34.9- obesity - grade I    Lab/Procedures/Meds    Pertinent Labs Reviewed: reviewed  Pertinent Labs Comments: Gluc 151  Pertinent Medications Reviewed: reviewed  Pertinent Medications Comments: -    Physical Findings/Assessment    Overall Physical Appearance: overweight, on ventilator support  Tubes: gastrostomy tube  Oral/Mouth Cavity: WDL  Skin: intact    Estimated/Assessed Needs    Weight Used For Calorie Calculations: 103.7 kg (228 lb 9.9 oz)  Height: 5' 10" (177.8 " cm)    Energy Calorie Requirements (kcal): 2218 kcal/d  Energy Need Method: [facility]    Protein Requirements: 113-151 g/d (1.5-2 g/kg IBW)  Weight Used For Protein Calculations: 75.5 kg (166 lb 7.2 oz)    Fluid Need Method: other (see comments) (Per MD or 1 mL/kcal)    Nutrition Prescription Ordered    Current Diet Order: NPO    Nutrition Risk    Level of Risk/Frequency of Follow-up: high     Assessment and Plan    ALS (amyotrophic lateral sclerosis)      Nutrition Problem  Inadequate energy intake    Related to (etiology):   Inability to consume sufficient energy    Signs and Symptoms (as evidenced by):   NPO with no alternate means of nutrition     Nutrition Diagnosis Status:   New         Monitor and Evaluation    Food and Nutrient Intake: enteral nutrition intake  Food and Nutrient Adminstration: enteral and parenteral nutrition administration  Physical Activity and Function: nutrition-related ADLs and IADLs  Anthropometric Measurements: weight, weight change  Biochemical Data, Medical Tests and Procedures: lipid profile, inflammatory profile, glucose/endocrine profile, gastrointestinal profile, electrolyte and renal panel  Nutrition-Focused Physical Findings: overall appearance     Nutrition Follow-Up    RD Follow-up?: Yes

## 2018-03-20 NOTE — ASSESSMENT & PLAN NOTE
TTE EF 68%, no DD  Renal function normal  Diuresing with lasix PO (latex allergy); initially bid, now daily  Net neg 9.5L since admission

## 2018-03-20 NOTE — SUBJECTIVE & OBJECTIVE
Interval History/Significant Events: POD 1 Trach placement. VSS WNL. Plan for esophogram vs EGD.    Review of Systems   Unable to perform ROS: Other (intubated, ALS w/ vocal cord paralysis)     Objective:     Vital Signs (Most Recent):  Temp: 99 °F (37.2 °C) (03/19/18 1901)  Pulse: 75 (03/20/18 0801)  Resp: 18 (03/20/18 0801)  BP: 108/72 (03/20/18 0800)  SpO2: 98 % (03/20/18 0801) Vital Signs (24h Range):  Temp:  [98.6 °F (37 °C)-99 °F (37.2 °C)] 99 °F (37.2 °C)  Pulse:  [] 75  Resp:  [15-22] 18  SpO2:  [96 %-100 %] 98 %  BP: ()/(50-93) 108/72   Weight: 103.7 kg (228 lb 9.9 oz)  Body mass index is 32.8 kg/m².      Intake/Output Summary (Last 24 hours) at 03/20/18 0840  Last data filed at 03/20/18 0800   Gross per 24 hour   Intake           839.14 ml   Output             2030 ml   Net         -1190.86 ml       Physical Exam   Constitutional: No distress.   HENT:   Head: Normocephalic and atraumatic.   Eyes: Conjunctivae are normal. No scleral icterus.   Cardiovascular: Normal rate, regular rhythm and intact distal pulses.  Exam reveals no friction rub.    Pulmonary/Chest: No respiratory distress. He has no wheezes.   intubated   Abdominal: Soft. He exhibits no distension. There is no tenderness. There is no guarding.   PEG tube in place, w/ some skin irritation surrounding tube    Genitourinary:   Genitourinary Comments: Michele in place   Musculoskeletal: He exhibits edema (+2 b/l LE edema, +1 edema hands b/l).   Neurological: He is alert. No sensory deficit.   Skin: Skin is warm. No rash noted. He is not diaphoretic.       Vents:  Vent Mode: A/C (03/20/18 0801)  Ventilator Initiated: Yes (03/15/18 1050)  Set Rate: 16 bmp (03/20/18 0801)  Vt Set: 0 mL (03/19/18 2314)  PEEP/CPAP: 10 cmH20 (03/20/18 0801)  Oxygen Concentration (%): 25 (03/20/18 0801)  Peak Airway Pressure: 22 cmH2O (03/20/18 0801)  Plateau Pressure: 24 cmH20 (03/15/18 1903)  Total Ve: 7.5 mL (03/20/18 0801)  F/VT Ratio<105 (RSBI): (!) 35.64  (03/20/18 0801)  Lines/Drains/Airways     Drain                 Gastrostomy/Enterostomy 01/23/17 1113 Percutaneous endoscopic gastrostomy (PEG) LUQ feeding 420 days         Urethral Catheter 03/13/18 1441 Non-latex 20 Fr. 6 days          Airway                 Surgical Airway 03/19/18 1355 Shiley Cuffed less than 1 day          Peripheral Intravenous Line                 Peripheral IV - Single Lumen 03/14/18 0300 Right Antecubital 6 days         Midline Catheter Insertion/Assessment  - Single Lumen 03/14/18 1545 Right cephalic vein (lateral side of arm) 18g x 10cm 5 days              Significant Labs:    CBC/Anemia Profile:    Recent Labs  Lab 03/19/18  0339 03/20/18  0240   WBC 7.99 7.86   HGB 8.6* 9.3*   HCT 27.4* 28.5*    277   MCV 90 88   RDW 14.2 13.4        Chemistries:    Recent Labs  Lab 03/19/18  0339 03/20/18  0240    141   K 3.6 3.8    103   CO2 30* 27   BUN 18 14   CREATININE 0.4* 0.4*   CALCIUM 9.4 9.7   MG 2.3 2.0   PHOS 2.6* 3.1       All pertinent labs within the past 24 hours have been reviewed.    Significant Imaging:  I have reviewed and interpreted all pertinent imaging results/findings within the past 24 hours.

## 2018-03-20 NOTE — PROGRESS NOTES
[facility]  Critical Care Medicine  Progress Note    Patient Name: Bayron Delgado  MRN: [id number]  Admission Date: 3/14/2018  Hospital Length of Stay: 6 days  Code Status: Full Code  Attending Provider: Jo Ann Fierro MD  Primary Care Provider: Scott Puckett Jr, MD   Principal Problem: Acute respiratory failure with hypoxia    Subjective:     HPI:  46 y/o M w/ Afib, ALS (diagnosed 2014) who [place] [facility] [place] [facility] via EMS on BiPAP 2/2 worsening respiratory failure. Per brother patient had been on home hospice for progression of his ALS, however, on 3/12 brother states patient experienced an episode of hypoxia as pt became more tachypneic w/ cyanosis surrounding lips. Per brother patient had a fever around this time as well. This episode resolved by 3/13 and at this time hospice agency re-visited patient's decision to explore life sustaining measures such as tracheostomy. According to brother, patient decided he would undergo trach placement, he was transported by EMS to [facility] where he was noted to have L lower lobe consolidation on CXR. He was started on vanc/ aztreonam due to penicillin allergy.and was subsequently [place] [facility] Initially patient had refused tracheostomy after being evaluated by ENT (Dr. Horner) in 2016.   Per brother patient's wife was unhappy w/ patient's decision to proceed w/ trach placement.   Patient communicates mostly by shaking his head to answer yes/ no. He is dependent on his trilogy machine for ventilation.  History obtained from brother at bedside as hx difficult to obtain from patient (nonverbal secondary to ALS).    Hospital/ICU Course:  Bayron Delgado is a 47 y.o. male with CHF, A-fib, and ALS who presents to the ED via EMS on BiPAP with an onset of worsening respiratory failure on 03/14/2018. The EMS care team transferred the patient here from [place], [place] and were not allowed to transfer the patient to [facility]  as per instructed by their supervisor. Patient revoked hospice with worsening respiratory status 3/13.    CXR with LLL PNA, concern for aspiration in setting of patient's ALS. UA with enterococcus & pseudomonas. Respiratory cultures with GPR, gram negative diplococci, and gram positive rods. Starting vancomycin and aztreonam (started on vanc & aztreonam at OSH.) Presents with anasarca, normal TTE & renal function. Diurese with IV lasix with adequate urine output. On the morning of 03/15, moted to have issues of desaturation as low as 45%, needed Bag valve mask to return oxygenation to 100%.  He had course breath sounds throughout.  Due to his inability to clear secretions, it was decided to emergently intubate. Afterwards, imaging revealed a pneumothorax on the right chest.  A small bore chest tube was placed on 3/15. Chest tube clamped 3/17.  03/18/2018 Run of afib w/ RVR overnight requiring x2 labetalol and amiodarone for termination. Now in 80s. Trach planned for tomorrow per ENT. Cough assist adjusted to Q6. CXR shows resolution of PTX. Will pull chest tube today. Some persistant peripheral edema noted, will continue daily lasix and reassess in the PM.   03/19/2018 Trach planned for today. Continuing abx.   03/20/2018 POD 1 Trach placement. No fistula visualized during trach placement. Consult placed to CTS for esophogram vs EGD for eval of possible tracheoesophageal fistula.    Interval History/Significant Events: POD 1 Trach placement. VSS WNL. Plan for esophogram vs EGD.    Review of Systems   Unable to perform ROS: Other (intubated, ALS w/ vocal cord paralysis)     Objective:     Vital Signs (Most Recent):  Temp: 99 °F (37.2 °C) (03/19/18 1901)  Pulse: 75 (03/20/18 0801)  Resp: 18 (03/20/18 0801)  BP: 108/72 (03/20/18 0800)  SpO2: 98 % (03/20/18 0801) Vital Signs (24h Range):  Temp:  [98.6 °F (37 °C)-99 °F (37.2 °C)] 99 °F (37.2 °C)  Pulse:  [] 75  Resp:  [15-22] 18  SpO2:  [96 %-100 %] 98 %  BP:  ()/(50-93) 108/72   Weight: 103.7 kg (228 lb 9.9 oz)  Body mass index is 32.8 kg/m².      Intake/Output Summary (Last 24 hours) at 03/20/18 0840  Last data filed at 03/20/18 0800   Gross per 24 hour   Intake           839.14 ml   Output             2030 ml   Net         -1190.86 ml       Physical Exam   Constitutional: No distress.   HENT:   Head: Normocephalic and atraumatic.   Eyes: Conjunctivae are normal. No scleral icterus.   Cardiovascular: Normal rate, regular rhythm and intact distal pulses.  Exam reveals no friction rub.    Pulmonary/Chest: No respiratory distress. He has no wheezes.   intubated   Abdominal: Soft. He exhibits no distension. There is no tenderness. There is no guarding.   PEG tube in place, w/ some skin irritation surrounding tube    Genitourinary:   Genitourinary Comments: Michele in place   Musculoskeletal: He exhibits edema (+2 b/l LE edema, +1 edema hands b/l).   Neurological: He is alert. No sensory deficit.   Skin: Skin is warm. No rash noted. He is not diaphoretic.       Vents:  Vent Mode: A/C (03/20/18 0801)  Ventilator Initiated: Yes (03/15/18 1050)  Set Rate: 16 bmp (03/20/18 0801)  Vt Set: 0 mL (03/19/18 2314)  PEEP/CPAP: 10 cmH20 (03/20/18 0801)  Oxygen Concentration (%): 25 (03/20/18 0801)  Peak Airway Pressure: 22 cmH2O (03/20/18 0801)  Plateau Pressure: 24 cmH20 (03/15/18 1903)  Total Ve: 7.5 mL (03/20/18 0801)  F/VT Ratio<105 (RSBI): (!) 35.64 (03/20/18 0801)  Lines/Drains/Airways     Drain                 Gastrostomy/Enterostomy 01/23/17 1113 Percutaneous endoscopic gastrostomy (PEG) LUQ feeding 420 days         Urethral Catheter 03/13/18 1441 Non-latex 20 Fr. 6 days          Airway                 Surgical Airway 03/19/18 1355 Shiley Cuffed less than 1 day          Peripheral Intravenous Line                 Peripheral IV - Single Lumen 03/14/18 0300 Right Antecubital 6 days         Midline Catheter Insertion/Assessment  - Single Lumen 03/14/18 1545 Right cephalic vein  (lateral side of arm) 18g x 10cm 5 days              Significant Labs:    CBC/Anemia Profile:    Recent Labs  Lab 03/19/18  0339 03/20/18  0240   WBC 7.99 7.86   HGB 8.6* 9.3*   HCT 27.4* 28.5*    277   MCV 90 88   RDW 14.2 13.4        Chemistries:    Recent Labs  Lab 03/19/18  0339 03/20/18  0240    141   K 3.6 3.8    103   CO2 30* 27   BUN 18 14   CREATININE 0.4* 0.4*   CALCIUM 9.4 9.7   MG 2.3 2.0   PHOS 2.6* 3.1       All pertinent labs within the past 24 hours have been reviewed.    Significant Imaging:  I have reviewed and interpreted all pertinent imaging results/findings within the past 24 hours.    Assessment/Plan:     Neuro   ALS (amyotrophic lateral sclerosis)    C/w home trilogy machine  Cough assist device q6  c/w scopolamine patch and glycopyrrolate for secretions        Psychiatric   Anxiety    C/w home clonazepam for anxiety        Depression    C/w home cymbalta  C/w home clonazepam for anxiety  C/w home doxepin        Pulmonary   * Acute respiratory failure with hypoxia    -CXR with L lower lobe asp PNA  -3/12 start vanc & aztreonam (penicillin allergy) Day 8 of 15 (also received 2 days of cefepime)  - 3/13 Sputum cultures with pansensitive Pseudomonas aeruginosa, ciprofloxacin resistant Proteus mirabilis  -3/13 Urine culture tetracycline resistant Enterococcus faecalis, ciprofloxacin resistant Pseudomonas aeruginosa  -Con't vanc trough  -procalcitonin, lactate wnl, blood cx neg  -3/19 ENT trach placement (patient has revoked wish for hospice) (now medically stable.)  -PTX resolved on CXR. chest tube pulled. Still with signs of pulmonary edema, will continue lasix.         Cardiac/Vascular   Atrial fibrillation    [id number] 2. Not on any anti-coagulation. Rate controlled with coreg 3.125 BID.     With a run of afib rvr last night, given lopressor x3 then esmolol gtt, now converted. Wean esmolol.    - Increase coreg to 6.25 BID        Renal/   Acute cystitis without hematuria     -UA with enterococcus & pseudomonas  -C/w chronic munoz, changed on 3/13 at OSH  -Patient already on antibiotics as above        Other   Anasarca    TTE EF 68%, no DD  Renal function normal  Diuresing with lasix PO (latex allergy); initially bid, now daily  Net neg 9.5L since admission               Jocelyn Chan MD  Critical Care Medicine  [facility]

## 2018-03-20 NOTE — ASSESSMENT & PLAN NOTE
47 year old male with PMH of ALS now POD1 s/p tracheostomy.     - Pneumomediastinum likely from procedures yesterday. TE fistula less likely as he has adequate tidal volumes on ventilator support today. Patient is already PEG dependent and NPO. Even if patient had a small TE fistula, would recommend continuing supportive care. No surgical intervention would be warranted in this patient.   - Thoracic surgery will sign off . Please call with any questions.

## 2018-03-20 NOTE — ANESTHESIA POSTPROCEDURE EVALUATION
"Anesthesia Post Evaluation    Patient: Bayron Delgado    Procedure(s) Performed: Procedure(s) (LRB):  TRACHEOSTOMY (N/A)  LARYNGOSCOPY  ESOPHAGOSCOPY (N/A)    Final Anesthesia Type: general  Patient location during evaluation: ICU  Patient participation: No - Unable to Participate, Other Reason (see comments) (trach)  Level of consciousness: sedated  Post-procedure vital signs: reviewed and stable  Pain management: adequate  Airway patency: patent  PONV status at discharge: No PONV  Anesthetic complications: no      Cardiovascular status: hemodynamically stable  Respiratory status: intubated and ventilator  Hydration status: euvolemic  Follow-up not needed.        Visit Vitals  BP (!) 174/85 (BP Location: Left arm, Patient Position: Lying)   Pulse 73   Temp 37 °C (98.6 °F) (Oral)   Resp 18   Ht 5' 10" (1.778 m)   Wt 103.7 kg (228 lb 9.9 oz)   SpO2 100%   BMI 32.80 kg/m²       Pain/Sandra Score: Pain Assessment Performed: Yes (3/19/2018  5:00 PM)  Presence of Pain: non-verbal indicators absent (3/19/2018  5:00 PM)  Pain Rating Prior to Med Admin: 0 (3/19/2018  5:00 PM)  Pain Rating Post Med Admin: 0 (3/19/2018  1:45 PM)      "

## 2018-03-20 NOTE — PROGRESS NOTES
Wound care notified by nurse of skin breakdown to the bottom lip- possible from ETT.  The bottom lip has an resolved abrasion noted- denies discomfort, no erythema, no drainage.   Recommend use of Sween 24 lotion daily to lips

## 2018-03-20 NOTE — PROGRESS NOTES
[facility]  Otorhinolaryngology-Head & Neck Surgery  Progress Note    Subjective:     Post-Op Info:  Procedure(s) (LRB):  TRACHEOSTOMY (N/A)  LARYNGOSCOPY  ESOPHAGOSCOPY (N/A)   1 Day Post-Op  Hospital Day: 7     Interval History: NAEON, continues to have air leak. No bleeding.     Medications:  Continuous Infusions:   esmolol 25 mcg/kg/min (03/20/18 0701)    fentanyl 10 mL/hr at 03/20/18 0701    propofol Stopped (03/17/18 2200)     Scheduled Meds:   aztreonam  2,000 mg Intravenous Q8H    carvedilol  3.125 mg Per G Tube BID    chlorhexidine  15 mL Mouth/Throat BID    clonazePAM  0.5 mg Per G Tube TID    diphenhydrAMINE  50 mg Per G Tube QHS    doxepin  75 mg Per G Tube BID    DULoxetine  60 mg Per G Tube Daily    enoxaparin  40 mg Subcutaneous Daily    famotidine  20 mg Per G Tube BID    furosemide  40 mg Per G Tube Daily    glycopyrrolate  0.3 mg Per G Tube TID    hydrOXYzine HCl  25 mg Per G Tube QID    lidocaine  3 patch Transdermal Q24H    ondansetron  4 mg Per G Tube Q6H    polyethylene glycol  17 g Per G Tube BID    pregabalin  100 mg Per G Tube TID    scopolamine  1 patch Transdermal Q3 Days    senna-docusate 8.6-50 mg  2 tablet Per G Tube BID    vancomycin (VANCOCIN) IVPB  15 mg/kg Intravenous Q12H     PRN Meds:acetaminophen, dextrose 50%, diphenhydrAMINE, glucagon (human recombinant), influenza, insulin aspart U-100, magnesium oxide, magnesium oxide, potassium chloride 10%, potassium chloride 10%, potassium, sodium phosphates, potassium, sodium phosphates, potassium, sodium phosphates     Review of patient's allergies indicates:   Allergen Reactions    Latex, natural rubber     Nsaids (non-steroidal anti-inflammatory drug)      Causes patient to go into afib per wife    Penicillins Other (See Comments)     Per wife- his mother stated he was allergic to it.  Had redness associated with cefepime 3/14/2018     Objective:     Vital Signs (24h Range):  Temp:  [98.6 °F  (37 °C)-99.2 °F (37.3 °C)] 99 °F (37.2 °C)  Pulse:  [] 74  Resp:  [15-22] 18  SpO2:  [96 %-100 %] 99 %  BP: ()/(50-93) 96/65       Date 03/20/18 0700 - 03/21/18 0659   Shift 4870-6771 8530-4526 1596-4476 24 Hour Total   I  N  T  A  K  E   I.V.  (mL/kg) 26.1  (0.3)   26.1  (0.3)    Shift Total  (mL/kg) 26.1  (0.3)   26.1  (0.3)   O  U  T  P  U  T   Shift Total  (mL/kg)       Weight (kg) 103.7 103.7 103.7 103.7     Lines/Drains/Airways     Drain                 Gastrostomy/Enterostomy 01/23/17 1113 Percutaneous endoscopic gastrostomy (PEG) LUQ feeding 420 days         Urethral Catheter 03/13/18 1441 Non-latex 20 Fr. 6 days          Airway                 Surgical Airway 03/19/18 1355 Shiley Cuffed less than 1 day          Peripheral Intravenous Line                 Peripheral IV - Single Lumen 03/14/18 0300 Right Antecubital 6 days         Midline Catheter Insertion/Assessment  - Single Lumen 03/14/18 1545 Right cephalic vein (lateral side of arm) 18g x 10cm 5 days                Physical Exam  Vent Mode: A/C  Oxygen Concentration (%):  [25] 25  Resp Rate Total:  [16 br/min-19 br/min] 16 br/min  Vt Set:  [0 mL-450 mL] 0 mL  PEEP/CPAP:  [10 cmH20] 10 cmH20  Mean Airway Pressure:  [13 dhY26-26 cmH20] 13 cmH20    NAD A&O x 3  Neck soft no crepitus, 8-0 Shiley cuffed trach in place secured with suture.   Mechanical breath sounds       Significant Labs:  CBC:   Recent Labs  Lab 03/20/18  0240   WBC 7.86   RBC 3.23*   HGB 9.3*   HCT 28.5*      MCV 88   MCH 28.8   MCHC 32.6     CMP:   Recent Labs  Lab 03/15/18  0327  03/20/18  0240   *  < > 151*   CALCIUM 9.6  < > 9.7   ALBUMIN 3.1*  --   --    PROT 6.6  --   --      < > 141   K 3.6  < > 3.8   CO2 33*  < > 27     < > 103   BUN 12  < > 14   CREATININE 0.4*  < > 0.4*   ALKPHOS 89  --   --    ALT 64*  --   --    AST 25  --   --    BILITOT 0.6  --   --    < > = values in this interval not displayed.        Assessment/Plan:     ALS  (amyotrophic lateral sclerosis)    46 y/o male with PMH of ALS transferred for worsening respiratory failure, POD# 1 s/p uneventful tracheostomy, direct laryngoscopy, and rigid esophagoscopy   Plan:   - routine trach care   - will plan for change POD# 5   - Continues to have air leak, unchanged from prior to tracheostomy   - If patient can participate, recommend esophagram to evaluate for tracheoesophageal fistula  - consider direct visualization with endoscopy             Faisal Yee MD  Otorhinolaryngology-Head & Neck Surgery  [facility]

## 2018-03-20 NOTE — ASSESSMENT & PLAN NOTE
[id number] 2. Not on any anti-coagulation. Rate controlled with coreg 3.125 BID.     With a run of afib rvr last night, given lopressor x3 then esmolol gtt, now converted. Wean esmolol.    - Increase coreg to 6.25 BID

## 2018-03-20 NOTE — SUBJECTIVE & OBJECTIVE
No current facility-administered medications on file prior to encounter.      Current Outpatient Prescriptions on File Prior to Encounter   Medication Sig    acetaminophen (TYLENOL) 500 MG tablet Take 500 mg by mouth every 6 (six) hours as needed for Temperature greater than.    carvedilol (COREG) 3.125 MG tablet 3.125 mg by Per G Tube route 2 (two) times daily.     clonazePAM (KLONOPIN) 0.5 MG tablet 0.5 mg by Per G Tube route 3 (three) times daily.    diphenhydrAMINE (BENADRYL) 25 mg capsule 25 mg by Per G Tube route every morning. 4PM    diphenhydrAMINE (BENADRYL) 50 MG capsule 50 mg by Per G Tube route every evening.    doxepin (SINEQUAN) 75 MG capsule 75 mg by Per G Tube route 2 (two) times daily.    duloxetine (CYMBALTA) 60 MG capsule Take 60 mg by mouth every morning.     furosemide (LASIX) 20 MG tablet TAKE 1 TABLET(20 MG) BY MOUTH EVERY DAY (Patient taking differently: 20 mg by Per G Tube route once daily. )    hydrocodone-acetaminophen 10-325mg (NORCO)  mg Tab 1 tablet by Per G Tube route 3 (three) times daily.     hydrOXYzine HCl (ATARAX) 25 MG tablet 25 mg by Per G Tube route 4 (four) times daily.    LACTOSE-REDUCED FOOD/FIBER (ISOSOURCE 1.5 YURY ORAL) by Per G Tube route 5 (five) times daily.    lidocaine (LIDODERM) 5 % Place 1 patch onto the skin daily as needed (neck and both knees). Remove & Discard patch within 12 hours or as directed by MD    morphine 100 mg/5 mL (20 mg/mL) concentrated solution Take 0.25 mg by mouth every 2 (two) hours as needed for Pain.    morphine 30 mg TRer 30 mg by Per G Tube route every 6 (six) hours while awake.    ondansetron (ZOFRAN) 4 mg/5 mL solution 4 mg by Per G Tube route 4 (four) times daily.    polyethylene glycol (GLYCOLAX) 17 gram PwPk 17 g by Per G Tube route 2 (two) times daily. AM and NOON    potassium chloride (MICRO-K) 10 MEQ CpSR TAKE ONE CAPSULE BY MOUTH DAILY (Patient taking differently: TAKE ONE CAPSULE BY G TUBE DAILY)     pregabalin (LYRICA) 100 MG capsule 100 mg by Per G Tube route 3 (three) times daily.    senna (SENOKOT) 8.6 mg tablet 7 tablets by Per G Tube route 2 (two) times daily. AM and NOON     temazepam (RESTORIL) 15 mg Cap 15 mg by Per G Tube route every evening.       Review of patient's allergies indicates:   Allergen Reactions    Latex, natural rubber     Nsaids (non-steroidal anti-inflammatory drug)      Causes patient to go into afib per wife    Penicillins Other (See Comments)     Per wife- his mother stated he was allergic to it.  Had redness associated with cefepime 3/14/2018       Past Medical History:   Diagnosis Date    ALS (amyotrophic lateral sclerosis)     Atrial fibrillation     CHF (congestive heart failure)     Neuropathy      Past Surgical History:   Procedure Laterality Date    APPENDECTOMY      HERNIA REPAIR       Family History     None        Social History Main Topics    Smoking status: Never Smoker    Smokeless tobacco: Not on file    Alcohol use No    Drug use: No    Sexual activity: Yes     Partners: Female     Review of Systems   Unable to perform ROS: Patient nonverbal     Objective:     Vital Signs (Most Recent):  Temp: 99 °F (37.2 °C) (03/20/18 0701)  Pulse: 89 (03/20/18 1127)  Resp: 19 (03/20/18 1127)  BP: 121/77 (03/20/18 1000)  SpO2: 98 % (03/20/18 1127) Vital Signs (24h Range):  Temp:  [98.6 °F (37 °C)-99 °F (37.2 °C)] 99 °F (37.2 °C)  Pulse:  [] 89  Resp:  [15-22] 19  SpO2:  [96 %-100 %] 98 %  BP: ()/(50-93) 121/77     Weight: 103.7 kg (228 lb 9.9 oz)  Body mass index is 32.8 kg/m².    Intake/Output - Last 3 Shifts       03/18 0700 - 03/19 0659 03/19 0700 - 03/20 0659 03/20 0700 - 03/21 0659    I.V. (mL/kg) 742.1 (7.2) 219.7 (2.1) 39.5 (0.4)    NG/ 90 60    IV Piggyback 750 500     Total Intake(mL/kg) 2192.1 (21.1) 809.7 (7.8) 99.5 (1)    Urine (mL/kg/hr) 995 (0.4) 2150 (0.9) 100 (0.2)    Stool 0 (0) 0 (0)     Chest Tube 0 (0)      Total Output 995 2150  100    Net +1197.1 -1340.3 -0.6           Stool Occurrence 2 x 2 x           SpO2: 98 %  O2 Device (Oxygen Therapy): ventilator    Physical Exam   Constitutional: No distress.   HENT:   Head: Normocephalic and atraumatic.   Eyes: Conjunctivae are normal. No scleral icterus.   Neck: Normal range of motion.   Trach in place.   Cardiovascular: Normal rate, regular rhythm and intact distal pulses.  Exam reveals no friction rub.    Pulmonary/Chest: No respiratory distress. He has no wheezes.   Coarse breath sounds on vent.    Abdominal: Soft. He exhibits no distension. There is no tenderness. There is no guarding.   PEG tube in place, w/ some skin irritation surrounding tube    Genitourinary:   Genitourinary Comments: Michele in place   Musculoskeletal: He exhibits edema (+2 b/l LE edema, +1 edema hands b/l).   Neurological: He is alert.   Skin: Skin is warm. No rash noted. He is not diaphoretic.       Significant Labs:  ABGs: No results for input(s): PH, PCO2, PO2, HCO3, POCSATURATED, BE in the last 48 hours.  CBC:   Recent Labs  Lab 03/20/18  0240   WBC 7.86   RBC 3.23*   HGB 9.3*   HCT 28.5*      MCV 88   MCH 28.8   MCHC 32.6     CMP:   Recent Labs  Lab 03/20/18  0240   *   CALCIUM 9.7      K 3.8   CO2 27      BUN 14   CREATININE 0.4*     Coagulation: No results for input(s): PT, INR, APTT in the last 48 hours.    Significant Diagnostics:  CXR: I have reviewed all pertinent results/findings within the past 24 hours    VTE Risk Mitigation         Ordered     enoxaparin injection 40 mg  Daily     Route:  Subcutaneous        03/14/18 3293

## 2018-03-20 NOTE — NURSING
Pt went into Afib -160, BP remains stable. MD notified. Lopressor ordered and given. Will continue to monitor.

## 2018-03-20 NOTE — PLAN OF CARE
Problem: Patient Care Overview  Goal: Plan of Care Review  Outcome: Ongoing (interventions implemented as appropriate)  Pt remains AAOx4 and afebrile. Trach intact, minimal discharge from site, secretions remain red tinged. Air leak still noted on assessment, SpO2 >96 all shift.  HR remains in Afib after lopressor x3 doses, pt now on Esmolol gtt. BP stable throughout shift. All other VSS. UO 30-125cc/hr. 2 BM this shift. Pt remains on fentanyl gtt. POC reviewed with patient and father, all questions and concerns addressed. Will continue to monitor.

## 2018-03-21 LAB
ALBUMIN SERPL BCP-MCNC: 3.3 G/DL
ALLENS TEST: ABNORMAL
ANION GAP SERPL CALC-SCNC: 12 MMOL/L
ANION GAP SERPL CALC-SCNC: 9 MMOL/L
BACTERIA #/AREA URNS AUTO: ABNORMAL /HPF
BASOPHILS # BLD AUTO: 0.09 K/UL
BASOPHILS NFR BLD: 0.9 %
BILIRUB UR QL STRIP: NEGATIVE
BUN SERPL-MCNC: 20 MG/DL
BUN SERPL-MCNC: 20 MG/DL
CALCIUM SERPL-MCNC: 9.4 MG/DL
CALCIUM SERPL-MCNC: 9.6 MG/DL
CHLORIDE SERPL-SCNC: 104 MMOL/L
CHLORIDE SERPL-SCNC: 105 MMOL/L
CLARITY UR REFRACT.AUTO: ABNORMAL
CO2 SERPL-SCNC: 24 MMOL/L
CO2 SERPL-SCNC: 27 MMOL/L
COLOR UR AUTO: YELLOW
CREAT SERPL-MCNC: 0.4 MG/DL
CREAT SERPL-MCNC: 0.4 MG/DL
DELSYS: ABNORMAL
DIFFERENTIAL METHOD: ABNORMAL
EOSINOPHIL # BLD AUTO: 0.3 K/UL
EOSINOPHIL NFR BLD: 3.1 %
ERYTHROCYTE [DISTWIDTH] IN BLOOD BY AUTOMATED COUNT: 14.2 %
ERYTHROCYTE [SEDIMENTATION RATE] IN BLOOD BY WESTERGREN METHOD: 16 MM/H
EST. GFR  (AFRICAN AMERICAN): >60 ML/MIN/1.73 M^2
EST. GFR  (AFRICAN AMERICAN): >60 ML/MIN/1.73 M^2
EST. GFR  (NON AFRICAN AMERICAN): >60 ML/MIN/1.73 M^2
EST. GFR  (NON AFRICAN AMERICAN): >60 ML/MIN/1.73 M^2
FIO2: 21
GLUCOSE SERPL-MCNC: 118 MG/DL
GLUCOSE SERPL-MCNC: 124 MG/DL
GLUCOSE UR QL STRIP: NEGATIVE
HCO3 UR-SCNC: 29.6 MMOL/L (ref 24–28)
HCT VFR BLD AUTO: 28.1 %
HGB BLD-MCNC: 8.9 G/DL
HGB UR QL STRIP: ABNORMAL
HYALINE CASTS UR QL AUTO: 0 /LPF
IMM GRANULOCYTES # BLD AUTO: 0.35 K/UL
IMM GRANULOCYTES NFR BLD AUTO: 3.4 %
KETONES UR QL STRIP: NEGATIVE
LEUKOCYTE ESTERASE UR QL STRIP: ABNORMAL
LYMPHOCYTES # BLD AUTO: 2.2 K/UL
LYMPHOCYTES NFR BLD: 21.8 %
MAGNESIUM SERPL-MCNC: 2.1 MG/DL
MCH RBC QN AUTO: 28.7 PG
MCHC RBC AUTO-ENTMCNC: 31.7 G/DL
MCV RBC AUTO: 91 FL
MICROSCOPIC COMMENT: ABNORMAL
MODE: ABNORMAL
MONOCYTES # BLD AUTO: 0.9 K/UL
MONOCYTES NFR BLD: 8.9 %
NEUTROPHILS # BLD AUTO: 6.3 K/UL
NEUTROPHILS NFR BLD: 61.9 %
NITRITE UR QL STRIP: NEGATIVE
NON-SQ EPI CELLS #/AREA URNS AUTO: 1 /HPF
NRBC BLD-RTO: 0 /100 WBC
PCO2 BLDA: 41.3 MMHG (ref 35–45)
PEEP: 5
PH SMN: 7.46 [PH] (ref 7.35–7.45)
PH UR STRIP: 5 [PH] (ref 5–8)
PHOSPHATE SERPL-MCNC: 2.8 MG/DL
PHOSPHATE SERPL-MCNC: 3.8 MG/DL
PLATELET # BLD AUTO: 298 K/UL
PMV BLD AUTO: 10.2 FL
PO2 BLDA: 44 MMHG (ref 40–60)
POC BE: 6 MMOL/L
POC SATURATED O2: 82 % (ref 95–100)
POC TCO2: 31 MMOL/L (ref 24–29)
POCT GLUCOSE: 104 MG/DL (ref 70–110)
POCT GLUCOSE: 118 MG/DL (ref 70–110)
POCT GLUCOSE: 134 MG/DL (ref 70–110)
POCT GLUCOSE: 136 MG/DL (ref 70–110)
POTASSIUM SERPL-SCNC: 3.2 MMOL/L
POTASSIUM SERPL-SCNC: 3.9 MMOL/L
POTASSIUM SERPL-SCNC: 4.4 MMOL/L
PROT UR QL STRIP: ABNORMAL
RBC # BLD AUTO: 3.1 M/UL
RBC #/AREA URNS AUTO: >100 /HPF (ref 0–4)
SAMPLE: ABNORMAL
SITE: ABNORMAL
SODIUM SERPL-SCNC: 140 MMOL/L
SODIUM SERPL-SCNC: 141 MMOL/L
SP GR UR STRIP: 1.02 (ref 1–1.03)
URN SPEC COLLECT METH UR: ABNORMAL
UROBILINOGEN UR STRIP-ACNC: NEGATIVE EU/DL
WBC # BLD AUTO: 10.16 K/UL
WBC #/AREA URNS AUTO: 73 /HPF (ref 0–5)

## 2018-03-21 PROCEDURE — 25000003 PHARM REV CODE 250: Performed by: STUDENT IN AN ORGANIZED HEALTH CARE EDUCATION/TRAINING PROGRAM

## 2018-03-21 PROCEDURE — 99900035 HC TECH TIME PER 15 MIN (STAT)

## 2018-03-21 PROCEDURE — 80069 RENAL FUNCTION PANEL: CPT

## 2018-03-21 PROCEDURE — 94003 VENT MGMT INPAT SUBQ DAY: CPT

## 2018-03-21 PROCEDURE — 20000000 HC ICU ROOM

## 2018-03-21 PROCEDURE — 27200966 HC CLOSED SUCTION SYSTEM

## 2018-03-21 PROCEDURE — 80048 BASIC METABOLIC PNL TOTAL CA: CPT

## 2018-03-21 PROCEDURE — 27000221 HC OXYGEN, UP TO 24 HOURS

## 2018-03-21 PROCEDURE — 25000003 PHARM REV CODE 250: Performed by: INTERNAL MEDICINE

## 2018-03-21 PROCEDURE — 84132 ASSAY OF SERUM POTASSIUM: CPT

## 2018-03-21 PROCEDURE — 87086 URINE CULTURE/COLONY COUNT: CPT

## 2018-03-21 PROCEDURE — 81001 URINALYSIS AUTO W/SCOPE: CPT

## 2018-03-21 PROCEDURE — 87040 BLOOD CULTURE FOR BACTERIA: CPT | Mod: 59

## 2018-03-21 PROCEDURE — S0073 INJECTION, AZTREONAM, 500 MG: HCPCS | Performed by: STUDENT IN AN ORGANIZED HEALTH CARE EDUCATION/TRAINING PROGRAM

## 2018-03-21 PROCEDURE — 82803 BLOOD GASES ANY COMBINATION: CPT

## 2018-03-21 PROCEDURE — 99233 SBSQ HOSP IP/OBS HIGH 50: CPT | Mod: ,,, | Performed by: INTERNAL MEDICINE

## 2018-03-21 PROCEDURE — 84100 ASSAY OF PHOSPHORUS: CPT

## 2018-03-21 PROCEDURE — 83735 ASSAY OF MAGNESIUM: CPT

## 2018-03-21 PROCEDURE — 94761 N-INVAS EAR/PLS OXIMETRY MLT: CPT

## 2018-03-21 PROCEDURE — 63600175 PHARM REV CODE 636 W HCPCS: Performed by: STUDENT IN AN ORGANIZED HEALTH CARE EDUCATION/TRAINING PROGRAM

## 2018-03-21 PROCEDURE — 36415 COLL VENOUS BLD VENIPUNCTURE: CPT

## 2018-03-21 PROCEDURE — 99900026 HC AIRWAY MAINTENANCE (STAT)

## 2018-03-21 PROCEDURE — 85025 COMPLETE CBC W/AUTO DIFF WBC: CPT

## 2018-03-21 RX ORDER — AZTREONAM 1 G/1
2000 INJECTION, POWDER, LYOPHILIZED, FOR SOLUTION INTRAMUSCULAR; INTRAVENOUS
Status: DISCONTINUED | OUTPATIENT
Start: 2018-03-21 | End: 2018-03-23 | Stop reason: HOSPADM

## 2018-03-21 RX ORDER — POTASSIUM CHLORIDE 20 MEQ/15ML
40 SOLUTION ORAL ONCE
Status: DISCONTINUED | OUTPATIENT
Start: 2018-03-21 | End: 2018-03-21

## 2018-03-21 RX ADMIN — Medication 2500 MCG: at 04:03

## 2018-03-21 RX ADMIN — HYDROXYZINE HYDROCHLORIDE 25 MG: 25 TABLET, FILM COATED ORAL at 08:03

## 2018-03-21 RX ADMIN — HYDROXYZINE HYDROCHLORIDE 25 MG: 25 TABLET, FILM COATED ORAL at 12:03

## 2018-03-21 RX ADMIN — ACETAMINOPHEN 650 MG: 325 TABLET, FILM COATED ORAL at 11:03

## 2018-03-21 RX ADMIN — STANDARDIZED SENNA CONCENTRATE AND DOCUSATE SODIUM 2 TABLET: 8.6; 5 TABLET, FILM COATED ORAL at 08:03

## 2018-03-21 RX ADMIN — Medication 4 MG: at 05:03

## 2018-03-21 RX ADMIN — POTASSIUM CHLORIDE 40 MEQ: 20 SOLUTION ORAL at 04:03

## 2018-03-21 RX ADMIN — Medication 1250 MG: at 11:03

## 2018-03-21 RX ADMIN — PREGABALIN 100 MG: 50 CAPSULE ORAL at 04:03

## 2018-03-21 RX ADMIN — CARVEDILOL 6.25 MG: 6.25 TABLET, FILM COATED ORAL at 09:03

## 2018-03-21 RX ADMIN — CLONAZEPAM 0.5 MG: 0.5 TABLET ORAL at 08:03

## 2018-03-21 RX ADMIN — CLONAZEPAM 0.5 MG: 0.5 TABLET ORAL at 04:03

## 2018-03-21 RX ADMIN — DOXEPIN HYDROCHLORIDE 75 MG: 75 CAPSULE ORAL at 08:03

## 2018-03-21 RX ADMIN — FAMOTIDINE 20 MG: 20 TABLET, FILM COATED ORAL at 09:03

## 2018-03-21 RX ADMIN — POLYETHYLENE GLYCOL 3350 17 G: 17 POWDER, FOR SOLUTION ORAL at 09:03

## 2018-03-21 RX ADMIN — ENOXAPARIN SODIUM 40 MG: 100 INJECTION SUBCUTANEOUS at 04:03

## 2018-03-21 RX ADMIN — AZTREONAM 2000 MG: 1 INJECTION, POWDER, LYOPHILIZED, FOR SOLUTION INTRAMUSCULAR; INTRAVENOUS at 04:03

## 2018-03-21 RX ADMIN — LIDOCAINE 2 PATCH: 50 PATCH TOPICAL at 11:03

## 2018-03-21 RX ADMIN — Medication 4 MG: at 11:03

## 2018-03-21 RX ADMIN — HYDROXYZINE HYDROCHLORIDE 25 MG: 25 TABLET, FILM COATED ORAL at 04:03

## 2018-03-21 RX ADMIN — FAMOTIDINE 20 MG: 20 TABLET, FILM COATED ORAL at 08:03

## 2018-03-21 RX ADMIN — AZTREONAM 2000 MG: 1 INJECTION, POWDER, LYOPHILIZED, FOR SOLUTION INTRAMUSCULAR; INTRAVENOUS at 09:03

## 2018-03-21 RX ADMIN — FUROSEMIDE 40 MG: 40 TABLET ORAL at 08:03

## 2018-03-21 RX ADMIN — AZTREONAM 2000 MG: 1 INJECTION, POWDER, LYOPHILIZED, FOR SOLUTION INTRAMUSCULAR; INTRAVENOUS at 11:03

## 2018-03-21 RX ADMIN — Medication 0.3 MG: at 09:03

## 2018-03-21 RX ADMIN — PREGABALIN 100 MG: 50 CAPSULE ORAL at 08:03

## 2018-03-21 RX ADMIN — DIPHENHYDRAMINE HYDROCHLORIDE 50 MG: 25 SOLUTION ORAL at 09:03

## 2018-03-21 RX ADMIN — POLYETHYLENE GLYCOL 3350 17 G: 17 POWDER, FOR SOLUTION ORAL at 08:03

## 2018-03-21 RX ADMIN — CARVEDILOL 6.25 MG: 6.25 TABLET, FILM COATED ORAL at 08:03

## 2018-03-21 RX ADMIN — CHLORHEXIDINE GLUCONATE 15 ML: 1.2 RINSE ORAL at 08:03

## 2018-03-21 RX ADMIN — Medication 0.3 MG: at 04:03

## 2018-03-21 RX ADMIN — PREGABALIN 100 MG: 50 CAPSULE ORAL at 09:03

## 2018-03-21 RX ADMIN — CHLORHEXIDINE GLUCONATE 15 ML: 1.2 RINSE ORAL at 09:03

## 2018-03-21 RX ADMIN — Medication 0.3 MG: at 08:03

## 2018-03-21 RX ADMIN — POTASSIUM CHLORIDE 40 MEQ: 20 SOLUTION ORAL at 06:03

## 2018-03-21 RX ADMIN — DULOXETINE 60 MG: 30 CAPSULE, DELAYED RELEASE ORAL at 08:03

## 2018-03-21 RX ADMIN — ACETAMINOPHEN 650 MG: 325 TABLET, FILM COATED ORAL at 05:03

## 2018-03-21 NOTE — PLAN OF CARE
Problem: Patient Care Overview  Goal: Plan of Care Review  Outcome: Ongoing (interventions implemented as appropriate)  No acute events throughout shift. All VSS, see flowsheet for assessment. BP dropped a few times overnight to 80/50s. MD notified, MAP remained >60. Pt remains on fentanyl gtt. Trach site intact, minimal secretions. UO 10-20cc/hr. Tube feeds continued, tolerating well. POC reviewed with patient. Will continue to monitor.

## 2018-03-21 NOTE — ASSESSMENT & PLAN NOTE
TTE EF 68%, no DD  Renal function normal  Diuresing with lasix PO (latex allergy); initially bid, now daily  Net neg 8.9L since admission

## 2018-03-21 NOTE — PLAN OF CARE
Problem: Patient Care Overview  Goal: Plan of Care Review  Outcome: Ongoing (interventions implemented as appropriate)  No acute events this shift.  VS and assessment per flow sheets. Plan for LTAC. Social work consulted.

## 2018-03-21 NOTE — PROGRESS NOTES
[facility]  Critical Care Medicine  Progress Note    Patient Name: Bayron Delgado  MRN: [id number]  Admission Date: 3/14/2018  Hospital Length of Stay: 7 days  Code Status: Full Code  Attending Provider: Jo Ann Fierro MD  Primary Care Provider: Scott Puckett Jr, MD   Principal Problem: Acute respiratory failure with hypoxia    Subjective:     HPI:  48 y/o M w/ Afib, ALS (diagnosed 2014) who presented to [facility] s/p transfer from [facility] via EMS on BiPAP 2/2 worsening respiratory failure. Per brother patient had been on home hospice for progression of his ALS, however, on 3/12 brother states patient experienced an episode of hypoxia as pt became more tachypneic w/ cyanosis surrounding lips. Per brother patient had a fever around this time as well. This episode resolved by 3/13 and at this time hospice agency re-visited patient's decision to explore life sustaining measures such as tracheostomy. According to brother, patient decided he would undergo trach placement, he was transported by EMS to [facility] where he was noted to have L lower lobe consolidation on CXR. He was started on vanc/ aztreonam due to penicillin allergy.and was subsequently transferred to [facility]. Initially patient had refused tracheostomy after being evaluated by ENT (Dr. Horner) in 2016.   Per brother patient's wife was unhappy w/ patient's decision to proceed w/ trach placement.   Patient communicates mostly by shaking his head to answer yes/ no. He is dependent on his trilogy machine for ventilation.  History obtained from brother at bedside as hx difficult to obtain from patient (nonverbal secondary to ALS).    Hospital/ICU Course:  Bayron Delgado is a 47 y.o. male with CHF, A-fib, and ALS who presents to the ED via EMS on BiPAP with an onset of worsening respiratory failure on 03/14/2018. The EMS care team transferred the patient here from [place], [place] and were not allowed to transfer the patient to [facility]  as per instructed by their supervisor. Patient revoked hospice with worsening respiratory status 3/13.    CXR with LLL PNA, concern for aspiration in setting of patient's ALS. UA with enterococcus & pseudomonas. Respiratory cultures with GPR, gram negative diplococci, and gram positive rods. Starting vancomycin and aztreonam (started on vanc & aztreonam at OSH.) Presents with anasarca, normal TTE & renal function. Diurese with IV lasix with adequate urine output. On the morning of 03/15, moted to have issues of desaturation as low as 45%, needed Bag valve mask to return oxygenation to 100%.  He had course breath sounds throughout.  Due to his inability to clear secretions, it was decided to emergently intubate. Afterwards, imaging revealed a pneumothorax on the right chest.  A small bore chest tube was placed on 3/15. Chest tube clamped 3/17.  03/18/2018 Run of afib w/ RVR overnight requiring x2 labetalol and amiodarone for termination. Now in 80s. Trach planned for tomorrow per ENT. Cough assist adjusted to Q6. CXR shows resolution of PTX. Will pull chest tube today. Some persistant peripheral edema noted, will continue daily lasix and reassess in the PM.   03/19/2018 Trach planned for today. Continuing abx.   03/20/2018 POD 1 Trach placement. No fistula visualized during trach placement. Consult placed to CTS for esophogram vs EGD for eval of possible tracheoesophageal fistula.  03/21/2018 POD#2 trach placement, doing well and does not have any complaints      Interval History/Significant Events: NAEON. Pr does not have any complaints    Review of Systems   Unable to perform ROS: Intubated     Objective:     Vital Signs (Most Recent):  Temp: 98.5 °F (36.9 °C) (03/20/18 1904)  Pulse: 107 (03/21/18 0730)  Resp: 16 (03/21/18 0730)  BP: (!) 94/56 (03/21/18 0600)  SpO2: 99 % (03/21/18 0730) Vital Signs (24h Range):  Temp:  [98.5 °F (36.9 °C)-99.3 °F (37.4 °C)] 98.5 °F (36.9 °C)  Pulse:  [] 107  Resp:  [16-21]  16  SpO2:  [93 %-100 %] 99 %  BP: ()/(50-77) 94/56   Weight: 103.7 kg (228 lb 9.9 oz)  Body mass index is 32.8 kg/m².      Intake/Output Summary (Last 24 hours) at 03/21/18 0822  Last data filed at 03/21/18 0600   Gross per 24 hour   Intake          1337.92 ml   Output              817 ml   Net           520.92 ml       Physical Exam   Constitutional: No distress.   HENT:   Head: Normocephalic and atraumatic.   Eyes: Right eye exhibits no discharge. Left eye exhibits no discharge.   Cardiovascular: Normal rate and regular rhythm.  Exam reveals no friction rub.    No murmur heard.  Pulmonary/Chest: Effort normal. He has no wheezes. He has rales.   S/p trach and on vent   Abdominal: Soft. He exhibits no distension. There is no tenderness. There is no guarding.   Musculoskeletal: He exhibits edema.   Neurological: He is alert.   Skin: He is not diaphoretic.       Vents:  Vent Mode: A/C (03/21/18 0730)  Ventilator Initiated: Yes (03/15/18 1050)  Set Rate: 16 bmp (03/21/18 0730)  Vt Set: 0 mL (03/19/18 2314)  PEEP/CPAP: 10 cmH20 (03/21/18 0730)  Oxygen Concentration (%): 21 (03/21/18 0730)  Peak Airway Pressure: 23 cmH2O (03/21/18 0730)  Plateau Pressure: 24 cmH20 (03/15/18 1903)  Total Ve: 7.3 mL (03/21/18 0730)  F/VT Ratio<105 (RSBI): (!) 34.63 (03/21/18 0730)  Lines/Drains/Airways     Drain                 Gastrostomy/Enterostomy 01/23/17 1113 Percutaneous endoscopic gastrostomy (PEG) LUQ feeding 421 days         Urethral Catheter 03/13/18 1441 Non-latex 20 Fr. 7 days          Airway                 Surgical Airway 03/19/18 1355 Shiley Cuffed 1 day          Peripheral Intravenous Line                 Peripheral IV - Single Lumen 03/14/18 0300 Right Antecubital 7 days         Midline Catheter Insertion/Assessment  - Single Lumen 03/14/18 1545 Right cephalic vein (lateral side of arm) 18g x 10cm 6 days              Significant Labs:    CBC/Anemia Profile:    Recent Labs  Lab 03/20/18  0240 03/21/18  0351   WBC  7.86 10.16   HGB 9.3* 8.9*   HCT 28.5* 28.1*    298   MCV 88 91   RDW 13.4 14.2        Chemistries:    Recent Labs  Lab 03/20/18  0240 03/21/18  0351    140   K 3.8 3.2*    104   CO2 27 24   BUN 14 20   CREATININE 0.4* 0.4*   CALCIUM 9.7 9.6   MG 2.0 2.1   PHOS 3.1 3.8       Recent Lab Results       03/21/18  0626 03/21/18  0351 03/21/18  0021 03/20/18  1746      Immature Granulocytes  3.4(H)       Immature Grans (Abs)  0.35  Comment:  Mild elevation in immature granulocytes is non specific and   can be seen in a variety of conditions including stress response,   acute inflammation, trauma and pregnancy. Correlation with other   laboratory and clinical findings is essential.  (H)       Anion Gap  12       Baso #  0.09       Basophil%  0.9       BUN, Bld  20       Calcium  9.6       Chloride  104       CO2  24       Creatinine  0.4(L)       Differential Method  Automated       eGFR if   >60.0       eGFR if non   >60.0  Comment:  Calculation used to obtain the estimated glomerular filtration  rate (eGFR) is the CKD-EPI equation.          Eos #  0.3       Eosinophil%  3.1       Glucose  118(H)       Gran # (ANC)  6.3       Gran%  61.9       Hematocrit  28.1(L)       Hemoglobin  8.9(L)       Lymph #  2.2       Lymph%  21.8       Magnesium  2.1       MCH  28.7       MCHC  31.7(L)       MCV  91       Mono #  0.9       Mono%  8.9       MPV  10.2       nRBC  0       Phosphorus  3.8       Platelets  298       POCT Glucose 104  134(H) 115(H)     Potassium  3.2(L)       RBC  3.10(L)       RDW  14.2       Sodium  140       WBC  10.16             Significant Imaging:  I have reviewed all pertinent imaging results/findings within the past 24 hours.    Assessment/Plan:     Neuro   ALS (amyotrophic lateral sclerosis)    Currently on vent post trach  C/w home trilogy machine on DC  Cough assist device q6  c/w scopolamine patch and glycopyrrolate for secretions        Psychiatric    Anxiety    C/w home clonazepam for anxiety        Depression    C/w home cymbalta  C/w home clonazepam for anxiety  C/w home doxepin        Pulmonary   * Acute respiratory failure with hypoxia    -CXR with L lower lobe asp PNA  -3/12 start vanc & aztreonam (penicillin allergy) Day 8 of 15 (also received 2 days of cefepime)  - 3/13 Sputum cultures with pansensitive Pseudomonas aeruginosa, ciprofloxacin resistant Proteus mirabilis  -3/13 Urine culture tetracycline resistant Enterococcus faecalis, ciprofloxacin resistant Pseudomonas aeruginosa  -Con't vanc trough  -procalcitonin, lactate wnl, blood cx neg  -3/19 ENT trach placement (patient has revoked wish for hospice) (now medically stable.)  -PTX resolved on CXR. chest tube pulled. Still with signs of pulmonary edema, will continue lasix.         Cardiac/Vascular   Atrial fibrillation    [id number] 2. Not on any anti-coagulation. Rate controlled with coreg 3.125 BID.   With a run of afib rvr on 3/20, given lopressor x3 then esmolol gtt, now converted. Off of esmolol  - Increase coreg to 6.25 BID        Renal/   Acute cystitis without hematuria    -UA with enterococcus & pseudomonas  -C/w chronic munoz, changed on 3/13 at OSH  -Patient already on antibiotics as above        Other   Anasarca    TTE EF 68%, no DD  Renal function normal  Diuresing with lasix PO (latex allergy); initially bid, now daily  Net neg 8.9L since admission           Critical Care Daily Checklist:    A: Awake: RASS Goal/Actual Goal: RASS Goal: 0-->alert and calm  Actual: Paige Agitation Sedation Scale (RASS): Alert and calm   B: Spontaneous Breathing Trial Performed?     C: SAT & SBT Coordinated?  AN                      D: Delirium: CAM-ICU Overall CAM-ICU: Negative   E: Early Mobility Performed? Yes   F: Feeding Goal: Goals: Meet % EEN, EPN  Status: Nutrition Goal Status: goal not met   Current Diet Order   Procedures    Diet NPO      AS: Analgesia/Sedation NA   T: Thromboembolic  Prophylaxis enoxaparin   H: HOB > 300 Yes   U: Stress Ulcer Prophylaxis (if needed) famotidine   G: Glucose Control wnl   B: Bowel Function Stool Occurrence: 1   I: Indwelling Catheter (Lines & Michele) Necessity trach   D: De-escalation of Antimicrobials/Pharmacotherapies vanc and aztreonam    Plan for the day/ETD Continue to monitor    Code Status:  Family/Goals of Care: Full Code              Laura Roberts MD  Critical Care Medicine  [facility]

## 2018-03-21 NOTE — ASSESSMENT & PLAN NOTE
Currently on vent post trach  C/w home trilogy machine on DC  Cough assist device q6  c/w scopolamine patch and glycopyrrolate for secretions

## 2018-03-21 NOTE — PLAN OF CARE
LEO spoke w/wife (Perlita) who states that they aren't any LTAC in [place] that is willing to take pt on Ventilators.  Wife has given consent for LEO to refer pt to [facility] for LTAC services.      LEO has notified [facility] of referral.    Referral send through [facility] fax system.    Keely Pelaez LMSW  Ext. [phone]

## 2018-03-21 NOTE — OP NOTE
DATE OF PROCEDURE:  03/19/2018    PREOPERATIVE DIAGNOSES:  1.  Respiratory failure.  2.  ALS.    POSTOPERATIVE DIAGNOSES:  1.  Respiratory failure.  2.  ALS.  3.  Persistent air leak after tracheostomy.    PROCEDURES PERFORMED:  1.  Tracheostomy.  2.  Direct laryngoscopy with use of operating telescope.  3.  Rigid esophagoscopy.  4.  Flexible tracheoscopy.    SURGEON:  Daniel Horner M.D.    ASSISTANT:  Faisal Yee M.D. (RES)    ANESTHESIA:  General endotracheal.    ESTIMATED BLOOD LOSS:  Minimal.    SPECIMENS:  None.    INDICATIONS FOR PROCEDURE:  Mr. Delgado is a 47-year-old gentleman with history   of ALS.  Unfortunately, he has experienced respiratory failure and required   mechanical ventilation.  Consult was called to the Otolaryngology Service for   placement of tracheostomy given his overarching condition.  He was noted on the   day before surgery to have an intermittent air leak despite endotracheal   intubation.  There was no apparent explanation for this air leak.  His family   was apprised of the risks, benefits and alternatives to tracheostomy placement.    In spite of the risks inherent to surgery, they provided informed consent on   his behalf.    PROCEDURE IN DETAIL:  The patient was taken to the Operating Room and placed on   the operating table in supine position.  General endotracheal anesthesia was   induced by the Anesthesia Team.  The neck was addressed.  The relevant midline   landmarks including the sternal notch, cricoid and thyroid cartilages were   marked.  An approximately 3 cm horizontal incision was marked at the level of   the cricoid cartilage.  The intended incision site was injected with several mL   of 1% lidocaine with epinephrine.  The neck was then prepped and draped in the   standard sterile fashion.    To begin, the Bovie on cutting current was utilized to incise the skin with   sharp dissection proceeding through the underlying subcutaneous tissue and   platysmal  muscle.  The midline cervical raphae was then identified and divided   with the Bovie and the strap musculature was bluntly dissected away from the   underlying trachea and retracted laterally with [employer] retractors.  The   thyroid isthmus was then identified.  Just above the cricoid cartilage, the soft   tissue just superior to the thyroid isthmus was divided and the thyroid isthmus   was bluntly dissected away from the underlying trachea and divided with the   Bovie.  The trachea was then retracted superiorly with a single hook and the   trachea was entered between the first and second tracheal rings.  An inferiorly   based Mj flap was fashioned by making downward cuts on either side of the   tracheostomy.  This was secured to the skin with a single 3-0 Vicryl suture.    The endotracheal tube was then withdrawn by Anesthesia and the tracheostomy was   placed into the airway.  The cuff was inflated and was attached to the   anesthesia circuit.  Placement of the airway was confirmed with return of end   tidal CO2.  The tracheostomy was then secured at the four corners with silk   suture and a trach tie was placed.  As the patient was given positive pressure   ventilation, it was noted to be a marked air leak.  The cuff of the endotracheal   tube was then inflated, but the air leak persisted.  The cuff was noted to be   intact and the air leak appeared to be independent of the level of inflation of   the cuff.  At this time, our concern was raised for the possibility of an   abnormal connections such as tracheoesophageal fistula.  Thus, although we did   not have specific consent for these procedures, they were deemed indicated given   the possibility of tracheoesophageal fistula.  Thus, a flexible tracheostomy,   direct laryngoscopy and rigid esophagoscopy were carried out.  This was   accomplished utilizing a flexible laryngoscope, Dedo laryngoscope with operating   telescope and Jesberg esophagoscope  respectively.  All findings were normal.    There was no evidence of a tracheoesophageal fistula.  At this time, the patient   was handed back to Anesthesia and transported back to the ICU in satisfactory   condition.  We determined the next step would be to perform CT imaging of the   neck and chest and also to consult Cardiothoracic Surgery in order to rule out a   tracheoesophageal fistula, possibly by performing an EGD.    There were no intraoperative complications.  I was present and participated in   the entire procedure.      CPH/BRIAN  dd: 03/20/2018 18:10:32 (CDT)  td: 03/20/2018 23:52:05 (CDT)  Doc ID   #[id number]  Job ID #[id number]    CC:

## 2018-03-21 NOTE — SUBJECTIVE & OBJECTIVE
Interval History/Significant Events: NAEON. Pr does not have any complaints    Review of Systems   Unable to perform ROS: Intubated     Objective:     Vital Signs (Most Recent):  Temp: 98.5 °F (36.9 °C) (03/20/18 1904)  Pulse: 107 (03/21/18 0730)  Resp: 16 (03/21/18 0730)  BP: (!) 94/56 (03/21/18 0600)  SpO2: 99 % (03/21/18 0730) Vital Signs (24h Range):  Temp:  [98.5 °F (36.9 °C)-99.3 °F (37.4 °C)] 98.5 °F (36.9 °C)  Pulse:  [] 107  Resp:  [16-21] 16  SpO2:  [93 %-100 %] 99 %  BP: ()/(50-77) 94/56   Weight: 103.7 kg (228 lb 9.9 oz)  Body mass index is 32.8 kg/m².      Intake/Output Summary (Last 24 hours) at 03/21/18 0822  Last data filed at 03/21/18 0600   Gross per 24 hour   Intake          1337.92 ml   Output              817 ml   Net           520.92 ml       Physical Exam   Constitutional: No distress.   HENT:   Head: Normocephalic and atraumatic.   Eyes: Right eye exhibits no discharge. Left eye exhibits no discharge.   Cardiovascular: Normal rate and regular rhythm.  Exam reveals no friction rub.    No murmur heard.  Pulmonary/Chest: Effort normal. He has no wheezes. He has rales.   S/p trach and on vent   Abdominal: Soft. He exhibits no distension. There is no tenderness. There is no guarding.   Musculoskeletal: He exhibits edema.   Neurological: He is alert.   Skin: He is not diaphoretic.       Vents:  Vent Mode: A/C (03/21/18 0730)  Ventilator Initiated: Yes (03/15/18 1050)  Set Rate: 16 bmp (03/21/18 0730)  Vt Set: 0 mL (03/19/18 2314)  PEEP/CPAP: 10 cmH20 (03/21/18 0730)  Oxygen Concentration (%): 21 (03/21/18 0730)  Peak Airway Pressure: 23 cmH2O (03/21/18 0730)  Plateau Pressure: 24 cmH20 (03/15/18 1903)  Total Ve: 7.3 mL (03/21/18 0730)  F/VT Ratio<105 (RSBI): (!) 34.63 (03/21/18 0730)  Lines/Drains/Airways     Drain                 Gastrostomy/Enterostomy 01/23/17 1113 Percutaneous endoscopic gastrostomy (PEG) LUQ feeding 421 days         Urethral Catheter 03/13/18 1441 Non-latex 20 Fr.  7 days          Airway                 Surgical Airway 03/19/18 1355 Shiley Cuffed 1 day          Peripheral Intravenous Line                 Peripheral IV - Single Lumen 03/14/18 0300 Right Antecubital 7 days         Midline Catheter Insertion/Assessment  - Single Lumen 03/14/18 1545 Right cephalic vein (lateral side of arm) 18g x 10cm 6 days              Significant Labs:    CBC/Anemia Profile:    Recent Labs  Lab 03/20/18  0240 03/21/18  0351   WBC 7.86 10.16   HGB 9.3* 8.9*   HCT 28.5* 28.1*    298   MCV 88 91   RDW 13.4 14.2        Chemistries:    Recent Labs  Lab 03/20/18  0240 03/21/18  0351    140   K 3.8 3.2*    104   CO2 27 24   BUN 14 20   CREATININE 0.4* 0.4*   CALCIUM 9.7 9.6   MG 2.0 2.1   PHOS 3.1 3.8       Recent Lab Results       03/21/18  0626 03/21/18  0351 03/21/18  0021 03/20/18  1746      Immature Granulocytes  3.4(H)       Immature Grans (Abs)  0.35  Comment:  Mild elevation in immature granulocytes is non specific and   can be seen in a variety of conditions including stress response,   acute inflammation, trauma and pregnancy. Correlation with other   laboratory and clinical findings is essential.  (H)       Anion Gap  12       Baso #  0.09       Basophil%  0.9       BUN, Bld  20       Calcium  9.6       Chloride  104       CO2  24       Creatinine  0.4(L)       Differential Method  Automated       eGFR if   >60.0       eGFR if non   >60.0  Comment:  Calculation used to obtain the estimated glomerular filtration  rate (eGFR) is the CKD-EPI equation.          Eos #  0.3       Eosinophil%  3.1       Glucose  118(H)       Gran # (ANC)  6.3       Gran%  61.9       Hematocrit  28.1(L)       Hemoglobin  8.9(L)       Lymph #  2.2       Lymph%  21.8       Magnesium  2.1       MCH  28.7       MCHC  31.7(L)       MCV  91       Mono #  0.9       Mono%  8.9       MPV  10.2       nRBC  0       Phosphorus  3.8       Platelets  298       POCT Glucose 104   134(H) 115(H)     Potassium  3.2(L)       RBC  3.10(L)       RDW  14.2       Sodium  140       WBC  10.16             Significant Imaging:  I have reviewed all pertinent imaging results/findings within the past 24 hours.

## 2018-03-21 NOTE — ASSESSMENT & PLAN NOTE
[id number] 2. Not on any anti-coagulation. Rate controlled with coreg 3.125 BID.   With a run of afib rvr on 3/20, given lopressor x3 then esmolol gtt, now converted. Off of esmolol  - Increase coreg to 6.25 BID

## 2018-03-22 VITALS
HEIGHT: 70 IN | DIASTOLIC BLOOD PRESSURE: 75 MMHG | SYSTOLIC BLOOD PRESSURE: 107 MMHG | RESPIRATION RATE: 17 BRPM | OXYGEN SATURATION: 100 % | BODY MASS INDEX: 32.73 KG/M2 | HEART RATE: 104 BPM | TEMPERATURE: 99 F | WEIGHT: 228.63 LBS

## 2018-03-22 LAB
ANION GAP SERPL CALC-SCNC: 8 MMOL/L
BACTERIA UR CULT: NO GROWTH
BASOPHILS # BLD AUTO: 0.08 K/UL
BASOPHILS NFR BLD: 0.8 %
BUN SERPL-MCNC: 16 MG/DL
CALCIUM SERPL-MCNC: 9.1 MG/DL
CHLORIDE SERPL-SCNC: 107 MMOL/L
CO2 SERPL-SCNC: 26 MMOL/L
CREAT SERPL-MCNC: 0.4 MG/DL
DIFFERENTIAL METHOD: ABNORMAL
EOSINOPHIL # BLD AUTO: 0.3 K/UL
EOSINOPHIL NFR BLD: 2.7 %
ERYTHROCYTE [DISTWIDTH] IN BLOOD BY AUTOMATED COUNT: 14.2 %
EST. GFR  (AFRICAN AMERICAN): >60 ML/MIN/1.73 M^2
EST. GFR  (NON AFRICAN AMERICAN): >60 ML/MIN/1.73 M^2
GLUCOSE SERPL-MCNC: 190 MG/DL
HCT VFR BLD AUTO: 28.5 %
HGB BLD-MCNC: 9 G/DL
IMM GRANULOCYTES # BLD AUTO: 0.32 K/UL
IMM GRANULOCYTES NFR BLD AUTO: 3 %
LYMPHOCYTES # BLD AUTO: 1.5 K/UL
LYMPHOCYTES NFR BLD: 14.1 %
MAGNESIUM SERPL-MCNC: 2.1 MG/DL
MCH RBC QN AUTO: 28.5 PG
MCHC RBC AUTO-ENTMCNC: 31.6 G/DL
MCV RBC AUTO: 90 FL
MONOCYTES # BLD AUTO: 0.7 K/UL
MONOCYTES NFR BLD: 6.6 %
NEUTROPHILS # BLD AUTO: 7.8 K/UL
NEUTROPHILS NFR BLD: 72.8 %
NRBC BLD-RTO: 0 /100 WBC
PHOSPHATE SERPL-MCNC: 2.6 MG/DL
PLATELET # BLD AUTO: 365 K/UL
PMV BLD AUTO: 10.5 FL
POCT GLUCOSE: 160 MG/DL (ref 70–110)
POCT GLUCOSE: 168 MG/DL (ref 70–110)
POCT GLUCOSE: 185 MG/DL (ref 70–110)
POCT GLUCOSE: 223 MG/DL (ref 70–110)
POTASSIUM SERPL-SCNC: 4.3 MMOL/L
RBC # BLD AUTO: 3.16 M/UL
SODIUM SERPL-SCNC: 141 MMOL/L
VANCOMYCIN TROUGH SERPL-MCNC: 12.5 UG/ML
WBC # BLD AUTO: 10.65 K/UL

## 2018-03-22 PROCEDURE — 25000003 PHARM REV CODE 250: Performed by: STUDENT IN AN ORGANIZED HEALTH CARE EDUCATION/TRAINING PROGRAM

## 2018-03-22 PROCEDURE — 20000000 HC ICU ROOM

## 2018-03-22 PROCEDURE — 80202 ASSAY OF VANCOMYCIN: CPT

## 2018-03-22 PROCEDURE — 99900035 HC TECH TIME PER 15 MIN (STAT)

## 2018-03-22 PROCEDURE — 94761 N-INVAS EAR/PLS OXIMETRY MLT: CPT

## 2018-03-22 PROCEDURE — 63600175 PHARM REV CODE 636 W HCPCS: Performed by: STUDENT IN AN ORGANIZED HEALTH CARE EDUCATION/TRAINING PROGRAM

## 2018-03-22 PROCEDURE — 99900026 HC AIRWAY MAINTENANCE (STAT)

## 2018-03-22 PROCEDURE — S0073 INJECTION, AZTREONAM, 500 MG: HCPCS | Performed by: STUDENT IN AN ORGANIZED HEALTH CARE EDUCATION/TRAINING PROGRAM

## 2018-03-22 PROCEDURE — 25000003 PHARM REV CODE 250: Performed by: INTERNAL MEDICINE

## 2018-03-22 PROCEDURE — 84100 ASSAY OF PHOSPHORUS: CPT

## 2018-03-22 PROCEDURE — 85025 COMPLETE CBC W/AUTO DIFF WBC: CPT

## 2018-03-22 PROCEDURE — 80048 BASIC METABOLIC PNL TOTAL CA: CPT

## 2018-03-22 PROCEDURE — 99233 SBSQ HOSP IP/OBS HIGH 50: CPT | Mod: ,,, | Performed by: INTERNAL MEDICINE

## 2018-03-22 PROCEDURE — 94003 VENT MGMT INPAT SUBQ DAY: CPT

## 2018-03-22 PROCEDURE — 27000221 HC OXYGEN, UP TO 24 HOURS

## 2018-03-22 PROCEDURE — 83735 ASSAY OF MAGNESIUM: CPT

## 2018-03-22 RX ORDER — AZTREONAM 1 G/1
2000 INJECTION, POWDER, LYOPHILIZED, FOR SOLUTION INTRAMUSCULAR; INTRAVENOUS EVERY 8 HOURS
Qty: 36 G | Refills: 0 | Status: SHIPPED | OUTPATIENT
Start: 2018-03-22 | End: 2018-03-28

## 2018-03-22 RX ORDER — ONDANSETRON HYDROCHLORIDE 4 MG/5ML
4 SOLUTION ORAL EVERY 6 HOURS
Qty: 1 ML | Refills: 0 | Status: SHIPPED | OUTPATIENT
Start: 2018-03-22 | End: 2019-11-06

## 2018-03-22 RX ORDER — LANOLIN ALCOHOL/MO/W.PET/CERES
800 CREAM (GRAM) TOPICAL
Refills: 0 | COMMUNITY
Start: 2018-03-22 | End: 2018-06-20

## 2018-03-22 RX ORDER — DIPHENHYDRAMINE HCL 12.5MG/5ML
25 ELIXIR ORAL EVERY 6 HOURS PRN
Qty: 1 ML | Refills: 0 | Status: SHIPPED | OUTPATIENT
Start: 2018-03-22 | End: 2018-06-20

## 2018-03-22 RX ORDER — FAMOTIDINE 20 MG/1
20 TABLET, FILM COATED ORAL 2 TIMES DAILY
Qty: 60 TABLET | Refills: 11 | Status: SHIPPED | OUTPATIENT
Start: 2018-03-22 | End: 2019-11-21 | Stop reason: SDUPTHER

## 2018-03-22 RX ORDER — ENOXAPARIN SODIUM 100 MG/ML
40 INJECTION SUBCUTANEOUS EVERY 12 HOURS
Qty: 1 ML | Refills: 1 | Status: SHIPPED | OUTPATIENT
Start: 2018-03-22 | End: 2018-06-20

## 2018-03-22 RX ORDER — DULOXETIN HYDROCHLORIDE 60 MG/1
60 CAPSULE, DELAYED RELEASE ORAL DAILY
Qty: 30 CAPSULE | Refills: 11 | Status: SHIPPED | OUTPATIENT
Start: 2018-03-23 | End: 2019-12-12 | Stop reason: SDUPTHER

## 2018-03-22 RX ORDER — PREGABALIN 100 MG/1
100 CAPSULE ORAL 3 TIMES DAILY
Qty: 90 CAPSULE | Refills: 6 | Status: SHIPPED | OUTPATIENT
Start: 2018-03-22 | End: 2020-03-06 | Stop reason: SDUPTHER

## 2018-03-22 RX ORDER — POLYETHYLENE GLYCOL 3350 17 G/17G
17 POWDER, FOR SOLUTION ORAL 2 TIMES DAILY
Qty: 1 EACH | Refills: 0 | Status: SHIPPED | OUTPATIENT
Start: 2018-03-22 | End: 2018-06-20

## 2018-03-22 RX ORDER — HYDROXYZINE HYDROCHLORIDE 25 MG/1
25 TABLET, FILM COATED ORAL 4 TIMES DAILY
Qty: 1 TABLET | Refills: 0 | Status: SHIPPED | OUTPATIENT
Start: 2018-03-22 | End: 2020-01-06 | Stop reason: SDUPTHER

## 2018-03-22 RX ORDER — CARVEDILOL 6.25 MG/1
6.25 TABLET ORAL 2 TIMES DAILY
Qty: 60 TABLET | Refills: 11 | Status: SHIPPED | OUTPATIENT
Start: 2018-03-22 | End: 2022-08-26 | Stop reason: SDUPTHER

## 2018-03-22 RX ORDER — CLONAZEPAM 0.5 MG/1
0.5 TABLET ORAL 3 TIMES DAILY
Qty: 90 TABLET | Refills: 3 | Status: SHIPPED | OUTPATIENT
Start: 2018-03-22 | End: 2019-11-21 | Stop reason: SDUPTHER

## 2018-03-22 RX ORDER — SCOLOPAMINE TRANSDERMAL SYSTEM 1 MG/1
1 PATCH, EXTENDED RELEASE TRANSDERMAL
Qty: 1 PATCH | Refills: 0 | Status: SHIPPED | OUTPATIENT
Start: 2018-03-23 | End: 2021-06-07

## 2018-03-22 RX ORDER — POTASSIUM CHLORIDE 20 MEQ/15ML
40 SOLUTION ORAL
Qty: 1 ML | Refills: 0 | Status: SHIPPED | OUTPATIENT
Start: 2018-03-22 | End: 2018-06-20

## 2018-03-22 RX ORDER — ACETAMINOPHEN 325 MG/1
650 TABLET ORAL EVERY 6 HOURS PRN
Refills: 0 | COMMUNITY
Start: 2018-03-22 | End: 2018-06-20

## 2018-03-22 RX ORDER — DOXEPIN HYDROCHLORIDE 75 MG/1
75 CAPSULE ORAL 2 TIMES DAILY
Qty: 60 CAPSULE | Refills: 11 | Status: SHIPPED | OUTPATIENT
Start: 2018-03-22 | End: 2019-11-06

## 2018-03-22 RX ORDER — LIDOCAINE 50 MG/G
3 PATCH TOPICAL DAILY
Qty: 1 PATCH | Refills: 0 | Status: SHIPPED | OUTPATIENT
Start: 2018-03-22 | End: 2019-11-06 | Stop reason: SDUPTHER

## 2018-03-22 RX ORDER — AMOXICILLIN 250 MG
2 CAPSULE ORAL 2 TIMES DAILY
COMMUNITY
Start: 2018-03-22 | End: 2018-06-20

## 2018-03-22 RX ORDER — FUROSEMIDE 40 MG/1
40 TABLET ORAL DAILY
Qty: 30 TABLET | Refills: 11 | Status: SHIPPED | OUTPATIENT
Start: 2018-03-23 | End: 2019-03-23

## 2018-03-22 RX ADMIN — FUROSEMIDE 40 MG: 40 TABLET ORAL at 09:03

## 2018-03-22 RX ADMIN — VANCOMYCIN HYDROCHLORIDE 1500 MG: 1 INJECTION, POWDER, LYOPHILIZED, FOR SOLUTION INTRAVENOUS at 05:03

## 2018-03-22 RX ADMIN — DULOXETINE 60 MG: 30 CAPSULE, DELAYED RELEASE ORAL at 09:03

## 2018-03-22 RX ADMIN — Medication 0.3 MG: at 09:03

## 2018-03-22 RX ADMIN — HYDROXYZINE HYDROCHLORIDE 25 MG: 25 TABLET, FILM COATED ORAL at 08:03

## 2018-03-22 RX ADMIN — Medication 4 MG: at 12:03

## 2018-03-22 RX ADMIN — CARVEDILOL 6.25 MG: 6.25 TABLET, FILM COATED ORAL at 09:03

## 2018-03-22 RX ADMIN — HYDROXYZINE HYDROCHLORIDE 25 MG: 25 TABLET, FILM COATED ORAL at 05:03

## 2018-03-22 RX ADMIN — DIPHENHYDRAMINE HYDROCHLORIDE 50 MG: 25 SOLUTION ORAL at 08:03

## 2018-03-22 RX ADMIN — CLONAZEPAM 0.5 MG: 0.5 TABLET ORAL at 09:03

## 2018-03-22 RX ADMIN — FAMOTIDINE 20 MG: 20 TABLET, FILM COATED ORAL at 08:03

## 2018-03-22 RX ADMIN — Medication 4 MG: at 05:03

## 2018-03-22 RX ADMIN — CLONAZEPAM 0.5 MG: 0.5 TABLET ORAL at 08:03

## 2018-03-22 RX ADMIN — FAMOTIDINE 20 MG: 20 TABLET, FILM COATED ORAL at 09:03

## 2018-03-22 RX ADMIN — Medication 0.3 MG: at 08:03

## 2018-03-22 RX ADMIN — AZTREONAM 2000 MG: 1 INJECTION, POWDER, LYOPHILIZED, FOR SOLUTION INTRAMUSCULAR; INTRAVENOUS at 08:03

## 2018-03-22 RX ADMIN — LIDOCAINE 3 PATCH: 50 PATCH TOPICAL at 09:03

## 2018-03-22 RX ADMIN — AZTREONAM 2000 MG: 1 INJECTION, POWDER, LYOPHILIZED, FOR SOLUTION INTRAMUSCULAR; INTRAVENOUS at 12:03

## 2018-03-22 RX ADMIN — PREGABALIN 100 MG: 50 CAPSULE ORAL at 08:03

## 2018-03-22 RX ADMIN — PREGABALIN 100 MG: 50 CAPSULE ORAL at 09:03

## 2018-03-22 RX ADMIN — Medication 4 MG: at 11:03

## 2018-03-22 RX ADMIN — HYDROXYZINE HYDROCHLORIDE 25 MG: 25 TABLET, FILM COATED ORAL at 09:03

## 2018-03-22 RX ADMIN — AZTREONAM 2000 MG: 1 INJECTION, POWDER, LYOPHILIZED, FOR SOLUTION INTRAMUSCULAR; INTRAVENOUS at 05:03

## 2018-03-22 RX ADMIN — CLONAZEPAM 0.5 MG: 0.5 TABLET ORAL at 03:03

## 2018-03-22 RX ADMIN — Medication 1250 MG: at 10:03

## 2018-03-22 RX ADMIN — DOXEPIN HYDROCHLORIDE 75 MG: 75 CAPSULE ORAL at 09:03

## 2018-03-22 RX ADMIN — Medication 0.3 MG: at 03:03

## 2018-03-22 RX ADMIN — HYDROXYZINE HYDROCHLORIDE 25 MG: 25 TABLET, FILM COATED ORAL at 12:03

## 2018-03-22 RX ADMIN — PREGABALIN 100 MG: 50 CAPSULE ORAL at 03:03

## 2018-03-22 RX ADMIN — CARVEDILOL 6.25 MG: 6.25 TABLET, FILM COATED ORAL at 08:03

## 2018-03-22 RX ADMIN — DOXEPIN HYDROCHLORIDE 75 MG: 75 CAPSULE ORAL at 08:03

## 2018-03-22 RX ADMIN — POLYETHYLENE GLYCOL 3350 17 G: 17 POWDER, FOR SOLUTION ORAL at 08:03

## 2018-03-22 RX ADMIN — ENOXAPARIN SODIUM 40 MG: 100 INJECTION SUBCUTANEOUS at 05:03

## 2018-03-22 RX ADMIN — CHLORHEXIDINE GLUCONATE 15 ML: 1.2 RINSE ORAL at 09:03

## 2018-03-22 RX ADMIN — CHLORHEXIDINE GLUCONATE 15 ML: 1.2 RINSE ORAL at 08:03

## 2018-03-22 RX ADMIN — STANDARDIZED SENNA CONCENTRATE AND DOCUSATE SODIUM 2 TABLET: 8.6; 5 TABLET, FILM COATED ORAL at 08:03

## 2018-03-22 RX ADMIN — STANDARDIZED SENNA CONCENTRATE AND DOCUSATE SODIUM 2 TABLET: 8.6; 5 TABLET, FILM COATED ORAL at 09:03

## 2018-03-22 NOTE — PROGRESS NOTES
[facility]  Otorhinolaryngology-Head & Neck Surgery  Progress Note    Subjective:     Post-Op Info:  Procedure(s) (LRB):  TRACHEOSTOMY (N/A)  LARYNGOSCOPY  ESOPHAGOSCOPY (N/A)   3 Days Post-Op  Hospital Day: 9     Interval History: NAEON. No trach issues. Stable on vent.    Medications:  Continuous Infusions:   fentanyl Stopped (03/21/18 1045)     Scheduled Meds:   aztreonam  2,000 mg Intravenous Q8H    carvedilol  6.25 mg Per G Tube BID    chlorhexidine  15 mL Mouth/Throat BID    clonazePAM  0.5 mg Per G Tube TID    diphenhydrAMINE  50 mg Per G Tube QHS    doxepin  75 mg Per G Tube BID    DULoxetine  60 mg Per G Tube Daily    enoxaparin  40 mg Subcutaneous Daily    famotidine  20 mg Per G Tube BID    furosemide  40 mg Per G Tube Daily    glycopyrrolate  0.3 mg Per G Tube TID    hydrOXYzine HCl  25 mg Per G Tube QID    lidocaine  3 patch Transdermal Q24H    ondansetron  4 mg Per G Tube Q6H    polyethylene glycol  17 g Per G Tube BID    pregabalin  100 mg Per G Tube TID    scopolamine  1 patch Transdermal Q3 Days    senna-docusate 8.6-50 mg  2 tablet Per G Tube BID    vancomycin (VANCOCIN) IVPB  1,250 mg Intravenous Q12H     PRN Meds:acetaminophen, dextrose 50%, diphenhydrAMINE, glucagon (human recombinant), influenza, insulin aspart U-100, magnesium oxide, magnesium oxide, potassium chloride 10%, potassium chloride 10%, potassium, sodium phosphates, potassium, sodium phosphates, potassium, sodium phosphates     Review of patient's allergies indicates:   Allergen Reactions    Latex, natural rubber     Nsaids (non-steroidal anti-inflammatory drug)      Causes patient to go into afib per wife    Penicillins Other (See Comments)     Per wife- his mother stated he was allergic to it.  Had redness associated with cefepime 3/14/2018     Objective:     Vital Signs (24h Range):  Temp:  [99 °F (37.2 °C)-101.5 °F (38.6 °C)] 99.7 °F (37.6 °C)  Pulse:  [] 104  Resp:  [12-26]  17  SpO2:  [92 %-100 %] 94 %  BP: ()/(51-82) 111/61        Lines/Drains/Airways     Drain                 Gastrostomy/Enterostomy 01/23/17 1113 Percutaneous endoscopic gastrostomy (PEG) LUQ feeding 422 days         Urethral Catheter 03/21/18 1803 less than 1 day          Airway                 Surgical Airway 03/19/18 1355 Shiley Cuffed 2 days          Peripheral Intravenous Line                 Peripheral IV - Single Lumen 03/14/18 0300 Right Antecubital 8 days         Midline Catheter Insertion/Assessment  - Single Lumen 03/14/18 1545 Right cephalic vein (lateral side of arm) 18g x 10cm 7 days                Physical Exam    Vent Mode: A/C  Oxygen Concentration (%):  [21] 21  Resp Rate Total:  [16 br/min-20 br/min] 18 br/min  PEEP/CPAP:  [5 cmH20-10 cmH20] 5 cmH20  Mean Airway Pressure:  [7.9 gjE55-51 cmH20] 8.2 cmH20    NAD A&O x 3  Neck soft no crepitus, 8-0 Shiley cuffed trach in place secured with suture.   Mechanical breath sounds       Significant Labs:  CBC:     Recent Labs  Lab 03/22/18  0547   WBC 10.65   RBC 3.16*   HGB 9.0*   HCT 28.5*   *   MCV 90   MCH 28.5   MCHC 31.6*     CMP:   Recent Labs  Lab 03/21/18  1339  03/22/18  0547   *  --  190*   CALCIUM 9.4  --  9.1   ALBUMIN 3.3*  --   --      --  141   K 3.9  < > 4.3   CO2 27  --  26     --  107   BUN 20  --  16   CREATININE 0.4*  --  0.4*   < > = values in this interval not displayed.        Assessment/Plan:     ALS (amyotrophic lateral sclerosis)    46 y/o male with PMH of ALS transferred for worsening respiratory failure, POD# 2 s/p uneventful tracheostomy, direct laryngoscopy, and rigid esophagoscopy     Plan:   - routine trach care   - will plan to cut sutures POD# 5 (Saturday)  - Continues to have air leak, unchanged from prior to tracheostomy   - ENT will follow, please call with questions             Elsie Wiseman MD  Otorhinolaryngology-Head & Neck Surgery  [facility]

## 2018-03-22 NOTE — ASSESSMENT & PLAN NOTE
48 y/o male with PMH of ALS transferred for worsening respiratory failure, POD# 2 s/p uneventful tracheostomy, direct laryngoscopy, and rigid esophagoscopy     Plan:   - routine trach care   - will plan to cut sutures POD# 5 (Saturday)  - Continues to have air leak, unchanged from prior to tracheostomy   - ENT will follow, please call with questions

## 2018-03-22 NOTE — SUBJECTIVE & OBJECTIVE
Interval History/Significant Events:     POD#3 trach placement. With intermittant SOB. Dessatting overnight, improvement with suction. Patient with fevers yesterday afternoon but afebrile overnight. UC, BCs pending. Cont aztreonam and vanc. Vanc sub therapeutic, redosing.     Review of Systems   Unable to perform ROS: Intubated     Objective:     Vital Signs (Most Recent):  Temp: 100.1 °F (37.8 °C) (03/22/18 1100)  Pulse: (!) 112 (03/22/18 1100)  Resp: (!) 22 (03/22/18 1100)  BP: 126/77 (03/22/18 1100)  SpO2: 98 % (03/22/18 1211) Vital Signs (24h Range):  Temp:  [98.9 °F (37.2 °C)-101.5 °F (38.6 °C)] 100.1 °F (37.8 °C)  Pulse:  [] 112  Resp:  [12-25] 22  SpO2:  [92 %-100 %] 98 %  BP: ()/(52-84) 126/77   Weight: 103.7 kg (228 lb 9.9 oz)  Body mass index is 32.8 kg/m².      Intake/Output Summary (Last 24 hours) at 03/22/18 1227  Last data filed at 03/22/18 1100   Gross per 24 hour   Intake             1540 ml   Output             3125 ml   Net            -1585 ml       Physical Exam   Constitutional: No distress.   HENT:   Head: Normocephalic and atraumatic.   Eyes: Right eye exhibits no discharge. Left eye exhibits no discharge.   Cardiovascular: Normal rate and regular rhythm.  Exam reveals no friction rub.    No murmur heard.  Pulmonary/Chest: Effort normal. He has no wheezes. He has rales.   S/p trach and on vent   Abdominal: Soft. He exhibits no distension. There is no tenderness. There is no guarding.   Musculoskeletal: He exhibits edema.   Neurological: He is alert.   Skin: He is not diaphoretic.       Vents:  Vent Mode: A/C (03/22/18 1211)  Ventilator Initiated: Yes (03/15/18 1050)  Set Rate: 16 bmp (03/22/18 1211)  Vt Set: 0 mL (03/19/18 2314)  PEEP/CPAP: 5 cmH20 (03/22/18 1211)  Oxygen Concentration (%): 25 (03/22/18 1211)  Peak Airway Pressure: 21 cmH2O (03/22/18 1211)  Plateau Pressure: 16 cmH20 (03/22/18 0317)  Total Ve: 8.5 mL (03/22/18 1211)  F/VT Ratio<105 (RSBI): (!) 45.16 (03/22/18  0755)  Lines/Drains/Airways     Drain                 Gastrostomy/Enterostomy 01/23/17 1113 Percutaneous endoscopic gastrostomy (PEG) LUQ feeding 423 days         Urethral Catheter 03/21/18 1803 less than 1 day          Airway                 Surgical Airway 03/19/18 1355 Shiley Cuffed 2 days          Peripheral Intravenous Line                 Peripheral IV - Single Lumen 03/14/18 0300 Right Antecubital 8 days         Midline Catheter Insertion/Assessment  - Single Lumen 03/14/18 1545 Right cephalic vein (lateral side of arm) 18g x 10cm 7 days              Significant Labs:    CBC/Anemia Profile:    Recent Labs  Lab 03/21/18  0351 03/22/18  0547   WBC 10.16 10.65   HGB 8.9* 9.0*   HCT 28.1* 28.5*    365*   MCV 91 90   RDW 14.2 14.2        Chemistries:    Recent Labs  Lab 03/21/18  0351 03/21/18  1339 03/21/18  2156 03/22/18  0547    141  --  141   K 3.2* 3.9 4.4 4.3    105  --  107   CO2 24 27  --  26   BUN 20 20  --  16   CREATININE 0.4* 0.4*  --  0.4*   CALCIUM 9.6 9.4  --  9.1   ALBUMIN  --  3.3*  --   --    MG 2.1  --   --  2.1   PHOS 3.8 2.8  --  2.6*       All pertinent labs within the past 24 hours have been reviewed.    Significant Imaging:  I have reviewed and interpreted all pertinent imaging results/findings within the past 24 hours.

## 2018-03-22 NOTE — PLAN OF CARE
[facility]    FACILITY TRANSFER ORDERS      Patient Name: Bayron Delgado  YOB: 1970    PCP: Scott Puckett Jr, MD   PCP Address: [address] [place] [address] / [facility] [address]  PCP Phone Number: [phone]  PCP Fax: [phone]    Encounter Date: 03/22/2018    Admit to: LTAC    Vital Signs:  Routine    Diagnoses:   Active Hospital Problems    Diagnosis  POA    *Acute respiratory failure with hypoxia [J96.01]  Yes    Acute cystitis without hematuria [N30.00]  Yes    Anasarca [R60.1]  Yes    Atrial fibrillation [I48.91]  Yes    Depression [F32.9]  Yes    ALS (amyotrophic lateral sclerosis) [G12.21]  Yes      Resolved Hospital Problems    Diagnosis Date Resolved POA   No resolved problems to display.       Allergies:  Review of patient's allergies indicates:   Allergen Reactions    Latex, natural rubber     Nsaids (non-steroidal anti-inflammatory drug)      Causes patient to go into afib per wife    Penicillins Other (See Comments)     Per wife- his mother stated he was allergic to it.  Had redness associated with cefepime 3/14/2018       Diet: NPO w/Tube Feeds    Tube Feeding:      - Isosource 1.5 @ 65 mL/hr to provide 2340 calories (105% EEN), 106 g of protein (94% EPN), 1192 mL fluid.  - Hold for residuals >500 mL  Activities: Bed rest    Nursing:   - Aspiration precautions:               - Total assistance with meals              -  Upright 90 degrees befor during and after meals               -  Suction at bedside          - Decubitus precautions:              -  for positioning              - Pressure reducing foam mattress              - Turn patient every two hours. Use wedge pillows to anchor patient    Labs: Per facility protocol              CMP, CBC each day              Vanc trough before 4th dose    CONSULTS:    Physical Therapy to evaluate and treat.  and Occupational Therapy to evaluate and treat.  Respiratory therapy for teaching of home vent  dependency    MISCELLANEOUS CARE:  Michele Care: Empty Michele bag every shift.  Change Michele every month                 Routine Skin for Bedridden Patients:  Apply moisture barrier cream to all                          skin folds and wet areas in perineal area daily and after baths and                           all bowel movements.    Trach Care:  Instruct patient/caregiver to clean site.  Monitor skin integrity.    Vent settings:  Vent Mode: A/C  Oxygen Concentration (%):  [21-25] 25  Resp Rate Total:  [17 br/min-22 br/min] 17 br/min  PEEP/CPAP:  [5 cmH20] 5 cmH20  Mean Airway Pressure:  [8 cmH20-9.3 cmH20] 8.2 cmH20      Medications: Review discharge medications with patient and family and provide education.       Bayron Delgado   Home Medication Instructions ESTHER:[id number]     Printed on:03/22/18 1421   Medication Information                                       acetaminophen (TYLENOL) 325 MG tablet  2 tablets (650 mg total) by Per G Tube route every 6 (six) hours as needed.                      aztreonam (AZACTAM) 1 gram injection  Inject 2 g (2,000 mg total) into the vein every 8 (eight) hours. END date: 3/28/18                       carvedilol (COREG) 6.25 MG tablet  1 tablet (6.25 mg total) by Per G Tube route 2 (two) times daily.                      clonazePAM (KLONOPIN) 0.5 MG tablet  1 tablet (0.5 mg total) by Per G Tube route 3 (three) times daily.                      diphenhydrAMINE (BENADRYL) 12.5 mg/5 mL elixir  10 mLs (25 mg total) by Per G Tube route every 6 (six) hours as needed for Itching.                      diphenhydrAMINE (BENADRYL) 25 mg capsule  25 mg by Per G Tube route every morning. 4PM                      diphenhydrAMINE (BENADRYL) 50 MG capsule  50 mg by Per G Tube route every evening.                      doxepin (SINEQUAN) 75 MG capsule  1 capsule (75 mg total) by Per G Tube route 2 (two) times daily.                      DULoxetine (CYMBALTA) 60 MG capsule  Take 1 capsule (60 mg  total) by mouth once daily.                      enoxaparin (LOVENOX) 40 mg/0.4 mL Syrg  Inject 0.4 mLs (40 mg total) into the skin every 12 (twelve) hours.                      famotidine (PEPCID) 20 MG tablet  1 tablet (20 mg total) by Per G Tube route 2 (two) times daily.                      furosemide (LASIX) 40 MG tablet  1 tablet (40 mg total) by Per G Tube route once daily.                      hydrocodone-acetaminophen 10-325mg (NORCO)  mg Tab  1 tablet by Per G Tube route 3 (three) times daily.                       hydrOXYzine HCl (ATARAX) 25 MG tablet  1 tablet (25 mg total) by Per G Tube route 4 (four) times daily.                      LACTOSE-REDUCED FOOD/FIBER (ISOSOURCE 1.5 YURY ORAL)  by Per G Tube route 5 (five) times daily.                      lidocaine (LIDODERM) 5 %  Place 3 patches onto the skin once daily. Remove & Discard patch within 12 hours or as directed by MD                      magnesium oxide (MAG-OX) 400 mg tablet  2 tablets (800 mg total) by Per G Tube route as needed (if magnesium level is 1.5-1.8 mg/dL give 800 mg every 4 hours x 2 doses).                      magnesium oxide (MAG-OX) 400 mg tablet  2 tablets (800 mg total) by Per G Tube route as needed (if magnesium level is 1.4 mg/dL give 800 mg 4 hours x 3 doses).                         glycopyrrolate 1 mg/5 mL (0.2 mg/mL) oral solution 0.3 mg per G tube 3 times daily                                         ondansetron (ZOFRAN) 4 mg/5 mL solution  5 mLs (4 mg total) by Per G Tube route every 6 (six) hours.                      polyethylene glycol (GLYCOLAX) 17 gram PwPk  17 g by Per G Tube route 2 (two) times daily.                      potassium chloride 10% (KAYCIEL) 20 mEq/15 mL solution  30 mLs (40 mEq total) by Per G Tube route as needed (if potassium level is 3.1-3.4 mmol/L give 40 mEq every 2 hours x 2 doses).                      pregabalin (LYRICA) 100 MG capsule  1 capsule (100 mg total) by Per G Tube route 3  (three) times daily.                      scopolamine (TRANSDERM-SCOP) 1.3-1.5 mg (1 mg over 3 days)  Place 1 patch onto the skin Every 3 (three) days.                      senna (SENOKOT) 8.6 mg tablet  7 tablets by Per G Tube route 2 (two) times daily. AM and NOON                       senna-docusate 8.6-50 mg (PERICOLACE) 8.6-50 mg per tablet  2 tablets by Per G Tube route 2 (two) times daily.                      sodium chloride 0.9% SolP 250 mL with vancomycin 1,000 mg SolR 1,500 mg  Inject 1,500 mg into the vein every 12 (twelve) hours. END date: 3/28/18                       temazepam (RESTORIL) 15 mg Cap  15 mg by Per G Tube route every evening.                    _________________________________  Ijeoma Schmidt MD  03/22/2018

## 2018-03-22 NOTE — ASSESSMENT & PLAN NOTE
[id number] 2. Not on any anti-coagulation. Rate controlled with coreg 3.125 BID.   With a run of afib rvr on 3/20, given lopressor x3 then esmolol gtt, now converted. Off of esmolol  - Increase coreg to 6.25 BID

## 2018-03-22 NOTE — PLAN OF CARE
SONIA spoke to Dr. Schmidt and requested assistance for Dr. Knight with placing facility transfer orders so patient may transfer to [facility].      Zakiya Lopez RN, BSN  Case Management  [facility]  Ext. [phone]

## 2018-03-22 NOTE — SUBJECTIVE & OBJECTIVE
Interval History: NAEON. No trach issues. Stable on vent.    Medications:  Continuous Infusions:   fentanyl Stopped (03/21/18 1045)     Scheduled Meds:   aztreonam  2,000 mg Intravenous Q8H    carvedilol  6.25 mg Per G Tube BID    chlorhexidine  15 mL Mouth/Throat BID    clonazePAM  0.5 mg Per G Tube TID    diphenhydrAMINE  50 mg Per G Tube QHS    doxepin  75 mg Per G Tube BID    DULoxetine  60 mg Per G Tube Daily    enoxaparin  40 mg Subcutaneous Daily    famotidine  20 mg Per G Tube BID    furosemide  40 mg Per G Tube Daily    glycopyrrolate  0.3 mg Per G Tube TID    hydrOXYzine HCl  25 mg Per G Tube QID    lidocaine  3 patch Transdermal Q24H    ondansetron  4 mg Per G Tube Q6H    polyethylene glycol  17 g Per G Tube BID    pregabalin  100 mg Per G Tube TID    scopolamine  1 patch Transdermal Q3 Days    senna-docusate 8.6-50 mg  2 tablet Per G Tube BID    vancomycin (VANCOCIN) IVPB  1,250 mg Intravenous Q12H     PRN Meds:acetaminophen, dextrose 50%, diphenhydrAMINE, glucagon (human recombinant), influenza, insulin aspart U-100, magnesium oxide, magnesium oxide, potassium chloride 10%, potassium chloride 10%, potassium, sodium phosphates, potassium, sodium phosphates, potassium, sodium phosphates     Review of patient's allergies indicates:   Allergen Reactions    Latex, natural rubber     Nsaids (non-steroidal anti-inflammatory drug)      Causes patient to go into afib per wife    Penicillins Other (See Comments)     Per wife- his mother stated he was allergic to it.  Had redness associated with cefepime 3/14/2018     Objective:     Vital Signs (24h Range):  Temp:  [99 °F (37.2 °C)-101.5 °F (38.6 °C)] 99.7 °F (37.6 °C)  Pulse:  [] 104  Resp:  [12-26] 17  SpO2:  [92 %-100 %] 94 %  BP: ()/(51-82) 111/61        Lines/Drains/Airways     Drain                 Gastrostomy/Enterostomy 01/23/17 1113 Percutaneous endoscopic gastrostomy (PEG) LUQ feeding 422 days         Urethral Catheter  03/21/18 1803 less than 1 day          Airway                 Surgical Airway 03/19/18 1355 Shiley Cuffed 2 days          Peripheral Intravenous Line                 Peripheral IV - Single Lumen 03/14/18 0300 Right Antecubital 8 days         Midline Catheter Insertion/Assessment  - Single Lumen 03/14/18 1545 Right cephalic vein (lateral side of arm) 18g x 10cm 7 days                Physical Exam    Vent Mode: A/C  Oxygen Concentration (%):  [21] 21  Resp Rate Total:  [16 br/min-20 br/min] 18 br/min  PEEP/CPAP:  [5 cmH20-10 cmH20] 5 cmH20  Mean Airway Pressure:  [7.9 gbX64-71 cmH20] 8.2 cmH20    NAD A&O x 3  Neck soft no crepitus, 8-0 Shiley cuffed trach in place secured with suture.   Mechanical breath sounds       Significant Labs:  CBC:     Recent Labs  Lab 03/22/18  0547   WBC 10.65   RBC 3.16*   HGB 9.0*   HCT 28.5*   *   MCV 90   MCH 28.5   MCHC 31.6*     CMP:   Recent Labs  Lab 03/21/18  1339  03/22/18  0547   *  --  190*   CALCIUM 9.4  --  9.1   ALBUMIN 3.3*  --   --      --  141   K 3.9  < > 4.3   CO2 27  --  26     --  107   BUN 20  --  16   CREATININE 0.4*  --  0.4*   < > = values in this interval not displayed.

## 2018-03-22 NOTE — PLAN OF CARE
[facility]     Department of Hospital Medicine     [address] [place] [address]     [phone] after hours  [phone] fax       Long Term Acute Care  03/22/2018    Admit to Nursing Home:   Skilled Bed                                            Diagnoses:  Active Hospital Problems    Diagnosis  POA    *Acute respiratory failure with hypoxia [J96.01]  Yes    Acute cystitis without hematuria [N30.00]  Yes    Anasarca [R60.1]  Yes    Atrial fibrillation [I48.91]  Yes    Depression [F32.9]  Yes    ALS (amyotrophic lateral sclerosis) [G12.21]  Yes      Resolved Hospital Problems    Diagnosis Date Resolved POA   No resolved problems to display.       Patient is homebound due to:  Acute respiratory failure with hypoxia    Allergies:  Review of patient's allergies indicates:   Allergen Reactions    Latex, natural rubber     Nsaids (non-steroidal anti-inflammatory drug)      Causes patient to go into afib per wife    Penicillins Other (See Comments)     Per wife- his mother stated he was allergic to it.  Had redness associated with cefepime 3/14/2018       Vitals:       Every shift (Skilled Nursing patients)    Diet: NPO w/ Tube Feeds     Tube Feeding:     - Isosource 1.5 @ 65 mL/hr to provide 2340 calories (105% EEN), 106 g of protein (94% EPN), 1192 mL fluid.  - Hold for residuals >500 mL    Acitivities:      - bed bound    LABS:  Per facility protocol   CMP, CBC each day   Vanc trough before 4th dose    Nursing Precautions:     - Aspiration precautions:             - Total assistance with meals            -  Upright 90 degrees befor during and after meals             -  Suction at bedside          - Decubitus precautions:        -  for positioning   - Pressure reducing foam mattress   - Turn patient every two hours. Use wedge pillows to anchor patient    CONSULTS:        Respiratory therapy for teaching of home vent dependency    MISCELLANEOUS  CARE:       Michele Care: Empty Michele bag every shift.  Change Michele every month     Routine Skin for Bedridden Patients:  Apply moisture barrier cream to all    skin folds and wet areas in perineal area daily and after baths and                           all bowel movements.     Bayron Delgado   Home Medication Instructions ESTHER:[id number]    Printed on:03/22/18 8088   Medication Information                      acetaminophen (TYLENOL) 325 MG tablet  2 tablets (650 mg total) by Per G Tube route every 6 (six) hours as needed.             aztreonam (AZACTAM) 1 gram injection  Inject 2 g (2,000 mg total) into the vein every 8 (eight) hours. END date: 3/28/18              carvedilol (COREG) 6.25 MG tablet  1 tablet (6.25 mg total) by Per G Tube route 2 (two) times daily.             clonazePAM (KLONOPIN) 0.5 MG tablet  1 tablet (0.5 mg total) by Per G Tube route 3 (three) times daily.             diphenhydrAMINE (BENADRYL) 12.5 mg/5 mL elixir  10 mLs (25 mg total) by Per G Tube route every 6 (six) hours as needed for Itching.             diphenhydrAMINE (BENADRYL) 25 mg capsule  25 mg by Per G Tube route every morning. 4PM             diphenhydrAMINE (BENADRYL) 50 MG capsule  50 mg by Per G Tube route every evening.             doxepin (SINEQUAN) 75 MG capsule  1 capsule (75 mg total) by Per G Tube route 2 (two) times daily.             DULoxetine (CYMBALTA) 60 MG capsule  Take 1 capsule (60 mg total) by mouth once daily.             enoxaparin (LOVENOX) 40 mg/0.4 mL Syrg  Inject 0.4 mLs (40 mg total) into the skin every 12 (twelve) hours.             famotidine (PEPCID) 20 MG tablet  1 tablet (20 mg total) by Per G Tube route 2 (two) times daily.             furosemide (LASIX) 40 MG tablet  1 tablet (40 mg total) by Per G Tube route once daily.             hydrocodone-acetaminophen 10-325mg (NORCO)  mg Tab  1 tablet by Per G Tube route 3 (three) times daily.              hydrOXYzine HCl (ATARAX) 25 MG tablet  1  tablet (25 mg total) by Per G Tube route 4 (four) times daily.             LACTOSE-REDUCED FOOD/FIBER (ISOSOURCE 1.5 YURY ORAL)  by Per G Tube route 5 (five) times daily.             lidocaine (LIDODERM) 5 %  Place 3 patches onto the skin once daily. Remove & Discard patch within 12 hours or as directed by MD             magnesium oxide (MAG-OX) 400 mg tablet  2 tablets (800 mg total) by Per G Tube route as needed (if magnesium level is 1.5-1.8 mg/dL give 800 mg every 4 hours x 2 doses).             magnesium oxide (MAG-OX) 400 mg tablet  2 tablets (800 mg total) by Per G Tube route as needed (if magnesium level is 1.4 mg/dL give 800 mg 4 hours x 3 doses).             morphine 100 mg/5 mL (20 mg/mL) concentrated solution  Take 0.25 mg by mouth every 2 (two) hours as needed for Pain.             morphine 30 mg TRer  30 mg by Per G Tube route every 6 (six) hours while awake.             ondansetron (ZOFRAN) 4 mg/5 mL solution  5 mLs (4 mg total) by Per G Tube route every 6 (six) hours.             polyethylene glycol (GLYCOLAX) 17 gram PwPk  17 g by Per G Tube route 2 (two) times daily.             potassium chloride 10% (KAYCIEL) 20 mEq/15 mL solution  30 mLs (40 mEq total) by Per G Tube route as needed (if potassium level is 3.1-3.4 mmol/L give 40 mEq every 2 hours x 2 doses).             pregabalin (LYRICA) 100 MG capsule  1 capsule (100 mg total) by Per G Tube route 3 (three) times daily.             scopolamine (TRANSDERM-SCOP) 1.3-1.5 mg (1 mg over 3 days)  Place 1 patch onto the skin Every 3 (three) days.             senna (SENOKOT) 8.6 mg tablet  7 tablets by Per G Tube route 2 (two) times daily. AM and NOON              senna-docusate 8.6-50 mg (PERICOLACE) 8.6-50 mg per tablet  2 tablets by Per G Tube route 2 (two) times daily.             sodium chloride 0.9% SolP 250 mL with vancomycin 1,000 mg SolR 1,500 mg  Inject 1,500 mg into the vein every 12 (twelve) hours. END date: 3/28/18              temazepam  (RESTORIL) 15 mg Cap  15 mg by Per G Tube route every evening.                 _________________________________  Bob Knight MD  03/22/2018

## 2018-03-22 NOTE — PLAN OF CARE
Per Dr. Schmidt, resident, pt can transfer today to [facility] care.   has notified HARJINDER Fritz liaison ph# [phone], that pt will transfer today per medical team A.    Awaiting consent from Catrina to arrange transportation.    Keely Pelaez, [facility]  Ext. [phone]

## 2018-03-22 NOTE — PLAN OF CARE
Facility Transfer Orders have been fax through [facility] fax system.     Keely Pelaez, [facility] [place]  Ext. [phone]

## 2018-03-22 NOTE — PLAN OF CARE
Problem: Patient Care Overview  Goal: Plan of Care Review  Outcome: Ongoing (interventions implemented as appropriate)  Vitals stable.  Vent support increased slightly today.  Suctioned about 3 times.  Tolerating tube feeds.  3 liquid BMs.  Pt communicates appropriately to the best of his ability.  Plan for rehab placement tonight.  Spoke with SW to get an ETA but she did not have this information yet and stated she would call me.  Plan reviewed with family.  Turned q2 when pt allowed (refused repositioning a couple times, explained importance to pt and family).

## 2018-03-22 NOTE — PLAN OF CARE
Plan for transition to LTAC at [facility] for IV antibiotics, respiratory and trach care and teaching for home vent dependency and wound care.  Awaiting facility acceptance.  CM will continue to follow     03/22/18 1114   Right Care Assessment   Can the patient answer the patient profile reliably? Yes, cognitively intact   How often would a person be available to care for the patient? Whenever needed   Describe the patient's ability to walk at the present time. Does not walk or unable to take any steps at all   How does the patient rate their overall health at the present time? Poor   Number of comorbid conditions (as recorded on the chart) Two   During the past month, has the patient often been bothered by feeling down, depressed or hopeless? Yes   Have you felt down, depressed, or hopeless? 1   During the past month, has the patient often been bothered by little interest or pleasure in doing things? Yes   Have you felt little interest or pleasure in doing things? 1   Zakiya Lopez RN, BSN  Case Management  [facility]  Ext. [phone]

## 2018-03-22 NOTE — PROGRESS NOTES
[facility]  Critical Care Medicine  Progress Note    Patient Name: Bayron Delgado  MRN: [id number]  Admission Date: 3/14/2018  Hospital Length of Stay: 8 days  Code Status: Full Code  Attending Provider: Jo Ann Fierro MD  Primary Care Provider: Scott Puckett Jr, MD   Principal Problem: Acute respiratory failure with hypoxia    Subjective:     HPI:  46 y/o M w/ Afib, ALS (diagnosed 2014) who [place] [facility] [place] from [facility] via EMS on BiPAP 2/2 worsening respiratory failure. Per brother patient had been on home hospice for progression of his ALS, however, on 3/12 brother states patient experienced an episode of hypoxia as pt became more tachypneic w/ cyanosis surrounding lips. Per brother patient had a fever around this time as well. This episode resolved by 3/13 and at this time hospice agency re-visited patient's decision to explore life sustaining measures such as tracheostomy. According to brother, patient decided he would undergo trach placement, he was transported by EMS to [facility] where he was noted to have L lower lobe consolidation on CXR. He was started on vanc/ aztreonam due to penicillin allergy.and was subsequently transferred [place] [facility]. Initially patient had refused tracheostomy after being evaluated by ENT (Dr. Horner) in 2016.   Per brother patient's wife was unhappy w/ patient's decision to proceed w/ trach placement.   Patient communicates mostly by shaking his head to answer yes/ no. He is dependent on his trilogy machine for ventilation.  History obtained from brother at bedside as hx difficult to obtain from patient (nonverbal secondary to ALS).    Hospital/ICU Course:  Bayron Delgado is a 47 y.o. male with CHF, A-fib, and ALS who presents to the ED via EMS on BiPAP with an onset of worsening respiratory failure on 03/14/2018. The EMS care team transferred the patient here from [place], [place] and were not allowed to transfer the patient to [facility]  as per instructed by their supervisor. Patient revoked hospice with worsening respiratory status 3/13.    CXR with LLL PNA, concern for aspiration in setting of patient's ALS. UA with enterococcus & pseudomonas. Respiratory cultures with GPR, gram negative diplococci, and gram positive rods. Starting vancomycin and aztreonam (started on vanc & aztreonam at OSH.) Presents with anasarca, normal TTE & renal function. Diurese with IV lasix with adequate urine output. On the morning of 03/15, moted to have issues of desaturation as low as 45%, needed Bag valve mask to return oxygenation to 100%.  He had course breath sounds throughout.  Due to his inability to clear secretions, it was decided to emergently intubate. Afterwards, imaging revealed a pneumothorax on the right chest.  A small bore chest tube was placed on 3/15. Chest tube clamped 3/17.  03/18/2018 Run of afib w/ RVR overnight requiring x2 labetalol and amiodarone for termination. Now in 80s. Trach planned for tomorrow per ENT. Cough assist adjusted to Q6. CXR shows resolution of PTX. Will pull chest tube today. Some persistant peripheral edema noted, will continue daily lasix and reassess in the PM.   03/19/2018 Trach planned for today. Continuing abx.   03/20/2018 POD 1 Trach placement. No fistula visualized during trach placement. Consult placed to CTS for esophogram vs EGD for eval of possible tracheoesophageal fistula.  03/21/2018 POD#2 trach placement, doing well and does not have any complaints  03/22/2018 POD#3 trach placement. With intermittant SOB. Dessatting overnight, improvement with suction. Patient with fevers yesterday afternoon but afebrile overnight. UC, BCs pending. Cont aztreonam and vanc. Vanc sub therapeutic, redosing.     Interval History/Significant Events:     POD#3 trach placement. With intermittant SOB. Dessatting overnight, improvement with suction. Patient with fevers yesterday afternoon but afebrile overnight. UC, BCs pending.  Cont aztreonam and vanc. Vanc sub therapeutic, redosing.     Review of Systems   Unable to perform ROS: Intubated     Objective:     Vital Signs (Most Recent):  Temp: 100.1 °F (37.8 °C) (03/22/18 1100)  Pulse: (!) 112 (03/22/18 1100)  Resp: (!) 22 (03/22/18 1100)  BP: 126/77 (03/22/18 1100)  SpO2: 98 % (03/22/18 1211) Vital Signs (24h Range):  Temp:  [98.9 °F (37.2 °C)-101.5 °F (38.6 °C)] 100.1 °F (37.8 °C)  Pulse:  [] 112  Resp:  [12-25] 22  SpO2:  [92 %-100 %] 98 %  BP: ()/(52-84) 126/77   Weight: 103.7 kg (228 lb 9.9 oz)  Body mass index is 32.8 kg/m².      Intake/Output Summary (Last 24 hours) at 03/22/18 1227  Last data filed at 03/22/18 1100   Gross per 24 hour   Intake             1540 ml   Output             3125 ml   Net            -1585 ml       Physical Exam   Constitutional: No distress.   HENT:   Head: Normocephalic and atraumatic.   Eyes: Right eye exhibits no discharge. Left eye exhibits no discharge.   Cardiovascular: Normal rate and regular rhythm.  Exam reveals no friction rub.    No murmur heard.  Pulmonary/Chest: Effort normal. He has no wheezes. He has rales.   S/p trach and on vent   Abdominal: Soft. He exhibits no distension. There is no tenderness. There is no guarding.   Musculoskeletal: He exhibits edema.   Neurological: He is alert.   Skin: He is not diaphoretic.       Vents:  Vent Mode: A/C (03/22/18 1211)  Ventilator Initiated: Yes (03/15/18 1050)  Set Rate: 16 bmp (03/22/18 1211)  Vt Set: 0 mL (03/19/18 2314)  PEEP/CPAP: 5 cmH20 (03/22/18 1211)  Oxygen Concentration (%): 25 (03/22/18 1211)  Peak Airway Pressure: 21 cmH2O (03/22/18 1211)  Plateau Pressure: 16 cmH20 (03/22/18 0317)  Total Ve: 8.5 mL (03/22/18 1211)  F/VT Ratio<105 (RSBI): (!) 45.16 (03/22/18 0755)  Lines/Drains/Airways     Drain                 Gastrostomy/Enterostomy 01/23/17 1113 Percutaneous endoscopic gastrostomy (PEG) LUQ feeding 423 days         Urethral Catheter 03/21/18 1803 less than 1 day           Airway                 Surgical Airway 03/19/18 1355 Shiley Cuffed 2 days          Peripheral Intravenous Line                 Peripheral IV - Single Lumen 03/14/18 0300 Right Antecubital 8 days         Midline Catheter Insertion/Assessment  - Single Lumen 03/14/18 1545 Right cephalic vein (lateral side of arm) 18g x 10cm 7 days              Significant Labs:    CBC/Anemia Profile:    Recent Labs  Lab 03/21/18  0351 03/22/18  0547   WBC 10.16 10.65   HGB 8.9* 9.0*   HCT 28.1* 28.5*    365*   MCV 91 90   RDW 14.2 14.2        Chemistries:    Recent Labs  Lab 03/21/18  0351 03/21/18  1339 03/21/18  2156 03/22/18  0547    141  --  141   K 3.2* 3.9 4.4 4.3    105  --  107   CO2 24 27  --  26   BUN 20 20  --  16   CREATININE 0.4* 0.4*  --  0.4*   CALCIUM 9.6 9.4  --  9.1   ALBUMIN  --  3.3*  --   --    MG 2.1  --   --  2.1   PHOS 3.8 2.8  --  2.6*       All pertinent labs within the past 24 hours have been reviewed.    Significant Imaging:  I have reviewed and interpreted all pertinent imaging results/findings within the past 24 hours.    Assessment/Plan:     Neuro   ALS (amyotrophic lateral sclerosis)    Currently on vent post trach  C/w home trilogy machine on DC  Cough assist device q6  c/w scopolamine patch and glycopyrrolate for secretions        Psychiatric   Anxiety    C/w home clonazepam for anxiety        Depression    C/w home cymbalta  C/w home clonazepam for anxiety  C/w home doxepin        Pulmonary   * Acute respiratory failure with hypoxia    -CXR with L lower lobe asp PNA  -3/12 start vanc & aztreonam (penicillin allergy) Day 8 of 15 (also received 2 days of cefepime)  - 3/13 Sputum cultures with pansensitive Pseudomonas aeruginosa, ciprofloxacin resistant Proteus mirabilis  -3/13 Urine culture tetracycline resistant Enterococcus faecalis, ciprofloxacin resistant Pseudomonas aeruginosa  -Con't vanc trough  -procalcitonin, lactate wnl, blood cx neg  -3/19 ENT trach placement (patient has  revoked wish for hospice) (now medically stable.)  -PTX resolved on CXR. chest tube pulled. Still with signs of pulmonary edema, will continue lasix.         Cardiac/Vascular   Atrial fibrillation    [id number] 2. Not on any anti-coagulation. Rate controlled with coreg 3.125 BID.   With a run of afib rvr on 3/20, given lopressor x3 then esmolol gtt, now converted. Off of esmolol  - Increase coreg to 6.25 BID        Renal/   Acute cystitis without hematuria    -UA with enterococcus & pseudomonas  -C/w chronic munoz, changed on 3/13 at OSH  -Patient already on antibiotics as above        Other   Anasarca    TTE EF 68%, no DD  Renal function normal  Diuresing with lasix PO (latex allergy); initially bid, now daily  Net neg 8.9L since admission                  Critical Care Daily Checklist:     A: Awake: RASS Goal/Actual Goal: RASS Goal: 0-->alert and calm  Actual: Paige Agitation Sedation Scale (RASS): Alert and calm   B: Spontaneous Breathing Trial Performed?    C: SAT & SBT Coordinated?  AN                      D: Delirium: CAM-ICU Overall CAM-ICU: Negative   E: Early Mobility Performed? Yes   F: Feeding Goal: Goals: Meet % EEN, EPN  Status: Nutrition Goal Status: goal not met       Current Diet Order   Procedures    Diet NPO       AS: Analgesia/Sedation NA   T: Thromboembolic Prophylaxis enoxaparin   H: HOB > 300 Yes   U: Stress Ulcer Prophylaxis (if needed) famotidine   G: Glucose Control wnl   B: Bowel Function Stool Occurrence: 1   I: Indwelling Catheter (Lines & Munoz) Necessity trach   D: De-escalation of Antimicrobials/Pharmacotherapies vanc and aztreonam     Plan for the day/ETD Continue to monitor     Code Status:  Family/Goals of Care: Full Code                    Critical care was time spent personally by me on the following activities: development of treatment plan with patient or surrogate and bedside caregivers, discussions with consultants, evaluation of patient's response to treatment,  examination of patient, ordering and performing treatments and interventions, ordering and review of laboratory studies, ordering and review of radiographic studies, pulse oximetry, re-evaluation of patient's condition. This critical care time did not overlap with that of any other provider or involve time for any procedures.     Bob Knight MD  Critical Care Medicine  [facility]

## 2018-03-23 NOTE — NURSING
Spoke with Khang in ED () told me to call [phone] and give pt info to vivien.   RN Spoke with Elder at [phone]. RN got transport set up for within the hour.

## 2018-03-23 NOTE — NURSING
Catrina from [facility] called. She stated for RN at [facility] to call [phone] and give report. For [facility] to arrange transportation. For MD to call report to [phone]. Then call Catrina back at [phone] after giving report.     Dr. Marie notified to call LT for report.

## 2018-03-23 NOTE — NURSING
Report called to Michell at [facility].     Still unable to get transport at this time. Charge RN aware, house sup aware. Social work on call still never paged/call back

## 2018-03-23 NOTE — NURSING TRANSFER
Nursing Transfer Note      3/22/2018     Transfer To: [facility]     Transfer [facility] stretcher [facility]    Transfer with ventilator to O2, cardiac monitoring pulse ox    Transported by [facility]    Medicines sent: no    Chart send with patient: No- [facility] packet sent    Notified: spouse earlier. Friend vivek at bedside, gathered all patients belongings

## 2018-03-23 NOTE — NURSING
Spoke with patient wife Perlita Delgado. Notified of patient transfer. Answered all questions and gave her phone number to [facility] nurses station where pt will be. Number is [phone].

## 2018-03-26 LAB
BACTERIA BLD CULT: NORMAL
BACTERIA BLD CULT: NORMAL

## 2018-03-28 NOTE — PHYSICIAN QUERY
PT Name: Bayron Delgado  MR #: [id number]     Physician Query Form - Documentation Clarification      CDS/: Tiarra Musa RN CDI     Contact information: [email or web address]    This form is a permanent document in the medical record.     Query Date: March 28, 2018    By submitting this query, we are merely seeking further clarification of documentation. Please utilize your independent clinical judgment when addressing the question(s) below.    The Medical record reflects the following:    Supporting Clinical Findings Location in Medical Record   1040: After intubation with FiO2 100%, SpO2 dropping to 70s. Suctioned through ETT to remove mucus plug. Suctioned successfully with SpO2 returned to 90s.  1100: CXR done showing pneumothorax. MD Moran at bedside preparing for chest tube insertion. Will continue to monitor.   Slime Poole RN at 3/15/2018  4:35 PM   Interval History/Significant Events: Intubated yesterday secondary to destaturation of SaO2 45%. Pneumothorax noted on CXR with chest tube placement. Slept well overnight, no events.   Meena Ortega 3/16 245 pm   Interval development of a right-sided pneumothorax.  There is increased opacification at the right lung base likely accentuated by the pneumothorax.    Chest xray results 3/15                                                                            Doctor, Please specify diagnosis or diagnoses associated with above clinical findings.    Provider Use Only      [   ] Primary spontaneous pneumothorax due to __________________.      [ x  ] Secondary spontaneous pneumothorax due to________________.       [   ] Spontaneous Tension pneumothorax due to_________________.       [   ] Other, please specify__________________.                                                                                                             [  ] Clinically undetermined

## 2018-03-29 PROBLEM — R14.0 ABDOMINAL DISTENTION: Status: ACTIVE | Noted: 2018-03-29

## 2018-04-18 ENCOUNTER — TELEPHONE (OUTPATIENT)
Dept: PULMONOLOGY | Facility: CLINIC | Age: 48
End: 2018-04-18

## 2018-04-18 NOTE — TELEPHONE ENCOUNTER
----- Message from Sara King sent at 4/18/2018 10:26 AM CDT -----  Contact: Rhea / Occupational Therapist   [phone]  Rhea called to inform Dr. Fuentes that she will be seeing the Pt.

## 2018-04-24 ENCOUNTER — TELEPHONE (OUTPATIENT)
Dept: PULMONOLOGY | Facility: CLINIC | Age: 48
End: 2018-04-24

## 2018-04-24 NOTE — TELEPHONE ENCOUNTER
----- Message from Ashly Bonner sent at 4/24/2018 10:57 AM CDT -----  Contact: [facility]  Calling to let Dr know adding a PRN visit for OT.

## 2018-05-01 RX ORDER — SULFAMETHOXAZOLE AND TRIMETHOPRIM 800; 160 MG/1; MG/1
1 TABLET ORAL 2 TIMES DAILY
Qty: 14 TABLET | Refills: 0 | Status: SHIPPED | OUTPATIENT
Start: 2018-05-01 | End: 2018-06-20

## 2018-06-20 ENCOUNTER — HOSPITAL ENCOUNTER (INPATIENT)
Facility: HOSPITAL | Age: 48
LOS: 4 days | Discharge: HOME HEALTH | DRG: 699 | End: 2018-06-24
Attending: EMERGENCY MEDICINE | Admitting: HOSPITALIST
Payer: MEDICARE

## 2018-06-20 DIAGNOSIS — R53.83 LETHARGY: ICD-10-CM

## 2018-06-20 DIAGNOSIS — T83.511A URINARY TRACT INFECTION ASSOCIATED WITH INDWELLING URETHRAL CATHETER, INITIAL ENCOUNTER: Primary | ICD-10-CM

## 2018-06-20 DIAGNOSIS — N39.0 URINARY TRACT INFECTION ASSOCIATED WITH INDWELLING URETHRAL CATHETER, INITIAL ENCOUNTER: Primary | ICD-10-CM

## 2018-06-20 DIAGNOSIS — N39.0 UTI (URINARY TRACT INFECTION): ICD-10-CM

## 2018-06-20 LAB
ALBUMIN SERPL BCP-MCNC: 3.5 G/DL
ALP SERPL-CCNC: 90 U/L
ALT SERPL W/O P-5'-P-CCNC: 77 U/L
ANION GAP SERPL CALC-SCNC: 11 MMOL/L
AST SERPL-CCNC: 29 U/L
BACTERIA #/AREA URNS HPF: ABNORMAL /HPF
BASOPHILS # BLD AUTO: 0.1 K/UL
BASOPHILS NFR BLD: 0.7 %
BILIRUB SERPL-MCNC: 0.4 MG/DL
BILIRUB UR QL STRIP: NEGATIVE
BUN SERPL-MCNC: 21 MG/DL
CALCIUM SERPL-MCNC: 9.7 MG/DL
CHLORIDE SERPL-SCNC: 106 MMOL/L
CLARITY UR: CLEAR
CO2 SERPL-SCNC: 20 MMOL/L
COLOR UR: YELLOW
CREAT SERPL-MCNC: 0.5 MG/DL
DIFFERENTIAL METHOD: ABNORMAL
EOSINOPHIL # BLD AUTO: 0.2 K/UL
EOSINOPHIL NFR BLD: 2.1 %
ERYTHROCYTE [DISTWIDTH] IN BLOOD BY AUTOMATED COUNT: 16.1 %
EST. GFR  (AFRICAN AMERICAN): >60 ML/MIN/1.73 M^2
EST. GFR  (NON AFRICAN AMERICAN): >60 ML/MIN/1.73 M^2
GLUCOSE SERPL-MCNC: 136 MG/DL
GLUCOSE UR QL STRIP: NEGATIVE
HCT VFR BLD AUTO: 35.5 %
HGB BLD-MCNC: 11.7 G/DL
HGB UR QL STRIP: ABNORMAL
KETONES UR QL STRIP: NEGATIVE
LACTATE SERPL-SCNC: 1.6 MMOL/L
LEUKOCYTE ESTERASE UR QL STRIP: ABNORMAL
LYMPHOCYTES # BLD AUTO: 2 K/UL
LYMPHOCYTES NFR BLD: 26.6 %
MCH RBC QN AUTO: 26.6 PG
MCHC RBC AUTO-ENTMCNC: 32.9 G/DL
MCV RBC AUTO: 81 FL
MICROSCOPIC COMMENT: ABNORMAL
MONOCYTES # BLD AUTO: 0.4 K/UL
MONOCYTES NFR BLD: 5.8 %
NEUTROPHILS # BLD AUTO: 4.9 K/UL
NEUTROPHILS NFR BLD: 64.8 %
NITRITE UR QL STRIP: NEGATIVE
PH UR STRIP: >=9 [PH] (ref 5–8)
PLATELET # BLD AUTO: 235 K/UL
PMV BLD AUTO: 8.7 FL
POTASSIUM SERPL-SCNC: 4 MMOL/L
PROT SERPL-MCNC: 6.7 G/DL
PROT UR QL STRIP: NEGATIVE
RBC # BLD AUTO: 4.39 M/UL
RBC #/AREA URNS HPF: 9 /HPF (ref 0–4)
SODIUM SERPL-SCNC: 137 MMOL/L
SP GR UR STRIP: <=1.005 (ref 1–1.03)
SQUAMOUS #/AREA URNS HPF: 1 /HPF
URATE CRY URNS QL MICRO: ABNORMAL
URN SPEC COLLECT METH UR: ABNORMAL
UROBILINOGEN UR STRIP-ACNC: NEGATIVE EU/DL
WBC # BLD AUTO: 7.6 K/UL
WBC #/AREA URNS HPF: 24 /HPF (ref 0–5)

## 2018-06-20 PROCEDURE — 99285 EMERGENCY DEPT VISIT HI MDM: CPT | Mod: 25

## 2018-06-20 PROCEDURE — 36415 COLL VENOUS BLD VENIPUNCTURE: CPT

## 2018-06-20 PROCEDURE — 80053 COMPREHEN METABOLIC PANEL: CPT

## 2018-06-20 PROCEDURE — 5A1945Z RESPIRATORY VENTILATION, 24-96 CONSECUTIVE HOURS: ICD-10-PCS | Performed by: EMERGENCY MEDICINE

## 2018-06-20 PROCEDURE — 87040 BLOOD CULTURE FOR BACTERIA: CPT

## 2018-06-20 PROCEDURE — 94002 VENT MGMT INPAT INIT DAY: CPT

## 2018-06-20 PROCEDURE — 20000000 HC ICU ROOM

## 2018-06-20 PROCEDURE — 81000 URINALYSIS NONAUTO W/SCOPE: CPT

## 2018-06-20 PROCEDURE — 83605 ASSAY OF LACTIC ACID: CPT

## 2018-06-20 PROCEDURE — 85025 COMPLETE CBC W/AUTO DIFF WBC: CPT

## 2018-06-20 RX ORDER — AMIODARONE HYDROCHLORIDE 200 MG/1
200 TABLET ORAL 2 TIMES DAILY
COMMUNITY
End: 2022-08-26 | Stop reason: RX

## 2018-06-21 PROBLEM — T83.511A URINARY TRACT INFECTION ASSOCIATED WITH INDWELLING URETHRAL CATHETER: Status: ACTIVE | Noted: 2018-06-20

## 2018-06-21 PROBLEM — Z99.11 VENTILATOR DEPENDENCE: Status: ACTIVE | Noted: 2018-06-21

## 2018-06-21 PROBLEM — Z97.8 CHRONIC INDWELLING FOLEY CATHETER: Status: ACTIVE | Noted: 2018-06-21

## 2018-06-21 PROBLEM — R14.0 ABDOMINAL DISTENTION: Status: RESOLVED | Noted: 2018-03-29 | Resolved: 2018-06-21

## 2018-06-21 PROBLEM — J96.01 ACUTE RESPIRATORY FAILURE WITH HYPOXIA: Status: RESOLVED | Noted: 2018-03-13 | Resolved: 2018-06-21

## 2018-06-21 PROBLEM — N30.00 ACUTE CYSTITIS WITHOUT HEMATURIA: Status: RESOLVED | Noted: 2018-03-15 | Resolved: 2018-06-21

## 2018-06-21 PROBLEM — R53.83 LETHARGY: Status: RESOLVED | Noted: 2018-06-20 | Resolved: 2018-06-21

## 2018-06-21 PROBLEM — Z93.0 TRACHEOSTOMY IN PLACE: Status: ACTIVE | Noted: 2018-06-21

## 2018-06-21 PROBLEM — D64.9 ANEMIA: Status: ACTIVE | Noted: 2018-06-21

## 2018-06-21 LAB
ALBUMIN SERPL BCP-MCNC: 3.5 G/DL
ALP SERPL-CCNC: 84 U/L
ALT SERPL W/O P-5'-P-CCNC: 71 U/L
ANION GAP SERPL CALC-SCNC: 13 MMOL/L
AST SERPL-CCNC: 29 U/L
BASOPHILS # BLD AUTO: 0.1 K/UL
BASOPHILS NFR BLD: 0.8 %
BILIRUB SERPL-MCNC: 0.5 MG/DL
BUN SERPL-MCNC: 20 MG/DL
CALCIUM SERPL-MCNC: 9.7 MG/DL
CHLORIDE SERPL-SCNC: 106 MMOL/L
CO2 SERPL-SCNC: 19 MMOL/L
CREAT SERPL-MCNC: 0.5 MG/DL
DIFFERENTIAL METHOD: ABNORMAL
EOSINOPHIL # BLD AUTO: 0.2 K/UL
EOSINOPHIL NFR BLD: 2.4 %
ERYTHROCYTE [DISTWIDTH] IN BLOOD BY AUTOMATED COUNT: 16.5 %
EST. GFR  (AFRICAN AMERICAN): >60 ML/MIN/1.73 M^2
EST. GFR  (NON AFRICAN AMERICAN): >60 ML/MIN/1.73 M^2
GENTAMICIN PEAK SERPL-MCNC: 1.5 UG/ML
GLUCOSE SERPL-MCNC: 115 MG/DL
HCT VFR BLD AUTO: 34.9 %
HGB BLD-MCNC: 11.4 G/DL
LYMPHOCYTES # BLD AUTO: 2.3 K/UL
LYMPHOCYTES NFR BLD: 33 %
MAGNESIUM SERPL-MCNC: 2.6 MG/DL
MCH RBC QN AUTO: 26.9 PG
MCHC RBC AUTO-ENTMCNC: 32.8 G/DL
MCV RBC AUTO: 82 FL
MONOCYTES # BLD AUTO: 0.7 K/UL
MONOCYTES NFR BLD: 10.4 %
NEUTROPHILS # BLD AUTO: 3.8 K/UL
NEUTROPHILS NFR BLD: 53.4 %
PHOSPHATE SERPL-MCNC: 4.5 MG/DL
PLATELET # BLD AUTO: 224 K/UL
PMV BLD AUTO: 9.4 FL
POTASSIUM SERPL-SCNC: 4.4 MMOL/L
PROT SERPL-MCNC: 6.6 G/DL
RBC # BLD AUTO: 4.25 M/UL
SODIUM SERPL-SCNC: 138 MMOL/L
WBC # BLD AUTO: 7.1 K/UL

## 2018-06-21 PROCEDURE — 20000000 HC ICU ROOM

## 2018-06-21 PROCEDURE — P9047 ALBUMIN (HUMAN), 25%, 50ML: HCPCS | Performed by: NURSE PRACTITIONER

## 2018-06-21 PROCEDURE — 87086 URINE CULTURE/COLONY COUNT: CPT

## 2018-06-21 PROCEDURE — 84100 ASSAY OF PHOSPHORUS: CPT

## 2018-06-21 PROCEDURE — 85025 COMPLETE CBC W/AUTO DIFF WBC: CPT

## 2018-06-21 PROCEDURE — 83735 ASSAY OF MAGNESIUM: CPT

## 2018-06-21 PROCEDURE — 36569 INSJ PICC 5 YR+ W/O IMAGING: CPT

## 2018-06-21 PROCEDURE — 80170 ASSAY OF GENTAMICIN: CPT

## 2018-06-21 PROCEDURE — 25000003 PHARM REV CODE 250: Performed by: NURSE PRACTITIONER

## 2018-06-21 PROCEDURE — 94770 HC EXHALED C02 TEST: CPT

## 2018-06-21 PROCEDURE — 76937 US GUIDE VASCULAR ACCESS: CPT

## 2018-06-21 PROCEDURE — 84145 PROCALCITONIN (PCT): CPT

## 2018-06-21 PROCEDURE — 87088 URINE BACTERIA CULTURE: CPT

## 2018-06-21 PROCEDURE — 94003 VENT MGMT INPAT SUBQ DAY: CPT

## 2018-06-21 PROCEDURE — 63600175 PHARM REV CODE 636 W HCPCS: Performed by: INTERNAL MEDICINE

## 2018-06-21 PROCEDURE — C1751 CATH, INF, PER/CENT/MIDLINE: HCPCS

## 2018-06-21 PROCEDURE — 63600175 PHARM REV CODE 636 W HCPCS: Performed by: NURSE PRACTITIONER

## 2018-06-21 PROCEDURE — 87077 CULTURE AEROBIC IDENTIFY: CPT

## 2018-06-21 PROCEDURE — 99900035 HC TECH TIME PER 15 MIN (STAT)

## 2018-06-21 PROCEDURE — 02HV33Z INSERTION OF INFUSION DEVICE INTO SUPERIOR VENA CAVA, PERCUTANEOUS APPROACH: ICD-10-PCS | Performed by: HOSPITALIST

## 2018-06-21 PROCEDURE — 25000003 PHARM REV CODE 250: Performed by: HOSPITALIST

## 2018-06-21 PROCEDURE — 36415 COLL VENOUS BLD VENIPUNCTURE: CPT

## 2018-06-21 PROCEDURE — 97802 MEDICAL NUTRITION INDIV IN: CPT

## 2018-06-21 PROCEDURE — 27000221 HC OXYGEN, UP TO 24 HOURS

## 2018-06-21 PROCEDURE — 25000003 PHARM REV CODE 250: Performed by: INTERNAL MEDICINE

## 2018-06-21 PROCEDURE — 94761 N-INVAS EAR/PLS OXIMETRY MLT: CPT

## 2018-06-21 PROCEDURE — 87186 SC STD MICRODIL/AGAR DIL: CPT

## 2018-06-21 PROCEDURE — 80053 COMPREHEN METABOLIC PANEL: CPT

## 2018-06-21 PROCEDURE — 63600175 PHARM REV CODE 636 W HCPCS: Performed by: HOSPITALIST

## 2018-06-21 PROCEDURE — A4216 STERILE WATER/SALINE, 10 ML: HCPCS | Performed by: HOSPITALIST

## 2018-06-21 RX ORDER — DULOXETIN HYDROCHLORIDE 30 MG/1
60 CAPSULE, DELAYED RELEASE ORAL DAILY
Status: DISCONTINUED | OUTPATIENT
Start: 2018-06-21 | End: 2018-06-24 | Stop reason: HOSPADM

## 2018-06-21 RX ORDER — SODIUM CHLORIDE 0.9 % (FLUSH) 0.9 %
10 SYRINGE (ML) INJECTION EVERY 6 HOURS
Status: DISCONTINUED | OUTPATIENT
Start: 2018-06-21 | End: 2018-06-24 | Stop reason: HOSPADM

## 2018-06-21 RX ORDER — FUROSEMIDE 40 MG/1
40 TABLET ORAL DAILY
Status: DISCONTINUED | OUTPATIENT
Start: 2018-06-21 | End: 2018-06-24 | Stop reason: HOSPADM

## 2018-06-21 RX ORDER — ONDANSETRON HYDROCHLORIDE 4 MG/5ML
4 SOLUTION ORAL EVERY 6 HOURS PRN
Status: DISCONTINUED | OUTPATIENT
Start: 2018-06-21 | End: 2018-06-24 | Stop reason: HOSPADM

## 2018-06-21 RX ORDER — HYDROXYZINE HYDROCHLORIDE 25 MG/1
25 TABLET, FILM COATED ORAL 4 TIMES DAILY
Status: DISCONTINUED | OUTPATIENT
Start: 2018-06-21 | End: 2018-06-24 | Stop reason: HOSPADM

## 2018-06-21 RX ORDER — GENTAMICIN SULFATE 80 MG/100ML
80 INJECTION, SOLUTION INTRAVENOUS
Status: DISCONTINUED | OUTPATIENT
Start: 2018-06-21 | End: 2018-06-21

## 2018-06-21 RX ORDER — IPRATROPIUM BROMIDE AND ALBUTEROL SULFATE 2.5; .5 MG/3ML; MG/3ML
3 SOLUTION RESPIRATORY (INHALATION) EVERY 4 HOURS PRN
Status: DISCONTINUED | OUTPATIENT
Start: 2018-06-21 | End: 2018-06-24 | Stop reason: HOSPADM

## 2018-06-21 RX ORDER — DIPHENHYDRAMINE HCL 12.5MG/5ML
25 ELIXIR ORAL NIGHTLY
Status: DISCONTINUED | OUTPATIENT
Start: 2018-06-21 | End: 2018-06-24 | Stop reason: HOSPADM

## 2018-06-21 RX ORDER — HYDROCODONE BITARTRATE AND ACETAMINOPHEN 10; 325 MG/1; MG/1
1 TABLET ORAL 3 TIMES DAILY
Status: DISCONTINUED | OUTPATIENT
Start: 2018-06-21 | End: 2018-06-21

## 2018-06-21 RX ORDER — BISACODYL 10 MG
10 SUPPOSITORY, RECTAL RECTAL DAILY PRN
Status: DISCONTINUED | OUTPATIENT
Start: 2018-06-21 | End: 2018-06-24 | Stop reason: HOSPADM

## 2018-06-21 RX ORDER — ENOXAPARIN SODIUM 100 MG/ML
40 INJECTION SUBCUTANEOUS EVERY 24 HOURS
Status: DISCONTINUED | OUTPATIENT
Start: 2018-06-21 | End: 2018-06-24 | Stop reason: HOSPADM

## 2018-06-21 RX ORDER — RAMELTEON 8 MG/1
8 TABLET ORAL NIGHTLY
Status: DISCONTINUED | OUTPATIENT
Start: 2018-06-21 | End: 2018-06-21

## 2018-06-21 RX ORDER — RAMELTEON 8 MG/1
8 TABLET ORAL NIGHTLY
Status: DISCONTINUED | OUTPATIENT
Start: 2018-06-21 | End: 2018-06-24 | Stop reason: HOSPADM

## 2018-06-21 RX ORDER — SODIUM CHLORIDE 0.9 % (FLUSH) 0.9 %
3 SYRINGE (ML) INJECTION
Status: DISCONTINUED | OUTPATIENT
Start: 2018-06-21 | End: 2018-06-24 | Stop reason: HOSPADM

## 2018-06-21 RX ORDER — AMIODARONE HYDROCHLORIDE 200 MG/1
200 TABLET ORAL 2 TIMES DAILY
Status: DISCONTINUED | OUTPATIENT
Start: 2018-06-21 | End: 2018-06-24

## 2018-06-21 RX ORDER — DIPHENHYDRAMINE HCL 12.5MG/5ML
25 ELIXIR ORAL NIGHTLY
Status: DISCONTINUED | OUTPATIENT
Start: 2018-06-21 | End: 2018-06-21

## 2018-06-21 RX ORDER — SODIUM CHLORIDE 0.9 % (FLUSH) 0.9 %
10 SYRINGE (ML) INJECTION
Status: DISCONTINUED | OUTPATIENT
Start: 2018-06-21 | End: 2018-06-24 | Stop reason: HOSPADM

## 2018-06-21 RX ORDER — DOXEPIN HYDROCHLORIDE 25 MG/1
75 CAPSULE ORAL 2 TIMES DAILY
Status: DISCONTINUED | OUTPATIENT
Start: 2018-06-21 | End: 2018-06-24 | Stop reason: HOSPADM

## 2018-06-21 RX ORDER — ALBUMIN HUMAN 250 G/1000ML
25 SOLUTION INTRAVENOUS ONCE
Status: COMPLETED | OUTPATIENT
Start: 2018-06-21 | End: 2018-06-21

## 2018-06-21 RX ORDER — ACETAMINOPHEN 650 MG/20.3ML
650 LIQUID ORAL EVERY 6 HOURS PRN
Status: DISCONTINUED | OUTPATIENT
Start: 2018-06-21 | End: 2018-06-24 | Stop reason: HOSPADM

## 2018-06-21 RX ORDER — SODIUM CHLORIDE 9 MG/ML
INJECTION, SOLUTION INTRAVENOUS CONTINUOUS
Status: ACTIVE | OUTPATIENT
Start: 2018-06-21 | End: 2018-06-21

## 2018-06-21 RX ORDER — POLYETHYLENE GLYCOL 3350 17 G/17G
17 POWDER, FOR SOLUTION ORAL 2 TIMES DAILY PRN
Status: DISCONTINUED | OUTPATIENT
Start: 2018-06-21 | End: 2018-06-24 | Stop reason: HOSPADM

## 2018-06-21 RX ORDER — SENNOSIDES 8.6 MG/1
8.6 TABLET ORAL DAILY
Status: DISCONTINUED | OUTPATIENT
Start: 2018-06-21 | End: 2018-06-24 | Stop reason: HOSPADM

## 2018-06-21 RX ORDER — HYDROCODONE BITARTRATE AND ACETAMINOPHEN 10; 325 MG/1; MG/1
1 TABLET ORAL EVERY 8 HOURS PRN
Status: DISCONTINUED | OUTPATIENT
Start: 2018-06-21 | End: 2018-06-24 | Stop reason: HOSPADM

## 2018-06-21 RX ORDER — FAMOTIDINE 20 MG/1
20 TABLET, FILM COATED ORAL 2 TIMES DAILY
Status: DISCONTINUED | OUTPATIENT
Start: 2018-06-21 | End: 2018-06-24 | Stop reason: HOSPADM

## 2018-06-21 RX ORDER — DULOXETIN HYDROCHLORIDE 30 MG/1
60 CAPSULE, DELAYED RELEASE ORAL DAILY
Status: DISCONTINUED | OUTPATIENT
Start: 2018-06-21 | End: 2018-06-21

## 2018-06-21 RX ORDER — CARVEDILOL 6.25 MG/1
6.25 TABLET ORAL 2 TIMES DAILY
Status: DISCONTINUED | OUTPATIENT
Start: 2018-06-21 | End: 2018-06-24 | Stop reason: HOSPADM

## 2018-06-21 RX ORDER — MEROPENEM AND SODIUM CHLORIDE 1 G/50ML
1 INJECTION, SOLUTION INTRAVENOUS
Status: DISCONTINUED | OUTPATIENT
Start: 2018-06-21 | End: 2018-06-24 | Stop reason: HOSPADM

## 2018-06-21 RX ORDER — AMOXICILLIN 250 MG
7 CAPSULE ORAL DAILY
COMMUNITY

## 2018-06-21 RX ORDER — CLONAZEPAM 0.5 MG/1
0.5 TABLET ORAL 3 TIMES DAILY
Status: DISCONTINUED | OUTPATIENT
Start: 2018-06-21 | End: 2018-06-24 | Stop reason: HOSPADM

## 2018-06-21 RX ADMIN — DIPHENHYDRAMINE HYDROCHLORIDE 25 MG: 25 SOLUTION ORAL at 09:06

## 2018-06-21 RX ADMIN — PREGABALIN 100 MG: 75 CAPSULE ORAL at 08:06

## 2018-06-21 RX ADMIN — CLONAZEPAM 0.5 MG: 0.5 TABLET ORAL at 08:06

## 2018-06-21 RX ADMIN — HYDROXYZINE HYDROCHLORIDE 25 MG: 25 TABLET, FILM COATED ORAL at 04:06

## 2018-06-21 RX ADMIN — GENTAMICIN SULFATE 430 MG: 40 INJECTION, SOLUTION INTRAMUSCULAR; INTRAVENOUS at 11:06

## 2018-06-21 RX ADMIN — CARVEDILOL 6.25 MG: 6.25 TABLET, FILM COATED ORAL at 09:06

## 2018-06-21 RX ADMIN — HYDROXYZINE HYDROCHLORIDE 25 MG: 25 TABLET, FILM COATED ORAL at 09:06

## 2018-06-21 RX ADMIN — HYDROCODONE BITARTRATE AND ACETAMINOPHEN 1 TABLET: 10; 325 TABLET ORAL at 09:06

## 2018-06-21 RX ADMIN — SODIUM CHLORIDE: 0.9 INJECTION, SOLUTION INTRAVENOUS at 02:06

## 2018-06-21 RX ADMIN — FUROSEMIDE 40 MG: 40 TABLET ORAL at 09:06

## 2018-06-21 RX ADMIN — RAMELTEON 8 MG: 8 TABLET, FILM COATED ORAL at 08:06

## 2018-06-21 RX ADMIN — CLONAZEPAM 0.5 MG: 0.5 TABLET ORAL at 12:06

## 2018-06-21 RX ADMIN — GENTAMICIN SULFATE 80 MG: 80 INJECTION, SOLUTION INTRAVENOUS at 09:06

## 2018-06-21 RX ADMIN — CLONAZEPAM 0.5 MG: 0.5 TABLET ORAL at 09:06

## 2018-06-21 RX ADMIN — DOXEPIN HYDROCHLORIDE 75 MG: 25 CAPSULE ORAL at 09:06

## 2018-06-21 RX ADMIN — RAMELTEON 8 MG: 8 TABLET, FILM COATED ORAL at 12:06

## 2018-06-21 RX ADMIN — HYDROXYZINE HYDROCHLORIDE 25 MG: 25 TABLET, FILM COATED ORAL at 08:06

## 2018-06-21 RX ADMIN — CLONAZEPAM 0.5 MG: 0.5 TABLET ORAL at 03:06

## 2018-06-21 RX ADMIN — AMIODARONE HYDROCHLORIDE 200 MG: 200 TABLET ORAL at 09:06

## 2018-06-21 RX ADMIN — MEROPENEM AND SODIUM CHLORIDE 1 G: 1 INJECTION, SOLUTION INTRAVENOUS at 04:06

## 2018-06-21 RX ADMIN — PREGABALIN 100 MG: 75 CAPSULE ORAL at 09:06

## 2018-06-21 RX ADMIN — DULOXETINE HYDROCHLORIDE 60 MG: 30 CAPSULE, DELAYED RELEASE ORAL at 09:06

## 2018-06-21 RX ADMIN — PREGABALIN 100 MG: 75 CAPSULE ORAL at 03:06

## 2018-06-21 RX ADMIN — HYDROCODONE BITARTRATE AND ACETAMINOPHEN 1 TABLET: 10; 325 TABLET ORAL at 04:06

## 2018-06-21 RX ADMIN — CARVEDILOL 6.25 MG: 6.25 TABLET, FILM COATED ORAL at 08:06

## 2018-06-21 RX ADMIN — AMIODARONE HYDROCHLORIDE 200 MG: 200 TABLET ORAL at 08:06

## 2018-06-21 RX ADMIN — ENOXAPARIN SODIUM 40 MG: 100 INJECTION SUBCUTANEOUS at 04:06

## 2018-06-21 RX ADMIN — SODIUM CHLORIDE, PRESERVATIVE FREE 10 ML: 5 INJECTION INTRAVENOUS at 07:06

## 2018-06-21 RX ADMIN — MEROPENEM AND SODIUM CHLORIDE 1 G: 1 INJECTION, SOLUTION INTRAVENOUS at 11:06

## 2018-06-21 RX ADMIN — ALBUMIN HUMAN 25 G: 0.25 SOLUTION INTRAVENOUS at 03:06

## 2018-06-21 RX ADMIN — SODIUM CHLORIDE, PRESERVATIVE FREE 10 ML: 5 INJECTION INTRAVENOUS at 11:06

## 2018-06-21 RX ADMIN — DOXEPIN HYDROCHLORIDE 75 MG: 25 CAPSULE ORAL at 08:06

## 2018-06-21 RX ADMIN — HYDROXYZINE HYDROCHLORIDE 25 MG: 25 TABLET, FILM COATED ORAL at 01:06

## 2018-06-21 RX ADMIN — GENTAMICIN SULFATE 80 MG: 80 INJECTION, SOLUTION INTRAVENOUS at 01:06

## 2018-06-21 RX ADMIN — DIPHENHYDRAMINE HYDROCHLORIDE 25 MG: 25 SOLUTION ORAL at 12:06

## 2018-06-21 RX ADMIN — SENNOSIDES 8.6 MG: 8.6 TABLET, FILM COATED ORAL at 09:06

## 2018-06-21 RX ADMIN — FAMOTIDINE 20 MG: 20 TABLET ORAL at 08:06

## 2018-06-21 RX ADMIN — DAPTOMYCIN 660 MG: 500 INJECTION, POWDER, LYOPHILIZED, FOR SOLUTION INTRAVENOUS at 03:06

## 2018-06-21 RX ADMIN — HYDROXYZINE HYDROCHLORIDE 25 MG: 25 TABLET, FILM COATED ORAL at 12:06

## 2018-06-21 RX ADMIN — SODIUM CHLORIDE 500 ML: 0.9 INJECTION, SOLUTION INTRAVENOUS at 12:06

## 2018-06-21 RX ADMIN — FAMOTIDINE 20 MG: 20 TABLET ORAL at 09:06

## 2018-06-21 NOTE — PLAN OF CARE
Problem: Patient Care Overview  Goal: Plan of Care Review  Outcome: Ongoing (interventions implemented as appropriate)  Michele catheter changed to new silicone catheter, urine culture sent. Bathed. Rash, dried to left hip/flank area noted, see picture in chart. Established trach on home vent, placed on facility vent upon arrival to ICU. Antibiotics per orders, cultures in progress. BP on low side, wife states it is usually 90s over 50's. 500ml NS bolus given per order as well as albumin. AM labs pending. Has one IV site, multiple attempts for second IV unsuccessful, has PICC team consult for in the am. Also, ID consult for am. Redness, swelling and purulent drainage noted to left great toe, see picture in chart, wife states history of ingrown toenail at this site. No falls this shift, safety maintained.

## 2018-06-21 NOTE — HPI
48 y/o gentleman, who presents to the ED with c/o lethargy, penile discharge, and foul smelling urine.  He has a PMH of ALS (ventilator dependent), CHF, and Afib. His wife is at bedside and provides majority of history as he is nonverbal.  She reports that symptoms have been ongoing over the past 6 weeks after having his catheter exchanged.  She states that he has been on two different rounds of ABX recently.  However, symptoms continue to return.  She reports that home health has been monitoring him and recently repeated a urine culture on 05/16/2018.  She states that she was called today and instructed to present to the hospital d/t urine culture being positive for ESBL, VRE, and multidrug resistant pseudomonas.  She denies fevers while at home.  Initial workup reveals Tmax 98.3, no leukocytosis, UA with moderate bacteria, 3+ leukocytes, +2 occult blood.

## 2018-06-21 NOTE — ED NOTES
Pt resting in bed awake and alert.  Family at bedside.  All lines and tubes intact and secure.  Vital signs remain stable at this time.  Home ventilator plugged into electrical outlet though battery is fully charged.

## 2018-06-21 NOTE — PROCEDURES
"Bayron Delgado is a 47 y.o. male patient.    Temp: 98.4 °F (36.9 °C) (06/21/18 1200)  Pulse: 75 (06/21/18 1139)  Resp: 14 (06/21/18 1139)  BP: 111/60 (06/21/18 1000)  SpO2: 99 % (06/21/18 1139)  Weight: 109.7 kg (241 lb 13.5 oz) (06/21/18 1000)  Height: 5' 10" (177.8 cm) (06/21/18 1000)    PICC  Date/Time: 6/21/2018 10:30 AM  Performed by: REE FUENTES  Consent Done: Yes  Time out: Immediately prior to procedure a time out was called to verify the correct patient, procedure, equipment, support staff and site/side marked as required  Indications: med administration and vascular access  Anesthesia: local infiltration  Local anesthetic: lidocaine 1% without epinephrine  Anesthetic Total (mL): 3  Preparation: skin prepped with ChloraPrep  Skin prep agent dried: skin prep agent completely dried prior to procedure  Sterile barriers: all five maximum sterile barriers used - cap, mask, sterile gown, sterile gloves, and large sterile sheet  Hand hygiene: hand hygiene performed prior to central venous catheter insertion  Location details: right basilic  Catheter type: double lumen  Catheter size: 5 Fr  Catheter Length: 44cm    Ultrasound guidance: yes  Vessel Caliber: small and patent, compressibility normal  Needle advanced into vessel with real time Ultrasound guidance.  Guidewire confirmed in vessel.  Image recorded and saved.  Sterile sheath used.  Number of attempts: 2  Post-procedure: chlorhexidine patch, blood return through all ports and sterile dressing applied    Assessment: placement verified by x-ray, no pneumothorax on x-ray, tip termination and successful placement  Complications: none          Ree Fuentes  6/21/2018    "

## 2018-06-21 NOTE — PLAN OF CARE
Problem: Patient Care Overview  Goal: Plan of Care Review  Outcome: Ongoing (interventions implemented as appropriate)  Care plan reviewed.  Safety maintained. All side rails up during rotation therapy. Patient repositioned to middle of bed several times.  Remains on ventilator with tracheostomy. Orally suctioned several times.  IV antibiotics continue for treatment of infection. Patient remains on contact isolation. Dr. Sparrow, ID consulted.

## 2018-06-21 NOTE — ED PROVIDER NOTES
Encounter Date: 6/20/2018    SCRIBE #1 NOTE: I, Jaya Hernández, am scribing for, and in the presence of, Dr. Boggs.       History     Chief Complaint   Patient presents with    Urinary Tract Infection     vented pt from home with positive urine culture       06/20/2018 9:09 PM     Chief complaint: Penile discharge      Bayron Delgado is a 47 y.o. male arriving via EMS with a past medical history of CHF, atrial fibrillation, and ALS presenting to the ED with a gradual onset of acute penile discharge beginning 1 day ago. The spouse reported the pt has a history of UTI and tested positive for VRE. The wife stated The spouse noted the pt was tested positive for a UTI 1.5 weeks ago and sent home with antibiotics. Associated symptoms of hematuria and lethargy. The spouse noted the pt's urine has a foul smell and has sediment in drainage bag. The spouse reported a temporal measured temperature 97.2. The spouse denied fever. The spouse noted the catheter was placed due to ALS symptoms not allowing the pt to fully empty his bladder which caused UTIs. The spouse also c/o forceful catheter placement from a home health nurse 2 months ago caused the pt to have blood clots in urine which she believes caused the onset of initial symptoms. The pt has no history of cigarette smoking. She has a past surgical history of appendectomy and hernia repair.       The history is provided by the patient.     Review of patient's allergies indicates:   Allergen Reactions    Latex, natural rubber     Nsaids (non-steroidal anti-inflammatory drug)      Causes patient to go into afib per wife    Penicillins Other (See Comments)     Per wife- his mother stated he was allergic to it.  Had redness associated with cefepime 3/14/2018     Past Medical History:   Diagnosis Date    ALS (amyotrophic lateral sclerosis)     Atrial fibrillation     CHF (congestive heart failure)     Neuropathy      Past Surgical History:   Procedure Laterality Date     APPENDECTOMY      HERNIA REPAIR       History reviewed. No pertinent family history.  Social History   Substance Use Topics    Smoking status: Never Smoker    Smokeless tobacco: Not on file    Alcohol use No     Review of Systems   Constitutional: Negative for appetite change, chills and fever.        + Lethargy   Eyes: Negative for visual disturbance.   Respiratory: Negative for apnea and shortness of breath.    Cardiovascular: Negative for chest pain and palpitations.   Gastrointestinal: Negative for abdominal distention and abdominal pain.   Genitourinary: Positive for discharge and hematuria. Negative for difficulty urinating.   Musculoskeletal: Negative for neck pain.   Skin: Negative for pallor and rash.   Neurological: Negative for headaches.   Hematological: Does not bruise/bleed easily.   Psychiatric/Behavioral: Negative for agitation.       Physical Exam     Initial Vitals [06/20/18 2100]   BP Pulse Resp Temp SpO2   (!) 107/55 78 18 97.6 °F (36.4 °C) 95 %      MAP       --         Physical Exam    Nursing note and vitals reviewed.  Constitutional: He appears well-developed and well-nourished.   HENT:   Head: Normocephalic and atraumatic.   Eyes: Conjunctivae are normal.   Neck: Normal range of motion. Neck supple.   Cardiovascular: Normal rate, regular rhythm and normal heart sounds. Exam reveals no gallop and no friction rub.    No murmur heard.  Pulmonary/Chest: Breath sounds normal. No respiratory distress. He has no wheezes. He has no rhonchi. He has no rales.   Abdominal: He exhibits no distension. There is no tenderness.   Peg tube in place   Genitourinary:   Genitourinary Comments: Urethral dischage   Musculoskeletal: Normal range of motion.   Neurological: He is alert and oriented to person, place, and time.   Skin:   No surrounding erythema and no exudates around peg tube stoma   Psychiatric: He has a normal mood and affect.         ED Course   Procedures  Labs Reviewed   CBC W/ AUTO  DIFFERENTIAL - Abnormal; Notable for the following:        Result Value    RBC 4.39 (*)     Hemoglobin 11.7 (*)     Hematocrit 35.5 (*)     MCV 81 (*)     MCH 26.6 (*)     RDW 16.1 (*)     MPV 8.7 (*)     All other components within normal limits   COMPREHENSIVE METABOLIC PANEL - Abnormal; Notable for the following:     CO2 20 (*)     Glucose 136 (*)     BUN, Bld 21 (*)     ALT 77 (*)     All other components within normal limits   URINALYSIS - Abnormal; Notable for the following:     Specific Gravity, UA <=1.005 (*)     Occult Blood UA 2+ (*)     Leukocytes, UA 3+ (*)     All other components within normal limits   URINALYSIS MICROSCOPIC - Abnormal; Notable for the following:     RBC, UA 9 (*)     WBC, UA 24 (*)     Bacteria, UA Moderate (*)     All other components within normal limits   CULTURE, BLOOD   LACTIC ACID, PLASMA          Imaging Results          X-Ray Chest AP Portable (In process)                  Medical Decision Making:   History:   Old Medical Records: I decided to obtain old medical records.  Independently Interpreted Test(s):   I have ordered and independently interpreted X-rays - see summary below.       <> Summary of X-Ray Reading(s): Chest x-ray interpreted by me reveals no infiltrates, effusions or mediastinal widening  Clinical Tests:   Lab Tests: Ordered and Reviewed  Radiological Study: Ordered and Reviewed  ED Management:  47-year-old male with a history of ALS who has a chronic indwelling Michele presents with lethargy.  Urine culture obtained on 6.16.18 is positive for ESBL and Klebsiella, multidrug resistant Pseudomonas and vancomycin resistant Enterococcus.  Urinalysis reveals significant pyuria and bacteriuria.  I discussed the case with Dr. Machado who feels that treatment should be initiated.  All 3 organisms are sensitive to gentamicin; therefore, therapy will be started with gentamicin.  Infectious Disease will be consulted in regard to the need for additional antibiotics for both  Pseudomonas and vancomycin.    Imaging Results          X-Ray Chest AP Portable (In process)                         Scribe Attestation:   Scribe #1: I performed the above scribed service and the documentation accurately describes the services I performed. I attest to the accuracy of the note.    I, Dr. Modesto Boggs III, personally performed the services described in this documentation. All medical record entries made by the scribe were at my direction and in my presence.  I have reviewed the chart and agree that the record reflects my personal performance and is accurate and complete. Modesto Boggs III, MD.  1:29 AM 06/21/2018           Clinical Impression:   Diagnoses of Lethargy and UTI (urinary tract infection) were pertinent to this visit.      Disposition:   Disposition: Admitted                        Modesto Boggs III, MD  06/21/18 0130

## 2018-06-21 NOTE — NURSING
BP remains decreased 80/50 after NS bolus complete, NP notified, new order received. Noted increased BP upon awakening patient for lab draw prior to hanging albumin.

## 2018-06-21 NOTE — SUBJECTIVE & OBJECTIVE
Past Medical History:   Diagnosis Date    ALS (amyotrophic lateral sclerosis)     Atrial fibrillation     CHF (congestive heart failure)     Neuropathy        Past Surgical History:   Procedure Laterality Date    APPENDECTOMY      HERNIA REPAIR         Review of patient's allergies indicates:   Allergen Reactions    Latex, natural rubber     Nsaids (non-steroidal anti-inflammatory drug)      Causes patient to go into afib per wife    Penicillins Other (See Comments)     Per wife- his mother stated he was allergic to it.  Had redness associated with cefepime 3/14/2018       No current facility-administered medications on file prior to encounter.      Current Outpatient Prescriptions on File Prior to Encounter   Medication Sig    carvedilol (COREG) 6.25 MG tablet 1 tablet (6.25 mg total) by Per G Tube route 2 (two) times daily. (Patient taking differently: 12.5 mg by Per G Tube route 2 (two) times daily. )    clonazePAM (KLONOPIN) 0.5 MG tablet 1 tablet (0.5 mg total) by Per G Tube route 3 (three) times daily. (Patient taking differently: 0.5 mg by Per G Tube route every 6 (six) hours. )    doxepin (SINEQUAN) 75 MG capsule 1 capsule (75 mg total) by Per G Tube route 2 (two) times daily.    DULoxetine (CYMBALTA) 60 MG capsule Take 1 capsule (60 mg total) by mouth once daily.    famotidine (PEPCID) 20 MG tablet 1 tablet (20 mg total) by Per G Tube route 2 (two) times daily.    furosemide (LASIX) 40 MG tablet 1 tablet (40 mg total) by Per G Tube route once daily.    hydrocodone-acetaminophen 10-325mg (NORCO)  mg Tab 1 tablet by Per G Tube route 3 (three) times daily.     hydrOXYzine HCl (ATARAX) 25 MG tablet 1 tablet (25 mg total) by Per G Tube route 4 (four) times daily.    LACTOSE-REDUCED FOOD/FIBER (ISOSOURCE 1.5 YURY ORAL) by Per G Tube route 6 (six) times daily.     lidocaine (LIDODERM) 5 % Place 3 patches onto the skin once daily. Remove & Discard patch within 12 hours or as directed by MD     ondansetron (ZOFRAN) 4 mg/5 mL solution 5 mLs (4 mg total) by Per G Tube route every 6 (six) hours.    pregabalin (LYRICA) 100 MG capsule 1 capsule (100 mg total) by Per G Tube route 3 (three) times daily.    scopolamine (TRANSDERM-SCOP) 1.3-1.5 mg (1 mg over 3 days) Place 1 patch onto the skin Every 3 (three) days.    temazepam (RESTORIL) 15 mg Cap 15 mg by Per G Tube route every evening.    [DISCONTINUED] acetaminophen (TYLENOL) 325 MG tablet 2 tablets (650 mg total) by Per G Tube route every 6 (six) hours as needed.    [DISCONTINUED] diphenhydrAMINE (BENADRYL) 12.5 mg/5 mL elixir 10 mLs (25 mg total) by Per G Tube route every 6 (six) hours as needed for Itching.    [DISCONTINUED] diphenhydrAMINE (BENADRYL) 25 mg capsule 25 mg by Per G Tube route every morning. 4PM    [DISCONTINUED] diphenhydrAMINE (BENADRYL) 50 MG capsule 50 mg by Per G Tube route every evening.    [DISCONTINUED] enoxaparin (LOVENOX) 40 mg/0.4 mL Syrg Inject 0.4 mLs (40 mg total) into the skin every 12 (twelve) hours.    [DISCONTINUED] magnesium oxide (MAG-OX) 400 mg tablet 2 tablets (800 mg total) by Per G Tube route as needed (if magnesium level is 1.5-1.8 mg/dL give 800 mg every 4 hours x 2 doses).    [DISCONTINUED] magnesium oxide (MAG-OX) 400 mg tablet 2 tablets (800 mg total) by Per G Tube route as needed (if magnesium level is 1.4 mg/dL give 800 mg 4 hours x 3 doses).    [DISCONTINUED] morphine 100 mg/5 mL (20 mg/mL) concentrated solution Take 0.25 mg by mouth every 2 (two) hours as needed for Pain.    [DISCONTINUED] morphine 30 mg TRer 30 mg by Per G Tube route every 6 (six) hours while awake.    [DISCONTINUED] polyethylene glycol (GLYCOLAX) 17 gram PwPk 17 g by Per G Tube route 2 (two) times daily.    [DISCONTINUED] potassium chloride 10% (KAYCIEL) 20 mEq/15 mL solution 30 mLs (40 mEq total) by Per G Tube route as needed (if potassium level is 3.1-3.4 mmol/L give 40 mEq every 2 hours x 2 doses).    [DISCONTINUED] senna  (SENOKOT) 8.6 mg tablet 7 tablets by Per G Tube route 2 (two) times daily. AM and NOON     [DISCONTINUED] senna-docusate 8.6-50 mg (PERICOLACE) 8.6-50 mg per tablet 2 tablets by Per G Tube route 2 (two) times daily.    [DISCONTINUED] sulfamethoxazole-trimethoprim 800-160mg (BACTRIM DS) 800-160 mg Tab Take 1 tablet by mouth 2 (two) times daily.     Family History     None        Social History Main Topics    Smoking status: Never Smoker    Smokeless tobacco: Not on file    Alcohol use No    Drug use: No    Sexual activity: Yes     Partners: Female     Review of Systems   Unable to perform ROS: Patient nonverbal     Objective:     Vital Signs (Most Recent):  Temp: 98.3 °F (36.8 °C) (06/20/18 2314)  Pulse: 69 (06/20/18 2155)  Resp: 18 (06/20/18 2100)  BP: (!) 107/55 (06/20/18 2105)  SpO2: 97 % (06/20/18 2155) Vital Signs (24h Range):  Temp:  [97.6 °F (36.4 °C)-98.3 °F (36.8 °C)] 98.3 °F (36.8 °C)  Pulse:  [69-78] 69  Resp:  [18] 18  SpO2:  [95 %-97 %] 97 %  BP: (107)/(55) 107/55        There is no height or weight on file to calculate BMI.    Physical Exam   Constitutional: He is oriented to person, place, and time. He appears well-developed and well-nourished. No distress.   HENT:   Head: Normocephalic and atraumatic.   Eyes: EOM are normal. Pupils are equal, round, and reactive to light.   Neck: Normal range of motion. Neck supple.   Cardiovascular: Normal rate, regular rhythm, normal heart sounds and intact distal pulses.    No murmur heard.  Pulmonary/Chest: Effort normal and breath sounds normal. No respiratory distress. He has no wheezes. He has no rales.   Abdominal: Soft. Bowel sounds are normal. He exhibits no distension. There is no tenderness.   PEG tube   Genitourinary:   Genitourinary Comments: Michele catheter with clear yellow urine with sediment noted    Musculoskeletal: Normal range of motion. He exhibits edema.   Neurological: He is alert and oriented to person, place, and time.   Skin: Skin is  warm and dry. He is not diaphoretic.   Psychiatric: He has a normal mood and affect. His behavior is normal.   Nursing note and vitals reviewed.        CRANIAL NERVES     CN III, IV, VI   Pupils are equal, round, and reactive to light.  Extraocular motions are normal.        Significant Labs:   CBC:   Recent Labs  Lab 06/20/18 2132   WBC 7.60   HGB 11.7*   HCT 35.5*        CMP:   Recent Labs  Lab 06/20/18 2132      K 4.0      CO2 20*   *   BUN 21*   CREATININE 0.5   CALCIUM 9.7   PROT 6.7   ALBUMIN 3.5   BILITOT 0.4   ALKPHOS 90   AST 29   ALT 77*   ANIONGAP 11   EGFRNONAA >60     Urine Studies:   Recent Labs  Lab 06/20/18 2130   COLORU Yellow   APPEARANCEUA Clear   PHUR >=9.0   SPECGRAV <=1.005*   PROTEINUA Negative   GLUCUA Negative   KETONESU Negative   BILIRUBINUA Negative   OCCULTUA 2+*   NITRITE Negative   UROBILINOGEN Negative   LEUKOCYTESUR 3+*   RBCUA 9*   WBCUA 24*   BACTERIA Moderate*   SQUAMEPITHEL 1       Significant Imaging: I have reviewed all pertinent imaging results/findings within the past 24 hours.

## 2018-06-21 NOTE — H&P
[facility] Medicine  History & Physical    Patient Name: Bayron Delgado  MRN: [id number]  Admission Date: 6/20/2018  Attending Physician: Barney Rehman MD   Primary Care Provider: Scott Puckett Jr, MD         Patient information was obtained from spouse/SO and ER records.     Subjective:     Principal Problem:UTI (urinary tract infection)    Chief Complaint:   Chief Complaint   Patient presents with    Urinary Tract Infection     vented pt from home with positive urine culture        HPI: 48 y/o gentleman, who presents to the ED with c/o lethargy, penile discharge, and foul smelling urine.  He has a PMH of ALS (ventilator dependent), CHF, and Afib. His wife is at bedside and provides majority of history as he is nonverbal.  She reports that symptoms have been ongoing over the past 6 weeks after having his catheter exchanged.  She states that he has been on two different rounds of ABX recently.  However, symptoms continue to return.  She reports that home health has been monitoring him and recently repeated a urine culture on 05/16/2018.  She states that she was called today and instructed to present to the hospital d/t urine culture being positive for ESBL, VRE, and multidrug resistant pseudomonas.  She denies fevers while at home.  Initial workup reveals Tmax 98.3, no leukocytosis, UA with moderate bacteria, 3+ leukocytes, +2 occult blood.      Past Medical History:   Diagnosis Date    ALS (amyotrophic lateral sclerosis)     Atrial fibrillation     CHF (congestive heart failure)     Neuropathy        Past Surgical History:   Procedure Laterality Date    APPENDECTOMY      HERNIA REPAIR         Review of patient's allergies indicates:   Allergen Reactions    Latex, natural rubber     Nsaids (non-steroidal anti-inflammatory drug)      Causes patient to go into afib per wife    Penicillins Other (See Comments)     Per wife- his mother stated he was allergic to it.  Had redness  associated with cefepime 3/14/2018       No current facility-administered medications on file prior to encounter.      Current Outpatient Prescriptions on File Prior to Encounter   Medication Sig    carvedilol (COREG) 6.25 MG tablet 1 tablet (6.25 mg total) by Per G Tube route 2 (two) times daily. (Patient taking differently: 12.5 mg by Per G Tube route 2 (two) times daily. )    clonazePAM (KLONOPIN) 0.5 MG tablet 1 tablet (0.5 mg total) by Per G Tube route 3 (three) times daily. (Patient taking differently: 0.5 mg by Per G Tube route every 6 (six) hours. )    doxepin (SINEQUAN) 75 MG capsule 1 capsule (75 mg total) by Per G Tube route 2 (two) times daily.    DULoxetine (CYMBALTA) 60 MG capsule Take 1 capsule (60 mg total) by mouth once daily.    famotidine (PEPCID) 20 MG tablet 1 tablet (20 mg total) by Per G Tube route 2 (two) times daily.    furosemide (LASIX) 40 MG tablet 1 tablet (40 mg total) by Per G Tube route once daily.    hydrocodone-acetaminophen 10-325mg (NORCO)  mg Tab 1 tablet by Per G Tube route 3 (three) times daily.     hydrOXYzine HCl (ATARAX) 25 MG tablet 1 tablet (25 mg total) by Per G Tube route 4 (four) times daily.    LACTOSE-REDUCED FOOD/FIBER (ISOSOURCE 1.5 YURY ORAL) by Per G Tube route 6 (six) times daily.     lidocaine (LIDODERM) 5 % Place 3 patches onto the skin once daily. Remove & Discard patch within 12 hours or as directed by MD    ondansetron (ZOFRAN) 4 mg/5 mL solution 5 mLs (4 mg total) by Per G Tube route every 6 (six) hours.    pregabalin (LYRICA) 100 MG capsule 1 capsule (100 mg total) by Per G Tube route 3 (three) times daily.    scopolamine (TRANSDERM-SCOP) 1.3-1.5 mg (1 mg over 3 days) Place 1 patch onto the skin Every 3 (three) days.    temazepam (RESTORIL) 15 mg Cap 15 mg by Per G Tube route every evening.    [DISCONTINUED] acetaminophen (TYLENOL) 325 MG tablet 2 tablets (650 mg total) by Per G Tube route every 6 (six) hours as needed.     [DISCONTINUED] diphenhydrAMINE (BENADRYL) 12.5 mg/5 mL elixir 10 mLs (25 mg total) by Per G Tube route every 6 (six) hours as needed for Itching.    [DISCONTINUED] diphenhydrAMINE (BENADRYL) 25 mg capsule 25 mg by Per G Tube route every morning. 4PM    [DISCONTINUED] diphenhydrAMINE (BENADRYL) 50 MG capsule 50 mg by Per G Tube route every evening.    [DISCONTINUED] enoxaparin (LOVENOX) 40 mg/0.4 mL Syrg Inject 0.4 mLs (40 mg total) into the skin every 12 (twelve) hours.    [DISCONTINUED] magnesium oxide (MAG-OX) 400 mg tablet 2 tablets (800 mg total) by Per G Tube route as needed (if magnesium level is 1.5-1.8 mg/dL give 800 mg every 4 hours x 2 doses).    [DISCONTINUED] magnesium oxide (MAG-OX) 400 mg tablet 2 tablets (800 mg total) by Per G Tube route as needed (if magnesium level is 1.4 mg/dL give 800 mg 4 hours x 3 doses).    [DISCONTINUED] morphine 100 mg/5 mL (20 mg/mL) concentrated solution Take 0.25 mg by mouth every 2 (two) hours as needed for Pain.    [DISCONTINUED] morphine 30 mg TRer 30 mg by Per G Tube route every 6 (six) hours while awake.    [DISCONTINUED] polyethylene glycol (GLYCOLAX) 17 gram PwPk 17 g by Per G Tube route 2 (two) times daily.    [DISCONTINUED] potassium chloride 10% (KAYCIEL) 20 mEq/15 mL solution 30 mLs (40 mEq total) by Per G Tube route as needed (if potassium level is 3.1-3.4 mmol/L give 40 mEq every 2 hours x 2 doses).    [DISCONTINUED] senna (SENOKOT) 8.6 mg tablet 7 tablets by Per G Tube route 2 (two) times daily. AM and NOON     [DISCONTINUED] senna-docusate 8.6-50 mg (PERICOLACE) 8.6-50 mg per tablet 2 tablets by Per G Tube route 2 (two) times daily.    [DISCONTINUED] sulfamethoxazole-trimethoprim 800-160mg (BACTRIM DS) 800-160 mg Tab Take 1 tablet by mouth 2 (two) times daily.     Family History     No known past pertinent family history         Social History Main Topics    Smoking status: Never Smoker    Smokeless tobacco: Not on file    Alcohol use No     Drug use: No    Sexual activity: Yes     Partners: Female     Review of Systems   Unable to perform ROS: Patient nonverbal     Objective:     Vital Signs (Most Recent):  Temp: 98.3 °F (36.8 °C) (06/20/18 2314)  Pulse: 69 (06/20/18 2155)  Resp: 18 (06/20/18 2100)  BP: (!) 107/55 (06/20/18 2105)  SpO2: 97 % (06/20/18 2155) Vital Signs (24h Range):  Temp:  [97.6 °F (36.4 °C)-98.3 °F (36.8 °C)] 98.3 °F (36.8 °C)  Pulse:  [69-78] 69  Resp:  [18] 18  SpO2:  [95 %-97 %] 97 %  BP: (107)/(55) 107/55        There is no height or weight on file to calculate BMI.    Physical Exam   Constitutional: He is oriented to person, place, and time. He appears well-developed and well-nourished. No distress.   HENT:   Head: Normocephalic and atraumatic.   Eyes: EOM are normal. Pupils are equal, round, and reactive to light.   Neck: Normal range of motion. Neck supple.   Cardiovascular: Normal rate, regular rhythm, normal heart sounds and intact distal pulses.    No murmur heard.  Pulmonary/Chest: Effort normal and breath sounds normal. No respiratory distress. He has no wheezes. He has no rales.   Abdominal: Soft. Bowel sounds are normal. He exhibits no distension. There is no tenderness.   PEG tube   Genitourinary:   Genitourinary Comments: Michele catheter with clear yellow urine with sediment noted    Musculoskeletal: Normal range of motion. He exhibits edema.   Neurological: He is alert and oriented to person, place, and time.   Skin: Skin is warm and dry. He is not diaphoretic.   Psychiatric: He has a normal mood and affect. His behavior is normal.   Nursing note and vitals reviewed.      CRANIAL NERVES     CN III, IV, VI   Pupils are equal, round, and reactive to light.  Extraocular motions are normal.        Significant Labs:   CBC:   Recent Labs  Lab 06/20/18 2132   WBC 7.60   HGB 11.7*   HCT 35.5*        CMP:   Recent Labs  Lab 06/20/18 2132      K 4.0      CO2 20*   *   BUN 21*   CREATININE 0.5    CALCIUM 9.7   PROT 6.7   ALBUMIN 3.5   BILITOT 0.4   ALKPHOS 90   AST 29   ALT 77*   ANIONGAP 11   EGFRNONAA >60     Urine Studies:   Recent Labs  Lab 06/20/18  2130   COLORU Yellow   APPEARANCEUA Clear   PHUR >=9.0   SPECGRAV <=1.005*   PROTEINUA Negative   GLUCUA Negative   KETONESU Negative   BILIRUBINUA Negative   OCCULTUA 2+*   NITRITE Negative   UROBILINOGEN Negative   LEUKOCYTESUR 3+*   RBCUA 9*   WBCUA 24*   BACTERIA Moderate*   SQUAMEPITHEL 1       Significant Imaging: I have reviewed all pertinent imaging results/findings within the past 24 hours.    Assessment/Plan:     * UTI (urinary tract infection)    Chronic indwelling Munoz catheter  - presents with lethargy and recurrent urinary symptoms of penile discharge and foul smelling urine  - afebrile, no leukocytosis, appears nontoxic  - urine culture collected 05/16/18 + for VRE, ESBL, and multidrug resistant pseudomonas   - sensitive to gentamicin - started while in ED  - pharmacy consulted to assist with dosing  - exchange munoz catheter   - ID consult pending   - repeat blood and urine cultures pending - f/u on results   - gentle hydration overnight and reassess fluid status in the AM         ALS (amyotrophic lateral sclerosis)    Ventilator dependence  - resume home ventilator settings  - titrate oxygen as needed to maintain saturations greater than 92%  - duo nebs Q4 PRN SOB / wheezing  - offloading techniques and turn Q 2  - routine trach care    - passive ROM         Atrial fibrillation    - hx of PAfib  - HR ranging between 69-78  - resume home dosing of amiodarone and carvedilol   - maintain on telemetry   - trend electrolytes daily and replete electrolytes as indicated         Anemia    - Hgb 11.7 (baseline 9.8-10.4)  - possibly hemoconcentration / volume depletion     - trend h/h daily         Anxiety    Depression  - continue home clonazepam and duloxetine dosing           VTE Risk Mitigation         Ordered     enoxaparin injection 40 mg   Daily      06/21/18 0001     Place sequential compression device  Until discontinued      06/21/18 0001     Place JOHN hose  Until discontinued      06/21/18 0001     IP VTE HIGH RISK PATIENT  Once      06/21/18 0001             Navjot Waldron NP  Department of Hospital Medicine   [facility]

## 2018-06-21 NOTE — PLAN OF CARE
Patient is ventilator dependent, cared for at home by spouse, Perlita Delgado, [place] and paid caregivers 24/7. Patient has paid sitters that are paid for by patient's parents and brother. Patient does qualify for medicaid waiver services but d/t high level of care patient requires, they have not been able to find a qualified sitter through medicaid waiver providers.Spouse reports that patient has all necessary DME which is provided by [facility]- ventilator, ventilator supplies, resp therapist, and other DME. [facility] supples PEG feedings and supplies. [facility] provides home health services. Patient choice disclosure signed and placed on chart. Patient does not have a living will and is full code but spouse has MPOA and voiced that she knows his wishes and will intervene as needed if condition deteriorates. Patient is followed by Lilo Edwards NP who visits patient at home. Patient is nonverbal and requires ambulance transportation.      06/21/18 1500   Discharge Assessment   Assessment Type Discharge Planning Assessment   Confirmed/corrected address and phone number on facesheet? Yes   Assessment information obtained from? Caregiver   Communicated expected length of stay with patient/caregiver yes   Prior to hospitilization cognitive status: Unable to Assess   Prior to hospitalization functional status: Completely Dependent   Current cognitive status: Unable to Assess   Current Functional Status: Completely Dependent   Lives With child(kermit), dependent;spouse   Able to Return to Prior Arrangements yes   Is patient able to care for self after discharge? Yes   Patient's perception of discharge disposition home health   Readmission Within The Last 30 Days no previous admission in last 30 days   Patient currently being followed by outpatient case management? No   Patient currently receives any other outside agency services? No   Equipment Currently Used at Home ventilator;lift device;power chair;hospital  bed  (electric zach, air mattress)   Do you have any problems affording any of your prescribed medications? No   Is the patient taking medications as prescribed? yes   Does the patient have transportation home? Yes   Transportation Available ambulance   Does the patient receive services at the [facility]? No   Discharge Plan [facility]   Patient/Family In Agreement With Plan yes

## 2018-06-21 NOTE — PLAN OF CARE
This note also relates to the following rows which could not be included:  BP - Cannot attach notes to unvalidated device data     06/21/18 0802   Patient Assessment/Suction   Level of Consciousness (AVPU) alert   Respiratory Effort Unlabored   Expansion/Accessory Muscles/Retractions no use of accessory muscles;no retractions;expansion symmetric   All Lung Fields Breath Sounds diminished   Rhythm/Pattern, Respiratory mechanical device;artificial airway;ventilator assisted   Cough Frequency infrequent   Suction Method in-line suction catheter (closed)   Sputum Amount small   Sputum Color white-yellow   Sputum Consistency thick   PRE-TX-O2-ETCO2   O2 Device (Oxygen Therapy) ventilator   $ Is the patient on Low Flow Oxygen? Yes   Oxygen Concentration (%) 30   SpO2 100 %   Pulse Oximetry Type Continuous   $ Pulse Oximetry - Multiple Charge Pulse Oximetry - Multiple   ETCO2 (mmHg) 39 mmHg   $ ETCO2 Charge Exhaled CO2 Monitoring   $ ETCO2 Usage Currently wearing   Pulse 70   Resp 14   Aerosol Therapy   $ Aerosol Therapy Charges PRN treatment not required   Respiratory Treatment Status PRN treatment not required       Surgical Airway 03/19/18 1355 Shiley Cuffed   Placement Date/Time: 03/19/18 1355   Present Prior to Hospital Arrival?: Yes  Inserted by: MD  Placed By: (c) Other (Comment)  Type: Tracheostomy  Brand: Shiley  Airway Device Size: 8.0  Style: Cuffed   Cuff Pressure 30 cm H2O   Status Secured   Site Assessment Clean;Dry;No bleeding;No drainage   Ties Assessment Clean;Dry;Intact;Secure   Vent Select   Conventional Vent Y   $ Ventilator Subsequent 1   Charged w/in last 24h YES   Preset Conventional Ventilator Settings   Vent Type    Ventilation Type VC   Vent Mode A/C   Humidity HME   Set Rate 14 bmp   Vt Set 500 mL   PEEP/CPAP 5 cmH20   Pressure Support 0 cmH20   Waveform RAMP   Peak Flow 60 L/min   Set Inspiratory Pressure 0 cmH20   Insp Time 0 Sec(s)   Plateau Set/Insp. Hold (sec) 0   Insp Rise Time  0 %    Trigger Sensitivity Flow/I-Trigger 3 L/min   P High 0 cm H2O   P Low 0 cm H2O   T High 0 sec   T Low 0 sec   Patient Ventilator Parameters   Resp Rate Total 14 br/min   Peak Airway Pressure 23 cmH2O   Mean Airway Pressure 9.7 cmH20   Plateau Pressure 0 cmH20   Exhaled Vt 507 mL   Total Ve 7.09 mL   Spont Ve 0 L   I:E Ratio Measured 1:3.80   Conventional Ventilator Alarms   Ve High Alarm 31.5 L/min   Resp Rate High Alarm 0 br/min   Press High Alarm 50 cmH2O   Apnea Rate 10   Apnea Volume (mL) 440 mL   Apnea Oxygen Concentration  100   Apnea Flow Rate (L/min) 52   T Apnea 20 sec(s)   Ready to Wean/Extubation Screen   FIO2<=50 (chart decimal) 0.3   MV<16L (chart vol.) 7.09   PEEP <=8 (chart #) 5   Ready to Wean Parameters   F/VT Ratio<105 (RSBI) (!) 27.61   Airway Safety   Ambu bag with the patient? Yes, Adult Ambu   Is mask with the patient? Yes, Adult Mask       Patient resting comfortably on ventilator at documented settings. All alarms on and functioning properly. Aerosol treatments PRN.

## 2018-06-21 NOTE — NURSING
Noted patient's BP decreased, wife states it is usually 90's over 50s at home. Joel NP notified of BP, new orders received including IV fluid bolus.

## 2018-06-21 NOTE — PROGRESS NOTES
[facility] faxed urine culture results over to us. Called Dr. Sparrow with the results, new orders obtained.

## 2018-06-21 NOTE — PLAN OF CARE
Pt on a vent.  Sitter who was at bedside stated pt's wife would be here later.  CM will follow up with family to complete the assessment.     10:00 a.m. - attempted to speak to pt's wife.  No answer, left message on her voice mail, [phone] requesting she call me.      11:50 a.m. - received call from pt's wife who stated she would be at the hospital in 30 minutes.  Plan to meet with her later to complete pt's assessment.       06/21/18 0910   Discharge Assessment   Assessment Type Discharge Planning Assessment

## 2018-06-21 NOTE — PROGRESS NOTES
" [facility]  Adult Nutrition  Progress Note    SUMMARY       Recommendations    Continuous Feeding: Isosource 1.5 @ 35 mls/hr for 24 hrs, then advance to goal 55 mls/hr. Flush with 150 mls Q 4 hrs. At goal provides 1980 calories, 90 gms protein, 232 gm CHO, 1008 mls free water.     Bolus Feeding:  Isosource 1.5, 240mL (1 can) Q4. Please give 120 mL (1/2 can) for one feeding per day to provide 1980 kcals, 90 g pro, 232 g CHO, 1007 mL water. Provide 165 mL free water flush Q4.     Goals: (P)  (1) Pt will receive >85% of EEN before next RD follow up)  Nutrition Goal Status:  new  Communication of RD Recs: other (comment) (POC,sticky note)    Reason for Assessment    Reason for Assessment: consult, new tube feeding  Diagnosis:    1. Lethargy    2. UTI (urinary tract infection)      Relevant Medical History:   Past Medical History:   Diagnosis Date    ALS (amyotrophic lateral sclerosis)     Atrial fibrillation     CHF (congestive heart failure)     Neuropathy      Interdisciplinary Rounds: did not attend  General Information Comments: Pt was visited this afternoon for a New TF Consult. Pt on vent with sitter at bedside. Sitter reported pt had not received any EN yet. NFPE Completed-- no malnutrition noted at this time.    Nutrition Discharge Planning:  (to be determined)    Nutrition Risk Screen    Nutrition Risk Screen: tube feeding or parenteral nutrition    Nutrition/Diet History    Do you have any cultural, spiritual, Roman Catholic conflicts, given your current situation?: [facility]    Anthropometrics    Temp: 98.4 °F (36.9 °C)  Height Method: Stated  Height: 5' 10"  Height (inches): 70 in  Weight Method: Bed Scale  Weight: 109.7 kg (241 lb 13.5 oz)  Weight (lb): 241.85 lb  Ideal Body Weight (IBW), Male: 166 lb  % Ideal Body Weight, Male (lb): 145.69 lb  BMI (Calculated): 34.8  BMI Grade: 30 - 34.9- obesity - grade I     Lab/Procedures/Meds    Pertinent Labs Reviewed: reviewed  BMP  Lab " Results   Component Value Date     06/21/2018    K 4.4 06/21/2018     06/21/2018    CO2 19 (L) 06/21/2018    BUN 20 06/21/2018    CREATININE 0.5 06/21/2018    CALCIUM 9.7 06/21/2018    ANIONGAP 13 06/21/2018    ESTGFRAFRICA >60 06/21/2018    EGFRNONAA >60 06/21/2018     Lab Results   Component Value Date    ALBUMIN 3.5 06/21/2018     Lab Results   Component Value Date    CRP 3.9 09/10/2015       Pertinent Medications Reviewed: reviewed  Scheduled Meds:   amiodarone  200 mg Per G Tube BID    carvedilol  6.25 mg Per G Tube BID    clonazePAM  0.5 mg Per G Tube TID    DAPTOmycin (CUBICIN)  IV  6 mg/kg Intravenous Q24H    diphenhydrAMINE  25 mg Per G Tube QHS    doxepin  75 mg Per G Tube BID    DULoxetine  60 mg Per G Tube Daily    enoxaparin  40 mg Subcutaneous Daily    famotidine  20 mg Per G Tube BID    furosemide  40 mg Per G Tube Daily    hydrOXYzine HCl  25 mg Per G Tube QID    meropenem (MERREM) IVPB  1 g Intravenous Q8H    pregabalin  100 mg Per G Tube TID    ramelteon  8 mg Per G Tube QHS    senna  8.6 mg Per G Tube Daily    sodium chloride 0.9%  10 mL Intravenous Q6H       Physical Findings/Assessment    Overall Physical Appearance: obese, on ventilator support  Tubes: gastrostomy tube    Estimated/Assessed Needs    Weight Used For Calorie Calculations: 109.7 kg (241 lb 13.5 oz)   1907 kcals  [facility]  Protein Requirements:  (1.2-1.5 g pro/day =  g pro/day)  Weight Used For Protein Calculations: 75.3 kg (166 lb) (used IBW)  Fluid Requirements (mL): 1 mL/kcal or per MD rec  Fluid Need Method: RDA Method     CHO Requirement: n/a     Nutrition Prescription Ordered    Current Diet Order: NPO  Current Nutrition Support Formula Ordered: Isosource 1.5  Current Nutrition Support Rate Ordered:  (240 mL Q4)    Evaluation of Received Nutrient/Fluid Intake    Total Calories (kcal):  (SHERON at this time)  Total Protein (gm):  (SHERON at this time)  I/O:      Intake/Output Summary (Last 24 hours) at 06/21/18 1458  Last data filed at 06/21/18 1200   Gross per 24 hour   Intake           671.67 ml   Output             2260 ml   Net         -1588.33 ml       % Intake of Estimated Energy Needs: Other: SHERON at this time  % Meal Intake: NPO     Comments: Pt currently NPO with PEG tube for EN feed. Pt currently has an order for bolus Isosource 1.5 feeds, however, pt's sitter reported pt hasn't received any formula since admit. Current EN order would provide 2160 kcal, 98 g pro, 253 g CHO, and 1100 mL water.    Assessment and Plan    Nutrition Problem  Increase energy needs    Related to (etiology):   Increased physiological needs    Signs and Symptoms (as evidenced by):   Current dx of UTI    Interventions/Recommendations (treatment strategy):  See above    Nutrition Diagnosis Status:   New      Monitor and Evaluation    Food and Nutrient Intake: enteral nutrition intake  Food and Nutrient Adminstration: enteral and parenteral nutrition administration  Anthropometric Measurements: weight change, body mass index  Biochemical Data, Medical Tests and Procedures: electrolyte and renal panel, inflammatory profile  Nutrition-Focused Physical Findings: NFPE Completed-- no malnutrition noted at this time.    Nutrition Follow-Up    RD Follow-up?: (P) Yes (2x/wk)    Akosua Fritz, Dietetic Intern  I certify that I directed the dietetic intern in service delivery and guided them using my skilled judgment. As the cosigning dietitian, I have reviewed the dietetic interns documentation and am responsible for the treatment, assessment, and plan.  Acacia Arredondo, MS RD LD

## 2018-06-21 NOTE — PLAN OF CARE
Problem: Patient Care Overview  Goal: Individualization & Mutuality  Recommendations    Continuous Feeding: Isosource 1.5 @ 35 mls/hr for 24 hrs, then advance to goal 55 mls/hr. Flush with 150 mls Q 4 hrs. At goal provides 1980 calories, 90 gms protein, 232 gm CHO, 1008 mls free water.     Bolus Feeding:  Isosource 1.5, 240mL (1 can) Q4. Please give 120 mL (1/2 can) for one feeding per day to provide 1980 kcals, 90 g pro, 232 g CHO, 1007 mL water. Provide 165 mL free water flush Q4.     Goals: (P)  (1) Pt will receive >85% of EEN before next RD follow up)  Nutrition Goal Status:  new  Communication of RD Recs: other (comment) (POC,sticky note)

## 2018-06-21 NOTE — PROGRESS NOTES
[facility], obtained fax number to request urine test results. Request has been faxed.

## 2018-06-21 NOTE — UM SECONDARY REVIEW
Physician Advisor External    Level of Care Issue    Approved Inpatient for admit 6/20/2018 per Dr. Earl at [facility].

## 2018-06-22 LAB
ALBUMIN SERPL BCP-MCNC: 3.7 G/DL
ALP SERPL-CCNC: 92 U/L
ALT SERPL W/O P-5'-P-CCNC: 62 U/L
ANION GAP SERPL CALC-SCNC: 11 MMOL/L
AST SERPL-CCNC: 26 U/L
BASOPHILS # BLD AUTO: 0 K/UL
BASOPHILS NFR BLD: 0.5 %
BILIRUB SERPL-MCNC: 0.5 MG/DL
BUN SERPL-MCNC: 17 MG/DL
CALCIUM SERPL-MCNC: 9.8 MG/DL
CHLORIDE SERPL-SCNC: 106 MMOL/L
CO2 SERPL-SCNC: 23 MMOL/L
CREAT SERPL-MCNC: 0.5 MG/DL
DIFFERENTIAL METHOD: ABNORMAL
EOSINOPHIL # BLD AUTO: 0.1 K/UL
EOSINOPHIL NFR BLD: 1.1 %
ERYTHROCYTE [DISTWIDTH] IN BLOOD BY AUTOMATED COUNT: 16.4 %
EST. GFR  (AFRICAN AMERICAN): >60 ML/MIN/1.73 M^2
EST. GFR  (NON AFRICAN AMERICAN): >60 ML/MIN/1.73 M^2
GLUCOSE SERPL-MCNC: 160 MG/DL
HCT VFR BLD AUTO: 33.2 %
HGB BLD-MCNC: 11.1 G/DL
LYMPHOCYTES # BLD AUTO: 1.2 K/UL
LYMPHOCYTES NFR BLD: 12.1 %
MAGNESIUM SERPL-MCNC: 2.3 MG/DL
MCH RBC QN AUTO: 27.2 PG
MCHC RBC AUTO-ENTMCNC: 33.2 G/DL
MCV RBC AUTO: 82 FL
MONOCYTES # BLD AUTO: 0.4 K/UL
MONOCYTES NFR BLD: 4.5 %
NEUTROPHILS # BLD AUTO: 7.8 K/UL
NEUTROPHILS NFR BLD: 81.8 %
PHOSPHATE SERPL-MCNC: 3.2 MG/DL
PLATELET # BLD AUTO: 215 K/UL
PMV BLD AUTO: 9.7 FL
POTASSIUM SERPL-SCNC: 3.9 MMOL/L
PROCALCITONIN SERPL IA-MCNC: 0.21 NG/ML
PROT SERPL-MCNC: 7 G/DL
RBC # BLD AUTO: 4.06 M/UL
SODIUM SERPL-SCNC: 140 MMOL/L
WBC # BLD AUTO: 9.5 K/UL

## 2018-06-22 PROCEDURE — 63600175 PHARM REV CODE 636 W HCPCS: Performed by: NURSE PRACTITIONER

## 2018-06-22 PROCEDURE — 83735 ASSAY OF MAGNESIUM: CPT

## 2018-06-22 PROCEDURE — 25000003 PHARM REV CODE 250: Performed by: HOSPITALIST

## 2018-06-22 PROCEDURE — 94770 HC EXHALED C02 TEST: CPT

## 2018-06-22 PROCEDURE — 94761 N-INVAS EAR/PLS OXIMETRY MLT: CPT

## 2018-06-22 PROCEDURE — 94003 VENT MGMT INPAT SUBQ DAY: CPT

## 2018-06-22 PROCEDURE — 27000221 HC OXYGEN, UP TO 24 HOURS

## 2018-06-22 PROCEDURE — 80053 COMPREHEN METABOLIC PANEL: CPT

## 2018-06-22 PROCEDURE — 20000000 HC ICU ROOM

## 2018-06-22 PROCEDURE — 25000003 PHARM REV CODE 250: Performed by: INTERNAL MEDICINE

## 2018-06-22 PROCEDURE — A4216 STERILE WATER/SALINE, 10 ML: HCPCS | Performed by: NURSE PRACTITIONER

## 2018-06-22 PROCEDURE — 99900026 HC AIRWAY MAINTENANCE (STAT)

## 2018-06-22 PROCEDURE — 25000003 PHARM REV CODE 250: Performed by: NURSE PRACTITIONER

## 2018-06-22 PROCEDURE — 99900035 HC TECH TIME PER 15 MIN (STAT)

## 2018-06-22 PROCEDURE — 85025 COMPLETE CBC W/AUTO DIFF WBC: CPT

## 2018-06-22 PROCEDURE — 63600175 PHARM REV CODE 636 W HCPCS: Performed by: INTERNAL MEDICINE

## 2018-06-22 PROCEDURE — A4216 STERILE WATER/SALINE, 10 ML: HCPCS | Performed by: HOSPITALIST

## 2018-06-22 PROCEDURE — 84100 ASSAY OF PHOSPHORUS: CPT

## 2018-06-22 RX ORDER — SODIUM CHLORIDE 9 MG/ML
INJECTION, SOLUTION INTRAVENOUS ONCE
Status: DISCONTINUED | OUTPATIENT
Start: 2018-06-22 | End: 2018-06-22

## 2018-06-22 RX ORDER — SCOLOPAMINE TRANSDERMAL SYSTEM 1 MG/1
1 PATCH, EXTENDED RELEASE TRANSDERMAL
Status: DISCONTINUED | OUTPATIENT
Start: 2018-06-22 | End: 2018-06-22 | Stop reason: SDUPTHER

## 2018-06-22 RX ORDER — LIDOCAINE 50 MG/G
3 PATCH TOPICAL DAILY
Status: DISCONTINUED | OUTPATIENT
Start: 2018-06-23 | End: 2018-06-22

## 2018-06-22 RX ORDER — SCOLOPAMINE TRANSDERMAL SYSTEM 1 MG/1
1 PATCH, EXTENDED RELEASE TRANSDERMAL
Status: DISCONTINUED | OUTPATIENT
Start: 2018-06-22 | End: 2018-06-24 | Stop reason: HOSPADM

## 2018-06-22 RX ORDER — LIDOCAINE 50 MG/G
3 PATCH TOPICAL DAILY
Status: DISCONTINUED | OUTPATIENT
Start: 2018-06-22 | End: 2018-06-24 | Stop reason: HOSPADM

## 2018-06-22 RX ADMIN — HYDROXYZINE HYDROCHLORIDE 25 MG: 25 TABLET, FILM COATED ORAL at 04:06

## 2018-06-22 RX ADMIN — DAPTOMYCIN 660 MG: 500 INJECTION, POWDER, LYOPHILIZED, FOR SOLUTION INTRAVENOUS at 02:06

## 2018-06-22 RX ADMIN — FUROSEMIDE 40 MG: 40 TABLET ORAL at 09:06

## 2018-06-22 RX ADMIN — SENNOSIDES 8.6 MG: 8.6 TABLET, FILM COATED ORAL at 09:06

## 2018-06-22 RX ADMIN — CLONAZEPAM 0.5 MG: 0.5 TABLET ORAL at 08:06

## 2018-06-22 RX ADMIN — PREGABALIN 100 MG: 75 CAPSULE ORAL at 08:06

## 2018-06-22 RX ADMIN — FAMOTIDINE 20 MG: 20 TABLET ORAL at 08:06

## 2018-06-22 RX ADMIN — MEROPENEM AND SODIUM CHLORIDE 1 G: 1 INJECTION, SOLUTION INTRAVENOUS at 09:06

## 2018-06-22 RX ADMIN — CARVEDILOL 6.25 MG: 6.25 TABLET, FILM COATED ORAL at 08:06

## 2018-06-22 RX ADMIN — SODIUM CHLORIDE, PRESERVATIVE FREE 10 ML: 5 INJECTION INTRAVENOUS at 06:06

## 2018-06-22 RX ADMIN — MEROPENEM AND SODIUM CHLORIDE 1 G: 1 INJECTION, SOLUTION INTRAVENOUS at 04:06

## 2018-06-22 RX ADMIN — HYDROXYZINE HYDROCHLORIDE 25 MG: 25 TABLET, FILM COATED ORAL at 08:06

## 2018-06-22 RX ADMIN — HYDROXYZINE HYDROCHLORIDE 25 MG: 25 TABLET, FILM COATED ORAL at 09:06

## 2018-06-22 RX ADMIN — SCOPALAMINE 1 PATCH: 1 PATCH, EXTENDED RELEASE TRANSDERMAL at 02:06

## 2018-06-22 RX ADMIN — AMIODARONE HYDROCHLORIDE 200 MG: 200 TABLET ORAL at 09:06

## 2018-06-22 RX ADMIN — DOXEPIN HYDROCHLORIDE 75 MG: 25 CAPSULE ORAL at 08:06

## 2018-06-22 RX ADMIN — PREGABALIN 100 MG: 75 CAPSULE ORAL at 02:06

## 2018-06-22 RX ADMIN — HYDROXYZINE HYDROCHLORIDE 25 MG: 25 TABLET, FILM COATED ORAL at 01:06

## 2018-06-22 RX ADMIN — FAMOTIDINE 20 MG: 20 TABLET ORAL at 09:06

## 2018-06-22 RX ADMIN — ENOXAPARIN SODIUM 40 MG: 100 INJECTION SUBCUTANEOUS at 04:06

## 2018-06-22 RX ADMIN — HYDROCODONE BITARTRATE AND ACETAMINOPHEN 1 TABLET: 10; 325 TABLET ORAL at 04:06

## 2018-06-22 RX ADMIN — SODIUM CHLORIDE, PRESERVATIVE FREE 3 ML: 5 INJECTION INTRAVENOUS at 02:06

## 2018-06-22 RX ADMIN — AMIODARONE HYDROCHLORIDE 200 MG: 200 TABLET ORAL at 08:06

## 2018-06-22 RX ADMIN — DIPHENHYDRAMINE HYDROCHLORIDE 25 MG: 25 SOLUTION ORAL at 08:06

## 2018-06-22 RX ADMIN — CLONAZEPAM 0.5 MG: 0.5 TABLET ORAL at 02:06

## 2018-06-22 RX ADMIN — CARVEDILOL 6.25 MG: 6.25 TABLET, FILM COATED ORAL at 09:06

## 2018-06-22 RX ADMIN — PREGABALIN 100 MG: 75 CAPSULE ORAL at 09:06

## 2018-06-22 RX ADMIN — DOXEPIN HYDROCHLORIDE 75 MG: 25 CAPSULE ORAL at 09:06

## 2018-06-22 RX ADMIN — SODIUM CHLORIDE, PRESERVATIVE FREE 10 ML: 5 INJECTION INTRAVENOUS at 02:06

## 2018-06-22 RX ADMIN — CLONAZEPAM 0.5 MG: 0.5 TABLET ORAL at 09:06

## 2018-06-22 RX ADMIN — LIDOCAINE 3 PATCH: 50 PATCH TOPICAL at 08:06

## 2018-06-22 RX ADMIN — RAMELTEON 8 MG: 8 TABLET, FILM COATED ORAL at 08:06

## 2018-06-22 RX ADMIN — DULOXETINE HYDROCHLORIDE 60 MG: 30 CAPSULE, DELAYED RELEASE ORAL at 09:06

## 2018-06-22 NOTE — NURSING
"pts wife called.  Expressed concerns of visitor "Sandra" acting aggressive towards the sitter Swapna, demanding password and medical information.  Stating she was a cousin.    Wife-Perlita- states if she Sandra becomes aggressive or if the pts vital signs or pressures change and pt seems to be in distress to please have her removed from the room.  She does not want medical information given to her.  Password changed and reflected on chart.   "

## 2018-06-22 NOTE — PLAN OF CARE
06/22/18 0810   Patient Assessment/Suction   Level of Consciousness (AVPU) alert   All Lung Fields Breath Sounds coarse   Cough Frequency with stimulation  (used patient's home cough assist)   Suction Method required;in-line suction catheter (closed);tracheostomy;oral   Sputum Amount moderate   Sputum Color cloudy;white-yellow   Sputum Consistency thick   PRE-TX-O2-ETCO2   O2 Device (Oxygen Therapy) ventilator   $ Is the patient on Low Flow Oxygen? Yes   Oxygen Concentration (%) 30   SpO2 99 %   Pulse Oximetry Type Continuous   $ Pulse Oximetry - Multiple Charge Pulse Oximetry - Multiple   ETCO2 (mmHg) 41 mmHg   $ ETCO2 Charge Exhaled CO2 Monitoring   $ ETCO2 Usage Currently wearing   Pulse 83   Resp 18   Aerosol Therapy   $ Aerosol Therapy Charges PRN treatment not required       Surgical Airway 03/19/18 1355 Shiley Cuffed   Placement Date/Time: 03/19/18 1355   Present Prior to Hospital Arrival?: Yes  Inserted by: MD  Placed By: (c) Other (Comment)  Type: Tracheostomy  Brand: [employer]  Airway Device Size: 8.0  Style: Cuffed   Cuff Pressure 40 cm H2O   Status Secured   Site Assessment Clean;Dry;No bleeding;No drainage   Ties Assessment Clean;Dry;Intact;Secure   Vent Select   Conventional Vent Y   $ Ventilator Subsequent 1   Charged w/in last 24h YES   Preset Conventional Ventilator Settings   Vent Type    Ventilation Type VC   Vent Mode A/C   Humidity HME   Set Rate 14 bmp   Vt Set 500 mL   PEEP/CPAP 5 cmH20   Pressure Support 0 cmH20   Waveform RAMP   Peak Flow 60 L/min   Set Inspiratory Pressure 0 cmH20   Insp Time 0 Sec(s)   Plateau Set/Insp. Hold (sec) 0   Insp Rise Time  0 %   Trigger Sensitivity Flow/I-Trigger 3 L/min   P High 0 cm H2O   P Low 0 cm H2O   T High 0 sec   T Low 0 sec   Patient Ventilator Parameters   Resp Rate Total 14 br/min   Peak Airway Pressure 24 cmH2O   Mean Airway Pressure 10 cmH20   Plateau Pressure 0 cmH20   Exhaled Vt 514 mL   Total Ve 7.39 mL   Spont Ve 0 L   I:E Ratio Measured  1:3.80   Conventional Ventilator Alarms   Ve High Alarm 31.5 L/min   Resp Rate High Alarm 0 br/min   Press High Alarm 50 cmH2O   Apnea Rate 10   Apnea Volume (mL) 440 mL   Apnea Oxygen Concentration  100   Apnea Flow Rate (L/min) 52   T Apnea 20 sec(s)   Ready to Wean/Extubation Screen   FIO2<=50 (chart decimal) 0.3   MV<16L (chart vol.) 7.39   PEEP <=8 (chart #) 5   Ready to Wean Parameters   F/VT Ratio<105 (RSBI) (!) 35.02   Airway Safety   Ambu bag with the patient? Yes, Adult Ambu   Is mask with the patient? Yes, Adult Mask   Prn treatment not needed, performed cough assist.

## 2018-06-22 NOTE — CONSULTS
Consult Note  Infectious Disease    Reason for Consult:  UTI    HPI: Bayron Delgado is an unfortunate 47 year old man with ALS, maintained on a home ventilator, who has a chronic indwelling mnuoz and numerous positive urine cultures over the last 3 months. Wife is not present to give detailed history and patient answers yes/no with his eyebrows. He confirms that HH nurse obtains the urine cultures, and that they are only taken when he is sick and not just to check to see if the prior infection is gone. He denies chills, fever, nausea, abd discomfort, back pain. He was brought to the ED for lethargy, urethral discharge and abnormal urine culture, not amenable to oral antibiotics. He has had no fever and no leukocytosis. He endorses that his catheter is changed once a month.    Review of patient's allergies indicates:   Allergen Reactions    Latex, natural rubber     Nsaids (non-steroidal anti-inflammatory drug)      Causes patient to go into afib per wife    Penicillins Other (See Comments)     Per wife- his mother stated he was allergic to it.  Had redness associated with cefepime 3/14/2018     Past Medical History:   Diagnosis Date    ALS (amyotrophic lateral sclerosis)  Neurogenic bladder  Recurring UTIs  Chronic indwelling munoz  PEG     Atrial fibrillation     CHF (congestive heart failure)     Neuropathy      Past Surgical History:   Procedure Laterality Date    APPENDECTOMY      HERNIA REPAIR       Social History     Social History    Marital status:      Spouse name: N/A    Number of children: N/A    Years of education: N/A     Social History Main Topics    Smoking status: Never Smoker    Smokeless tobacco: None    Alcohol use No    Drug use: No    Sexual activity: Yes     Partners: Female     Other Topics Concern    None     Social History Narrative    None     History reviewed. No pertinent family history.    Pertinent medications noted:     Review of Systems: very limited due to the  patient's inability to verbalize  No chills, fever, sweats    No chest pain, palpitations, syncope  No cough, sputum production, shortness of breath, No nausea, vomiting, diarrhea though he does have soft stools from his tube feeding,no constipation, , or focal abd pain  I do not know how long he has been mechanically ventilated  No diabetes,   No bleeding,    EXAM & DIAGNOSTICS REVIEWED:   Vitals:     Temp:  [97.3 °F (36.3 °C)-98.4 °F (36.9 °C)]   Temp: 98.1 °F (36.7 °C) (06/21/18 1530)  Pulse: 73 (06/21/18 1916)  Resp: 18 (06/21/18 1916)  BP: (!) 103/57 (06/21/18 1800)  SpO2: 95 % (06/21/18 1916)    Intake/Output Summary (Last 24 hours) at 06/21/18 1927  Last data filed at 06/21/18 1800   Gross per 24 hour   Intake          2201.67 ml   Output             3460 ml   Net         -1258.33 ml       General:  In NAD. Looks non toxic. Alert and attentive, cooperative. Has been cared for meticulously  Eyes:  Anicteric, PERRL, EOMI. Answers yes/no with eyebrows  ENT:  Mouth w/ pink MMM, no lesions/exudate, good dentition  Neck:  Trachea midline, supple, no adenopathy appreciated  Lungs: Clear. Requiring only 30% oxygen  Heart:  RRR, no gallop/murmur noted  Abd:  soft, NT, ND, normal BS, no masses/organomegaly appreciated.  :  Michele draining yellow urine, clear  Musc:  Joints without effusion, swelling,  erythema, synovitis,   Skin:  Generally warm, dry, normal for color. No rashes. No palmar or plantar    lesions. No subungual petechiae  Wound:   Neuro: AAOx3, quadriplegic, facial muscles move a little  Extrem: Chronic pitting edema,no  erythema, phlebitis, cellulitis,   VAD:    Lines/Tubes/Drains:    General Labs reviewed:    Recent Labs  Lab 06/21/18  0321   WBC 7.10   RBC 4.25*   HGB 11.4*   HCT 34.9*      MCV 82   MCH 26.9*   MCHC 32.8       Recent Labs  Lab 06/21/18  0321   CALCIUM 9.7   PROT 6.6      K 4.4   CO2 19*      BUN 20   CREATININE 0.5   ALKPHOS 84   ALT 71*   AST 29   BILITOT 0.5            Micro:6/16 urine culture: VRE, pseudomonas and ESBL Klebsiella  6/2: urine culture: ESBL Klebsiella  5/15: urine culture: Proteus, Group B strep, pseudomonas, Citrobacter  4/27: urine culture: citrobacter      Microbiology Results (last 7 days)     Procedure Component Value Units Date/Time    Blood Culture #1 [[id number]] Collected:  06/20/18 2136    Order Status:  Completed Specimen:  Blood from Peripheral, Left  Hand Updated:  06/21/18 1715     Blood Culture, Routine No Growth to date    Urine culture [[id number]] Collected:  06/21/18 0115    Order Status:  Sent Specimen:  Urine from Urine, Catheterized Updated:  06/21/18 0956        Imaging Reviewed:   CXR      IMPRESSION & PLAN   1. Lethargy, with positive urine culture, no fever, no leukocytosis, mild pyuria  2. Chronic indwelling munoz, neurogenic bladder  3. ALS, with chronic respiratory failure  4. MDRO    Recommendation:   daptomycin and merrem while we sort out whether he actually had a symptomatic UTI  When was last renal us?  When was last urology eval?

## 2018-06-22 NOTE — PROGRESS NOTES
Progress Note  Infectious Disease    Follow-up For:  MDRO UTI    SUBJECTIVE:   ROS:  No complaints. Feels about the same. Sitter at bedside indicates that he has been cultured as test of cure, not just for symptoms. Last antibiotic was augmentin?.     Antibiotics     Start     Stop Route Frequency Ordered    06/21/18 1600  meropenem-0.9% sodium chloride 1 g/50 mL IVPB      -- IV Every 8 hours (non-standard times) 06/21/18 1434    06/21/18 1530  DAPTOmycin (CUBICIN) 660 mg in sodium chloride 0.9% IVPB      -- IV Every 24 hours (non-standard times) 06/21/18 1434          Pertinent Medications noted:    EXAM & DIAGNOSTICS REVIEWED:   Vitals:     Temp:  [98.2 °F (36.8 °C)-99.1 °F (37.3 °C)]   Temp: 99.1 °F (37.3 °C) (06/22/18 1600)  Pulse: 78 (06/22/18 1700)  Resp: 15 (06/22/18 1700)  BP: (!) 90/57 (06/22/18 1700)  SpO2: 96 % (06/22/18 1700)    Intake/Output Summary (Last 24 hours) at 06/22/18 1757  Last data filed at 06/22/18 1607   Gross per 24 hour   Intake             2190 ml   Output             2945 ml   Net             -755 ml       General:  In NAD. Looks alert and comfortable  Eyes:  Anicteric, PERRL, EOMI  ENT:  Mouth w/ pink MMM, no lesions/exudate,   Neck:  Trachea midline, supple, no adenopathy appreciated  Lungs:  clear  Heart:  RRR, no gallop/murmur noted  Abd:  soft, NT, ND, normal BS, no masses/organomegaly appreciated.  :  Michele draining yellow urine, clear  Musc:  Joints without effusion, erythema, synovitis,   Skin:  Generally warm, dry, normal for color. No rashes  Wound: Left great toenail has a little dried exudate. Able toexpress 1 drop of pus, minimal erythema  Neuro: AAOx3,quadriplegic  Extrem: Chronic generalized edema,  VAD:      Lines/Tubes/Drains:    General Labs reviewed:    Recent Labs  Lab 06/22/18  0303   WBC 9.50   RBC 4.06*   HGB 11.1*   HCT 33.2*      MCV 82   MCH 27.2   MCHC 33.2       Recent Labs  Lab 06/22/18  0303   CALCIUM 9.8   PROT 7.0      K 3.9   CO2 23   CL  106   BUN 17   CREATININE 0.5   ALKPHOS 92   ALT 62*   AST 26   BILITOT 0.5     6/16 urine culture: VRE, pseudomonas and ESBL Klebsiella  6/2: urine culture: ESBL Klebsiella  5/15: urine culture: Proteus, Group B strep, pseudomonas, Citrobacter  4/27: urine culture: citrobacter    Micro: urine culture here growing Ecoli, ?ESBL    Imaging Reviewed: renal ultrasound neg    ASSESSMENT & PLAN      Patient Active Problem List   Diagnosis    ALS (amyotrophic lateral sclerosis)    Atrial fibrillation    Bartonella infection    Anasarca    Anxiety    Urinary tract infection associated with indwelling urethral catheter    Anemia    Ventilator dependence    Chronic indwelling Munoz catheter    Tracheostomy in place   1.         Lethargy, with positive urine culture, no fever, no leukocytosis, mild pyuria  2.         Chronic indwelling munoz, neurogenic bladder  3.         ALS, with chronic respiratory failure  4.         MDRO  5. Paronychia left great toe    Recommendation: Will review culture tomorrow and give final recs. (perhaps we will be able to give fosfomycin through his PEG tube)  I have suggested to sitter(wife not here) not to check urine culture for test of cure as this will only reveal more resistant organisms. He should only be cultured for symptoms: fever, chills, nausea, abd pain, decreased level of consciousness, increased SOB, etc.  Munoz change every 3-4 weeks  Warm compresses to left great toe and soap and water, no ointment

## 2018-06-22 NOTE — SUBJECTIVE & OBJECTIVE
Interval History: Patient seen and examined.  No acute events overnight.  Chest x-ray reviewed shows increased atelectasis in the patient's baseline.  Plan of care reviewed with the nurses and staff in detail.  Also discussed with Infectious Disease.    Review of Systems   Unable to perform ROS: Patient nonverbal     Objective:     Vital Signs (Most Recent):  Temp: 98.6 °F (37 °C) (06/22/18 1145)  Pulse: 92 (06/22/18 1531)  Resp: 19 (06/22/18 1531)  BP: 94/61 (06/22/18 1400)  SpO2: 96 % (06/22/18 1531) Vital Signs (24h Range):  Temp:  [98.2 °F (36.8 °C)-98.9 °F (37.2 °C)] 98.6 °F (37 °C)  Pulse:  [62-92] 92  Resp:  [14-23] 19  SpO2:  [93 %-100 %] 96 %  BP: ()/(56-74) 94/61     Weight: 111.2 kg (245 lb 2.4 oz)  Body mass index is 35.18 kg/m².    Intake/Output Summary (Last 24 hours) at 06/22/18 1609  Last data filed at 06/22/18 1500   Gross per 24 hour   Intake             2180 ml   Output             2745 ml   Net             -565 ml      Physical Exam   Constitutional: No distress.   Patient nonverbal unresponsive at baseline   HENT:   Mouth/Throat: Oropharynx is clear and moist. No oropharyngeal exudate.   Noted ETT in place. OG/NG in place as well   Eyes: Right eye exhibits no discharge. Left eye exhibits no discharge.   Pupils sluggish   Neck: No JVD present. No tracheal deviation present. No thyromegaly present.   Trach in place   Cardiovascular: Intact distal pulses.  Exam reveals no gallop and no friction rub.    No murmur heard.  Pulmonary/Chest: No respiratory distress. He has no wheezes. He has no rales.   Patient with coarse breath sounds on ventilator   Abdominal: Soft. He exhibits no distension. There is no tenderness.   PEG in place   Genitourinary:   Genitourinary Comments: Indwelling munoz in place   Musculoskeletal: He exhibits edema (3+ anasarca). He exhibits no tenderness or deformity.   Neurological: He displays normal reflexes. He exhibits normal muscle tone.   Patient with diffuse,  flaccid paralysis at baseline. Some movement of eyebrows is on response   Skin: No rash noted. He is not diaphoretic. No erythema.   Noted rash on buttocks- L side with drying lesions and scale. Noted L toe with purulent exudate   Nursing note and vitals reviewed.      Significant Labs: All pertinent labs within the past 24 hours have been reviewed.    Significant Imaging: I have reviewed all pertinent imaging results/findings within the past 24 hours.

## 2018-06-22 NOTE — SUBJECTIVE & OBJECTIVE
Interval History: Patient seen and examined.  No acute events overnight.  Patient remains at a symptomatic baseline.  Mild hypertension overnight which has essentially resolved this morning.  Gentamicin has been started on the patient by the primary admitting provider.  Had a long discussion with the patient's caregiver and I cannot find the patient's culture results at the present time.    Review of Systems   Unable to perform ROS: Patient nonverbal     Objective:     Vital Signs (Most Recent):  Temp: 98.2 °F (36.8 °C) (06/21/18 1900)  Pulse: 76 (06/21/18 2100)  Resp: 16 (06/21/18 2100)  BP: 100/70 (06/21/18 2100)  SpO2: 99 % (06/21/18 2100) Vital Signs (24h Range):  Temp:  [97.3 °F (36.3 °C)-98.4 °F (36.9 °C)] 98.2 °F (36.8 °C)  Pulse:  [58-92] 76  Resp:  [10-18] 16  SpO2:  [94 %-100 %] 99 %  BP: ()/(47-70) 100/70     Weight: 109.7 kg (241 lb 13.5 oz)  Body mass index is 34.7 kg/m².    Intake/Output Summary (Last 24 hours) at 06/21/18 2255  Last data filed at 06/21/18 1800   Gross per 24 hour   Intake          2201.67 ml   Output             3460 ml   Net         -1258.33 ml      Physical Exam   Constitutional: No distress.   Patient nonverbal unresponsive at baseline   HENT:   Mouth/Throat: Oropharynx is clear and moist. No oropharyngeal exudate.   Noted ETT in place. OG/NG in place as well   Eyes: Right eye exhibits no discharge. Left eye exhibits no discharge.   Pupils sluggish   Neck: No JVD present. No tracheal deviation present. No thyromegaly present.   Trach in place   Cardiovascular: Intact distal pulses.  Exam reveals no gallop and no friction rub.    No murmur heard.  Pulmonary/Chest: No respiratory distress. He has no wheezes. He has no rales.   Patient with coarse breath sounds on ventilator   Abdominal: Soft. He exhibits no distension. There is no tenderness.   PEG in place   Genitourinary:   Genitourinary Comments: Indwelling munoz in place   Musculoskeletal: He exhibits edema (3+ anasarca).  He exhibits no tenderness or deformity.   Neurological: He displays normal reflexes. He exhibits normal muscle tone.   Patient with diffuse, flaccid paralysis at baseline. Some movement of eyebrows is on response   Skin: No rash noted. He is not diaphoretic. No erythema.   Noted rash on buttocks- L side with drying lesions and scale. Noted L toe with purulent exudate   Nursing note and vitals reviewed.      Significant Labs: All pertinent labs within the past 24 hours have been reviewed.    Significant Imaging: I have reviewed all pertinent imaging results/findings within the past 24 hours.

## 2018-06-22 NOTE — PROGRESS NOTES
[facility] Hospital Medicine  Progress Note    Patient Name: Bayron Delgado  MRN: [id number]  Patient Class: IP- Inpatient   Admission Date: 6/20/2018  Length of Stay: 2 days  Attending Physician: Barney Rehman MD  Primary Care Provider: Scott Puckett Jr, MD        Subjective:     Principal Problem:Urinary tract infection associated with indwelling urethral catheter    HPI:  48 y/o gentleman, who presents to the ED with c/o lethargy, penile discharge, and foul smelling urine.  He has a PMH of ALS (ventilator dependent), CHF, and Afib. His wife is at bedside and provides majority of history as he is nonverbal.  She reports that symptoms have been ongoing over the past 6 weeks after having his catheter exchanged.  She states that he has been on two different rounds of ABX recently.  However, symptoms continue to return.  She reports that home health has been monitoring him and recently repeated a urine culture on 05/16/2018.  She states that she was called today and instructed to present to the hospital d/t urine culture being positive for ESBL, VRE, and multidrug resistant pseudomonas.  She denies fevers while at home.  Initial workup reveals Tmax 98.3, no leukocytosis, UA with moderate bacteria, 3+ leukocytes, +2 occult blood.      Hospital Course:  No notes on file    Interval History: Patient seen and examined.  No acute events overnight.  Chest x-ray reviewed shows increased atelectasis in the patient's baseline.  Plan of care reviewed with the nurses and staff in detail.  Also discussed with Infectious Disease.    Review of Systems   Unable to perform ROS: Patient nonverbal     Objective:     Vital Signs (Most Recent):  Temp: 98.6 °F (37 °C) (06/22/18 1145)  Pulse: 92 (06/22/18 1531)  Resp: 19 (06/22/18 1531)  BP: 94/61 (06/22/18 1400)  SpO2: 96 % (06/22/18 1531) Vital Signs (24h Range):  Temp:  [98.2 °F (36.8 °C)-98.9 °F (37.2 °C)] 98.6 °F (37 °C)  Pulse:  [62-92] 92  Resp:  [14-23] 19  SpO2:   [93 %-100 %] 96 %  BP: ()/(56-74) 94/61     Weight: 111.2 kg (245 lb 2.4 oz)  Body mass index is 35.18 kg/m².    Intake/Output Summary (Last 24 hours) at 06/22/18 1609  Last data filed at 06/22/18 1500   Gross per 24 hour   Intake             2180 ml   Output             2745 ml   Net             -565 ml      Physical Exam   Constitutional: No distress.   Patient nonverbal unresponsive at baseline   HENT:   Mouth/Throat: Oropharynx is clear and moist. No oropharyngeal exudate.   Noted ETT in place. OG/NG in place as well   Eyes: Right eye exhibits no discharge. Left eye exhibits no discharge.   Pupils sluggish   Neck: No JVD present. No tracheal deviation present. No thyromegaly present.   Trach in place   Cardiovascular: Intact distal pulses.  Exam reveals no gallop and no friction rub.    No murmur heard.  Pulmonary/Chest: No respiratory distress. He has no wheezes. He has no rales.   Patient with coarse breath sounds on ventilator   Abdominal: Soft. He exhibits no distension. There is no tenderness.   PEG in place   Genitourinary:   Genitourinary Comments: Indwelling munoz in place   Musculoskeletal: He exhibits edema (3+ anasarca). He exhibits no tenderness or deformity.   Neurological: He displays normal reflexes. He exhibits normal muscle tone.   Patient with diffuse, flaccid paralysis at baseline. Some movement of eyebrows is on response   Skin: No rash noted. He is not diaphoretic. No erythema.   Noted rash on buttocks- L side with drying lesions and scale. Noted L toe with purulent exudate   Nursing note and vitals reviewed.      Significant Labs: All pertinent labs within the past 24 hours have been reviewed.    Significant Imaging: I have reviewed all pertinent imaging results/findings within the past 24 hours.    Assessment/Plan:      * Urinary tract infection associated with indwelling urethral catheter    - urine culture collected + for VRE, ESBL, and multidrug resistant pseudomonas. Patient on  targeted antibiotic therapy per culture data by Infectious Disease.  Will defer to Infectious Disease for antimicrobial management and attempt to set up patient for outpatient antibiotics if at all possible.  Will check renal ultrasound to rule out potential nidus of infection within the kidney itself.        Ventilator dependence    Patient's vent settings, CXR were reviewed.    Vent Mode: A/C  Oxygen Concentration (%):  [30] 30  Resp Rate Total:  [14 br/min-20 br/min] 18 br/min  Vt Set:  [500 mL] 500 mL  PEEP/CPAP:  [5 cmH20] 5 cmH20  Pressure Support:  [0 cmH20] 0 cmH20  Mean Airway Pressure:  [10 adG21-24 cmH20] 11 cmH20     Patient with increased atelectasis today.  Encouraged use of cough assist.    Will adjust vent management as follows- Continue same management for now. Use cough assist.            Tracheostomy in place    Routine care          Chronic indwelling Munoz catheter    With UTI- Change per reccs          Anemia    Patient's anemia is currently controlled. S/p 0 units of PRBCs. Etiology likely d/t malnutrition  Current CBC reviewed-   Lab Results   Component Value Date    HGB 11.1 (L) 06/22/2018    HCT 33.2 (L) 06/22/2018     Monitor serial CBC and transfuse if patient becomes hemodynamically unstable, symptomatic or H/H drops below 7/21.           Anxiety    Chronic problem, controlled. Will continue chronic medication(s), BZD and SSRI and monitor for any changes, adjusting as needed.           Anasarca    Chronic problem. Related to severe immobility. Continue lasix and monitor I/Os.          Atrial fibrillation    Atrial Fibrillation controlled currently with Amiodarone. [id number] score 4. Anticoagulation not indicated currently d/t ALS          ALS (amyotrophic lateral sclerosis)    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 Noted cough assist. Patient seen by Dr. Fuentes as an outpatient.  No acute inpatient needs at this point in time.           VTE Risk Mitigation          Ordered     enoxaparin injection 40 mg  Daily      06/21/18 0001     Place sequential compression device  Until discontinued      06/21/18 0001     Place JOHN hose  Until discontinued      06/21/18 0001     IP VTE HIGH RISK PATIENT  Once      06/21/18 0001          Critical care time spent on the evaluation and treatment of severe organ dysfunction, review of pertinent labs and imaging studies, discussions with consulting providers and discussions with patient/family: 37 minutes.    Barney Rehman MD  Department of Hospital Medicine   [facility]

## 2018-06-22 NOTE — PROGRESS NOTES
[facility] Medicine  Progress Note    Patient Name: Bayron Delgado  MRN: [id number]  Patient Class: IP- Inpatient   Admission Date: 6/20/2018  Length of Stay: 1 days  Attending Physician: Barney Rehman MD  Primary Care Provider: Scott Puckett Jr, MD        Subjective:     Principal Problem:Urinary tract infection associated with indwelling urethral catheter    HPI:  48 y/o gentleman, who presents to the ED with c/o lethargy, penile discharge, and foul smelling urine.  He has a PMH of ALS (ventilator dependent), CHF, and Afib. His wife is at bedside and provides majority of history as he is nonverbal.  She reports that symptoms have been ongoing over the past 6 weeks after having his catheter exchanged.  She states that he has been on two different rounds of ABX recently.  However, symptoms continue to return.  She reports that home health has been monitoring him and recently repeated a urine culture on 05/16/2018.  She states that she was called today and instructed to present to the hospital d/t urine culture being positive for ESBL, VRE, and multidrug resistant pseudomonas.  She denies fevers while at home.  Initial workup reveals Tmax 98.3, no leukocytosis, UA with moderate bacteria, 3+ leukocytes, +2 occult blood.      Hospital Course:  No notes on file    Interval History: Patient seen and examined.  No acute events overnight.  Patient remains at a symptomatic baseline.  Mild hypertension overnight which has essentially resolved this morning.  Gentamicin has been started on the patient by the primary admitting provider.  Had a long discussion with the patient's caregiver and I cannot find the patient's culture results at the present time.    Review of Systems   Unable to perform ROS: Patient nonverbal     Objective:     Vital Signs (Most Recent):  Temp: 98.2 °F (36.8 °C) (06/21/18 1900)  Pulse: 76 (06/21/18 2100)  Resp: 16 (06/21/18 2100)  BP: 100/70 (06/21/18 2100)  SpO2: 99 %  (06/21/18 2100) Vital Signs (24h Range):  Temp:  [97.3 °F (36.3 °C)-98.4 °F (36.9 °C)] 98.2 °F (36.8 °C)  Pulse:  [58-92] 76  Resp:  [10-18] 16  SpO2:  [94 %-100 %] 99 %  BP: ()/(47-70) 100/70     Weight: 109.7 kg (241 lb 13.5 oz)  Body mass index is 34.7 kg/m².    Intake/Output Summary (Last 24 hours) at 06/21/18 2255  Last data filed at 06/21/18 1800   Gross per 24 hour   Intake          2201.67 ml   Output             3460 ml   Net         -1258.33 ml      Physical Exam   Constitutional: No distress.   Patient nonverbal unresponsive at baseline   HENT:   Mouth/Throat: Oropharynx is clear and moist. No oropharyngeal exudate.   Noted ETT in place. OG/NG in place as well   Eyes: Right eye exhibits no discharge. Left eye exhibits no discharge.   Pupils sluggish   Neck: No JVD present. No tracheal deviation present. No thyromegaly present.   Trach in place   Cardiovascular: Intact distal pulses.  Exam reveals no gallop and no friction rub.    No murmur heard.  Pulmonary/Chest: No respiratory distress. He has no wheezes. He has no rales.   Patient with coarse breath sounds on ventilator   Abdominal: Soft. He exhibits no distension. There is no tenderness.   PEG in place   Genitourinary:   Genitourinary Comments: Indwelling munoz in place   Musculoskeletal: He exhibits edema (3+ anasarca). He exhibits no tenderness or deformity.   Neurological: He displays normal reflexes. He exhibits normal muscle tone.   Patient with diffuse, flaccid paralysis at baseline. Some movement of eyebrows is on response   Skin: No rash noted. He is not diaphoretic. No erythema.   Noted rash on buttocks- L side with drying lesions and scale. Noted L toe with purulent exudate   Nursing note and vitals reviewed.      Significant Labs: All pertinent labs within the past 24 hours have been reviewed.    Significant Imaging: I have reviewed all pertinent imaging results/findings within the past 24 hours.    Assessment/Plan:      * Urinary  tract infection associated with indwelling urethral catheter    - urine culture collected 05/16/18 + for VRE, ESBL, and multidrug resistant pseudomonas. Consult ID and defer to them for management. Appears hemodynamically stable. Will need PICC for long term IV ABX.          Ventilator dependence    Patient's vent settings, CXR were reviewed.    Vent Mode: A/C  Oxygen Concentration (%):  [30] 30  Resp Rate Total:  [14 br/min-22 br/min] 17 br/min  Vt Set:  [500 mL] 500 mL  PEEP/CPAP:  [5 cmH20] 5 cmH20  Pressure Support:  [0 cmH20] 0 cmH20  Mean Airway Pressure:  [9.2 cmH20-10 cmH20] 10 cmH20    Will adjust vent management as follows- Continue same management for now. Use cough assist.            Tracheostomy in place    Routine care          Chronic indwelling Munoz catheter    With UTI- Change per reccs          Anemia    Patient's anemia is currently controlled. S/p 0 units of PRBCs. Etiology likely d/t malnutrition  Current CBC reviewed-   Lab Results   Component Value Date    HGB 11.4 (L) 06/21/2018    HCT 34.9 (L) 06/21/2018     Monitor serial CBC and transfuse if patient becomes hemodynamically unstable, symptomatic or H/H drops below 7/21.           Anxiety    Chronic problem, controlled. Will continue chronic medication(s), BZD and SSRI and monitor for any changes, adjusting as needed.           Anasarca    Chronic problem. Related to severe immobility. Continue lasix and monitor I/Os.          Atrial fibrillation    Atrial Fibrillation controlled currently with Amiodarone. [id number] score 4. Anticoagulation not indicated currently d/t ALS          ALS (amyotrophic lateral sclerosis)    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 Noted cough assist. Patient seen by Dr. Fuentes as an outpatient.  No acute inpatient needs at this point in time.           VTE Risk Mitigation         Ordered     enoxaparin injection 40 mg  Daily      06/21/18 0001     Place sequential compression device  Until  discontinued      06/21/18 0001     Place JOHN hose  Until discontinued      06/21/18 0001     IP VTE HIGH RISK PATIENT  Once      06/21/18 0001          Critical care time spent on the evaluation and treatment of severe organ dysfunction, review of pertinent labs and imaging studies, discussions with consulting providers and discussions with patient/family: 46 minutes.    Barney Rehman MD  Department of Hospital Medicine   [facility]

## 2018-06-22 NOTE — PLAN OF CARE
Problem: Patient Care Overview  Goal: Plan of Care Review  Pt remained free from injury.  Sitter remained at bedside through out day.  Wife updated.  Pt without distress.  Pain controled with PRN medication.  Requesting lidocaine patch to be reordered and order received.  Extremities elevated to pillows.  Trach care per resp.  FiO2 30% PEEP of 5.  PRN inline suction and tolerating.  Cough assist in use and encouraged.  Michele with adequate output.  Tolerating bolus feedings with water flushes.  Less than 60cc residual.  scopolamine patch restarted.  Communication board at bedside.  Pt able to answer yes with a raise of eyebrow.  US kidney completed this AM.  Awaiting urine cultures.  Questions and concerns addressed.

## 2018-06-22 NOTE — PLAN OF CARE
Problem: Patient Care Overview  Goal: Plan of Care Review  Outcome: Ongoing (interventions implemented as appropriate)  Patient free of falls and injuries. Takes some time but the patient is able to communicate with a communication board. Raises eyebrows for yes. Full bath and skin care done. Good skin integrity remains. Remains sinus on monitor.Bolus feedings given as ordered. Tolerated well.

## 2018-06-23 LAB
ALBUMIN SERPL BCP-MCNC: 3.4 G/DL
ALP SERPL-CCNC: 84 U/L
ALT SERPL W/O P-5'-P-CCNC: 49 U/L
ANION GAP SERPL CALC-SCNC: 9 MMOL/L
AST SERPL-CCNC: 20 U/L
BACTERIA #/AREA URNS HPF: ABNORMAL /HPF
BASOPHILS # BLD AUTO: 0 K/UL
BASOPHILS NFR BLD: 0.5 %
BILIRUB SERPL-MCNC: 0.5 MG/DL
BILIRUB UR QL STRIP: NEGATIVE
BUN SERPL-MCNC: 16 MG/DL
CALCIUM SERPL-MCNC: 9.6 MG/DL
CHLORIDE SERPL-SCNC: 105 MMOL/L
CLARITY UR: CLEAR
CO2 SERPL-SCNC: 25 MMOL/L
COLOR UR: YELLOW
CREAT SERPL-MCNC: 0.5 MG/DL
DIFFERENTIAL METHOD: ABNORMAL
EOSINOPHIL # BLD AUTO: 0.1 K/UL
EOSINOPHIL NFR BLD: 1.7 %
ERYTHROCYTE [DISTWIDTH] IN BLOOD BY AUTOMATED COUNT: 16.3 %
EST. GFR  (AFRICAN AMERICAN): >60 ML/MIN/1.73 M^2
EST. GFR  (NON AFRICAN AMERICAN): >60 ML/MIN/1.73 M^2
GLUCOSE SERPL-MCNC: 151 MG/DL
GLUCOSE UR QL STRIP: NEGATIVE
HCT VFR BLD AUTO: 30.2 %
HGB BLD-MCNC: 10 G/DL
HGB UR QL STRIP: ABNORMAL
KETONES UR QL STRIP: NEGATIVE
LEUKOCYTE ESTERASE UR QL STRIP: ABNORMAL
LYMPHOCYTES # BLD AUTO: 1.4 K/UL
LYMPHOCYTES NFR BLD: 16.3 %
MAGNESIUM SERPL-MCNC: 2.2 MG/DL
MCH RBC QN AUTO: 27.1 PG
MCHC RBC AUTO-ENTMCNC: 33.1 G/DL
MCV RBC AUTO: 82 FL
MICROSCOPIC COMMENT: ABNORMAL
MONOCYTES # BLD AUTO: 0.6 K/UL
MONOCYTES NFR BLD: 7.3 %
NEUTROPHILS # BLD AUTO: 6.2 K/UL
NEUTROPHILS NFR BLD: 74.2 %
NITRITE UR QL STRIP: NEGATIVE
PH UR STRIP: 8 [PH] (ref 5–8)
PHOSPHATE SERPL-MCNC: 2.3 MG/DL
PLATELET # BLD AUTO: 198 K/UL
PMV BLD AUTO: 8.9 FL
POTASSIUM SERPL-SCNC: 4 MMOL/L
PROT SERPL-MCNC: 6.5 G/DL
PROT UR QL STRIP: NEGATIVE
RBC # BLD AUTO: 3.69 M/UL
RBC #/AREA URNS HPF: 13 /HPF (ref 0–4)
SODIUM SERPL-SCNC: 139 MMOL/L
SP GR UR STRIP: 1.01 (ref 1–1.03)
SQUAMOUS #/AREA URNS HPF: 0 /HPF
T4 FREE SERPL-MCNC: 1.01 NG/DL
TSH SERPL DL<=0.005 MIU/L-ACNC: 1.81 UIU/ML
URN SPEC COLLECT METH UR: ABNORMAL
UROBILINOGEN UR STRIP-ACNC: NEGATIVE EU/DL
WBC # BLD AUTO: 8.3 K/UL
WBC #/AREA URNS HPF: 6 /HPF (ref 0–5)

## 2018-06-23 PROCEDURE — 20000000 HC ICU ROOM

## 2018-06-23 PROCEDURE — 83735 ASSAY OF MAGNESIUM: CPT

## 2018-06-23 PROCEDURE — 99900026 HC AIRWAY MAINTENANCE (STAT)

## 2018-06-23 PROCEDURE — 85025 COMPLETE CBC W/AUTO DIFF WBC: CPT

## 2018-06-23 PROCEDURE — 25000003 PHARM REV CODE 250: Performed by: HOSPITALIST

## 2018-06-23 PROCEDURE — 94761 N-INVAS EAR/PLS OXIMETRY MLT: CPT

## 2018-06-23 PROCEDURE — 84443 ASSAY THYROID STIM HORMONE: CPT

## 2018-06-23 PROCEDURE — 81000 URINALYSIS NONAUTO W/SCOPE: CPT

## 2018-06-23 PROCEDURE — 36415 COLL VENOUS BLD VENIPUNCTURE: CPT

## 2018-06-23 PROCEDURE — 63600175 PHARM REV CODE 636 W HCPCS: Performed by: INTERNAL MEDICINE

## 2018-06-23 PROCEDURE — A4216 STERILE WATER/SALINE, 10 ML: HCPCS | Performed by: HOSPITALIST

## 2018-06-23 PROCEDURE — 80053 COMPREHEN METABOLIC PANEL: CPT

## 2018-06-23 PROCEDURE — 25000242 PHARM REV CODE 250 ALT 637 W/ HCPCS: Performed by: HOSPITALIST

## 2018-06-23 PROCEDURE — 87086 URINE CULTURE/COLONY COUNT: CPT

## 2018-06-23 PROCEDURE — 94770 HC EXHALED C02 TEST: CPT

## 2018-06-23 PROCEDURE — 25000003 PHARM REV CODE 250: Performed by: NURSE PRACTITIONER

## 2018-06-23 PROCEDURE — 84100 ASSAY OF PHOSPHORUS: CPT

## 2018-06-23 PROCEDURE — 94003 VENT MGMT INPAT SUBQ DAY: CPT

## 2018-06-23 PROCEDURE — 94640 AIRWAY INHALATION TREATMENT: CPT

## 2018-06-23 PROCEDURE — 63600175 PHARM REV CODE 636 W HCPCS: Performed by: NURSE PRACTITIONER

## 2018-06-23 PROCEDURE — 99900035 HC TECH TIME PER 15 MIN (STAT)

## 2018-06-23 PROCEDURE — 84439 ASSAY OF FREE THYROXINE: CPT

## 2018-06-23 PROCEDURE — 27000221 HC OXYGEN, UP TO 24 HOURS

## 2018-06-23 RX ORDER — INSULIN ASPART 100 [IU]/ML
0-5 INJECTION, SOLUTION INTRAVENOUS; SUBCUTANEOUS
Status: DISCONTINUED | OUTPATIENT
Start: 2018-06-23 | End: 2018-06-23 | Stop reason: CLARIF

## 2018-06-23 RX ORDER — IBUPROFEN 200 MG
24 TABLET ORAL
Status: DISCONTINUED | OUTPATIENT
Start: 2018-06-23 | End: 2018-06-23 | Stop reason: CLARIF

## 2018-06-23 RX ORDER — IPRATROPIUM BROMIDE AND ALBUTEROL SULFATE 2.5; .5 MG/3ML; MG/3ML
3 SOLUTION RESPIRATORY (INHALATION) EVERY 4 HOURS
Status: DISCONTINUED | OUTPATIENT
Start: 2018-06-23 | End: 2018-06-24 | Stop reason: HOSPADM

## 2018-06-23 RX ORDER — POLYETHYLENE GLYCOL 3350 17 G/17G
17 POWDER, FOR SOLUTION ORAL DAILY
Status: DISCONTINUED | OUTPATIENT
Start: 2018-06-24 | End: 2018-06-24 | Stop reason: HOSPADM

## 2018-06-23 RX ORDER — IBUPROFEN 200 MG
16 TABLET ORAL
Status: DISCONTINUED | OUTPATIENT
Start: 2018-06-23 | End: 2018-06-23 | Stop reason: CLARIF

## 2018-06-23 RX ORDER — GLUCAGON 1 MG
1 KIT INJECTION
Status: DISCONTINUED | OUTPATIENT
Start: 2018-06-23 | End: 2018-06-23 | Stop reason: CLARIF

## 2018-06-23 RX ADMIN — RAMELTEON 8 MG: 8 TABLET, FILM COATED ORAL at 09:06

## 2018-06-23 RX ADMIN — CLONAZEPAM 0.5 MG: 0.5 TABLET ORAL at 09:06

## 2018-06-23 RX ADMIN — SODIUM CHLORIDE, PRESERVATIVE FREE 10 ML: 5 INJECTION INTRAVENOUS at 12:06

## 2018-06-23 RX ADMIN — PREGABALIN 100 MG: 75 CAPSULE ORAL at 03:06

## 2018-06-23 RX ADMIN — CARVEDILOL 6.25 MG: 6.25 TABLET, FILM COATED ORAL at 09:06

## 2018-06-23 RX ADMIN — SODIUM CHLORIDE, PRESERVATIVE FREE 10 ML: 5 INJECTION INTRAVENOUS at 07:06

## 2018-06-23 RX ADMIN — DOXEPIN HYDROCHLORIDE 75 MG: 25 CAPSULE ORAL at 09:06

## 2018-06-23 RX ADMIN — HYDROXYZINE HYDROCHLORIDE 25 MG: 25 TABLET, FILM COATED ORAL at 01:06

## 2018-06-23 RX ADMIN — HYDROXYZINE HYDROCHLORIDE 25 MG: 25 TABLET, FILM COATED ORAL at 09:06

## 2018-06-23 RX ADMIN — DIPHENHYDRAMINE HYDROCHLORIDE 25 MG: 25 SOLUTION ORAL at 09:06

## 2018-06-23 RX ADMIN — FAMOTIDINE 20 MG: 20 TABLET ORAL at 09:06

## 2018-06-23 RX ADMIN — MEROPENEM AND SODIUM CHLORIDE 1 G: 1 INJECTION, SOLUTION INTRAVENOUS at 12:06

## 2018-06-23 RX ADMIN — PREGABALIN 100 MG: 75 CAPSULE ORAL at 09:06

## 2018-06-23 RX ADMIN — HYDROXYZINE HYDROCHLORIDE 25 MG: 25 TABLET, FILM COATED ORAL at 06:06

## 2018-06-23 RX ADMIN — IPRATROPIUM BROMIDE AND ALBUTEROL SULFATE 3 ML: .5; 3 SOLUTION RESPIRATORY (INHALATION) at 04:06

## 2018-06-23 RX ADMIN — ENOXAPARIN SODIUM 40 MG: 100 INJECTION SUBCUTANEOUS at 05:06

## 2018-06-23 RX ADMIN — MEROPENEM AND SODIUM CHLORIDE 1 G: 1 INJECTION, SOLUTION INTRAVENOUS at 09:06

## 2018-06-23 RX ADMIN — IPRATROPIUM BROMIDE AND ALBUTEROL SULFATE 3 ML: .5; 3 SOLUTION RESPIRATORY (INHALATION) at 12:06

## 2018-06-23 RX ADMIN — AMIODARONE HYDROCHLORIDE 200 MG: 200 TABLET ORAL at 09:06

## 2018-06-23 RX ADMIN — LIDOCAINE 3 PATCH: 50 PATCH TOPICAL at 09:06

## 2018-06-23 RX ADMIN — SENNOSIDES 8.6 MG: 8.6 TABLET, FILM COATED ORAL at 09:06

## 2018-06-23 RX ADMIN — DULOXETINE HYDROCHLORIDE 60 MG: 30 CAPSULE, DELAYED RELEASE ORAL at 09:06

## 2018-06-23 RX ADMIN — MEROPENEM AND SODIUM CHLORIDE 1 G: 1 INJECTION, SOLUTION INTRAVENOUS at 03:06

## 2018-06-23 RX ADMIN — CLONAZEPAM 0.5 MG: 0.5 TABLET ORAL at 03:06

## 2018-06-23 RX ADMIN — FUROSEMIDE 40 MG: 40 TABLET ORAL at 09:06

## 2018-06-23 RX ADMIN — IPRATROPIUM BROMIDE AND ALBUTEROL SULFATE 3 ML: .5; 3 SOLUTION RESPIRATORY (INHALATION) at 07:06

## 2018-06-23 RX ADMIN — SODIUM CHLORIDE, PRESERVATIVE FREE 10 ML: 5 INJECTION INTRAVENOUS at 06:06

## 2018-06-23 NOTE — PHYSICIAN QUERY
"PT Name: Bayron Delgado  MR #: [id number]    Physician Query Form - Heart  Condition Clarification     CDS: Malia De Santiago RN, CCDS         Contact information :[phone]  [email or web address]     This form is a permanent document in the medical record.     Query Date: June 22, 2018    By submitting this query, we are merely seeking further clarification of documentation. Please utilize your independent clinical judgment when addressing the question(s) below.    The medical record contains the following   Indicators     Supporting Clinical Findings Location in Medical Record    BNP      EF      Radiology findings      Echo Results      "Ascites" documented      "SOB" or "HAGER" documented      "Hypoxia" documented     x Heart Failure documented "PMH CHF" H&P    x "Edema" documented "He exhibits edema." H&P   x Diuretics/Meds Lasix 40 mg po  Coreg 6.25 mg  Home meds per H&P    Treatment:      Other:          Provider, please specify diagnosis or diagnoses associated with above clinical findings.  Please clarify diagnosis of PMH of CHF                                 [  ] Chronic Diastolic Heart Failure (EF > 40)*  [  ] Chronic Systolic Heart Failure (EF < 40)*  [  ] Chronic Combined Systolic and Diastolic Heart Failure    [  ] Other Type of Heart Failure (please specify type): _________________________    [ x ] Heart Failure Ruled Out    [  ] Other (please specify): ___________________________________    [  ] Clinically Undetermined            *American Heart Association                                                                                                          Please document in your progress notes daily for the duration of treatment until resolved and include in your discharge summary.    "

## 2018-06-23 NOTE — NURSING
Call placed to [facility] with direct connection. Update given Notified of orders restarted today and active tomorrow. New orders received.

## 2018-06-23 NOTE — PROGRESS NOTES
Progress Note  Infectious Disease    Follow-up For:  MDRO UTI    SUBJECTIVE:   ROS:  No complaints. Feels about the same. Sitter at bedside . Urine organism may be a CRE. Sensitivities pending.   As he is no worse, he either was not symptomatically infected or the organism is responding to the merrem.    Antibiotics     Start     Stop Route Frequency Ordered    06/21/18 1600  meropenem-0.9% sodium chloride 1 g/50 mL IVPB      -- IV Every 8 hours (non-standard times) 06/21/18 1434          Pertinent Medications noted:    EXAM & DIAGNOSTICS REVIEWED:   Vitals:     Temp:  [98.3 °F (36.8 °C)-99.1 °F (37.3 °C)]   Temp: 98.7 °F (37.1 °C) (06/23/18 1200)  Pulse: (!) 56 (06/23/18 1232)  Resp: 15 (06/23/18 1232)  BP: (!) 89/58 (06/23/18 1200)  SpO2: 100 % (06/23/18 1232)    Intake/Output Summary (Last 24 hours) at 06/23/18 1415  Last data filed at 06/23/18 0500   Gross per 24 hour   Intake             1420 ml   Output              920 ml   Net              500 ml       General:  In NAD. Looks alert and comfortable  Eyes:  Anicteric, PERRL, EOMI  ENT:  Mouth w/ pink MMM, no lesions/exudate,   Neck:  Trachea midline, supple, no adenopathy appreciated  Lungs:  clear  Heart:  RRR, no gallop/murmur noted  Abd:  soft, NT, ND, normal BS, no masses/organomegaly appreciated.  :  Michele draining yellow urine, clear  Musc:  Joints without effusion, erythema, synovitis,   Skin:  Generally warm, dry, normal for color. No rashes  Wound: Left great toenail:m Able toexpress 1 drop of pus, less erythema  Neuro: AAOx3,quadriplegic  Extrem: Chronic generalized edema,  VAD:      Lines/Tubes/Drains:    General Labs reviewed:    Recent Labs  Lab 06/23/18 0318   WBC 8.30   RBC 3.69*   HGB 10.0*   HCT 30.2*      MCV 82   MCH 27.1   MCHC 33.1       Recent Labs  Lab 06/23/18 0318   CALCIUM 9.6   PROT 6.5      K 4.0   CO2 25      BUN 16   CREATININE 0.5   ALKPHOS 84   ALT 49*   AST 20   BILITOT 0.5     6/16 urine culture: VRE,  pseudomonas and ESBL Klebsiella  6/2: urine culture: ESBL Klebsiella  5/15: urine culture: Proteus, Group B strep, pseudomonas, Citrobacter  4/27: urine culture: citrobacter    Micro: urine culture here growing Ecoli, ?ESBL ?CRE    Imaging Reviewed: renal ultrasound neg    ASSESSMENT & PLAN      Patient Active Problem List   Diagnosis    ALS (amyotrophic lateral sclerosis)    Atrial fibrillation    Bartonella infection    Anasarca    Anxiety    Urinary tract infection associated with indwelling urethral catheter    Anemia    Ventilator dependence    Chronic indwelling Munoz catheter    Tracheostomy in place   1.         Lethargy, with positive urine culture, no fever, no leukocytosis, mild pyuria, not clinically ill  2.         Chronic indwelling munoz, neurogenic bladder  3.         ALS, with chronic respiratory failure  4.         MDRO  5. Paronychia left great toe    Recommendation: Sending new UA and culture, stopping daptomycin  May be going home on no antibiotics    I have suggested to sitter(wife not here) not to check urine culture for test of cure as this will only reveal more resistant organisms. He should only be cultured for symptoms: fever, chills, nausea, abd pain, decreased level of consciousness, increased SOB, etc.  Munoz change every 3-4 weeks  Warm compresses to left great toe and soap and water, no ointment

## 2018-06-23 NOTE — PLAN OF CARE
Problem: Patient Care Overview  Goal: Plan of Care Review  Outcome: Ongoing (interventions implemented as appropriate)  Patient free of falls and injuries this shift. Continues to communicate well with his personal communication board. Required more frequent cough assist to clear out secretions. Thick, yellow to tan secretions noted. Sinus on monitor. Afebrile. Full bath given with skin care/oral care done. Tolerated well.

## 2018-06-23 NOTE — PROGRESS NOTES
[facility] Medicine  Progress Note    Patient Name: Bayron Delgado  MRN: [id number]  Patient Class: IP- Inpatient   Admission Date: 6/20/2018  Length of Stay: 3 days  Attending Physician: Barney Rehman MD  Primary Care Provider: Scott Puckett Jr, MD        Subjective:     Principal Problem:Urinary tract infection associated with indwelling urethral catheter    HPI:  46 y/o gentleman, who presents to the ED with c/o lethargy, penile discharge, and foul smelling urine.  He has a PMH of ALS (ventilator dependent), CHF, and Afib. His wife is at bedside and provides majority of history as he is nonverbal.  She reports that symptoms have been ongoing over the past 6 weeks after having his catheter exchanged.  She states that he has been on two different rounds of ABX recently.  However, symptoms continue to return.  She reports that home health has been monitoring him and recently repeated a urine culture on 05/16/2018.  She states that she was called today and instructed to present to the hospital d/t urine culture being positive for ESBL, VRE, and multidrug resistant pseudomonas.  She denies fevers while at home.  Initial workup reveals Tmax 98.3, no leukocytosis, UA with moderate bacteria, 3+ leukocytes, +2 occult blood.      Hospital Course:  No notes on file    Interval History: Patient seen and examined.  Overnight, no acute events.  Chest x-ray reviewed and shows continue atelectasis in the right lobe.  Notes from Infectious Disease were reviewed.  Plan of care was discussed and the patient, the sitter and the bedside nurse in detail.    Review of Systems   Unable to perform ROS: Patient nonverbal     Objective:     Vital Signs (Most Recent):  Temp: 98.7 °F (37.1 °C) (06/23/18 1200)  Pulse: 61 (06/23/18 1400)  Resp: 15 (06/23/18 1400)  BP: 98/66 (06/23/18 1400)  SpO2: 100 % (06/23/18 1400) Vital Signs (24h Range):  Temp:  [98.3 °F (36.8 °C)-99.1 °F (37.3 °C)] 98.7 °F (37.1  °C)  Pulse:  [56-90] 61  Resp:  [14-19] 15  SpO2:  [92 %-100 %] 100 %  BP: ()/(50-80) 98/66     Weight: 110.7 kg (244 lb 0.8 oz)  Body mass index is 35.02 kg/m².    Intake/Output Summary (Last 24 hours) at 06/23/18 1544  Last data filed at 06/23/18 0500   Gross per 24 hour   Intake             1300 ml   Output              570 ml   Net              730 ml      Physical Exam   Constitutional: No distress.   Patient nonverbal unresponsive at baseline   HENT:   Mouth/Throat: Oropharynx is clear and moist. No oropharyngeal exudate.   Noted ETT in place. OG/NG in place as well   Eyes: Right eye exhibits no discharge. Left eye exhibits no discharge.   Pupils sluggish   Neck: No JVD present. No tracheal deviation present. No thyromegaly present.   Trach in place   Cardiovascular: Intact distal pulses.  Exam reveals no gallop and no friction rub.    No murmur heard.  Pulmonary/Chest: No respiratory distress. He has no wheezes. He has no rales.   Patient with coarse breath sounds on ventilator   Abdominal: Soft. He exhibits no distension. There is no tenderness.   PEG in place   Genitourinary:   Genitourinary Comments: Indwelling munoz in place   Musculoskeletal: He exhibits edema (3+ anasarca). He exhibits no tenderness or deformity.   Neurological: He displays normal reflexes. He exhibits normal muscle tone.   Patient with diffuse, flaccid paralysis at baseline. Some movement of eyebrows is on response   Skin: No rash noted. He is not diaphoretic. No erythema.   Noted rash on buttocks- L side with drying lesions and scale. Noted L toe with purulent exudate   Nursing note and vitals reviewed.      Significant Labs: All pertinent labs within the past 24 hours have been reviewed.    Significant Imaging: I have reviewed all pertinent imaging results/findings within the past 24 hours.    Assessment/Plan:      * Urinary tract infection associated with indwelling urethral catheter    - urine culture collected + for VRE,  ESBL, and multidrug resistant pseudomonas. Patient on targeted antibiotic therapy per culture data by Infectious Disease.  Will defer to Infectious Disease for antimicrobial management and attempt to set up patient for outpatient antibiotics if at all possible.  Renal ultrasound is negative.        Ventilator dependence    Patient's vent settings, CXR were reviewed.    Vent Mode: A/C  Oxygen Concentration (%):  [30-40] 40  Resp Rate Total:  [14 br/min-19 br/min] 16 br/min  Vt Set:  [500 mL] 500 mL  PEEP/CPAP:  [5 cmH20] 5 cmH20  Pressure Support:  [0 cmH20] 0 cmH20  Mean Airway Pressure:  [9.4 cmH20-9.9 cmH20] 9.4 cmH20     Patient with increased atelectasis again noted.  Placed order for continued cough assist q.4 hours and started patient on scheduled bronchodilators.  Continue vent settings at the same.            Tracheostomy in place    Routine care          Chronic indwelling Munoz catheter    With UTI- Change per reccs          Anemia    Patient's anemia is currently controlled. S/p 0 units of PRBCs. Etiology likely d/t malnutrition  Current CBC reviewed-   Lab Results   Component Value Date    HGB 10.0 (L) 06/23/2018    HCT 30.2 (L) 06/23/2018     Monitor serial CBC and transfuse if patient becomes hemodynamically unstable, symptomatic or H/H drops below 7/21.           Anxiety    Chronic problem, controlled. Will continue chronic medication(s), BZD and SSRI and monitor for any changes, adjusting as needed.           Anasarca    Chronic problem. Related to severe immobility. Continue lasix and monitor I/Os.          Atrial fibrillation    Atrial Fibrillation controlled currently with Amiodarone. [id number] score 4. Anticoagulation not indicated currently d/t ALS          ALS (amyotrophic lateral sclerosis)    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 Noted cough assist. Patient seen by Dr. Fuentes as an outpatient.  No acute inpatient needs at this point in time.           VTE Risk  Mitigation         Ordered     enoxaparin injection 40 mg  Daily      06/21/18 0001     Place sequential compression device  Until discontinued      06/21/18 0001     Place JOHN hose  Until discontinued      06/21/18 0001     IP VTE HIGH RISK PATIENT  Once      06/21/18 0001          Critical care time spent on the evaluation and treatment of severe organ dysfunction, review of pertinent labs and imaging studies, discussions with consulting providers and discussions with patient/family:  35 minutes.    Barney Rehman MD  Department of Hospital Medicine   [facility]

## 2018-06-23 NOTE — PLAN OF CARE
Problem: Patient Care Overview  Goal: Plan of Care Review  Outcome: Ongoing (interventions implemented as appropriate)  Pt on vent as ordered with Q4 PRN duoneb treatments, and Q shift trach care

## 2018-06-23 NOTE — SUBJECTIVE & OBJECTIVE
Interval History: Patient seen and examined.  Overnight, no acute events.  Chest x-ray reviewed and shows continue atelectasis in the right lobe.  Notes from Infectious Disease were reviewed.  Plan of care was discussed and the patient, the sitter and the bedside nurse in detail.    Review of Systems   Unable to perform ROS: Patient nonverbal     Objective:     Vital Signs (Most Recent):  Temp: 98.7 °F (37.1 °C) (06/23/18 1200)  Pulse: 61 (06/23/18 1400)  Resp: 15 (06/23/18 1400)  BP: 98/66 (06/23/18 1400)  SpO2: 100 % (06/23/18 1400) Vital Signs (24h Range):  Temp:  [98.3 °F (36.8 °C)-99.1 °F (37.3 °C)] 98.7 °F (37.1 °C)  Pulse:  [56-90] 61  Resp:  [14-19] 15  SpO2:  [92 %-100 %] 100 %  BP: ()/(50-80) 98/66     Weight: 110.7 kg (244 lb 0.8 oz)  Body mass index is 35.02 kg/m².    Intake/Output Summary (Last 24 hours) at 06/23/18 1544  Last data filed at 06/23/18 0500   Gross per 24 hour   Intake             1300 ml   Output              570 ml   Net              730 ml      Physical Exam   Constitutional: No distress.   Patient nonverbal unresponsive at baseline   HENT:   Mouth/Throat: Oropharynx is clear and moist. No oropharyngeal exudate.   Noted ETT in place. OG/NG in place as well   Eyes: Right eye exhibits no discharge. Left eye exhibits no discharge.   Pupils sluggish   Neck: No JVD present. No tracheal deviation present. No thyromegaly present.   Trach in place   Cardiovascular: Intact distal pulses.  Exam reveals no gallop and no friction rub.    No murmur heard.  Pulmonary/Chest: No respiratory distress. He has no wheezes. He has no rales.   Patient with coarse breath sounds on ventilator   Abdominal: Soft. He exhibits no distension. There is no tenderness.   PEG in place   Genitourinary:   Genitourinary Comments: Indwelling munoz in place   Musculoskeletal: He exhibits edema (3+ anasarca). He exhibits no tenderness or deformity.   Neurological: He displays normal reflexes. He exhibits normal muscle  tone.   Patient with diffuse, flaccid paralysis at baseline. Some movement of eyebrows is on response   Skin: No rash noted. He is not diaphoretic. No erythema.   Noted rash on buttocks- L side with drying lesions and scale. Noted L toe with purulent exudate   Nursing note and vitals reviewed.      Significant Labs: All pertinent labs within the past 24 hours have been reviewed.    Significant Imaging: I have reviewed all pertinent imaging results/findings within the past 24 hours.

## 2018-06-23 NOTE — PHYSICIAN QUERY
"PT Name: Bayron Delgado  MR #: [id number]    Physician Query Form - Hematology Clarification      CDS: Malia D eSantiago RN, CCDS         Contact information :ext [phone])  [email or web address]       This form is a permanent document in the medical record.      Query Date: June 22, 2018    By submitting this query, we are merely seeking further clarification of documentation. Please utilize your independent clinical judgment when addressing the question(s) below.    The Medical record contains the following:   Indicators  Supporting Clinical Findings Location in Medical Record   X "Anemia" documented   "anemia     - Hgb 11.7 (baseline 9.8-10.4)  - possibly hemoconcentration / volume depletion     - trend h/h daily "      "Patient's anemia is currently controlled. S/p 0 units of PRBCs. Etiology likely d/t malnutrition"        H&P 6/21/18            Progress note 6/22/18   x H & H = H/H 11.7/35.5  H/H 11.1/33.2 Lab 6/20/18  Lab 6/22/18    BP =                     HR=      "GI bleeding" documented      Acute bleeding (Non GI site)      Transfusion(s)      Treatment:     x Other:  "PMH of ALS (ventilator dependent), CHF, and Afib."  "UA with moderate bacteria, 3+ leukocytes, +2 occult blood" H&P 6/21/18     Provider, please specify diagnosis or diagnoses associated with above clinical findings.        [x  ] Anemia of chronic disease ( Specify chronic disease) , _____ Malnutrition     [  ] Other (Specify) _______________________________     [  ] Clinically Undetermined     [  ] Chronic blood loss anemia    [  ] Other Hematological Diagnosis (please specify): _________________________________    [  ] Clinically Undetermined       Please document in your progress notes daily for the duration of treatment, until resolved, and include in your discharge summary.                                                                                                      "

## 2018-06-24 VITALS
TEMPERATURE: 99 F | BODY MASS INDEX: 34.94 KG/M2 | WEIGHT: 244.06 LBS | HEIGHT: 70 IN | HEART RATE: 65 BPM | OXYGEN SATURATION: 100 % | DIASTOLIC BLOOD PRESSURE: 57 MMHG | RESPIRATION RATE: 15 BRPM | SYSTOLIC BLOOD PRESSURE: 88 MMHG

## 2018-06-24 PROBLEM — Z93.1 PERCUTANEOUS ENDOSCOPIC GASTROSTOMY STATUS: Status: ACTIVE | Noted: 2018-06-24

## 2018-06-24 PROBLEM — R00.1 BRADYCARDIA: Status: ACTIVE | Noted: 2018-06-24

## 2018-06-24 LAB
ALBUMIN SERPL BCP-MCNC: 3.8 G/DL
ALLENS TEST: ABNORMAL
ALP SERPL-CCNC: 91 U/L
ALT SERPL W/O P-5'-P-CCNC: 51 U/L
ANION GAP SERPL CALC-SCNC: 12 MMOL/L
AST SERPL-CCNC: 25 U/L
BACTERIA UR CULT: NORMAL
BASOPHILS # BLD AUTO: 0 K/UL
BASOPHILS NFR BLD: 0.4 %
BILIRUB SERPL-MCNC: 0.6 MG/DL
BUN SERPL-MCNC: 13 MG/DL
CALCIUM SERPL-MCNC: 10.4 MG/DL
CHLORIDE SERPL-SCNC: 107 MMOL/L
CO2 SERPL-SCNC: 22 MMOL/L
CREAT SERPL-MCNC: 0.5 MG/DL
DELSYS: ABNORMAL
DIFFERENTIAL METHOD: ABNORMAL
EOSINOPHIL # BLD AUTO: 0.1 K/UL
EOSINOPHIL NFR BLD: 2 %
ERYTHROCYTE [DISTWIDTH] IN BLOOD BY AUTOMATED COUNT: 15.9 %
ERYTHROCYTE [SEDIMENTATION RATE] IN BLOOD BY WESTERGREN METHOD: 14 MM/H
EST. GFR  (AFRICAN AMERICAN): >60 ML/MIN/1.73 M^2
EST. GFR  (NON AFRICAN AMERICAN): >60 ML/MIN/1.73 M^2
FIO2: 40
GLUCOSE SERPL-MCNC: 139 MG/DL
HCO3 UR-SCNC: 27.8 MMOL/L (ref 24–28)
HCT VFR BLD AUTO: 33.3 %
HGB BLD-MCNC: 11 G/DL
LYMPHOCYTES # BLD AUTO: 1.3 K/UL
LYMPHOCYTES NFR BLD: 23.2 %
MAGNESIUM SERPL-MCNC: 2.6 MG/DL
MCH RBC QN AUTO: 26.8 PG
MCHC RBC AUTO-ENTMCNC: 33.2 G/DL
MCV RBC AUTO: 81 FL
MIN VOL: 7
MODE: ABNORMAL
MONOCYTES # BLD AUTO: 0.3 K/UL
MONOCYTES NFR BLD: 6.1 %
NEUTROPHILS # BLD AUTO: 3.9 K/UL
NEUTROPHILS NFR BLD: 68.3 %
PCO2 BLDA: 37.6 MMHG (ref 35–45)
PEEP: 5
PH SMN: 7.48 [PH] (ref 7.35–7.45)
PHOSPHATE SERPL-MCNC: 2.3 MG/DL
PIP: 22
PLATELET # BLD AUTO: 230 K/UL
PMV BLD AUTO: 8.6 FL
PO2 BLDA: 148 MMHG (ref 80–100)
POC BE: 4 MMOL/L
POC SATURATED O2: 99 % (ref 95–100)
POC TCO2: 29 MMOL/L (ref 23–27)
POCT GLUCOSE: 133 MG/DL (ref 70–110)
POTASSIUM SERPL-SCNC: 3.4 MMOL/L
PROT SERPL-MCNC: 7.2 G/DL
RBC # BLD AUTO: 4.12 M/UL
SAMPLE: ABNORMAL
SITE: ABNORMAL
SODIUM SERPL-SCNC: 141 MMOL/L
SP02: 100
VT: 500
WBC # BLD AUTO: 5.7 K/UL

## 2018-06-24 PROCEDURE — 36415 COLL VENOUS BLD VENIPUNCTURE: CPT

## 2018-06-24 PROCEDURE — 25000003 PHARM REV CODE 250: Performed by: HOSPITALIST

## 2018-06-24 PROCEDURE — 63600175 PHARM REV CODE 636 W HCPCS: Performed by: NURSE PRACTITIONER

## 2018-06-24 PROCEDURE — 94770 HC EXHALED C02 TEST: CPT

## 2018-06-24 PROCEDURE — 99900035 HC TECH TIME PER 15 MIN (STAT)

## 2018-06-24 PROCEDURE — 84100 ASSAY OF PHOSPHORUS: CPT

## 2018-06-24 PROCEDURE — 27000221 HC OXYGEN, UP TO 24 HOURS

## 2018-06-24 PROCEDURE — 63600175 PHARM REV CODE 636 W HCPCS: Performed by: INTERNAL MEDICINE

## 2018-06-24 PROCEDURE — 85025 COMPLETE CBC W/AUTO DIFF WBC: CPT

## 2018-06-24 PROCEDURE — 99900026 HC AIRWAY MAINTENANCE (STAT)

## 2018-06-24 PROCEDURE — 94640 AIRWAY INHALATION TREATMENT: CPT

## 2018-06-24 PROCEDURE — 83735 ASSAY OF MAGNESIUM: CPT

## 2018-06-24 PROCEDURE — A4216 STERILE WATER/SALINE, 10 ML: HCPCS | Performed by: HOSPITALIST

## 2018-06-24 PROCEDURE — 36600 WITHDRAWAL OF ARTERIAL BLOOD: CPT

## 2018-06-24 PROCEDURE — 80053 COMPREHEN METABOLIC PANEL: CPT

## 2018-06-24 PROCEDURE — 82803 BLOOD GASES ANY COMBINATION: CPT

## 2018-06-24 PROCEDURE — 94003 VENT MGMT INPAT SUBQ DAY: CPT

## 2018-06-24 PROCEDURE — 25000242 PHARM REV CODE 250 ALT 637 W/ HCPCS: Performed by: HOSPITALIST

## 2018-06-24 PROCEDURE — 25000003 PHARM REV CODE 250: Performed by: NURSE PRACTITIONER

## 2018-06-24 PROCEDURE — 94761 N-INVAS EAR/PLS OXIMETRY MLT: CPT

## 2018-06-24 RX ORDER — AMIODARONE HYDROCHLORIDE 200 MG/1
200 TABLET ORAL DAILY
Start: 2018-06-24

## 2018-06-24 RX ORDER — AMIODARONE HYDROCHLORIDE 200 MG/1
200 TABLET ORAL DAILY
Status: DISCONTINUED | OUTPATIENT
Start: 2018-06-25 | End: 2018-06-24 | Stop reason: HOSPADM

## 2018-06-24 RX ORDER — CEPHALEXIN 250 MG/5ML
250 POWDER, FOR SUSPENSION ORAL EVERY 6 HOURS
Qty: 100 ML | Refills: 0 | Status: SHIPPED | OUTPATIENT
Start: 2018-06-24 | End: 2018-06-29

## 2018-06-24 RX ORDER — POTASSIUM CHLORIDE 20 MEQ/15ML
20 SOLUTION ORAL ONCE
Status: COMPLETED | OUTPATIENT
Start: 2018-06-24 | End: 2018-06-24

## 2018-06-24 RX ADMIN — IPRATROPIUM BROMIDE AND ALBUTEROL SULFATE 3 ML: .5; 3 SOLUTION RESPIRATORY (INHALATION) at 12:06

## 2018-06-24 RX ADMIN — SENNOSIDES 8.6 MG: 8.6 TABLET, FILM COATED ORAL at 09:06

## 2018-06-24 RX ADMIN — ENOXAPARIN SODIUM 40 MG: 100 INJECTION SUBCUTANEOUS at 05:06

## 2018-06-24 RX ADMIN — POTASSIUM CHLORIDE 20 MEQ: 20 SOLUTION ORAL at 01:06

## 2018-06-24 RX ADMIN — HYDROCODONE BITARTRATE AND ACETAMINOPHEN 1 TABLET: 10; 325 TABLET ORAL at 09:06

## 2018-06-24 RX ADMIN — MEROPENEM AND SODIUM CHLORIDE 1 G: 1 INJECTION, SOLUTION INTRAVENOUS at 09:06

## 2018-06-24 RX ADMIN — DOXEPIN HYDROCHLORIDE 75 MG: 25 CAPSULE ORAL at 09:06

## 2018-06-24 RX ADMIN — MEROPENEM AND SODIUM CHLORIDE 1 G: 1 INJECTION, SOLUTION INTRAVENOUS at 03:06

## 2018-06-24 RX ADMIN — POLYETHYLENE GLYCOL 3350 17 G: 17 POWDER, FOR SOLUTION ORAL at 09:06

## 2018-06-24 RX ADMIN — MEROPENEM AND SODIUM CHLORIDE 1 G: 1 INJECTION, SOLUTION INTRAVENOUS at 12:06

## 2018-06-24 RX ADMIN — SODIUM CHLORIDE, PRESERVATIVE FREE 10 ML: 5 INJECTION INTRAVENOUS at 12:06

## 2018-06-24 RX ADMIN — SODIUM CHLORIDE, PRESERVATIVE FREE 10 ML: 5 INJECTION INTRAVENOUS at 01:06

## 2018-06-24 RX ADMIN — PREGABALIN 100 MG: 75 CAPSULE ORAL at 03:06

## 2018-06-24 RX ADMIN — IPRATROPIUM BROMIDE AND ALBUTEROL SULFATE 3 ML: .5; 3 SOLUTION RESPIRATORY (INHALATION) at 04:06

## 2018-06-24 RX ADMIN — CLONAZEPAM 0.5 MG: 0.5 TABLET ORAL at 09:06

## 2018-06-24 RX ADMIN — HYDROXYZINE HYDROCHLORIDE 25 MG: 25 TABLET, FILM COATED ORAL at 01:06

## 2018-06-24 RX ADMIN — HYDROXYZINE HYDROCHLORIDE 25 MG: 25 TABLET, FILM COATED ORAL at 05:06

## 2018-06-24 RX ADMIN — HYDROXYZINE HYDROCHLORIDE 25 MG: 25 TABLET, FILM COATED ORAL at 09:06

## 2018-06-24 RX ADMIN — SODIUM CHLORIDE, PRESERVATIVE FREE 10 ML: 5 INJECTION INTRAVENOUS at 06:06

## 2018-06-24 RX ADMIN — CLONAZEPAM 0.5 MG: 0.5 TABLET ORAL at 03:06

## 2018-06-24 RX ADMIN — AMIODARONE HYDROCHLORIDE 200 MG: 200 TABLET ORAL at 09:06

## 2018-06-24 RX ADMIN — DULOXETINE HYDROCHLORIDE 60 MG: 30 CAPSULE, DELAYED RELEASE ORAL at 09:06

## 2018-06-24 RX ADMIN — FAMOTIDINE 20 MG: 20 TABLET ORAL at 09:06

## 2018-06-24 RX ADMIN — FUROSEMIDE 40 MG: 40 TABLET ORAL at 09:06

## 2018-06-24 RX ADMIN — POTASSIUM PHOSPHATE, MONOBASIC AND POTASSIUM PHOSPHATE, DIBASIC 15 MMOL: 224; 236 INJECTION, SOLUTION INTRAVENOUS at 06:06

## 2018-06-24 RX ADMIN — PREGABALIN 100 MG: 75 CAPSULE ORAL at 09:06

## 2018-06-24 NOTE — PLAN OF CARE
Problem: Patient Care Overview  Goal: Plan of Care Review  Outcome: Ongoing (interventions implemented as appropriate)  Patient free of falls and injuries this shift. Sinus to sinus yumiko most of the night.  E-ICU notified. ABG's, glucometer, labs draw. Appeared to be confused at one point of the night.spelling out letters that aren't words. This was out of the norm for him. Rested comfortably for most of the night. Bolus feedings every 4 hrs with flushes. Tolerated well.

## 2018-06-24 NOTE — PLAN OF CARE
Problem: Patient Care Overview  Goal: Plan of Care Review  Outcome: Ongoing (interventions implemented as appropriate)  Pt on vent as ordered with Q4 duoneb treatments, pt refused treatment.

## 2018-06-24 NOTE — NURSING
E-ICU notified of  New onset sinus yumiko and appears maybe confused, but otherwise asymptomatic. New orders received.

## 2018-06-24 NOTE — HOSPITAL COURSE
Patient is a 47-year-old male with a past medical history significant for ALS with secondary ventilator dependence and neurogenic bladder with indwelling Michele catheter, tracheostomy, and percutaneous gastrostomy who presents to the hospital with increasing lethargy and positive urine culture for multi drug resistant organisms.  Infectious Disease was consulted and patient was started on broad-spectrum IV antibiotic therapy.  Cultures reviewed by Infectious Disease and antibiotics were adjusted were carbapenems given history of extended spectrum beta lactamase producing gram-negative rods.  Patient remained afebrile and asymptomatic throughout his hospital stay.  He did have some increased atelectasis for which he was prescribed bronchodilators and cough assist.  Patient remained at his symptomatic baseline throughout his hospitalization and his respiratory status remained stable.  He had no evidence of increasing fevers or worsening systemic inflammation.  He was discharged home with Keflex the next 5 days.

## 2018-06-24 NOTE — PROGRESS NOTES
[facility] Medicine  Progress Note    Patient Name: Bayron Delgado  MRN: [id number]  Patient Class: IP- Inpatient   Admission Date: 6/20/2018  Length of Stay: 4 days  Attending Physician: Barney Rehman MD  Primary Care Provider: Scott Puckett Jr, MD        Subjective:     Principal Problem:Urinary tract infection associated with indwelling urethral catheter    HPI:  48 y/o gentleman, who presents to the ED with c/o lethargy, penile discharge, and foul smelling urine.  He has a PMH of ALS (ventilator dependent), CHF, and Afib. His wife is at bedside and provides majority of history as he is nonverbal.  She reports that symptoms have been ongoing over the past 6 weeks after having his catheter exchanged.  She states that he has been on two different rounds of ABX recently.  However, symptoms continue to return.  She reports that home health has been monitoring him and recently repeated a urine culture on 05/16/2018.  She states that she was called today and instructed to present to the hospital d/t urine culture being positive for ESBL, VRE, and multidrug resistant pseudomonas.  She denies fevers while at home.  Initial workup reveals Tmax 98.3, no leukocytosis, UA with moderate bacteria, 3+ leukocytes, +2 occult blood.      Hospital Course:  Patient is a 47-year-old male with a past medical history significant for ALS with secondary ventilator dependence and neurogenic bladder with indwelling Michele catheter, tracheostomy, and percutaneous gastrostomy who presents to the hospital with increasing lethargy and positive urine culture for multi drug resistant organisms.  Infectious Disease was consulted and patient was started on broad-spectrum IV antibiotic therapy.  Cultures reviewed by Infectious Disease and antibiotics were adjusted were carbapenems given history of extended spectrum beta lactamase producing gram-negative rods.  Patient remained afebrile and asymptomatic throughout his  hospital stay.  He did have some increased atelectasis for which he was prescribed bronchodilators and cough assist.  Patient remained at his symptomatic baseline throughout his hospitalization and his respiratory status remained stable.  He had no evidence of increasing fevers or worsening systemic inflammation.    Interval History:  Patient remains at his symptomatic baseline.  No acute events overnight.  Chest x-ray reviewed and shows evidence of decreasing atelectasis from previous chest x-rays.  Patient refusing bronchodilator therapy.  Plan of care discussed with the caregiver at the bedside and the bedside nurse in detail.    Review of Systems   Unable to perform ROS: Patient nonverbal     Objective:     Vital Signs (Most Recent):  Temp: 98.2 °F (36.8 °C) (06/24/18 1300)  Pulse: (!) 50 (06/24/18 1300)  Resp: 14 (06/24/18 1300)  BP: (!) 74/49 (06/24/18 1300)  SpO2: 100 % (06/24/18 1300) Vital Signs (24h Range):  Temp:  [97.9 °F (36.6 °C)-98.8 °F (37.1 °C)] 98.2 °F (36.8 °C)  Pulse:  [45-78] 50  Resp:  [14-30] 14  SpO2:  [99 %-100 %] 100 %  BP: ()/(49-83) 74/49     Weight: 110.7 kg (244 lb 0.8 oz)  Body mass index is 35.02 kg/m².    Intake/Output Summary (Last 24 hours) at 06/24/18 1440  Last data filed at 06/24/18 0600   Gross per 24 hour   Intake             1080 ml   Output             1350 ml   Net             -270 ml      Physical Exam   Constitutional: No distress.   Patient nonverbal unresponsive at baseline   HENT:   Mouth/Throat: Oropharynx is clear and moist. No oropharyngeal exudate.   Noted ETT in place. OG/NG in place as well   Eyes: Right eye exhibits no discharge. Left eye exhibits no discharge.   Pupils sluggish   Neck: No JVD present. No tracheal deviation present. No thyromegaly present.   Trach in place   Cardiovascular: Intact distal pulses.  Exam reveals no gallop and no friction rub.    No murmur heard.  Pulmonary/Chest: No respiratory distress. He has no wheezes. He has no rales.    Patient with coarse breath sounds on ventilator   Abdominal: Soft. He exhibits no distension. There is no tenderness.   PEG in place   Genitourinary:   Genitourinary Comments: Indwelling munoz in place   Musculoskeletal: He exhibits edema (3+ anasarca). He exhibits no tenderness or deformity.   Neurological: He displays normal reflexes. He exhibits normal muscle tone.   Patient with diffuse, flaccid paralysis at baseline. Some movement of eyebrows is on response   Skin: No rash noted. He is not diaphoretic. No erythema.   Noted rash on buttocks- L side with drying lesions and scale. Noted L toe with purulent exudate   Nursing note and vitals reviewed.      Significant Labs: All pertinent labs within the past 24 hours have been reviewed.    Significant Imaging: I have reviewed all pertinent imaging results/findings within the past 24 hours.    Assessment/Plan:      * Urinary tract infection associated with indwelling urethral catheter    - urine culture collected + for VRE, ESBL, and multidrug resistant pseudomonas. Patient on targeted antibiotic therapy per culture data by Infectious Disease.  Will defer to Infectious Disease for antimicrobial management and attempt to set up patient for outpatient antibiotics if at all possible.  Renal ultrasound is negative.        Ventilator dependence    Patient's vent settings, CXR were reviewed.    Vent Mode: A/C  Oxygen Concentration (%):  [30-40] 30  Resp Rate Total:  [14 br/min-20 br/min] 15 br/min  Vt Set:  [500 mL] 500 mL  PEEP/CPAP:  [5 cmH20] 5 cmH20  Pressure Support:  [0 cmH20] 0 cmH20  Mean Airway Pressure:  [9.3 byV31-00 cmH20] 9.3 cmH20     Atelectasis appears improved.  Continue scheduled cough assist q.4 hours and monitor patient closely.  No symptoms of pneumonia.  Patient very high risk for pneumonia given his history of multidrug resistant organisms, trach status, and impaired ventilatory effort.            Bradycardia    New diagnosis, likely secondary to  beta-blocker and amiodarone.  Will decrease amiodarone dose to 200 mg daily and hold beta-blocker for heart rate less than 50 or blood pressure less than 90/50 defer consult Cardiology for the moment.          Atrial fibrillation    Atrial Fibrillation controlled currently with Amiodarone and carvedilol. [id number] score 4. Anticoagulation not indicated currently d/t ALS.  Adjust medications for rate control as described in bradycardia above.          Percutaneous endoscopic gastrostomy status    Continue routine percutaneous gastrostomy care.  Continue tube feeds per home regimen.          Tracheostomy in place    Routine care          Chronic indwelling Munoz catheter    With UTI- Change per reccs          Anemia    Patient's anemia is currently controlled. S/p 0 units of PRBCs. Etiology likely d/t malnutrition  Current CBC reviewed-   Lab Results   Component Value Date    HGB 11.0 (L) 06/24/2018    HCT 33.3 (L) 06/24/2018     Monitor serial CBC and transfuse if patient becomes hemodynamically unstable, symptomatic or H/H drops below 7/21.           Anxiety    Chronic problem, controlled. Will continue chronic medication(s), BZD and SSRI and monitor for any changes, adjusting as needed.           Anasarca    Chronic problem. Related to severe immobility. Continue lasix and monitor I/Os.          ALS (amyotrophic lateral sclerosis)    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 Noted cough assist. Patient seen by Dr. Fuentes as an outpatient.  No acute inpatient needs at this point in time.           VTE Risk Mitigation         Ordered     enoxaparin injection 40 mg  Daily      06/21/18 0001     Place sequential compression device  Until discontinued      06/21/18 0001     Place JOHN hose  Until discontinued      06/21/18 0001     IP VTE HIGH RISK PATIENT  Once      06/21/18 0001          Critical care time spent on the evaluation and treatment of severe organ dysfunction, review of pertinent labs  and imaging studies, discussions with consulting providers and discussions with patient/family:  37 minutes.    Barney Rehman MD  Department of Hospital Medicine   [facility]

## 2018-06-24 NOTE — PROGRESS NOTES
Progress Note  Infectious Disease    Follow-up For:  MDRO UTI    SUBJECTIVE:   ROS:  No complaints. Feels about the same. Sitter at bedside . Urine organism returned as sensitive Ecoli. Paronychia still draining pus.     Antibiotics     Start     Stop Route Frequency Ordered    06/21/18 1600  meropenem-0.9% sodium chloride 1 g/50 mL IVPB      -- IV Every 8 hours (non-standard times) 06/21/18 1434          Pertinent Medications noted:    EXAM & DIAGNOSTICS REVIEWED:   Vitals:     Temp:  [97.9 °F (36.6 °C)-98.8 °F (37.1 °C)]   Temp: 98.2 °F (36.8 °C) (06/24/18 1300)  Pulse: (!) 50 (06/24/18 1300)  Resp: 14 (06/24/18 1300)  BP: (!) 74/49 (06/24/18 1300)  SpO2: 100 % (06/24/18 1300)    Intake/Output Summary (Last 24 hours) at 06/24/18 1502  Last data filed at 06/24/18 0600   Gross per 24 hour   Intake             1080 ml   Output             1350 ml   Net             -270 ml       General:  In NAD. Looks alert and comfortable  Eyes:  Anicteric, PERRL, EOMI  ENT:  Mouth w/ pink MMM, no lesions/exudate,   Neck:  Trachea midline, supple, no adenopathy appreciated  Lungs:  clear  Heart:  RRR, no gallop/murmur noted  Abd:  soft, NT, ND, normal BS, no masses/organomegaly appreciated.  :  Michele draining yellow urine, clear and no exudate from his meatus  Musc:  Joints without effusion, erythema, synovitis,   Skin:  Generally warm, dry, normal for color. No rashes  Wound: Left great toenail: Able toexpress 1 drop of pus, less erythema and I trimmed the nail back a bit with sterile scissors/forceps to facilitate drainage.   Neuro: AAOx3,quadriplegic  Extrem: Chronic generalized edema,  VAD:      Lines/Tubes/Drains:    General Labs reviewed:    Recent Labs  Lab 06/24/18  0322   WBC 5.70   RBC 4.12*   HGB 11.0*   HCT 33.3*      MCV 81*   MCH 26.8*   MCHC 33.2       Recent Labs  Lab 06/24/18  0322   CALCIUM 10.4   PROT 7.2      K 3.4*   CO2 22*      BUN 13   CREATININE 0.5   ALKPHOS 91   ALT 51*   AST 25    BILITOT 0.6     6/16 urine culture: VRE, pseudomonas and ESBL Klebsiella  6/2: urine culture: ESBL Klebsiella  5/15: urine culture: Proteus, Group B strep, pseudomonas, Citrobacter  4/27: urine culture: citrobacter    Micro: urine culture here growing Ecoli, very sensitive   renal ultrasound neg    ASSESSMENT & PLAN      Patient Active Problem List   Diagnosis    ALS (amyotrophic lateral sclerosis)    Atrial fibrillation    Bartonella infection    Anasarca    Anxiety    Urinary tract infection associated with indwelling urethral catheter    Anemia    Ventilator dependence    Chronic indwelling Munoz catheter    Tracheostomy in place    Bradycardia    Percutaneous endoscopic gastrostomy status   1.         Lethargy, with positive urine culture, no fever, no leukocytosis, mild pyuria, not clinically ill  2.         Chronic indwelling munoz, neurogenic bladder  3.         ALS, with chronic respiratory failure  4.         MDRO  5. Paronychia left great toe, nail trimmed back by me    Recommendation: Ok with me to go home on cephalexin 500 QID for 3-4 more days  Would not check urine for test of cure  Munoz change every 3-4 weeks  Warm compresses to left great toe and soap and water, no ointment

## 2018-06-24 NOTE — SUBJECTIVE & OBJECTIVE
Interval History:  Patient remains at his symptomatic baseline.  No acute events overnight.  Chest x-ray reviewed and shows evidence of decreasing atelectasis from previous chest x-rays.  Patient refusing bronchodilator therapy.  Plan of care discussed with the caregiver at the bedside and the bedside nurse in detail.    Review of Systems   Unable to perform ROS: Patient nonverbal     Objective:     Vital Signs (Most Recent):  Temp: 98.2 °F (36.8 °C) (06/24/18 1300)  Pulse: (!) 50 (06/24/18 1300)  Resp: 14 (06/24/18 1300)  BP: (!) 74/49 (06/24/18 1300)  SpO2: 100 % (06/24/18 1300) Vital Signs (24h Range):  Temp:  [97.9 °F (36.6 °C)-98.8 °F (37.1 °C)] 98.2 °F (36.8 °C)  Pulse:  [45-78] 50  Resp:  [14-30] 14  SpO2:  [99 %-100 %] 100 %  BP: ()/(49-83) 74/49     Weight: 110.7 kg (244 lb 0.8 oz)  Body mass index is 35.02 kg/m².    Intake/Output Summary (Last 24 hours) at 06/24/18 1440  Last data filed at 06/24/18 0600   Gross per 24 hour   Intake             1080 ml   Output             1350 ml   Net             -270 ml      Physical Exam   Constitutional: No distress.   Patient nonverbal unresponsive at baseline   HENT:   Mouth/Throat: Oropharynx is clear and moist. No oropharyngeal exudate.   Noted ETT in place. OG/NG in place as well   Eyes: Right eye exhibits no discharge. Left eye exhibits no discharge.   Pupils sluggish   Neck: No JVD present. No tracheal deviation present. No thyromegaly present.   Trach in place   Cardiovascular: Intact distal pulses.  Exam reveals no gallop and no friction rub.    No murmur heard.  Pulmonary/Chest: No respiratory distress. He has no wheezes. He has no rales.   Patient with coarse breath sounds on ventilator   Abdominal: Soft. He exhibits no distension. There is no tenderness.   PEG in place   Genitourinary:   Genitourinary Comments: Indwelling munoz in place   Musculoskeletal: He exhibits edema (3+ anasarca). He exhibits no tenderness or deformity.   Neurological: He  displays normal reflexes. He exhibits normal muscle tone.   Patient with diffuse, flaccid paralysis at baseline. Some movement of eyebrows is on response   Skin: No rash noted. He is not diaphoretic. No erythema.   Noted rash on buttocks- L side with drying lesions and scale. Noted L toe with purulent exudate   Nursing note and vitals reviewed.      Significant Labs: All pertinent labs within the past 24 hours have been reviewed.    Significant Imaging: I have reviewed all pertinent imaging results/findings within the past 24 hours.

## 2018-06-24 NOTE — EICU
eICU Note :    Called by the [facility] eRN:    Problem: Bradycardia into the 40's with mental status     Pertinent History and labs reviewed : 46 y/o male with h/o ALS on chronic vent , just received his PM dose of Carvedilol ,Klonopin , Lyrica and Amiodarone     Treatment /Intervention given: Possible drug induced episode , but has never happened before , pt has been on these meds for a long time , will check ABG and TSH        Steffany Alcaraz M.D  eICU Physician

## 2018-06-24 NOTE — DISCHARGE INSTRUCTIONS
new antibiotic from your pharmacy, discharge around catheter is ok even if it has smell,Clean well with soap and water as needed. if you have symptoms like fever, chills,mental un clarity ,vomiting then that is time to come to er. Inner cannula has been changed 6/24/18 meds are up to ate till 5 pm.

## 2018-06-25 ENCOUNTER — TELEPHONE (OUTPATIENT)
Dept: MEDSURG UNIT | Facility: HOSPITAL | Age: 48
End: 2018-06-25

## 2018-06-25 LAB — BACTERIA UR CULT: NO GROWTH

## 2018-06-25 NOTE — DISCHARGE SUMMARY
[facility] Medicine  Discharge Summary      Patient Name: Bayron Delgado  MRN: [id number]  Admission Date: 6/20/2018  Hospital Length of Stay: 4 days  Discharge Date and Time: 6/24/2018  7:31 PM  Attending Physician: No att. providers found   Discharging Provider: Barney Rehman MD  Primary Care Provider: Scott Puckett Jr, MD      HPI:   48 y/o gentleman, who presents to the ED with c/o lethargy, penile discharge, and foul smelling urine.  He has a PMH of ALS (ventilator dependent), CHF, and Afib. His wife is at bedside and provides majority of history as he is nonverbal.  She reports that symptoms have been ongoing over the past 6 weeks after having his catheter exchanged.  She states that he has been on two different rounds of ABX recently.  However, symptoms continue to return.  She reports that home health has been monitoring him and recently repeated a urine culture on 05/16/2018.  She states that she was called today and instructed to present to the hospital d/t urine culture being positive for ESBL, VRE, and multidrug resistant pseudomonas.  She denies fevers while at home.  Initial workup reveals Tmax 98.3, no leukocytosis, UA with moderate bacteria, 3+ leukocytes, +2 occult blood.      * No surgery found *      Hospital Course:   Patient is a 47-year-old male with a past medical history significant for ALS with secondary ventilator dependence and neurogenic bladder with indwelling Michele catheter, tracheostomy, and percutaneous gastrostomy who presents to the hospital with increasing lethargy and positive urine culture for multi drug resistant organisms.  Infectious Disease was consulted and patient was started on broad-spectrum IV antibiotic therapy.  Cultures reviewed by Infectious Disease and antibiotics were adjusted were carbapenems given history of extended spectrum beta lactamase producing gram-negative rods.  Patient remained afebrile and asymptomatic throughout his hospital  stay.  He did have some increased atelectasis for which he was prescribed bronchodilators and cough assist.  Patient remained at his symptomatic baseline throughout his hospitalization and his respiratory status remained stable.  He had no evidence of increasing fevers or worsening systemic inflammation.  He was discharged home with Keflex the next 5 days.     Consults:   Consults         Status Ordering Provider     Inpatient consult to Infectious Diseases  Once     Provider:  Isabel Sparrow MD    Completed ARIELLA MIRANDA     Inpatient consult to Registered Dietitian/Nutritionist  Once     Provider:  (Not yet assigned)    Completed OTTO LOTT     IP consult to case management  Once     Provider:  (Not yet assigned)    Completed OTTO LOTT          No new Assessment & Plan notes have been filed under this hospital service since the last note was generated.  Service: Hospital Medicine    Final Active Diagnoses:    Diagnosis Date Noted POA    PRINCIPAL PROBLEM:  Urinary tract infection associated with indwelling urethral catheter [T83.511A, N39.0] 06/20/2018 Yes    Ventilator dependence [Z99.11] 06/21/2018 Not Applicable    Bradycardia [R00.1] 06/24/2018 No    Atrial fibrillation [I48.91] 09/09/2015 Yes    Percutaneous endoscopic gastrostomy status [Z93.1] 06/24/2018 Not Applicable    Anemia [D64.9] 06/21/2018 Yes    Chronic indwelling Michele catheter [Z92.89] 06/21/2018 Not Applicable    Tracheostomy in place [Z93.0] 06/21/2018 Not Applicable    Anxiety [F41.9] 03/15/2018 Yes    Anasarca [R60.1] 03/14/2018 Yes    ALS (amyotrophic lateral sclerosis) [G12.21] 06/05/2014 Yes      Problems Resolved During this Admission:    Diagnosis Date Noted Date Resolved POA       Discharged Condition: stable    Disposition: Home-Health Care [facility]    Follow Up:  Follow-up Information     Scott Puckett Jr, MD In 2 weeks.    Specialty:  Family Medicine  Contact information:  [address]  [place]  [address]  [phone]                 Patient Instructions:     Referral to Home health   Referral Priority: Routine Referral Type: Home Health Care   Referral Reason: Specialty Services Required    Requested Specialty: Home Health Services    Number of Visits Requested: 1      Activity as tolerated     Notify your health care provider if you experience any of the following:  temperature >100.4     Notify your health care provider if you experience any of the following:  difficulty breathing or increased cough     Notify your health care provider if you experience any of the following:  increased confusion or weakness     Change dressing (specify)   Order Comments: Dressing change: warm compress to L great toe  times per day using wet gauze.     Tube Feedings/Formulas   Order Comments: Isosource 1.5, 240mL (1 can) Q4. Please give 120 mL (1/2 can) for one feeding per day to provide 1980 kcals, 90 g pro, 232 g CHO, 1007 mL water. Provide 165 mL free water flush Q4   Order Specific Question Answer Comments   Select Adult Formula: Isosource 1.5 asa    Route: Gastrostomy          Significant Diagnostic Studies:     Pending Diagnostic Studies:     None         Medications:  Reconciled Home Medications:      Medication List      START taking these medications    cephALEXin 250 mg/5 mL suspension  Commonly known as:  KEFLEX  5 mLs (250 mg total) by Per G Tube route every 6 (six) hours. for 5 days        ASK your doctor about these medications    amiodarone 200 MG Tab  Commonly known as:  PACERONE  200 mg by Per G Tube route 2 (two) times daily.     carvedilol 6.25 MG tablet  Commonly known as:  COREG  1 tablet (6.25 mg total) by Per G Tube route 2 (two) times daily.     clonazePAM 0.5 MG tablet  Commonly known as:  KLONOPIN  1 tablet (0.5 mg total) by Per G Tube route 3 (three) times daily.     diclofenac sodium 2 % Sopk  Apply 40 mg topically daily as needed (pain).     diphenhydrAMINE 50 MG tablet  Commonly known as:   BENADRYL  Take 50 mg by mouth nightly as needed for Itching.     doxepin 75 MG capsule  Commonly known as:  SINEQUAN  1 capsule (75 mg total) by Per G Tube route 2 (two) times daily.     DULoxetine 60 MG capsule  Commonly known as:  CYMBALTA  Take 1 capsule (60 mg total) by mouth once daily.     famotidine 20 MG tablet  Commonly known as:  PEPCID  1 tablet (20 mg total) by Per G Tube route 2 (two) times daily.     furosemide 40 MG tablet  Commonly known as:  LASIX  1 tablet (40 mg total) by Per G Tube route once daily.     HYDROcodone-acetaminophen  mg per tablet  Commonly known as:  NORCO  1 tablet by Per G Tube route 3 (three) times daily.     hydrOXYzine HCl 25 MG tablet  Commonly known as:  ATARAX  1 tablet (25 mg total) by Per G Tube route 4 (four) times daily.     ISOSOURCE 1.5 YURY ORAL  by Per G Tube route 6 (six) times daily.     lidocaine 5 %  Commonly known as:  LIDODERM  Place 3 patches onto the skin once daily. Remove & Discard patch within 12 hours or as directed by MD     ondansetron 4 mg/5 mL solution  Commonly known as:  ZOFRAN  5 mLs (4 mg total) by Per G Tube route every 6 (six) hours.     pregabalin 100 MG capsule  Commonly known as:  LYRICA  1 capsule (100 mg total) by Per G Tube route 3 (three) times daily.     scopolamine 1.3-1.5 mg (1 mg over 3 days)  Commonly known as:  TRANSDERM-SCOP  Place 1 patch onto the skin Every 3 (three) days.     SENNA WITH DOCUSATE SODIUM 8.6-50 mg per tablet  Generic drug:  senna-docusate 8.6-50 mg  Take 7 tablets by mouth once daily.     temazepam 15 mg Cap  Commonly known as:  RESTORIL  15 mg by Per G Tube route every evening.            Indwelling Lines/Drains at time of discharge:   Lines/Drains/Airways     Drain                 Gastrostomy/Enterostomy 01/23/17 1113 Percutaneous endoscopic gastrostomy (PEG) LUQ feeding 517 days         Urethral Catheter 06/21/18 0115 Non-latex 20 Fr. 3 days          Airway                 Surgical Airway 03/19/18 2485  John Cuffed 97 days                Time spent on the discharge of patient: 41 minutes  Patient was seen and examined on the date of discharge and determined to be suitable for discharge.      Barney Rehman MD  Department of Hospital Medicine  [facility]

## 2018-06-25 NOTE — PROGRESS NOTES
[facility] sent patient information to [facility] to resume home health services through [facility] for authorization and acceptance.HALIE Sandhu     11:45am per Giselle with [facility] [phone], they accept patient.HALIE Sandhu

## 2018-06-26 LAB — BACTERIA BLD CULT: NORMAL

## 2019-03-05 ENCOUNTER — TELEPHONE (OUTPATIENT)
Dept: PULMONOLOGY | Facility: CLINIC | Age: 49
End: 2019-03-05

## 2019-03-05 DIAGNOSIS — R60.9 EDEMA, UNSPECIFIED TYPE: Primary | ICD-10-CM

## 2019-03-05 RX ORDER — FUROSEMIDE 40 MG/1
40 TABLET ORAL
Qty: 60 TABLET | Refills: 2 | Status: SHIPPED | OUTPATIENT
Start: 2019-03-05 | End: 2019-12-18 | Stop reason: SDUPTHER

## 2019-03-05 NOTE — TELEPHONE ENCOUNTER
Marked edema, including scrotum and penis.  On lasix 40 mg/day. Will increase to bid for 5 days and re-assess.

## 2019-10-14 ENCOUNTER — TELEPHONE (OUTPATIENT)
Dept: BARIATRICS | Facility: CLINIC | Age: 49
End: 2019-10-14

## 2019-10-14 NOTE — TELEPHONE ENCOUNTER
called pt's wife, advised dr. ramos will not be able to, can contact dr. zapien for gen surg referral in area. pt acknowledged.

## 2019-10-14 NOTE — TELEPHONE ENCOUNTER
----- Message from Pilo Smith sent at 10/14/2019  1:47 PM CDT -----  Contact: Wife/Perlita Bhat called in regarding the attached patient ().  Perlita wanted to see if patient could get an appt with Dr. Maldonado on 11/5/19 to get the growth around feeding tube cut down.    Perlita's call back number is [phone]

## 2019-10-18 ENCOUNTER — TELEPHONE (OUTPATIENT)
Dept: FAMILY MEDICINE | Facility: CLINIC | Age: 49
End: 2019-10-18

## 2019-10-22 ENCOUNTER — TELEPHONE (OUTPATIENT)
Dept: SURGERY | Facility: CLINIC | Age: 49
End: 2019-10-22

## 2019-10-22 ENCOUNTER — HOSPITAL ENCOUNTER (EMERGENCY)
Facility: HOSPITAL | Age: 49
Discharge: HOME | End: 2019-10-22
Attending: EMERGENCY MEDICINE
Payer: MEDICARE

## 2019-10-22 VITALS
DIASTOLIC BLOOD PRESSURE: 75 MMHG | HEIGHT: 70 IN | TEMPERATURE: 98 F | SYSTOLIC BLOOD PRESSURE: 110 MMHG | HEART RATE: 74 BPM | RESPIRATION RATE: 14 BRPM | BODY MASS INDEX: 34.36 KG/M2 | OXYGEN SATURATION: 97 % | WEIGHT: 240 LBS

## 2019-10-22 DIAGNOSIS — K94.29 IRRITATION AROUND PERCUTANEOUS ENDOSCOPIC GASTROSTOMY (PEG) TUBE SITE: Primary | ICD-10-CM

## 2019-10-22 DIAGNOSIS — L92.9 GRANULATION TISSUE ABNORMALITY: ICD-10-CM

## 2019-10-22 LAB
ANION GAP SERPL CALC-SCNC: 13 MMOL/L (ref 8–16)
BASOPHILS # BLD AUTO: 0.1 K/UL (ref 0–0.2)
BASOPHILS NFR BLD: 1.4 % (ref 0–1.9)
BUN SERPL-MCNC: 20 MG/DL (ref 6–20)
CALCIUM SERPL-MCNC: 10.1 MG/DL (ref 8.7–10.5)
CHLORIDE SERPL-SCNC: 106 MMOL/L (ref 95–110)
CO2 SERPL-SCNC: 21 MMOL/L (ref 23–29)
CREAT SERPL-MCNC: 0.5 MG/DL (ref 0.5–1.4)
DIFFERENTIAL METHOD: ABNORMAL
EOSINOPHIL # BLD AUTO: 0.1 K/UL (ref 0–0.5)
EOSINOPHIL NFR BLD: 1.8 % (ref 0–8)
ERYTHROCYTE [DISTWIDTH] IN BLOOD BY AUTOMATED COUNT: 15.5 % (ref 11.5–14.5)
EST. GFR  (AFRICAN AMERICAN): >60 ML/MIN/1.73 M^2
EST. GFR  (NON AFRICAN AMERICAN): >60 ML/MIN/1.73 M^2
GLUCOSE SERPL-MCNC: 125 MG/DL (ref 70–110)
HCT VFR BLD AUTO: 42.5 % (ref 40–54)
HGB BLD-MCNC: 14.1 G/DL (ref 14–18)
IMM GRANULOCYTES # BLD AUTO: 0.08 K/UL (ref 0–0.04)
LYMPHOCYTES # BLD AUTO: 1.6 K/UL (ref 1–4.8)
LYMPHOCYTES NFR BLD: 22.3 % (ref 18–48)
MCH RBC QN AUTO: 28.8 PG (ref 27–31)
MCHC RBC AUTO-ENTMCNC: 33.2 G/DL (ref 32–36)
MCV RBC AUTO: 87 FL (ref 82–98)
MONOCYTES # BLD AUTO: 0.6 K/UL (ref 0.3–1)
MONOCYTES NFR BLD: 8.5 % (ref 4–15)
NEUTROPHILS # BLD AUTO: 4.8 K/UL (ref 1.8–7.7)
NEUTROPHILS NFR BLD: 64.9 % (ref 38–73)
NRBC BLD-RTO: 0 /100 WBC
PLATELET # BLD AUTO: 221 K/UL (ref 150–350)
PMV BLD AUTO: 10.6 FL (ref 9.2–12.9)
POTASSIUM SERPL-SCNC: 3.4 MMOL/L (ref 3.5–5.1)
RBC # BLD AUTO: 4.9 M/UL (ref 4.6–6.2)
SODIUM SERPL-SCNC: 140 MMOL/L (ref 136–145)
WBC # BLD AUTO: 7.37 K/UL (ref 3.9–12.7)

## 2019-10-22 PROCEDURE — 80048 BASIC METABOLIC PNL TOTAL CA: CPT

## 2019-10-22 PROCEDURE — 25500020 PHARM REV CODE 255: Performed by: EMERGENCY MEDICINE

## 2019-10-22 PROCEDURE — 85025 COMPLETE CBC W/AUTO DIFF WBC: CPT

## 2019-10-22 PROCEDURE — 36415 COLL VENOUS BLD VENIPUNCTURE: CPT

## 2019-10-22 PROCEDURE — 99285 EMERGENCY DEPT VISIT HI MDM: CPT | Mod: 25

## 2019-10-22 RX ADMIN — IOHEXOL 100 ML: 350 INJECTION, SOLUTION INTRAVENOUS at 11:10

## 2019-10-22 RX ADMIN — IOHEXOL 30 ML: 350 INJECTION, SOLUTION INTRAVENOUS at 11:10

## 2019-10-22 NOTE — ED NOTES
Pt to be discharged home via ambulance. Transfer arranged via [facility]. Pt and family updated on status of care and understanding verbalized. No other needs are identified at this time. Will monitor prn.

## 2019-10-22 NOTE — TELEPHONE ENCOUNTER
LMOR at 5:12pm to inform patients wife that Dr. Garcia doesn't do PEG tubes.  He would need to go back to the surgeon that put the tube in or the gastroenterologist.  Liborio

## 2019-10-22 NOTE — ED NOTES
Trach in place.    Cardiovascular: Normal rate, regular rhythm, normal heart sounds and intact distal pulses. Exam reveals no gallop and no friction rub.    No murmur heard.  Pulmonary/Chest: Breath sounds normal. He has no wheezes. He has no rhonchi. He has no rales.   Abdominal: Soft. He exhibits distension. He exhibits no mass. There is no tenderness.   Abdomen distended but soft. LUQ PEG intact with 1 cm granulation tissue adjacent to central margin. Granulation tissue noted with clear drainage. No palpable fluctuance or induration. No surrounding erythema. No TTP. No palpable masses or hernias noted.    Musculoskeletal: He exhibits edema.   1+ pitting edema bilaterally.

## 2019-10-22 NOTE — ED PROVIDER NOTES
"Encounter Date: 10/22/2019    SCRIBE #1 NOTE: IIjeoma, am scribing for, and in the presence of, Dg Zavaleta MD.       History     Chief Complaint   Patient presents with    PEG tube problem     vent dependent ALS patient        Time seen by provider: 9:11 AM on 10/22/2019    Bayron Delgado is a 48 y.o. male with PMHx of CHF, A-fib, and vent dependent ALS pt who presents to the ED via EMS for evaluation of "PEG tube problem". The family reports noticing a worsening "fistula around the PEG tube". The spouse endorses granulation tissue was noticed a few months ago "but is getting bigger" and has begun to drain for the past x2 weeks. The patient has applied topical Bactroban to the area. The patient denies pain. The PEG tube was last changed x6 months ago. He is able to tolerate tube feedings. HPI provided by the family. ROS limited secondary to tracheostomy pt. SHx includes appendectomy and hernia repair. Known drug allergies includes NSAIDS and penicillin.     The history is provided by the spouse and a relative.     Review of patient's allergies indicates:   Allergen Reactions    Latex, natural rubber     Nsaids (non-steroidal anti-inflammatory drug)      Causes patient to go into afib per wife    Penicillins Other (See Comments)     Per wife- his mother stated he was allergic to it.  Had redness associated with cefepime 3/14/2018     Past Medical History:   Diagnosis Date    ALS (amyotrophic lateral sclerosis)     Atrial fibrillation     CHF (congestive heart failure)     Neuropathy      Past Surgical History:   Procedure Laterality Date    APPENDECTOMY      HERNIA REPAIR       No family history on file.  Social History     Tobacco Use    Smoking status: Never Smoker   Substance Use Topics    Alcohol use: No    Drug use: No     Review of Systems   Unable to perform ROS: Other       Physical Exam     Initial Vitals [10/22/19 0904]   BP Pulse Resp Temp SpO2   131/73 (!) 56 14 98.3 °F " (36.8 °C) 100 %      MAP       --         Physical Exam    Nursing note and vitals reviewed.  Constitutional: He appears well-developed and well-nourished. He is not diaphoretic.  Non-toxic appearance.   HENT:   Head: Normocephalic and atraumatic.   Eyes: Conjunctivae are normal.   Neck: Neck supple.   Trach in place.    Cardiovascular: Normal rate, regular rhythm, normal heart sounds and intact distal pulses. Exam reveals no gallop and no friction rub.    No murmur heard.  Pulmonary/Chest: Breath sounds normal. He has no wheezes. He has no rhonchi. He has no rales.   Abdominal: Soft. He exhibits distension. He exhibits no mass. There is no tenderness.   Abdomen distended but soft. LUQ PEG intact with 1 cm granulation tissue adjacent to central margin. Granulation tissue noted with clear drainage. No palpable fluctuance or induration. No surrounding erythema. No TTP. No palpable masses or hernias noted.    Musculoskeletal: He exhibits edema.   1+ pitting edema bilaterally.   Neurological: He is alert.   Skin: No rash noted. No erythema.         ED Course   Procedures  Labs Reviewed   CBC W/ AUTO DIFFERENTIAL - Abnormal; Notable for the following components:       Result Value    RDW 15.5 (*)     Immature Grans (Abs) 0.08 (*)     All other components within normal limits   BASIC METABOLIC PANEL - Abnormal; Notable for the following components:    Potassium 3.4 (*)     CO2 21 (*)     Glucose 125 (*)     All other components within normal limits          Imaging Results          CT Abdomen Pelvis With Contrast (Final result)  Result time 10/22/19 11:48:55    Final result by Camille Guerrier MD (10/22/19 11:48:55)                 Impression:      Large amount of stool throughout the colon.  No acute abdominal findings.  Peg tube present with the tip in the stomach.  There is soft tissue thickening along the PEG tube which extends to the abdominal wall but with no fluid collection suggesting abscess or fistulous  tract.    Additional findings as detailed above including posterior bibasilar atelectatic change decreased compared to the prior chest CT of 3/26/2018.      Electronically signed by: Camille Guerrier MD  Date:    10/22/2019  Time:    11:48             Narrative:    EXAMINATION:  CT ABDOMEN PELVIS WITH CONTRAST    CLINICAL HISTORY:  Abdominal distension;LUQ PEG tube with adjacent tract, eval for fistula, abdominal wall abscess;    TECHNIQUE:  Low dose axial images, sagittal and coronal reformations were obtained from the lung bases to the pubic symphysis following the IV administration of 100 mL of Omnipaque 350 and 30 mL Omnipaque 350 p.o.    COMPARISON:  4/2/2014 abdominal CT and images of the lung bases from the more recent chest CT of 3/26/2018    FINDINGS:  There is posterior bibasilar atelectasis and consolidation decreased compared to the prior chest CT    Liver unremarkable appearance    No calcified stones the gallbladder or CT findings of acute cholecystitis.    Spleen unremarkable appearance    Adrenal glands unremarkable appearance    Pancreas unremarkable appearance    Abdominal aorta no aneurysm    Stomach, bowel, mesentery; prominent amount of stool throughout the course of the colon.  No appreciable bowel wall thickening or inflammatory change.  No free intraperitoneal air or fluid.  Appendix not visualized but no abnormal appendix is seen and history states appendectomy.  There is PEG tube present.  The tip appears within the stomach.  There is mild soft tissue thickening along the tube within the anterior abdominal wall but with no fluid collection suggesting abscess or fistulous tract.  There is small fat containing umbilical hernia with surrounding fat stranding and thickened soft tissue planes..  There is subcutaneous fat stranding in the soft tissues.    Renal enhancement is symmetrical and the kidneys are unremarkable appearance.  There is a Michele catheter in the bladder.                             (radiology reading, visualized by me)     Medical Decision Making:   History:   Old Medical Records: I decided to obtain old medical records.  Clinical Tests:   Lab Tests: Reviewed and Ordered  Radiological Study: Reviewed and Ordered            Scribe Attestation:   Scribe #1: I performed the above scribed service and the documentation accurately describes the services I performed. I attest to the accuracy of the note.      I, Dr. Dg Zavaleta, personally performed the services described in this documentation. All medical record entries made by the scribe were at my direction and in my presence.  I have reviewed the chart and agree that the record reflects my personal performance and is accurate and complete. Dg Zavaleta MD.  12:54 PM 10/22/2019    Bayron Delgado is a 48 y.o. male presenting with a irritation around PEG tube site.  There is clear granulation tissue with a ulceration at the inferior margin.  Gentle probing reveals a depth of 1 cm with no evident deeper fistula on exam.  There is no current drainage here.  There is no periods of cellulitis or abscess.  Further imaging shows no sign of fistula or abscess.  I do not think antibiotics are indicated.  Wound care with digit surgery follow-up discussed in detail.  I did explain to the family that further reassessment of possible fistula as well or further studies will be at the discretion of surgery.  I do not think requires admission or emergent surgical intervention.  PEG tube is functioning well and he does not appear dehydrated.  Mild hypokalemia not requiring immediate treatment noted.  Continue outpatient supplementation.  Return precautions reviewed.             Clinical Impression:       ICD-10-CM ICD-9-CM   1. Irritation around percutaneous endoscopic gastrostomy (PEG) tube site K94.29 536.49   2. Granulation tissue abnormality L92.9 701.5         Disposition:   Disposition: Discharged  Condition: Stable                         Dg Zavaleta MD  10/22/19 2445

## 2019-10-22 NOTE — TELEPHONE ENCOUNTER
I spoke to patients wife and she states that her  has ALS and is on a ventilator.  His PEG tube needs to be replaced due to green pus coming out of it and columns of cauliflower growing around the tube.  Dr. Garcia states that he doesn't treat PEG tubes.  He stated that the patient should go back to the surgeon or gastroenterologist that put the tube in.  Liborio

## 2019-10-22 NOTE — TELEPHONE ENCOUNTER
----- Message from Mandi Cannon sent at 10/22/2019  1:43 PM CDT -----  Contact: wife--[phone]  Type: Needs Medical Advice    Who Called:  Perlita Judd Call Back Number: [phone] (home)   Additional Information: The wife said she needs a nurse to call her back in regards to an earlier appt. Patient needs to see the doctor for his feeding tube it has skin growing around it an needs is removed the ER referred him to call Dr Garcia for an appt asap Please call to advise.

## 2019-10-29 DIAGNOSIS — Z93.1 PERCUTANEOUS ENDOSCOPIC GASTROSTOMY STATUS: ICD-10-CM

## 2019-10-29 DIAGNOSIS — G12.21 ALS (AMYOTROPHIC LATERAL SCLEROSIS): Primary | ICD-10-CM

## 2019-11-05 ENCOUNTER — TELEPHONE (OUTPATIENT)
Dept: NEUROLOGY | Facility: CLINIC | Age: 49
End: 2019-11-05

## 2019-11-05 DIAGNOSIS — G12.21 ALS (AMYOTROPHIC LATERAL SCLEROSIS): Primary | ICD-10-CM

## 2019-11-05 NOTE — TELEPHONE ENCOUNTER
Return call from patient's wife to inform they are unable to bring Mr. Delgado's mattress. RN Coordinator [facility] with [place] to request alternating pressure pad (SAMANTHA) rental.     Patient's wife notified.

## 2019-11-05 NOTE — TELEPHONE ENCOUNTER
Incoming call from patient's wife, Perlita, to inform that transportation is being provided by a [place] based company. She indicated that it is a one-way transport. The company is unable to leave stretcher with patient. Perlita noted they will bring patient's vent, suction, and air mattress to place on hospital bed rental.      RN Coordinator contacted JEN Mckinley with [employer] to request a loaner power wheelchair for patient to utilize 11/5-11/8. Elías indicated that he just retrieved a patient's chair and will delivery to [facility]. RN Coordinator to clean and disinfect the PWC upon drop off.     Ivy lift [facility]      Patient's wife notified of the above. She expressed her gratitude.

## 2019-11-06 ENCOUNTER — OFFICE VISIT (OUTPATIENT)
Dept: NEUROLOGY | Facility: CLINIC | Age: 49
End: 2019-11-06
Payer: MEDICARE

## 2019-11-06 ENCOUNTER — TELEPHONE (OUTPATIENT)
Dept: NEUROLOGY | Facility: CLINIC | Age: 49
End: 2019-11-06

## 2019-11-06 DIAGNOSIS — M25.561 CHRONIC PAIN OF BOTH KNEES: ICD-10-CM

## 2019-11-06 DIAGNOSIS — M25.562 CHRONIC PAIN OF BOTH KNEES: ICD-10-CM

## 2019-11-06 DIAGNOSIS — M79.2 NEUROPATHIC PAIN OF FOOT, UNSPECIFIED LATERALITY: ICD-10-CM

## 2019-11-06 DIAGNOSIS — G89.29 CHRONIC MIDLINE LOW BACK PAIN WITHOUT SCIATICA: ICD-10-CM

## 2019-11-06 DIAGNOSIS — F43.21 ADJUSTMENT DISORDER WITH DEPRESSED MOOD: ICD-10-CM

## 2019-11-06 DIAGNOSIS — R26.9 ABNORMALITY OF GAIT AND MOBILITY: ICD-10-CM

## 2019-11-06 DIAGNOSIS — Z93.1 PRESENCE OF EXTERNALLY REMOVABLE PERCUTANEOUS ENDOSCOPIC GASTROSTOMY (PEG) TUBE: ICD-10-CM

## 2019-11-06 DIAGNOSIS — G12.21 ALS (AMYOTROPHIC LATERAL SCLEROSIS): Primary | ICD-10-CM

## 2019-11-06 DIAGNOSIS — G89.29 CHRONIC NECK PAIN: ICD-10-CM

## 2019-11-06 DIAGNOSIS — Z93.1 PERCUTANEOUS ENDOSCOPIC GASTROSTOMY STATUS: ICD-10-CM

## 2019-11-06 DIAGNOSIS — M54.50 CHRONIC MIDLINE LOW BACK PAIN WITHOUT SCIATICA: ICD-10-CM

## 2019-11-06 DIAGNOSIS — R47.1 DYSARTHRIA: ICD-10-CM

## 2019-11-06 DIAGNOSIS — M54.2 CHRONIC NECK PAIN: ICD-10-CM

## 2019-11-06 DIAGNOSIS — G89.29 CHRONIC PAIN OF BOTH KNEES: ICD-10-CM

## 2019-11-06 DIAGNOSIS — G31.84 MILD NEUROCOGNITIVE DISORDER: ICD-10-CM

## 2019-11-06 PROCEDURE — 99203 OFFICE O/P NEW LOW 30 MIN: CPT | Mod: S$PBB,,, | Performed by: PHYSICAL MEDICINE & REHABILITATION

## 2019-11-06 PROCEDURE — 99999 PR PBB SHADOW E&M-EST. PATIENT-LVL III: CPT | Mod: PBBFAC,,,

## 2019-11-06 PROCEDURE — 99203 PR OFFICE/OUTPT VISIT, NEW, LEVL III, 30-44 MIN: ICD-10-PCS | Mod: S$PBB,,, | Performed by: PHYSICAL MEDICINE & REHABILITATION

## 2019-11-06 PROCEDURE — 90791 PSYCH DIAGNOSTIC EVALUATION: CPT | Mod: ,,, | Performed by: CLINICAL NEUROPSYCHOLOGIST

## 2019-11-06 PROCEDURE — 90791 PR PSYCHIATRIC DIAGNOSTIC EVALUATION: ICD-10-PCS | Mod: ,,, | Performed by: CLINICAL NEUROPSYCHOLOGIST

## 2019-11-06 PROCEDURE — 99214 OFFICE O/P EST MOD 30 MIN: CPT | Mod: S$PBB,,, | Performed by: INTERNAL MEDICINE

## 2019-11-06 PROCEDURE — 99214 PR OFFICE/OUTPT VISIT, EST, LEVL IV, 30-39 MIN: ICD-10-PCS | Mod: S$PBB,,, | Performed by: INTERNAL MEDICINE

## 2019-11-06 PROCEDURE — 99213 OFFICE O/P EST LOW 20 MIN: CPT | Mod: PBBFAC

## 2019-11-06 PROCEDURE — 99999 PR PBB SHADOW E&M-EST. PATIENT-LVL III: ICD-10-PCS | Mod: PBBFAC,,,

## 2019-11-06 RX ORDER — MUPIROCIN 20 MG/G
OINTMENT TOPICAL 3 TIMES DAILY
Qty: 2 TUBE | Refills: 0 | Status: SHIPPED | OUTPATIENT
Start: 2019-11-06

## 2019-11-06 RX ORDER — HYDROCODONE BITARTRATE AND ACETAMINOPHEN 10; 325 MG/1; MG/1
1 TABLET ORAL 3 TIMES DAILY
Qty: 120 TABLET | Refills: 0 | Status: SHIPPED | OUTPATIENT
Start: 2019-11-16 | End: 2019-11-20 | Stop reason: SDUPTHER

## 2019-11-06 RX ORDER — TRAMADOL HYDROCHLORIDE 50 MG/1
50-100 TABLET ORAL EVERY 6 HOURS PRN
Qty: 120 TABLET | Refills: 2 | Status: SHIPPED | OUTPATIENT
Start: 2019-11-16 | End: 2019-12-16

## 2019-11-06 RX ORDER — PROCHLORPERAZINE MALEATE 10 MG
10 TABLET ORAL NIGHTLY
COMMUNITY
End: 2020-03-20 | Stop reason: SDUPTHER

## 2019-11-06 RX ORDER — NYSTATIN 100000 U/G
30 CREAM TOPICAL 2 TIMES DAILY
COMMUNITY
End: 2020-03-20 | Stop reason: SDUPTHER

## 2019-11-06 RX ORDER — NYSTATIN 100000 [USP'U]/G
60 POWDER TOPICAL 2 TIMES DAILY
COMMUNITY
End: 2020-03-20 | Stop reason: SDUPTHER

## 2019-11-06 RX ORDER — LIDOCAINE 50 MG/G
3 PATCH TOPICAL DAILY
Qty: 90 PATCH | Refills: 2 | Status: SHIPPED | OUTPATIENT
Start: 2019-11-06 | End: 2019-11-21 | Stop reason: SDUPTHER

## 2019-11-06 NOTE — PROGRESS NOTES
[facility]    SPEECH THERAPY NEUROLOGICAL REHABILITATION SCREENING    ALS MULTIDISCIPLINARY CLINIC    HISTORY AND SUBJECTIVE  Date: 11/6/2019   ALS Clinic: 1  Start Time:  1120  Stop Time:  1130  Billable Time: 0 minutes    Onset Date:  June 2014  History of Present Illness: Bayron presents to the [facility] ALS clinic secondary to the diagnosis of ALS. He transferred from the [facility]. His wife and caregiver were present and provided all pertinent information. Pt's wife reported that he has a Tobii Dynavox speech generating device and a floor stand only. He does not have a wheelchair mount for the device.   Respiratory Status: trach/vent dependent  Pain:  Pt did not indicate pain. He was asleep during most of the session.     OBJECTIVE  Motor Speech/Voice: Pt was nonverbal.  Augmentative/Alternative Communication (AAC): Pt is currently using an augmentative/ alternative communication device (Tobii Dynavox with eyegaze). He did not have the device with him today but reportedly he is using it without difficulty. He also uses an alphabet board when not using his device. He needs a wheelchair mount for the device. He also needs to use eyegaze to operate his wheelchair which is not able to do so at this time.   Swallowing: G- tube feedings    ASSESSMENT  Pt exhibits no functional speech or voice. He has a Tobii Dynavox with eyegaze but needs a wheelchair mount. Pt is currently receiving home health services.    PLAN/RECOMMENDATIONS:  Recommended Treatment Plan: NO speech therapy services are warranted at this time. Follow up with ALS clinic in 3 months.     NOTE: No charges were posted to this account.     MELISSA Marquez, CCC-SLP, CBIS  Speech-Language Pathologist  Outpatient Neurological Rehabilitation    Date: 11/6/2019

## 2019-11-06 NOTE — PROGRESS NOTES
NEUROPSYCHOLOGICAL CONSULTATION    Referral Information  Name: Bayron Delgado  MRN: [id number]  HAGER: 2019  : 1970  Age: 48 y.o.  Handedness: Right  Race: White  Gender: male  Referring Provider:   Referral Reason/Medical Necessity: Patient referred for neuropsychological consult as part of a multidisciplinary clinic for diagnosis and management of amyotrophic lateral sclerosis (ALS).  Neuropsychological evaluation was requested to document any cognitive deficits and provide treatment recommendations.  Billing:Total licensed neuropsychologists professional time includes: clinical interview, mental status exam, and treatment plan (20309)= 1 hour.   Consent: The patient expressed an understanding of the purpose of the evaluation and consented to all procedures.    CLINICAL INTERVIEW:   Review concurrent clinic notes for history/background. Mr. Delgado is vent dependent and does not speak aside from using a communication board and some technology.     Cognitively, wife endorsed some memory trouble over the past year that has worsened. Additionally, she noted some uncharacteristic flirting with a prior caregiver.   Psychiatrically, wife reported that he seems more withdrawn. Examples: closing his eyes more around her, communicating less with her, talking less to his daughter, seems less interested in their lives when they try and engage him.    patient endorsed significant changes with memory. He described short-term memory difficulty as beginning a year or more ago and has worsened over time. He described forgetting conversations with his wife, appointments, tasks, and repeating himself more frequently. His wife agreed and added that he will often ask the same question over and over. She also described and increase in executive dysfunction particularly noticeable over the last few months. She described that he has more problems with reasoning/judgment (e.g., they will argue about his limitations and have  significant disagreements about he think he can and she thinks he cannot do). She is now managing most IADLs (finances, household, driving, medications). Wife is also managing most ADLs 2/2 motor difficulties. He denied any current mood concerns. Wife endorsed significant caregiver burden and ongoing depression/anxiety associated with that. Sleep is fairly normal and appetite has improved. Energy is up/down. Review ALS clinic notes for motor sxs.     BACKGROUND INFORMATION: Review ALS clinic notes for most background information. There is no family psychiatric history. Educationally, he completed high school. Socially, he and wife have one 13-year old daughter. Review ALS notes for problem list. There is no psychiatric history.    BEHAVIORAL OBSERVATIONS: He was variably alert and intermittent had his eyes closed. He did not want to communicate with the communication board and preferred to keep his eyes shut. He is completely dependent physicially.  Affect could not be assessed. Thoughts were could not be assessed apart from nothing unremarkably displayed. No SI/HI endorsed by wife.    SUMMARY/PLAN:   Adjustment Disorder with depression  -Spent considerable time (>40-minutes) counseling wife about patient's recent onset of withdrawal. Helped to provide strategies (spending time with him in the moment without talking; reframing his withdrawal rather than taking it personally) and she was very appreciative.   -Continue to monitor in clinic visits. In particular, review with wife prior discussions about mood    Mild Neurocognitive Disorder  -Likely memory trouble with some disinhibition and apathy.   -Discussed with wife/caregiver that this can happen in ALS and how this explains some uncharacteristic behavior (e.g., see above). Discussed ways to help, but most strategies will center around coping/acceptance with wife.   -At this point, pushing him to do things or testing his memory/pushing him to remember isn't  recommended.         Thank you for allowing me to participate in Mr. Delgado's care.  If you have any questions, please contact me.     LAWRENCE Mccullough, Ph.D.; Clinical Neuropsychologist; [facility] - Department of Neurology

## 2019-11-06 NOTE — PROGRESS NOTES
Subjective:       Patient ID: Bayron Delgado is a 48 y.o. male.    Chief Complaint: No chief complaint on file.    HPI     Mr. Delgado is a 48 y.o. male with ALS who is presenting to ALS clinic for multidisciplinary care. He is switching his care from [facility] to [facility]. He is accompanied by his wife and his aide, from whom history was obtained b/o patient is nonverbal. He was diagnosed with ALS in 2014. He had a rapidly progressive course, resulting in tetraplegia. He is dependent for all his ADLs.  He needs a Ivy lift for transfers.  He is on a ventilator through tracheostomy due to respiratory failure. He has a G-tube due to dysphagia. He has been bed bound and power wheelchair bound for about 2 years. He uses an [email or web address] communication device.    The patient is being seen by the PM&R service for pain management. He has history of chronic nonradicular neck pain and low back pain. He has chronic bilateral knee pain from hyperextension injury couple years ago. He has severe bilateral neuropathic foot pain attributed to Lyme disease.    He is currently getting Lyrica 100 mg 3 times per day. He is on hydrocodone/apap 10/325 q 6 hours prn. He is on tramadol 50 mg q 6 hours prn. He gets Lidoderm patches, 2 for his back and 2 for his knees daily. These have been prescribed by his PCP/Nurse Practitioner (Lilo Edwards in [place], [place]). The wife wants to transfer this responsibility to [facility].          Review of Systems   Unable to perform ROS: Patient nonverbal         Objective:      Physical Exam   Constitutional:   Coming to the clinic in a Power Wheelchair.  Tracheostomy and PEG tube in place.   Eyes: EOM are normal.   Musculoskeletal:   BUE and BLE edema   Neurological:   Communicated with eye blink.  BUE and BLE: no palpable or visible movement noted.         Assessment:       1. ALS (amyotrophic lateral sclerosis)    2. Percutaneous endoscopic gastrostomy status    3. Adjustment  disorder with depressed mood    4. Mild neurocognitive disorder    5. Neuropathic pain of foot, unspecified laterality    6. Chronic midline low back pain without sciatica    7. Chronic neck pain    8. Chronic pain of both knees        Plan:       - Continue current medications per G-tube for pain control:  · Lyrica 100 mg capsules, 1 capsule 3 times per day.  · Tramadol 50 mg, 1 tablet q 6 hrs prn for moderate pain.  · Hydrocodone/apap 10/325, 1 tablet q 6 hrs prn for severe pain.  · Lidoderm patches to painful areas daily, 12 hrs on/12 hrs off.  - Follow up in ALS clinic.

## 2019-11-06 NOTE — PROGRESS NOTES
Physical Therapy Screen at ALS clinic     Clinic Date: 11/06/2019  ALS clinic visit #: 1  Precautions: Trach, PEG, AAC device    Time In: 1130  Time out: 1142  Total billable time: 0 min     Bayron is a 48 y.o. male that presents to [facility] secondary to dx of ALS.  Pt diagnoses of ALS dates back to June 2014. Transferring care from [facility] clinic. Pt has a trach, vent, PEG.        This patient is currently receiving home health services so a new PT evaluation was not completed today.  Wife and paid caregiver were present. Pt was in PWC with eyes closed but indicating he was alert with smiles throughout session. Wife addressed all needs/concerns. He had to arrive in [place] PWC due to his current WC battery is non-funcitonal.      Equipment/DME: PWC (not currently functional), electric zach, hospital bed with low air loss mattress.     Current functional level: Dependent / bedbound.      Recommendations:   Pt to continue to follow up with referring provider and ALS clinic. No outpatient PT warranted.  Continue home services. His wife indicates that he has a custom PWC but it has no battery and they need a replacement. He also needs a headrest with strap, WC mount for his AAC and adaptive control driving of eye gaze control. They also need attendant control bracket.     Karolyn Mercer, PT  11/06/2019

## 2019-11-06 NOTE — PROGRESS NOTES
NEUROLOGY  [facility]  [address] [place]  [address]  [phone] (office) / [phone] (fax)    Patient Name:  Bayron Delgado  :  1970  MR #:  [id number]  [id number]    Date of Clinic Follow Up: 2019  Name of Neurologist: Monique Gray D.O, ABPN, AOBNP, ABEM    Other Physicians:  Camille Bustillos NP (Primary Care Physician); No ref. provider found (Referring)    Subjective:       Patient ID: Bayron Delgado is a 48 y.o. male.    Chief Complaint:  OT Initial Evaluation      History of Present Illness    Bayron Delgado is a 48 y.o. male here today for follow up of motor neuron disease.    Here with: wife isaías and caregiver    Abram in  with foot drop in the right leg.  He had fasciculations.  He was diagnosed at [facility] with Dr. Sauer as a second opinion.  He has no family history of ALS.  He did not take Riluzole.      He has been bed bound for about 18 months now.      Tracheostomy in 2018.    He just had a pain pill today, so cannot participate in exam.    Because of his bed bound status, he felt it was too painful and to exertional to get to the [facility] clinic.      He has had pneumonia in the past.      They needed to come to clinic today to be seen so that they can get prescriptions for some of the DME and medications he needs.        Strength:  No movement, lot of limb edema.    Dysarthria:  Mute    Dysphagia: NPO, has G-tube - he has hypergranulation tissue, infection, rash around the tube    Sialorrhea:  Chronic suction    Constipation:  Has a bowel movement at least every other day, sometimes needs digital stim.  He is on a bowel program.    Sleep:  Sleep is ok, but it can be up and down.  He has a hospital bed and takes Restoril.    Gait:  Bedbound    Falls:  None    Breathing:  Trach    Bladder:  Michele catheter for urinary retention, frequent infections, needs flushed a lot    Pain:  Chronic issues    Mood:  Anti-anxiety medications    PBA:   None    Memory:  Mild issues      Treatment to date  N/A      Review of Systems  Review of Systems   Constitutional: Positive for appetite change and fatigue. Negative for fever.   HENT: Positive for drooling and trouble swallowing.    Eyes: Negative for pain and visual disturbance.   Respiratory: Positive for shortness of breath. Negative for chest tightness.    Cardiovascular: Positive for leg swelling. Negative for chest pain.   Gastrointestinal: Positive for constipation. Negative for nausea.   Endocrine: Negative for cold intolerance and heat intolerance.   Genitourinary: Negative for difficulty urinating and flank pain.   Musculoskeletal: Positive for back pain, gait problem and myalgias.   Skin: Negative for color change and pallor.   Allergic/Immunologic: Negative for environmental allergies and food allergies.   Neurological: Positive for speech difficulty and weakness.   Hematological: Negative for adenopathy. Does not bruise/bleed easily.   Psychiatric/Behavioral: Negative for behavioral problems and hallucinations.           Past Medical/Surgical History:   Past Medical History:   Diagnosis Date    ALS (amyotrophic lateral sclerosis)     Atrial fibrillation     CHF (congestive heart failure)     Neuropathy     Ventilator dependent      Past Surgical History:   Procedure Laterality Date    APPENDECTOMY      GASTROSTOMY TUBE PLACEMENT  2017    HERNIA REPAIR      TRACHEOSTOMY  03/2018     Current Medications:   Home Psychiatric Meds:   Psychotherapeutics (From admission, onward)    None        Allergies:   Review of patient's allergies indicates:   Allergen Reactions    Latex, natural rubber     Nsaids (non-steroidal anti-inflammatory drug)      Causes patient to go into afib per wife    Penicillins Other (See Comments)     Per wife- his mother stated he was allergic to it.  Had redness associated with cefepime 3/14/2018     History reviewed. No pertinent family history.     Social History      Tobacco Use    Smoking status: Never Smoker   Substance Use Topics    Alcohol use: No    Drug use: No         Objective:          ALS Physical Exam PBA Speech Facial Strength Tongue Strength Tongue Appear Jaw Jerk Limb Fasciculations   11/6/2019 No mute moderate severe atrophied No Absent      ALS Physical Exam (Continued) Neck Flex HHD Neck Flex MMT Neck Ext HHD Neck Ext MMT Shd Abd R HHD Shd Abd R MMT Shd Abd L HHD   11/6/2019 0 0 0 0 0 0 0      ALS Physical Exam (Continued) Shd Abd L MMT Wrist Ext R HHD Wrist Ext R MMT Wrist Ext L HHD Wrist Ext L MMT Hand  R (kg) Hand  L (kg)   11/6/2019 0 0 0 0 0 0 0      ALS Physical Exam (Continued) Hip Flex R HHD Hip Flex R MMT Hip Flex L HHD Hip Flex L MMT Knee Ext R HHD Knee Ext R MMT Knee Ext L HHD   11/6/2019 0 0 0 0 0 0 0      ALS Physical Exam (Continued) Knee Ext L MMT Foot DF R HHD Foot DF R MMT Foot DF L HHD Foot DF L MMT UMN Signs Legs   11/6/2019 0 0 0 0 0 No       ALSFRS Score:  0  FRS-6 Score:  0                GENERAL:  General appearance: Sedate from medication  Respiratory:  Normal.  Extremities: sl edema    MENTAL STATUS:  Alertness, attention span & concentration: sleeping from pain medication.  Language: mute.  Orientation to self, place & time:  eh.  Memory, recent & remote: eh.  Fund of knowledge: [facility].    SPEECH:  mute  Follows complex commands.    CRANIAL NERVES:  Cranial Nerves II-XII were examined.  II - Visual fields: normal.  III, IV, VI: PERRL, EOMI, No ptosis, No nystagmus.  V - Facial sensation: normal.  VII - Face symmetry & mobility: bilat lower facial weakness.  VIII - Hearing: normal.  IX, X - Palate: atrophied, weak.  XI - Shoulder shrug: weak.  XII - Tongue protrusion: attrophied, weak.    GROSS MOTOR:  Gait & station: non-ambulatory.  Tone:low.  Abnormal movements: none.  Too weak for coord exam    MUSCLE STRENGTH:   See table above    REFLEXES:    RIGHT Reflex   LEFT   1 Biceps 1   1 Brachiorad. 1   1 Triceps 1     Pectoralis     Jaw Jerk     Lantigua's         1 Patellar 1   1 Ankle 1    Suprapatellar              Down PLANTAR Down     SENSORY:  Light touch: Normal throughout.      Diagnostic Data Reviewed:   Records will be requested    Impression and Plan:   .  Bayron Delgado is a 49 y.o.male here to establish care for ALS.  I and the members of the multidisciplinary team have assessed Bayron Delgado and have made the following recommendations:    Problem List Items Addressed This Visit        Neuro    ALS (amyotrophic lateral sclerosis) - Primary    Overview     Dx in 2014 with foot drop in the right leg.  He had fasciculations.  [facility] with [facility] Sauer as a second opinion.  He has no family history of ALS.  He did not take Riluzole.    He has been bed bound for about 18 months now.    Tracheostomy in March 2018.  Has a G-tube         Current Assessment & Plan     Botox for facial spasms discussed.  Will request records           Relevant Orders    IR Gastrostomy Replace Tube w Fluoro & Imaging    Ambulatory referral to ALS Clinic    Ambulatory referral to Speech Therapy    Ambulatory referral to Physical Medicine Rehab    Ambulatory referral to Pulmonology    Ambulatory referral to Respiratory Therapy    Ambulatory referral to Social Work    Ambulatory referral to Physical Therapy    Ambulatory referral to Occupational Therapy    HME - OTHER    HME - OTHER    Dysarthria    Overview     Has AAC         Current Assessment & Plan     Needs WC [facility] for AAC            GI    Percutaneous endoscopic gastrostomy status    Relevant Orders    HME - OTHER    HME - OTHER       Other    Presence of externally removable percutaneous endoscopic gastrostomy (PEG) tube    Current Assessment & Plan     Difficulty with G-tube discussed.  Referral to interventional radiology         Abnormality of gait and mobility    Overview     Has PWC         Current Assessment & Plan     Needs new battery for power chair  Needs head rest  fitting  Needs alternative drive (eye drive)  Needs attendant control  Patient has no ability to use limbs for PWC           Other Visit Diagnoses     Adjustment disorder with depressed mood        Relevant Orders    HME - OTHER    HME - OTHER    Mild neurocognitive disorder        Relevant Orders    HME - OTHER    HME - OTHER    Neuropathic pain of foot, unspecified laterality        Relevant Orders    HME - OTHER    HME - OTHER    Chronic midline low back pain without sciatica        Relevant Orders    HME - OTHER    HME - OTHER    Chronic neck pain        Relevant Orders    HME - OTHER    HME - OTHER    Chronic pain of both knees        Relevant Orders    HME - OTHER    HME - OTHER            The patient will return to clinic in 3 months.    This note was created with voice recognition software.  Grammatical, syntax and spelling errors may be inevitable.        Monique Gray D.O, ABPN, AOBNP, ABEM

## 2019-11-06 NOTE — PROGRESS NOTES
[facility]    SPEECH THERAPY NEUROLOGICAL REHABILITATION EVALUATION    ALS MULTIDISCIPLINARY CLINIC     Date: 11/6/2019    Name: Bayron Delgado   MRN: [id number]    Therapy Diagnosis:   Encounter Diagnosis   Name Primary?    ALS (amyotrophic lateral sclerosis) Yes    Physician: Dr. Chaudhary/ Dr. Gray  Physician Orders: Ambulatory referral for Speech; Evaluation only  Medical Diagnosis: ALS    ALS Clinic: 1  Date of Evaluation:  11/6/2019   Insurance Authorization Period: Evaluation Only   Plan of Care Expiration:   Evaluation Only     Time In: ***  Time Out: ***  Total Billable Time: *** minutes   Procedure Min.   Evaluation of Speech Sound Production  ***          Subjective   Onset Date: June 2014  Past Medical History:  has a past medical history of ALS (amyotrophic lateral sclerosis), Atrial fibrillation, CHF (congestive heart failure), and Neuropathy. Bayron Delgado  has a past surgical history that includes Appendectomy and Hernia repair.  Medical Hx and Allergies:  Bayron has a current medication list which includes the following prescription(s): amiodarone, carvedilol, clonazepam, diclofenac sodium, diphenhydramine, doxepin, duloxetine, famotidine, furosemide, hydrocodone-acetaminophen, hydroxyzine hcl, lactose-reduced food/fiber, lidocaine, ondansetron, pregabalin, scopolamine, senna-docusate 8.6-50 mg, and temazepam.   Review of patient's allergies indicates:   Allergen Reactions    Latex, natural rubber     Nsaids (non-steroidal anti-inflammatory drug)      Causes patient to go into afib per wife    Penicillins Other (See Comments)     Per wife- his mother stated he was allergic to it.  Had redness associated with cefepime 3/14/2018     History of Present Illness: Bayron Delgado  presents to the [facility] secondary to the diagnosis of ALS Disease. ***  Prior Therapy:  ***                      Home Health Therapy at  "present time: {YES:11374}  Pain: {PAIN 0-10:80354} /10  Respiratory Status: ***   Nutrition:  {Blank multiple:19196::"regular with *** liquids","mechanical soft with *** liquids","pureed with *** liquids","NPO","[place] Free Water Protocol","***","***"}  Recent MBSS:  ***   Social History:  ***  Vision: ***   Hearing: Appeared to be within functional limits in conversation. Will monitor and refer as necessary.  Prior Level of Function:  ***   Changes of note from last ALS clinic include: ***   Chief Complaint: ***  Precautions: progressive degenerative disease, ***  Objective   Pt's motor speech, fluency, and voice were informally assessed. OME performed within Cranial Nerve Assessment detailed below. Motor speech skills were assessed and {WERE / WERE NOT:15936} functional for daily communication with a variety of communication partners (i.e. Family, friends, medical personnel).   Respiration / Phonation:   # of reps/ 5s Norms/ # reps per second Maximum Phon. Time (ah) Words Per Minute:   P^ ***  5.0-7.1 ***  Trial 1: ***     *** words per minute   T^ ***  4.8-7.1 ***  Trial 2: *** >125 = WFL    K^ ***  4.4-7.4 ***   <125 = refer for AAC eval   P^T^K^ ***  3.6-7.5 ***  Avg: ***       Norm: 160-170  (paragraph reading)       Norm (F 10-26, M 13-34.6) Norm: 150 to 250 (norm conversation)        Phonation Oral Reading Conversation   Stimulus Sustained ah:    Singing up scale:    Counting 1 to highest # on one breath: [employer] [place] History and Conversation   Quality {Voice quality:64175} {Voice quality:66933} {Voice quality:44642}   Duration  *** seconds  *** minute  N/a   Loudness  {Blank single:19197::"WFL","monoloudness","excessive loudness"," variable Loudness","***"} {Blank single:19197::"WFL","monoloudness","excessive loudness"," variable Loudness","***"} {Blank single:19197::"WFL","monoloudness","excessive loudness"," variable Loudness","***"}   Steadiness {Blank multiple:19196::"WFL","vocal " "tremor","varied pitch","high pitch","low pitch","***"} {Blank multiple:15121::"WFL","vocal tremor","varied pitch","high pitch","low pitch","***"} {Blank multiple:19196::"WFL","vocal tremor","varied pitch","high pitch","low pitch","***"}     Overall Speech Intelligibility: {GOOD FAIR:28716}; ***     Augmentative/Alternative Communication (AAC): Pt {IS / IS NOT:92633} currently using an augmentative/ alternative communication device. He  would benefit from a full AAC evaluation given current motor speech and voice skills and progressive nature of ALS. ***OR***He  will be monitored for the need of a full AAC evaluation given current motor speech and voice skills and progressive nature of ALS***  Type of AAC Device:  ***   Recommend AAC Eval: {YES/NO:20267}    Clinical Swallow Exam/ [place] Mechanism Exam:  Mandibular Strength and Mobility - Trigeminal Nerve (CNV) Rest: ***   Lateralization: ***  Protrusion: ***  Retraction:  ***  Involuntary Movement: {PRESENT/ABSENT:27158} ***   Oral Labial Strength and Mobility -  Facial Nerve (CN VII)  Rest: ***   Lateralization: ***  Protrusion: ***  Retraction:  ***  Involuntary Movement: {PRESENT/ABSENT:09046} ***   Lingual Strength and Mobility- Hypoglossal Nerve (CN XII)  Rest: ***   Lateralization: ***  Protrusion: ***  Elevation:  ***  Involuntary Movement: {PRESENT/ABSENT:33458} ***   Velar Elevation -   Glossopharyngeal Nerve (CN IX) and Vagus (CN X) Rest: ***   Symmetry: ***   Elevation: ***   Sustained elevation: ***   Involuntary movement: {PRESENT/ABSENT:46427} ***    Buccal Strength and Mobility -   Facial Nerve (CN VII)  {Blank single:19197::"WFL","reduced"} ***   Facial Sensation and Movement -   Facial Nerve (CN VII) Symmetrical at rest: {YES/NO:20267}  Wrinkle forehead: {YES/NO:20267}  Able to close eyes tightly: {YES/NO:20267}  Taste to Anterior 2/3 of Tongue: {YES/NO:20267}     Structure Abnormalities: {YES/NO:20267}  Dentition: ***  Secretion Management: " "***  Mucosal Quality: ***  Volitional Cough: ***  Volitional Swallow: ***  Voice Prior to PO Intake: ***    [place] Swallow Protocol:  ***  [place] Swallow Protocol dictates pt remain NPO if fail screener; (Goldier et al. 2014) however, as objective swallow assessment is not available for greater than a week, pt will remain on current diet until objective assessment is completed unless otherwise indicated.     Clinical Swallow Examination:   Pt presented with:   THIN:- {liquid choices:19877}    PUREE:- tsp bites of pudding x2    SOLID: -bite of chiquita cracker x1     DESCRIPTION: Oral Phase: ***   Pharyngeal:  ***     Eating Assessment Tool (EAT-10) is a questionnaire given to the patient to  the pt's swallowing function: *** /40  Recommend MBSS: {YES/NO:20267}    Education   Patient and his  family were educated on the {Blank multiple:26398::"recommendations","motor speech strategies","AAC","speech generating devices (SGD)","voice banking","message banking","swallowing precautions","***"}. They verbalized understanding.     Assessment   Impressions: Bayron Delgado exhibited {MILD:41521} {Blank multiple:65729::"mixed spastic-flaccid dysarthria","oral dysphagia","pharyngeal dysphagia","oropharyngeal dysphagia","esophageal dysphagia","***"}  2/2 ALS. His deficits were characterized by {alsms:86391} and {alsvoice:74594} His swallow was c/b *** .  Demonstrates impairments including limitations as described in the problem list. Positive prognostic factors include ***. Negative prognostic factors include progressive nature of disease ***. Barriers to progress include complex medical history and progressive nature of disease ***.     Pt's spiritual, cultural and educational needs considered and pt agreeable to plan of care and goals.  ALS Severity Scale:  {ALS Speech Stages:96245}      [place] National Outcome Measurement System (NOMS) and Functional Communication Measure (FCM):   Severity Modifier for Medicare G-Code:  Motor " Speech  Current status: FCM: {NOMS Motor Speech:89276}  - {gcode:88897}    Voice  Current status: FCM: {noms voice:24288}  - {gcode:93335}        Swallowing  Current status:  FCM: {NOMS Swallowin} -  {gcode:19126}        Rehab Potential: fair to guarded ***    Plan    Recommended Treatment Plan: 1.  Follow up with [facility] in 3 months 2. ***     Plan of care expiration: Eval Only     Other Recommendations: ***    JOE Lord   Clinician  Speech-Language Pathology  Date: 2019

## 2019-11-06 NOTE — PATIENT INSTRUCTIONS
ALS Clinic Notes  Neurology:  No new issues    Respiratory:  To treat edema, increase frequency of furosemide to two times per day and assess for response.  We can increase furosemide intermittently in this way to remove the excess fluid.  For example, three days per week each week for one month.    PMR:      Physical Therapy/Occupational Therapy: [facility] to be notified of power wheelchair needs.       Speech Language Pathology: No speech therapy services needed. Clinician sent an email to Julian Stewart, consultant for [employer] to contact wife abut order wheelchair mount.       Nutrition:      :

## 2019-11-06 NOTE — PROGRESS NOTES
[facility]  ALS Screen     Date: 11/6/2019  Patient: Bayron Delgado  Chart Number: [id number]  Referring Physician: Reshma Gray Diagnosis:   1. ALS (amyotrophic lateral sclerosis)  IR Gastrostomy Replace Tube w Fluoro & Imaging    Ambulatory referral to ALS Clinic    Ambulatory referral to Speech Therapy    Ambulatory referral to Physical Medicine Rehab    Ambulatory referral to Pulmonology    Ambulatory referral to Respiratory Therapy    Ambulatory referral to Social Work    Ambulatory referral to Physical Therapy    Ambulatory referral to Occupational Therapy   2. Percutaneous endoscopic gastrostomy status     3. Adjustment disorder with depressed mood     4. Mild neurocognitive disorder     5. Neuropathic pain of foot, unspecified laterality     6. Chronic midline low back pain without sciatica     7. Chronic neck pain     8. Chronic pain of both knees         Medical Diagnosis: ALS  Physician Orders: Screen in ALS multidisciplinary clinic, pt. Receiving Nsg. HH.  Evaluation Date: 11/6/19    Precautions:  Standard , tracheostomy,  Subjective:  Patient goals: use power mobility efficiently and safely.   Chief Complaint:   Chief Complaint   Patient presents with    OT Initial Evaluation        Social Hx: Retired  lives with their spouse. Pt. Has caregivers.     DME: power wheelchair - batteries not charging, headrest not supportive, he can no longer drive with hand. Pt. Would benefit from mount on wheelchair for [employer] and eyegaze drive through AAC device.     Home access: accessible    Past treatment includes: HH Nsg    Pain: Pt. Not alert during screen.   Dominant hand: right    ASSESSMENT:  OT diagnosis: Vent Dependent with no active movement.    Bayron Delgado is a 48 y.o. male with a medical diagnosis of   Encounter Diagnoses   Name Primary?    ALS (amyotrophic lateral sclerosis) Yes    Percutaneous endoscopic gastrostomy status     Adjustment disorder  with depressed mood     Mild neurocognitive disorder     Neuropathic pain of foot, unspecified laterality     Chronic midline low back pain without sciatica     Chronic neck pain     Chronic pain of both knees     referred to occupational therapy for screen. He presents with wheelchair modification needs.    PLAN:    Patient/caregiver understands and agrees with plan of care. Contact JEN Rasheed for power wheelchair needs and updates.

## 2019-11-06 NOTE — TELEPHONE ENCOUNTER
Incoming call from Amalia Hicks that noted attempted to call patient's wife since yesterday regarding PEG appt on today. Interventional Radiology does not have any appt available this week. Recommendation for Consult with Rad for PEG check and Wound Care appt.     Left Voicemail message to notify Perlita of the above.  Awaiting  Return call.

## 2019-11-06 NOTE — PROGRESS NOTES
CHIEF COMPLAINT:  OT Initial Evaluation    HISTORY OF PRESENT ILLNESS: Bayron Delgado is a 48 y.o. male with ALS onset 2013, dx 2014 which progressed to quadraparesis and chronic respiratory failure - underwent tracheostomy in 3/2018 and has a gastrostomy tube (complicated by excess granulation tissue), chronic indwelling catheter, recurrent infections with resistant organisms, and peripheral edema.  Currently stable and cared for by wife Perlita and sitters at home.  Enjoys watching TV, as he expressed at the time of trach tube placement.      PAST MEDICAL HISTORY:    Past Medical History:   Diagnosis Date    ALS (amyotrophic lateral sclerosis)     Atrial fibrillation     CHF (congestive heart failure)     Neuropathy        PAST SURGICAL HISTORY:    Past Surgical History:   Procedure Laterality Date    APPENDECTOMY      GASTROSTOMY TUBE PLACEMENT  2017    HERNIA REPAIR      TRACHEOSTOMY         FAMILY HISTORY:                No family history on file.    SOCIAL HISTORY:          Social support: wife Perlita, twin brother   Social History     Tobacco Use   Smoking Status Never Smoker       ALLERGIES:    Review of patient's allergies indicates:   Allergen Reactions    Latex, natural rubber     Nsaids (non-steroidal anti-inflammatory drug)      Causes patient to go into afib per wife    Penicillins Other (See Comments)     Per wife- his mother stated he was allergic to it.  Had redness associated with cefepime 3/14/2018       CURRENT MEDICATIONS:    Current Outpatient Medications   Medication Sig Dispense Refill    nystatin (MYCOSTATIN) cream Apply 30 g topically 2 (two) times daily.      nystatin (MYCOSTATIN) powder Apply 60 g topically 2 (two) times daily.      prochlorperazine (COMPAZINE) 10 MG tablet Take 10 mg by mouth every evening.      amiodarone (PACERONE) 200 MG Tab 200 mg by Per G Tube route 2 (two) times daily.      carvedilol (COREG) 6.25 MG tablet 1 tablet (6.25 mg total) by Per G Tube route 2  (two) times daily. (Patient taking differently: 12.5 mg by Per G Tube route 2 (two) times daily. ) 60 tablet 11    clonazePAM (KLONOPIN) 0.5 MG tablet 1 tablet (0.5 mg total) by Per G Tube route 3 (three) times daily. (Patient taking differently: 0.5 mg by Per G Tube route 4 (four) times daily. ) 90 tablet 3    diphenhydrAMINE (BENADRYL) 50 MG tablet Take 50 mg by mouth nightly as needed for Itching.      DULoxetine (CYMBALTA) 60 MG capsule Take 1 capsule (60 mg total) by mouth once daily. 30 capsule 11    famotidine (PEPCID) 20 MG tablet 1 tablet (20 mg total) by Per G Tube route 2 (two) times daily. 60 tablet 11    furosemide (LASIX) 40 MG tablet Take 1 tablet (40 mg total) by mouth as needed (use to increased to furosemide 40 mg twice per day for 5 days for excessive edema). 60 tablet 2    [START ON 11/16/2019] HYDROcodone-acetaminophen (NORCO)  mg per tablet 1 tablet by Per G Tube route 3 (three) times daily. 120 tablet 0    hydrOXYzine HCl (ATARAX) 25 MG tablet 1 tablet (25 mg total) by Per G Tube route 4 (four) times daily. 1 tablet 0    LACTOSE-REDUCED FOOD/FIBER (ISOSOURCE 1.5 YURY ORAL) by Per G Tube route 6 (six) times daily.       lidocaine (LIDODERM) 5 % Place 3 patches onto the skin once daily. Remove & Discard patch within 12 hours or as directed by MD Saleh patch 2    mupirocin (BACTROBAN) 2 % ointment Apply topically 3 (three) times daily. 2 Tube 0    pregabalin (LYRICA) 100 MG capsule 1 capsule (100 mg total) by Per G Tube route 3 (three) times daily. 90 capsule 6    scopolamine (TRANSDERM-SCOP) 1.3-1.5 mg (1 mg over 3 days) Place 1 patch onto the skin Every 3 (three) days. 1 patch 0    senna-docusate 8.6-50 mg (SENNA WITH DOCUSATE SODIUM) 8.6-50 mg per tablet Take 7 tablets by mouth once daily.      temazepam (RESTORIL) 15 mg Cap 30 mg by Per G Tube route every evening.       [START ON 11/16/2019] traMADol (ULTRAM) 50 mg tablet 1-2 tablets ( mg total) by Per G Tube route  every 6 (six) hours as needed for Pain. 120 tablet 2     No current facility-administered medications for this visit.                   REVIEW OF SYSTEMS:   Pulmonary related symptoms as per HPI.  Gen:  no weight loss, no fever  HEENT:  no sinus congestion, + dysphagia, not speaking, no sialorrhea  CV:  no chest pain  GI:  no melena, no hematochezia, no diarrhea,++ constipation.  :  no dysuria, no hematuria, no incontinence - munoz catheter  Neuro:  Quadraparesis, recent botox to eyelids to facilitate communication device use     PHYSICAL EXAM:  Respiratory Rate: 14  Ti 1.3 PEEP 5  There were no vitals filed for this visit.  GENERAL:  Alert, calm, in no  distress  HEENT:  Normal pupils, normal conjunctiva, normal EOM, nasal and oral mucosa normal, tongue -limited protrusion, ++ fasciculation  NECK:  no palpable lymphadenopathy or masses,no jugular venous distention, trach site clean  CVS: regular rate and rhythm, no murmers, gallops or rubs  PULM: Grossly decreased vital capacity, normal to percussion and palpation, clear to auscultation bilaterally with no wheezes, crackles or rhonchi  ABDOMEN:  soft nontender/nondistended  EXTREMITIES no cyanosis, clubbing, 3++ edema bilat LE  NEURO:  Dense quadraparesis, limited mouth opening    CONTRIBUTORY STUDIES:       ACTIVE PULMONARY PROBLEMS:    ICD-10-CM ICD-9-CM    1. ALS (amyotrophic lateral sclerosis) G12.21 335.20 IR Gastrostomy Replace Tube w Fluoro & Imaging      Ambulatory referral to ALS Clinic      Ambulatory referral to Speech Therapy      Ambulatory referral to Physical Medicine Rehab      Ambulatory referral to Pulmonology      Ambulatory referral to Respiratory Therapy      Ambulatory referral to Social Work      Ambulatory referral to Physical Therapy      Ambulatory referral to Occupational Therapy   2. Percutaneous endoscopic gastrostomy status Z93.1 V44.1    3. Adjustment disorder with depressed mood F43.21 309.0    4. Mild neurocognitive disorder  G31.84 331.83    5. Neuropathic pain of foot, unspecified laterality M79.2 355.8    6. Chronic midline low back pain without sciatica M54.5 724.2     G89.29 338.29    7. Chronic neck pain M54.2 723.1     G89.29 338.29    8. Chronic pain of both knees M25.561 719.46     M25.562 338.29     G89.29         ASSESSMENT&PLAN:  1.  Chronic invasive ventilation for respiratory failure due to ALS.  Cont current support with cough assist as needed for secretion clearance  2.  Volume overload - increase lasix to bid for three days, then re-assess.  Likely 10+ liters +++  3.  Goals of care - satisfied with his current quality of life      No follow-ups on file.      [Greater than 50% of this 25 minute visit spent counseling patient and family]

## 2019-11-07 ENCOUNTER — OFFICE VISIT (OUTPATIENT)
Dept: OTOLARYNGOLOGY | Facility: CLINIC | Age: 49
End: 2019-11-07
Payer: MEDICARE

## 2019-11-07 ENCOUNTER — TELEPHONE (OUTPATIENT)
Dept: NEUROLOGY | Facility: CLINIC | Age: 49
End: 2019-11-07

## 2019-11-07 VITALS — SYSTOLIC BLOOD PRESSURE: 95 MMHG | DIASTOLIC BLOOD PRESSURE: 68 MMHG | HEART RATE: 74 BPM

## 2019-11-07 DIAGNOSIS — G12.21 ALS (AMYOTROPHIC LATERAL SCLEROSIS): Primary | ICD-10-CM

## 2019-11-07 DIAGNOSIS — G12.21 ALS (AMYOTROPHIC LATERAL SCLEROSIS): ICD-10-CM

## 2019-11-07 DIAGNOSIS — Z99.11 VENTILATOR DEPENDENCE: ICD-10-CM

## 2019-11-07 DIAGNOSIS — Z93.0 TRACHEOSTOMY IN PLACE: ICD-10-CM

## 2019-11-07 DIAGNOSIS — Z93.1 PRESENCE OF EXTERNALLY REMOVABLE PERCUTANEOUS ENDOSCOPIC GASTROSTOMY (PEG) TUBE: Primary | ICD-10-CM

## 2019-11-07 PROCEDURE — 99213 OFFICE O/P EST LOW 20 MIN: CPT | Mod: S$PBB,,, | Performed by: OTOLARYNGOLOGY

## 2019-11-07 PROCEDURE — 99999 PR PBB SHADOW E&M-EST. PATIENT-LVL IV: CPT | Mod: PBBFAC,,, | Performed by: OTOLARYNGOLOGY

## 2019-11-07 PROCEDURE — 99213 PR OFFICE/OUTPT VISIT, EST, LEVL III, 20-29 MIN: ICD-10-PCS | Mod: S$PBB,,, | Performed by: OTOLARYNGOLOGY

## 2019-11-07 PROCEDURE — 99214 OFFICE O/P EST MOD 30 MIN: CPT | Mod: PBBFAC | Performed by: OTOLARYNGOLOGY

## 2019-11-07 PROCEDURE — 99999 PR PBB SHADOW E&M-EST. PATIENT-LVL IV: ICD-10-PCS | Mod: PBBFAC,,, | Performed by: OTOLARYNGOLOGY

## 2019-11-07 RX ORDER — SILVER NITRATE 38.21; 12.74 MG/1; MG/1
STICK TOPICAL
Qty: 100 APPLICATOR | Refills: 3 | Status: SHIPPED | OUTPATIENT
Start: 2019-11-08

## 2019-11-07 NOTE — PROGRESS NOTES
Bayron Delgado is a 49 yo male who presented to General Surgery clinic for hypergranulation tissue noted around PEG tube. PEG placed by IR.   Large amount of granulation tissue noted around PEG site.   Applied silver nitrate to granulation tissue, patient tolerated it well.  Prescribed silver nitrate to patient, for wife to apply. Wife states she is familiar with application as she is a nurse.   Patient will return to clinic if granulation tissue does not resolve or wound care issues become worse. May refer to wound care if granulation tissue persists.

## 2019-11-07 NOTE — TELEPHONE ENCOUNTER
Communicated with Dr. Fuentes and Perlita, patient's wife, regarding sputum culture.     Order submitted. Perlita is a RN and she feels comfortable with home collect and submitting specimen to lab.

## 2019-11-07 NOTE — ASSESSMENT & PLAN NOTE
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  Since his tube has not been changed for over 6 months and is posture is quite kyphotic, I recommended change in the operating room.  His family requested that his PEG to be change the same time due to excessive granulation tissue surrounding it.  I will arrange for him to be evaluated by surgery for possible PEG tube replacement at the same time.    Surgery is scheduled on November 22, 2019.

## 2019-11-07 NOTE — PROGRESS NOTES
Chief Complaint   Patient presents with    potential trach change?       HPI   48 y.o. male presents status post trach in the setting of ALS in 2018.  His wife reports this trach is not changed over 6 months.  He presents today for evaluation and possible trach change.      Review of Systems   Constitutional: Negative for fatigue and unexpected weight change.   HENT: Per HPI.  Eyes: Negative for visual disturbance.   Respiratory: Negative for shortness of breath, hemoptysis   Cardiovascular: Negative for chest pain and palpitations.   Musculoskeletal: Negative for decreased ROM, back pain.   Skin: Negative for rash, sunburn, itching.   Neurological: Negative for dizziness and seizures.   Hematological: Negative for adenopathy. Does not bruise/bleed easily.   Endocrine: Negative for rapid weight loss/weight gain, heat/cold intolerance.     Past Medical History   Patient Active Problem List   Diagnosis    ALS (amyotrophic lateral sclerosis)    Atrial fibrillation    Bartonella infection    Anasarca    Anxiety    Urinary tract infection associated with indwelling urethral catheter    Anemia    Ventilator dependence    Chronic indwelling Michele catheter    Tracheostomy in place    Bradycardia    Percutaneous endoscopic gastrostomy status           Past Surgical History   Past Surgical History:   Procedure Laterality Date    APPENDECTOMY      GASTROSTOMY TUBE PLACEMENT  2017    HERNIA REPAIR      TRACHEOSTOMY  03/2018         Family History   History reviewed. No pertinent family history.        Social History   .  Social History     Socioeconomic History    Marital status:      Spouse name: Not on file    Number of children: Not on file    Years of education: Not on file    Highest education level: Not on file   Occupational History    Not on file   Social Needs    Financial resource strain: Not on file    Food insecurity:     Worry: Not on file     Inability: Not on file    Transportation  needs:     Medical: Not on file     Non-medical: Not on file   Tobacco Use    Smoking status: Never Smoker   Substance and Sexual Activity    Alcohol use: No    Drug use: No    Sexual activity: Yes     Partners: Female   Lifestyle    Physical activity:     Days per week: Not on file     Minutes per session: Not on file    Stress: Not on file   Relationships    Social connections:     Talks on phone: Not on file     Gets together: Not on file     Attends [employer] service: Not on file     Active member of club or organization: Not on file     Attends meetings of clubs or organizations: Not on file     Relationship status: Not on file   Other Topics Concern    Not on file   Social History Narrative    Not on file         Allergies   Review of patient's allergies indicates:   Allergen Reactions    Latex, natural rubber     Nsaids (non-steroidal anti-inflammatory drug)      Causes patient to go into afib per wife    Penicillins Other (See Comments)     Per wife- his mother stated he was allergic to it.  Had redness associated with cefepime 3/14/2018           Physical Exam     Vitals:    11/07/19 0938   BP: 95/68   Pulse: 74         There is no height or weight on file to calculate BMI.      General: AOx3, NAD   Respiratory:  Symmetric chest rise.  On ventilator.  Neck:  Trach in place..  No cervical lymphadenopathy, thyromegaly or thyroid nodules.  Normal range of motion.      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  Since his tube has not been changed for over 6 months and is posture is quite kyphotic, I recommended change in the operating room.  His family requested that his PEG to be change the same time due to excessive granulation tissue surrounding it.  I will arrange for him to be evaluated by surgery for possible PEG tube replacement at the same time.    Surgery is scheduled on November 22,  2019.           Other Visit Diagnoses     Presence of externally removable percutaneous endoscopic gastrostomy (PEG) tube    -  Primary    Relevant Orders    Ambulatory consult to General Surgery    Case Request Operating Room: REPLACEMENT, TRACHEOSTOMY TUBE (Completed)

## 2019-11-14 ENCOUNTER — ANESTHESIA EVENT (OUTPATIENT)
Dept: SURGERY | Facility: HOSPITAL | Age: 49
End: 2019-11-14
Payer: MEDICARE

## 2019-11-14 DIAGNOSIS — Z01.818 PREOPERATIVE TESTING: Primary | ICD-10-CM

## 2019-11-14 RX ORDER — OXYBUTYNIN CHLORIDE 10 MG/1
10 TABLET, EXTENDED RELEASE ORAL DAILY
COMMUNITY
End: 2019-11-21 | Stop reason: SDUPTHER

## 2019-11-14 NOTE — PRE ADMISSION SCREENING
Anesthesia Assessment: Preoperative EQUATION    Planned Procedure: Procedure(s) (LRB):  REPLACEMENT, TRACHEOSTOMY TUBE (N/A)  Requested Anesthesia Type:General  Surgeon: Daniel Horner MD  Service: ENT  Known or anticipated Date of Surgery:11/22/2019    Surgeon notes: reviewed    Electronic QUestionnaire Assessment completed via nurse interview with patient.        NO AQ        Triage considerations:     The patient has no apparent active cardiac condition (No unstable coronary Syndrome such as severe unstable angina or recent [<1 month] myocardial infarction, decompensated CHF, severe valvular   disease or significant arrhythmia)    Previous anesthesia records:GETA and No problems   Airway/Jaw/Neck:  Airway Findings: Pre-Existing Airway Tube(s): Oral Endotracheal tube     Placement Date: 03/15/18; Placement Time: 1038; Method of Intubation: Glidescope; Inserted by: MD; Airway Device: Endotracheal Tube-Hi/Lo; Airway Device Size: 8.0; Placement Verified By: Chest X-ray; Intubation Findings: Positive EtCO2;  Depth of Insertion: 23; Securment: Lips; Complications: None  Last PCP note: outside [facility]   Subspecialty notes: ENT, Neurology    Other important co-morbidities: ALS, QUADRIPARESIS, TRACH, A-FIB, CHF     Tests already available:  Available tests,  within 1 month , within [facility] .10/22/19-BMP, CBC      3/14/18-ECHO            Instructions given. (See in Nurse's note)    Optimization:  Anesthesia Preop Clinic Assessment  Indicated-NOT INDICATED      Plan:    Testing:  EKG     Patient  has previously scheduled Medical Appointment:NOT AT THIS TIME    Navigation: Tests Scheduled.              Results will be tracked by Preop Clinic.

## 2019-11-14 NOTE — ANESTHESIA PREPROCEDURE EVALUATION
Stefanie Deal, RN   Registered Nurse      Pre Admission Screening   Signed                       []Hide copied text    []Nayana for details  Anesthesia Assessment: Preoperative EQUATION     Planned Procedure: Procedure(s) (LRB):  REPLACEMENT, TRACHEOSTOMY TUBE (N/A)  Requested Anesthesia Type:General  Surgeon: Daniel Horner MD  Service: ENT  Known or anticipated Date of Surgery:11/22/2019     Surgeon notes: reviewed     Electronic QUestionnaire Assessment completed via nurse interview with patient.         NO AQ           Triage considerations:      The patient has no apparent active cardiac condition (No unstable coronary Syndrome such as severe unstable angina or recent [<1 month] myocardial infarction, decompensated CHF, severe valvular   disease or significant arrhythmia)     Previous anesthesia records:GETA and No problems-PATIENT CANNOT OPEN HIS MOUTH  Airway/Jaw/Neck:  Airway Findings: Pre-Existing Airway Tube(s): Oral Endotracheal tube     Placement Date: 03/15/18; Placement Time: 1038; Method of Intubation: Glidescope; Inserted by: MD; Airway Device: Endotracheal Tube-Hi/Lo; Airway Device Size: 8.0; Placement Verified By: Chest X-ray; Intubation Findings: Positive EtCO2;  Depth of Insertion: 23; Securment: Lips; Complications: None  Last PCP note: outside [facility]   Subspecialty notes: ENT, Neurology     Other important co-morbidities: ALS, QUADRIPARESIS, TRACH, A-FIB, HX OF CHF, BED BOUND     Tests already available:  Available tests,  within 1 month , within [facility] .10/22/19-BMP, CBC      3/14/18-ECHO                            Instructions given. (See in Nurse's note)     Optimization:  Anesthesia Preop Clinic Assessment  Indicated-NOT INDICATED                Plan:    Testing:  EKG AM OF SURGERY                           Patient  has previously scheduled Medical Appointment:NOT AT THIS TIME     Navigation: Tests Scheduled.                         Results will be tracked by Preop Clinic.                                                                                                                      11/14/2019  Bayron Delgado is a 48 y.o., male with a pre-operative diagnosis of Presence of externally removable percutaneous endoscopic gastrostomy (PEG) tube [Z93.1] who is scheduled for Procedure(s) (LRB):  REPLACEMENT, TRACHEOSTOMY TUBE (N/A).     Requested anesthesia type: General  Surgeon: Daniel Horner MD  Allergies:   Review of patient's allergies indicates:   Allergen Reactions    Latex, natural rubber     Nsaids (non-steroidal anti-inflammatory drug)      Causes patient to go into afib per wife    Penicillins Other (See Comments)     Per wife- his mother stated he was allergic to it.  Had redness associated with cefepime 3/14/2018     Vital Sign Range:    Chronic Medications:   Medications Prior to Admission   Medication Sig Dispense Refill Last Dose    amiodarone (PACERONE) 200 MG Tab 200 mg by Per G Tube route 2 (two) times daily.   11/14/2019 at Unknown time    carvedilol (COREG) 6.25 MG tablet 1 tablet (6.25 mg total) by Per G Tube route 2 (two) times daily. (Patient taking differently: 12.5 mg by Per G Tube route 2 (two) times daily. ) 60 tablet 11 11/14/2019 at Unknown time    clonazePAM (KLONOPIN) 0.5 MG tablet 1 tablet (0.5 mg total) by Per G Tube route 3 (three) times daily. (Patient taking differently: 0.5 mg by Per G Tube route 4 (four) times daily. ) 90 tablet 3 11/14/2019 at Unknown time    diphenhydrAMINE (BENADRYL) 50 MG tablet Take 50 mg by mouth nightly as needed for Itching.   11/13/2019 at Unknown time    DULoxetine (CYMBALTA) 60 MG capsule Take 1 capsule (60 mg total) by mouth once daily. 30 capsule 11 11/14/2019 at Unknown time    famotidine (PEPCID) 20 MG tablet 1 tablet (20 mg total) by Per G Tube route 2 (two) times daily. 60 tablet 11 11/14/2019 at Unknown time    furosemide (LASIX) 40 MG tablet Take 1 tablet (40 mg total) by mouth as needed (use to  increased to furosemide 40 mg twice per day for 5 days for excessive edema). 60 tablet 2 11/14/2019 at Unknown time    hydrOXYzine HCl (ATARAX) 25 MG tablet 1 tablet (25 mg total) by Per G Tube route 4 (four) times daily. 1 tablet 0 11/14/2019 at Unknown time    LACTOSE-REDUCED FOOD/FIBER (ISOSOURCE 1.5 YURY ORAL) by Per G Tube route 6 (six) times daily.    11/14/2019 at Unknown time    lidocaine (LIDODERM) 5 % Place 3 patches onto the skin once daily. Remove & Discard patch within 12 hours or as directed by MD Saleh patch 2 11/14/2019 at Unknown time    mupirocin (BACTROBAN) 2 % ointment Apply topically 3 (three) times daily. 2 Tube 0 11/14/2019 at Unknown time    nystatin (MYCOSTATIN) cream Apply 30 g topically 2 (two) times daily.   11/14/2019 at Unknown time    nystatin (MYCOSTATIN) powder Apply 60 g topically 2 (two) times daily.   11/14/2019 at Unknown time    oxybutynin (DITROPAN-XL) 10 MG 24 hr tablet Take 10 mg by mouth once daily.   11/14/2019 at Unknown time    pregabalin (LYRICA) 100 MG capsule 1 capsule (100 mg total) by Per G Tube route 3 (three) times daily. 90 capsule 6 11/14/2019 at Unknown time    prochlorperazine (COMPAZINE) 10 MG tablet Take 10 mg by mouth every evening.   11/13/2019 at Unknown time    scopolamine (TRANSDERM-SCOP) 1.3-1.5 mg (1 mg over 3 days) Place 1 patch onto the skin Every 3 (three) days. 1 patch 0 Past Week at Unknown time    senna-docusate 8.6-50 mg (SENNA WITH DOCUSATE SODIUM) 8.6-50 mg per tablet Take 7 tablets by mouth once daily.   11/14/2019 at Unknown time    silver nitrate applicators 75-25 % applicator Apply topically 3 (three) times a week. Apply to granulation tissue PRN. 100 applicator 3 11/14/2019 at Unknown time    temazepam (RESTORIL) 15 mg Cap 30 mg by Per G Tube route every evening.    11/13/2019 at Unknown time    traMADol (ULTRAM) 50 mg tablet 1-2 tablets ( mg total) by Per G Tube route every 6 (six) hours as needed for Pain. 120 tablet 2  11/14/2019 at Unknown time    HYDROcodone-acetaminophen (NORCO)  mg per tablet 1 tablet by Per G Tube route every 6 (six) hours as needed for Pain. 120 tablet 0      Current Medications:   No current facility-administered medications for this encounter.      Medical History:   Past Medical History:   Diagnosis Date    ALS (amyotrophic lateral sclerosis)     Atrial fibrillation     CHF (congestive heart failure)     Neuropathy      .    Anesthesia Evaluation    I have reviewed the Patient Summary Reports.    I have reviewed the Nursing Notes.   I have reviewed the Medications.     Review of Systems  Anesthesia Hx:  No problems with previous Anesthesia  History of prior surgery of interest to airway management or planning: tracheostomy. Previous anesthesia: General TRACHEOSTOMY (N/A Neck) LARYNGOSCOPY (Throat) ESOPHAGOSCOPY (N/A Throat) 3/19/18 with general anesthesia.  Denies Family Hx of Anesthesia complications.   Denies Personal Hx of Anesthesia complications.   Social:  Non-Smoker    Hematology/Oncology:  Hematology Normal   Oncology Normal     EENT/Dental:   TRACH 3/2018   Cardiovascular:   Dysrhythmias atrial fibrillation Denies Angina. CHF HYPOTENSION Functional Capacity very limited / < 3 METS, BED BOUND/STRETCHER BOUND  Congestive Heart Failure (CHF)  Disorder of Cardiac Rhythm, Atrial Fibrillation    Pulmonary:   Denies Shortness of breath.  Denies Recent URI. TRACH Respiratory Failure, Chronic, Ventilator Dependent Chronically   Renal/:  Devices/Procedures/Surgery, indwelling urinary catheter.   Hepatic/GI:   PEG   Musculoskeletal:  Musculoskeletal General/Symptoms: Functional capacity is bedridden. QUADRAPARESIS   Neurological:   Neuromuscular Disease, (ALS) MILD NEUROCOGNITIVE DISORDER Neuro Symptoms of paralysis, weakness, spasticity  Chronic Pain Syndrome  Peripheral Neuropathy  Cognitive Impairment  Neuromuscular Disease, Amyotrophic Lateral Sclerosis ALS ONSET 2013  Endocrine:  Endocrine  Normal    Psych:   depression  Depression.          Physical Exam  General:  Obesity    Airway/Jaw/Neck:  Airway Findings: Pre-Existing Airway Tube(s): Oral Endotracheal tube     Dental:  Dental Findings:   Chest/Lungs:  Chest/Lungs Findings: Normal Respiratory Rate, Decreased Breath Sounds Bilateral         Mental Status:  Mental Status Findings: (sedated but responsive)         Anesthesia Plan  Type of Anesthesia, risks & benefits discussed:  Anesthesia Type:  general  Patient's Preference:   Intra-op Monitoring Plan: standard ASA monitors  Intra-op Monitoring Plan Comments:   Post Op Pain Control Plan: per primary service following discharge from PACU  Post Op Pain Control Plan Comments:   Induction:   IV  Beta Blocker:  Patient is on a Beta-Blocker and has received one dose within the past 24 hours (No further documentation required).       Informed Consent: Patient representative understands risks and agrees with Anesthesia plan.  Questions answered. Anesthesia consent signed with patient representative.  ASA Score: 3     Day of Surgery Review of History & Physical:    H&P update referred to the surgeon.         Ready For Surgery From Anesthesia Perspective.

## 2019-11-15 ENCOUNTER — TELEPHONE (OUTPATIENT)
Dept: NEUROLOGY | Facility: CLINIC | Age: 49
End: 2019-11-15

## 2019-11-15 NOTE — TELEPHONE ENCOUNTER
Late Entry:    11/14/19: Call received from Silvia Jerez with [place] [facility] inquiring of status of Physician Certification Document.     Informed to fax to [phone] bc RN Coordinator had not received the fax of of 11/14/19.     Document received.. Dr. Munoz reviewed and signed. Returned via Fax and called Silvia to confirm Independent Living Waiver of Person's Long Term Care Program Choice received. (Tel: [phone] Fax: [phone]).     Mrs. Vega indicated that the patient's wife, Perlita, voiced that she is extremely please with the care received at [facility].

## 2019-11-20 ENCOUNTER — TELEPHONE (OUTPATIENT)
Dept: NEUROLOGY | Facility: CLINIC | Age: 49
End: 2019-11-20

## 2019-11-20 DIAGNOSIS — F41.9 ANXIETY: ICD-10-CM

## 2019-11-20 DIAGNOSIS — G12.21 ALS (AMYOTROPHIC LATERAL SCLEROSIS): Primary | ICD-10-CM

## 2019-11-20 DIAGNOSIS — T83.511A URINARY TRACT INFECTION ASSOCIATED WITH INDWELLING URETHRAL CATHETER, INITIAL ENCOUNTER: ICD-10-CM

## 2019-11-20 DIAGNOSIS — N39.0 URINARY TRACT INFECTION ASSOCIATED WITH INDWELLING URETHRAL CATHETER, INITIAL ENCOUNTER: ICD-10-CM

## 2019-11-20 DIAGNOSIS — Z93.1 PERCUTANEOUS ENDOSCOPIC GASTROSTOMY STATUS: ICD-10-CM

## 2019-11-20 DIAGNOSIS — Z97.8 CHRONIC INDWELLING FOLEY CATHETER: ICD-10-CM

## 2019-11-20 RX ORDER — HYDROCODONE BITARTRATE AND ACETAMINOPHEN 10; 325 MG/1; MG/1
1 TABLET ORAL EVERY 6 HOURS PRN
Qty: 120 TABLET | Refills: 0 | Status: SHIPPED | OUTPATIENT
Start: 2019-11-20 | End: 2020-02-04 | Stop reason: SDUPTHER

## 2019-11-20 NOTE — TELEPHONE ENCOUNTER
Incoming call from Perlita regarding medication refill needed    Restoril 30mg per G Tube QHS    Klonopin 1mg 4 times daily per G Tube     Ditropan-XL 10mg per G Tube daily    Pepcid 20mg per G Tube BID    Lidocaine Patches 4 per day. However sig reads 3 patches per day. Perlita indicate that  4 are placed in following areas each day. 2 on Lower Back and one on each 1 hip/1 knee/1 foot and or 1 ankle.    Phos-NAK powder supplement 1 pack via G Tube BID. Patient pays cash for medication; however, needs an order.     Norco 10-325mg per G Tube 4 times daily. However Rx sig indicates 3 times per day. Per Dr. Espinal notes medication prescribed every 4 hours.     Updated preferred Pharmacy... [facility].

## 2019-11-21 ENCOUNTER — TELEPHONE (OUTPATIENT)
Dept: OTOLARYNGOLOGY | Facility: CLINIC | Age: 49
End: 2019-11-21

## 2019-11-22 ENCOUNTER — ANESTHESIA (OUTPATIENT)
Dept: SURGERY | Facility: HOSPITAL | Age: 49
End: 2019-11-22
Payer: MEDICARE

## 2019-11-22 ENCOUNTER — HOSPITAL ENCOUNTER (OUTPATIENT)
Facility: HOSPITAL | Age: 49
Discharge: HOME | End: 2019-11-22
Attending: OTOLARYNGOLOGY | Admitting: OTOLARYNGOLOGY
Payer: MEDICARE

## 2019-11-22 VITALS
DIASTOLIC BLOOD PRESSURE: 84 MMHG | HEIGHT: 68 IN | HEART RATE: 59 BPM | BODY MASS INDEX: 45.47 KG/M2 | SYSTOLIC BLOOD PRESSURE: 131 MMHG | TEMPERATURE: 98 F | OXYGEN SATURATION: 98 % | WEIGHT: 300 LBS | RESPIRATION RATE: 18 BRPM

## 2019-11-22 DIAGNOSIS — Z93.1 PRESENCE OF EXTERNALLY REMOVABLE PERCUTANEOUS ENDOSCOPIC GASTROSTOMY (PEG) TUBE: ICD-10-CM

## 2019-11-22 DIAGNOSIS — G12.21 ALS (AMYOTROPHIC LATERAL SCLEROSIS): Primary | ICD-10-CM

## 2019-11-22 DIAGNOSIS — Z01.818 PREOPERATIVE TESTING: ICD-10-CM

## 2019-11-22 PROCEDURE — 99900026 HC AIRWAY MAINTENANCE (STAT)

## 2019-11-22 PROCEDURE — D9220A PRA ANESTHESIA: ICD-10-PCS | Mod: CRNA,,, | Performed by: NURSE ANESTHETIST, CERTIFIED REGISTERED

## 2019-11-22 PROCEDURE — 37000008 HC ANESTHESIA 1ST 15 MINUTES: Performed by: OTOLARYNGOLOGY

## 2019-11-22 PROCEDURE — 31502 CHANGE OF WINDPIPE AIRWAY: CPT | Mod: ,,, | Performed by: OTOLARYNGOLOGY

## 2019-11-22 PROCEDURE — D9220A PRA ANESTHESIA: Mod: CRNA,,, | Performed by: NURSE ANESTHETIST, CERTIFIED REGISTERED

## 2019-11-22 PROCEDURE — 63600175 PHARM REV CODE 636 W HCPCS: Performed by: NURSE ANESTHETIST, CERTIFIED REGISTERED

## 2019-11-22 PROCEDURE — D9220A PRA ANESTHESIA: Mod: ANES,,, | Performed by: ANESTHESIOLOGY

## 2019-11-22 PROCEDURE — 93010 ELECTROCARDIOGRAM REPORT: CPT | Mod: ,,, | Performed by: INTERNAL MEDICINE

## 2019-11-22 PROCEDURE — 93005 ELECTROCARDIOGRAM TRACING: CPT | Mod: 59

## 2019-11-22 PROCEDURE — 36000707: Performed by: OTOLARYNGOLOGY

## 2019-11-22 PROCEDURE — 27201423 OPTIME MED/SURG SUP & DEVICES STERILE SUPPLY: Performed by: OTOLARYNGOLOGY

## 2019-11-22 PROCEDURE — 31502: ICD-10-PCS | Mod: ,,, | Performed by: OTOLARYNGOLOGY

## 2019-11-22 PROCEDURE — 93010 EKG 12-LEAD: ICD-10-PCS | Mod: ,,, | Performed by: INTERNAL MEDICINE

## 2019-11-22 PROCEDURE — 36000706: Performed by: OTOLARYNGOLOGY

## 2019-11-22 PROCEDURE — 37000009 HC ANESTHESIA EA ADD 15 MINS: Performed by: OTOLARYNGOLOGY

## 2019-11-22 PROCEDURE — 71000015 HC POSTOP RECOV 1ST HR: Performed by: OTOLARYNGOLOGY

## 2019-11-22 PROCEDURE — D9220A PRA ANESTHESIA: ICD-10-PCS | Mod: ANES,,, | Performed by: ANESTHESIOLOGY

## 2019-11-22 PROCEDURE — 71000033 HC RECOVERY, INTIAL HOUR: Performed by: OTOLARYNGOLOGY

## 2019-11-22 RX ORDER — SODIUM CHLORIDE 9 MG/ML
INJECTION, SOLUTION INTRAVENOUS CONTINUOUS PRN
Status: DISCONTINUED | OUTPATIENT
Start: 2019-11-22 | End: 2019-11-22

## 2019-11-22 RX ORDER — FENTANYL CITRATE 50 UG/ML
INJECTION, SOLUTION INTRAMUSCULAR; INTRAVENOUS
Status: DISCONTINUED | OUTPATIENT
Start: 2019-11-22 | End: 2019-11-22

## 2019-11-22 RX ORDER — SODIUM,POTASSIUM PHOSPHATES 280-250MG
1 POWDER IN PACKET (EA) ORAL ONCE
COMMUNITY
End: 2019-11-27 | Stop reason: SDUPTHER

## 2019-11-22 RX ORDER — SODIUM CHLORIDE 9 MG/ML
INJECTION, SOLUTION INTRAVENOUS CONTINUOUS
Status: DISCONTINUED | OUTPATIENT
Start: 2019-11-22 | End: 2019-11-22 | Stop reason: HOSPADM

## 2019-11-22 RX ORDER — ONDANSETRON 2 MG/ML
4 INJECTION INTRAMUSCULAR; INTRAVENOUS DAILY PRN
Status: DISCONTINUED | OUTPATIENT
Start: 2019-11-22 | End: 2019-11-22 | Stop reason: HOSPADM

## 2019-11-22 RX ORDER — MIDAZOLAM HYDROCHLORIDE 1 MG/ML
INJECTION, SOLUTION INTRAMUSCULAR; INTRAVENOUS
Status: DISCONTINUED | OUTPATIENT
Start: 2019-11-22 | End: 2019-11-22

## 2019-11-22 RX ADMIN — FENTANYL CITRATE 100 MCG: 50 INJECTION, SOLUTION INTRAMUSCULAR; INTRAVENOUS at 12:11

## 2019-11-22 RX ADMIN — MIDAZOLAM HYDROCHLORIDE 2 MG: 1 INJECTION, SOLUTION INTRAMUSCULAR; INTRAVENOUS at 12:11

## 2019-11-22 RX ADMIN — SODIUM CHLORIDE: 9 INJECTION, SOLUTION INTRAVENOUS at 11:11

## 2019-11-22 NOTE — BRIEF OP NOTE
[facility]  Brief Operative Note     SUMMARY     Surgery Date: 11/22/2019     Surgeon(s) and Role:     * Daniel Horner MD - Primary    Assisting Surgeon: None    Pre-op Diagnosis:  Presence of externally removable percutaneous endoscopic gastrostomy (PEG) tube [Z93.1]    Post-op Diagnosis:  Post-Op Diagnosis Codes:     * Presence of externally removable percutaneous endoscopic gastrostomy (PEG) tube [Z93.1]    Procedure(s) (LRB):  REPLACEMENT, TRACHEOSTOMY TUBE (N/A)    Anesthesia: Light sedatioin    Description of the findings of the procedure: Patent tracheal stoma. Some granulation tissue    Findings/Key Components: Same    Estimated Blood Loss: * No values recorded between 11/22/2019 12:05 PM and 11/22/2019 12:10 PM *         Specimens:   Specimen (12h ago, onward)    None          Discharge Note    SUMMARY     Admit Date: 11/22/2019    Discharge Date and Time:  11/22/2019 12:11 PM    Hospital Course (synopsis of major diagnoses, care, treatment, and services provided during the course of the hospital stay): Outpatient surgery for tracheostomy tube exchange.      Final Diagnosis: Post-Op Diagnosis Codes:     * Presence of externally removable percutaneous endoscopic gastrostomy (PEG) tube [Z93.1]    Disposition: Home-Health Care [facility]    Follow Up/Patient Instructions:     Medications:  Reconciled Home Medications:      Medication List      ASK your doctor about these medications    amiodarone 200 MG Tab  Commonly known as:  PACERONE  200 mg by Per G Tube route 2 (two) times daily.     carvedilol 6.25 MG tablet  Commonly known as:  COREG  1 tablet (6.25 mg total) by Per G Tube route 2 (two) times daily.     clonazePAM 0.5 MG tablet  Commonly known as:  KLONOPIN  1 tablet (0.5 mg total) by Per G Tube route 3 (three) times daily.     diphenhydrAMINE 50 MG tablet  Commonly known as:  BENADRYL  Take 50 mg by mouth nightly as needed for Itching.     DULoxetine 60 MG capsule  Commonly known  as:  CYMBALTA  Take 1 capsule (60 mg total) by mouth once daily.     famotidine 20 MG tablet  Commonly known as:  PEPCID  1 tablet (20 mg total) by Per G Tube route 2 (two) times daily.     furosemide 40 MG tablet  Commonly known as:  LASIX  Take 1 tablet (40 mg total) by mouth as needed (use to increased to furosemide 40 mg twice per day for 5 days for excessive edema).     HYDROcodone-acetaminophen  mg per tablet  Commonly known as:  NORCO  1 tablet by Per G Tube route every 6 (six) hours as needed for Pain.     hydrOXYzine HCl 25 MG tablet  Commonly known as:  ATARAX  1 tablet (25 mg total) by Per G Tube route 4 (four) times daily.     ISOSOURCE 1.5 YURY ORAL  by Per G Tube route 6 (six) times daily.     lidocaine 5 %  Commonly known as:  LIDODERM  Place 3 patches onto the skin once daily. Remove & Discard patch within 12 hours or as directed by MD     mupirocin 2 % ointment  Commonly known as:  BACTROBAN  Apply topically 3 (three) times daily.     * nystatin cream  Commonly known as:  MYCOSTATIN  Apply 30 g topically 2 (two) times daily.     * nystatin powder  Commonly known as:  MYCOSTATIN  Apply 60 g topically 2 (two) times daily.     oxybutynin 10 MG 24 hr tablet  Commonly known as:  DITROPAN-XL  Take 10 mg by mouth once daily.     potassium, sodium phosphates 280-160-250 mg Pwpk  Commonly known as:  PHOS-NAK  Take 1 packet by mouth once.     pregabalin 100 MG capsule  Commonly known as:  LYRICA  1 capsule (100 mg total) by Per G Tube route 3 (three) times daily.     prochlorperazine 10 MG tablet  Commonly known as:  COMPAZINE  Take 10 mg by mouth every evening.     scopolamine 1.3-1.5 mg (1 mg over 3 days)  Commonly known as:  TRANSDERM-SCOP  Place 1 patch onto the skin Every 3 (three) days.     Senna with Docusate Sodium 8.6-50 mg per tablet  Generic drug:  senna-docusate 8.6-50 mg  Take 7 tablets by mouth once daily.     silver nitrate applicators 75-25 % applicator  Apply topically 3 (three) times a  week. Apply to granulation tissue PRN.     temazepam 15 mg Cap  Commonly known as:  RESTORIL  30 mg by Per G Tube route every evening.     traMADol 50 mg tablet  Commonly known as:  ULTRAM  1-2 tablets ( mg total) by Per G Tube route every 6 (six) hours as needed for Pain.         * This list has 2 medication(s) that are the same as other medications prescribed for you. Read the directions carefully, and ask your doctor or other care provider to review them with you.              No discharge procedures on file.

## 2019-11-22 NOTE — H&P
The patient has been examined and the H&P has been reviewed:    I concur with the findings and no changes have occurred since H&P was written.    Anesthesia/Surgery risks, benefits and alternative options discussed and understood by patient/family.          Active Hospital Problems    Diagnosis  POA    Presence of externally removable percutaneous endoscopic gastrostomy (PEG) tube [Z93.1]  Not Applicable      Resolved Hospital Problems   No resolved problems to display.

## 2019-11-22 NOTE — OP NOTE
DATE OF PROCEDURE: 11/22/2019     PREOPERATIVE DIAGNOSES:   1.  ALS  2.  Ventilator dependence    POSTOPERATIVE DIAGNOSES:   Same    SURGEON:  Surgeon(s) and Role:     * Daniel Horner MD - Primary      PROCEDURES PERFORMED:    Tracheostomy tube change    ANESTHESIA:  MAC      INDICATIONS FOR PROCEDURE:   Bayron Delgado is a 49 y.o. man with a history of ALS.  He presents today for tracheostomy tube change.    He was apprised of the risks, benefits and alternatives to surgery.  In spite of the risk inherent to surgery,he provided informed consent for the aforementioned procedures.     PROCEDURE IN DETAIL:  The patient was taken to the operating room and placed on the operating table in the supine position.  IV sedation was administered by anesthesia.    The tracheostomy tube was removed and exchanged for an identical size 8 cuffed Shiley tracheostomy tube.  The anesthesia circuit was attached to the tracheostomy tube with return of end-tidal CO2 confirmed placement the airway.  A drain sponge and a trach tie were then placed.  He was handed back to anesthesia and transported to recovery in satisfactory condition.    There were no intraoperative complications.  I was present for and participated in the entire procedure as dictated above.       ESTIMATED BLOOD LOSS:  Minimal    SPECIMENS:   Specimen (12h ago, onward)    None

## 2019-11-22 NOTE — ANESTHESIA POSTPROCEDURE EVALUATION
Anesthesia Post Evaluation    Patient: Bayron Delgado    Procedure(s) Performed: Procedure(s) (LRB):  REPLACEMENT, TRACHEOSTOMY TUBE (N/A)    Final Anesthesia Type: general    Patient location during evaluation: PACU  Patient participation: No - Unable to Participate, Other Reason (see comments)  Level of consciousness: obtunded/minimal responses and sedated (ALS with neurologic changes.)  Post-procedure vital signs: reviewed and stable  Pain management: adequate  Airway patency: patent    PONV status at discharge: No PONV  Anesthetic complications: no      Cardiovascular status: stable  Respiratory status: ventilator  Hydration status: euvolemic  Follow-up not needed.          Vitals Value Taken Time   /79 11/22/2019 12:33 PM   Temp 36.7 °C (98.1 °F) 11/22/2019 12:33 PM   Pulse 62 11/22/2019  1:00 PM   Resp 19 11/22/2019  1:00 PM   SpO2 98 % 11/22/2019  1:00 PM         Event Time     Out of Recovery 12:27:00          Pain/Sandra Score: No data recorded

## 2019-11-22 NOTE — DISCHARGE INSTRUCTIONS
Tracheostomy Care  A tracheostomy is an opening made in your neck and into your trachea (windpipe) to make it easier for you to breathe. It may be temporary or permanent. These instructions will help you take care of your tracheostomy tube (trach), your stoma (the opening in your neck), and the skin around the stoma.  Follow these steps and any other directions you have received.  Cleaning your trach tube and stoma  · Clean the opening of the trach tube and the skin around it at least once a day. Some doctors will advise cleanings twice daily or more.  · Choose a clean, well-lighted space near a sink and mirror, and collect the following supplies:  ¨ Gauze pads or other non-fraying material advised by your doctor  ¨ Cotton swabs  ¨ Trach tube brush  ¨ Bowl filled with normal saline solution. Your doctor may advise a mixture of equal parts normal saline and hydrogen peroxide, so it is important to identify his or her preference.  ¨ Wash your hands with soap and warm water. Put on clean, disposable, powderless gloves.  · Remove the inner cannula (the tube that slides into your trach opening).  ¨ Hold the neck plate with one hand. With the other hand, unlock the inner cannula. Gently remove the inner cannula.  · Clean the inner cannula.  ¨ Soak the reusable inner cannula in the bowl of normal saline or hydrogen peroxide and normal saline mixture as directed by your doctor. This is a common mixture, although many doctors now advise using normal saline only. Others may advise using the mixture of hydrogen peroxide and normal saline only if needed to remove secretions on the inner cannula. Ask your doctor which approach he or she prefers.  ¨ Disposable inner cannulas do not need to be cleaned as they are meant for one time use. If you have a metal inner cannula, do not use hydrogen peroxide as it can cause pitting, rather use only normal saline.  ¨ Clean the inner cannula with a trach tube brush. Don't use a  toothbrush. Rinse with normal saline solution to remove hydrogen peroxide and debris.  ¨ Put the wet inner cannula back into the outer cannula. Lock the inner cannula in place.  · Clean your neck plate and skin.  ¨ Remove the gauze pad from behind the neck plate. Assess the area for evidence of skin breakdown or infection. Clean the neck plate and the skin under it. Use clean gauze pads or or other non-fraying material dabbed in normal saline solution. A thorough cleaning technique you may consider involves cleaning the stoma in a step-wise fashion, one quarter at a time. Start at the 12 o'clock position wiping to the 3 o'clock position, then with a new gauze pad for each section, clean from 12 o'clock to 9 o'clock followed by the 3 o'clock to 6 o'clock position and lastly from the 9 o'clock to 6 o'clock position. Gently pat the skin dry. This pattern can be followed on the surrounding skin and tube flange.  ¨ Put a clean, pre-cut gauze pad under the neck plate. This pad protects your skin. Do not cut a gauze pad because the frayed edged increases the risk for infection.  Clearing A Mucus Plug:  It is normal to have some mucus in your airway, but mucus can build up and thicken. If this happens, your trach tube can become plugged. Follow these steps and any other guidelines you have been given to clear your trach tube.  · Find a clean, well-lighted space near a sink and mirror, and collect the following supplies:  ¨ Suction machine  ¨ Clean suction catheter (tube). Your doctor will instruct you on which type of catheter and suction process is appropriate for your trach tube.  ¨ Small bowl of normal saline solution.  ¨ Wash your hands with soap and warm water. Put on clean, disposable, powderless gloves.  · Turn on the suction machine to the pressure setting you were given.  ¨ Attach the suction catheter to the suction machine.  ¨ Ensure the suction is working by dipping the catheter tip into the normal saline  solution in the bowl.  ¨ Take a few deep breaths to fill your lungs with oxygen.  ¨ Gently insert the catheter into your trach tube. While you are inserting the catheter, don't suction. Stop inserting the catheter when you start to cough.  ¨ Apply suction. At the same time, slowly pull the catheter out of your trach tube. Rotate the catheter as you pull it all the way out.  ¨ Take 5 to 10 seconds to remove the catheter completely. If you need to suction again, relax and breathe for 30 seconds to a few minutes, then start over.  ¨ Before suctioning again, rinse the catheter with normal saline.  ¨ When you are finished, turn off the suction machine. Discard the used catheter, water and gloves.  Return Promptly  or contact your doctor if any of the following occur:  · Shortness of breath, difficulty breathing or wheezing that does not respond to breathing treatments  · Severe, prolonged coughing spells  · Red, painful, or bleeding stoma  · Swelling of the skin around the stoma  · Fever of 100.4ºF (38ºC) or higher, or as directed by your health care provider  · Coughing up yellow, smelly, bloody or thick mucus  · Blocked tracheostomy tube that you cannot clear with suctioning  · Tracheostomy tube falls out and cannot be replaced  Date Last Reviewed: 12/27/2015  © 1751-4585 [employer], [employer]. [address] [place] [address], [place] [address]. All rights reserved. This information is not intended as a substitute for professional medical care. Always follow your healthcare professional's instructions.

## 2019-11-22 NOTE — PLAN OF CARE
Pt back to pacu for discharge. Spouse and caregiver given discharge instructions, spouse and caregiver verbalize understanding of discharge instructions.

## 2019-11-22 NOTE — ANESTHESIA RELEASE NOTE
"Anesthesia Release from PACU Note    Patient: Bayron Delgado    Procedure(s) Performed: Procedure(s) (LRB):  REPLACEMENT, TRACHEOSTOMY TUBE (N/A)    Anesthesia type: GEN    Post pain: Adequate analgesia reported    Post assessment: no apparent anesthetic complications, tolerated procedure well and no evidence of recall    Post vital signs: /79 (BP Location: Left arm, Patient Position: Lying)   Pulse (!) 59   Temp 36.7 °C (98.1 °F) (Temporal)   Resp (!) 57   Ht 5' 8" (1.727 m)   Wt 136.1 kg (300 lb)   SpO2 98%   BMI 45.61 kg/m²     Level of consciousness: awake, alert and oriented    Nausea/Vomiting: no nausea/no vomiting    Complications: none    Airway Patency: patent    Respiratory: unassisted, mechanical ventilation as before, humidified air    Cardiovascular: stable and blood pressure at baseline    Hydration: euvolemic    "

## 2019-11-22 NOTE — TRANSFER OF CARE
"Anesthesia Transfer of Care Note    Patient: Bayron Delgado    Procedure(s) Performed: Procedure(s) (LRB):  REPLACEMENT, TRACHEOSTOMY TUBE (N/A)    Patient location: PACU    Anesthesia Type: general    Transport from OR: Transported from OR on room air with adequate spontaneous ventilation. Upon arrival to PACU/ICU, patient attached to ventilator and auscultated to confirm bilateral breath sounds and adequate TV    Post pain: adequate analgesia    Post assessment: no apparent anesthetic complications    Post vital signs: stable    Level of consciousness: awake and alert    Nausea/Vomiting: no nausea/vomiting    Complications: none    Transfer of care protocol was followedComments: Patient transported on his home ventilator.       Last vitals:   Visit Vitals  BP (!) 131/8   Pulse 66   Temp 36.6 °C (97.9 °F)   Resp (!) 104   Ht 5' 8" (1.727 m)   Wt 136.1 kg (300 lb)   SpO2 98%   BMI 45.61 kg/m²     "

## 2019-11-23 NOTE — CARE UPDATE
I received a call from Dr. Burnham with cardiology at approximately 5:45 PM. He told me that he had just read Mr. Delgado's preop EKG from this morning as a possible acute MI, but that the level of concern for MI is dependent on clinical scenario. The patient had already been discharged home by the time the EKG was read.    I attempted to call Dr. Mckenna, who staffed the anesthesia for the case, with no answer. I left a voicemail message with some details. I then attempted to call the patient, but he did not answer and his voicemail was full. His brother is listed as an emergency contact, but also no answer or voicemail.    At change of shift from day to night float, I informed Dr. Fitzgerald of the situation who intends to follow up.    Rodo Mendez MD PGY-2  Anesthesiology  [facility], [place]

## 2019-11-25 PROBLEM — R26.9 ABNORMALITY OF GAIT AND MOBILITY: Status: ACTIVE | Noted: 2019-11-25

## 2019-11-25 PROBLEM — R47.1 DYSARTHRIA: Status: ACTIVE | Noted: 2019-11-25

## 2019-11-25 NOTE — ASSESSMENT & PLAN NOTE
Needs new battery for power chair  Needs head rest fitting  Needs alternative drive (eye drive)  Needs attendant control  Patient has no ability to use limbs for PWC

## 2019-11-27 RX ORDER — OXYBUTYNIN CHLORIDE 10 MG/1
10 TABLET, EXTENDED RELEASE ORAL DAILY
Qty: 30 TABLET | Refills: 3 | Status: SHIPPED | OUTPATIENT
Start: 2019-11-27 | End: 2020-01-06 | Stop reason: SDUPTHER

## 2019-11-27 RX ORDER — FAMOTIDINE 20 MG/1
20 TABLET, FILM COATED ORAL 2 TIMES DAILY
Qty: 60 TABLET | Refills: 11 | Status: SHIPPED | OUTPATIENT
Start: 2019-11-27 | End: 2021-01-06

## 2019-11-27 RX ORDER — TEMAZEPAM 15 MG/1
30 CAPSULE ORAL NIGHTLY
Qty: 60 CAPSULE | Refills: 3 | Status: SHIPPED | OUTPATIENT
Start: 2019-11-27 | End: 2020-03-20 | Stop reason: SDUPTHER

## 2019-11-27 RX ORDER — LIDOCAINE 50 MG/G
3 PATCH TOPICAL DAILY
Qty: 120 PATCH | Refills: 2 | Status: SHIPPED | OUTPATIENT
Start: 2019-11-27 | End: 2020-10-21

## 2019-11-27 RX ORDER — SODIUM,POTASSIUM PHOSPHATES 280-250MG
1 POWDER IN PACKET (EA) ORAL 2 TIMES DAILY
Qty: 60 PACKET | Refills: 11 | COMMUNITY
Start: 2019-11-27 | End: 2022-08-26

## 2019-11-27 RX ORDER — CLONAZEPAM 0.5 MG/1
1 TABLET ORAL 4 TIMES DAILY
Qty: 120 TABLET | Refills: 3 | Status: SHIPPED | OUTPATIENT
Start: 2019-11-27 | End: 2019-12-04

## 2019-11-27 NOTE — H&P
Chief Complaint   Patient presents with    potential trach change?         HPI   48 y.o. male presents status post trach in the setting of ALS in 2018.  He is scheduled for trach change today.     Review of Systems   Constitutional: Negative for fatigue and unexpected weight change.   HENT: Per HPI.  Eyes: Negative for visual disturbance.   Respiratory: Negative for shortness of breath, hemoptysis   Cardiovascular: Negative for chest pain and palpitations.   Musculoskeletal: Negative for decreased ROM, back pain.   Skin: Negative for rash, sunburn, itching.   Neurological: Negative for dizziness and seizures.   Hematological: Negative for adenopathy. Does not bruise/bleed easily.   Endocrine: Negative for rapid weight loss/weight gain, heat/cold intolerance.      Past Medical History       Patient Active Problem List   Diagnosis    ALS (amyotrophic lateral sclerosis)    Atrial fibrillation    Bartonella infection    Anasarca    Anxiety    Urinary tract infection associated with indwelling urethral catheter    Anemia    Ventilator dependence    Chronic indwelling Michele catheter    Tracheostomy in place    Bradycardia    Percutaneous endoscopic gastrostomy status               Past Surgical History         Past Surgical History:   Procedure Laterality Date    APPENDECTOMY        GASTROSTOMY TUBE PLACEMENT   2017    HERNIA REPAIR        TRACHEOSTOMY   03/2018            Family History   History reviewed. No pertinent family history.           Social History   .  Social History               Socioeconomic History    Marital status:        Spouse name: Not on file    Number of children: Not on file    Years of education: Not on file    Highest education level: Not on file   Occupational History    Not on file   Social Needs    Financial resource strain: Not on file    Food insecurity:       Worry: Not on file       Inability: Not on file    Transportation needs:       Medical: Not on file        Non-medical: Not on file   Tobacco Use    Smoking status: Never Smoker   Substance and Sexual Activity    Alcohol use: No    Drug use: No    Sexual activity: Yes       Partners: Female   Lifestyle    Physical activity:       Days per week: Not on file       Minutes per session: Not on file    Stress: Not on file   Relationships    Social connections:       Talks on phone: Not on file       Gets together: Not on file       Attends Muslim service: Not on file       Active member of club or organization: Not on file       Attends meetings of clubs or organizations: Not on file       Relationship status: Not on file   Other Topics Concern    Not on file   Social History Narrative    Not on file               Allergies         Review of patient's allergies indicates:   Allergen Reactions    Latex, natural rubber      Nsaids (non-steroidal anti-inflammatory drug)         Causes patient to go into afib per wife    Penicillins Other (See Comments)       Per wife- his mother stated he was allergic to it.  Had redness associated with cefepime 3/14/2018               Physical Exam          Vitals:     11/07/19 0938   BP: 95/68   Pulse: 74            There is no height or weight on file to calculate BMI.        General: AOx3, NAD   Respiratory:  Symmetric chest rise.  On ventilator.  Neck:  Trach in place..  No cervical lymphadenopathy, thyromegaly or thyroid nodules.  Normal range of motion.       Assessment/Plan  Problem List Items Addressed This Visit                 Neuro      ALS (amyotrophic lateral sclerosis)     To OR for trach change.

## 2019-12-03 PROCEDURE — G0179 PR HOME HEALTH MD RECERTIFICATION: ICD-10-PCS | Mod: ,,, | Performed by: INTERNAL MEDICINE

## 2019-12-03 PROCEDURE — G0179 MD RECERTIFICATION HHA PT: HCPCS | Mod: ,,, | Performed by: INTERNAL MEDICINE

## 2019-12-04 DIAGNOSIS — Z97.8 CHRONIC INDWELLING FOLEY CATHETER: ICD-10-CM

## 2019-12-04 DIAGNOSIS — F41.9 ANXIETY: ICD-10-CM

## 2019-12-04 DIAGNOSIS — G12.21 ALS (AMYOTROPHIC LATERAL SCLEROSIS): ICD-10-CM

## 2019-12-04 DIAGNOSIS — Z93.1 PERCUTANEOUS ENDOSCOPIC GASTROSTOMY STATUS: ICD-10-CM

## 2019-12-04 DIAGNOSIS — T83.511A URINARY TRACT INFECTION ASSOCIATED WITH INDWELLING URETHRAL CATHETER, INITIAL ENCOUNTER: ICD-10-CM

## 2019-12-04 DIAGNOSIS — N39.0 URINARY TRACT INFECTION ASSOCIATED WITH INDWELLING URETHRAL CATHETER, INITIAL ENCOUNTER: ICD-10-CM

## 2019-12-05 RX ORDER — CLONAZEPAM 0.5 MG/1
1 TABLET ORAL 4 TIMES DAILY
Qty: 120 TABLET | Refills: 3 | Status: SHIPPED | OUTPATIENT
Start: 2019-12-05 | End: 2020-03-20

## 2019-12-06 ENCOUNTER — TELEPHONE (OUTPATIENT)
Dept: NEUROLOGY | Facility: CLINIC | Age: 49
End: 2019-12-06

## 2019-12-06 NOTE — TELEPHONE ENCOUNTER
Regarding Rx refill Klonopin  Call received from pt's wife Perlita indicated pt has been off of his Klonopin for 3-4 days now and is beginning withdrawals.  Wife inquired if Rx could be called in?  SW reached out to HARJINDER Bernal  -Dr. Gray's nurse for assistance.  Rx called in to pt's preferred pharmacy [facility].      Regarding order for Headrest  W/ [facility]   Pt's wife also indicated [facility] needed order for headrest.  Pt's wife indicated [facility] had the clinical documentation.  SW faxed order Md order to [facility] (P) [phone] (F) [phone]  (custom power w/c battery, headrest, alternate drive control, and attendant control(iDrive).      Spoke with JEN Schreiber w/ [employer]-aware of the above.        NINA MCCAIN

## 2019-12-12 DIAGNOSIS — F41.9 ANXIETY: ICD-10-CM

## 2019-12-12 DIAGNOSIS — R60.9 EDEMA, UNSPECIFIED TYPE: ICD-10-CM

## 2019-12-12 DIAGNOSIS — G12.21 ALS (AMYOTROPHIC LATERAL SCLEROSIS): Primary | ICD-10-CM

## 2019-12-16 ENCOUNTER — TELEPHONE (OUTPATIENT)
Dept: NEUROLOGY | Facility: CLINIC | Age: 49
End: 2019-12-16

## 2019-12-16 RX ORDER — DULOXETIN HYDROCHLORIDE 60 MG/1
60 CAPSULE, DELAYED RELEASE ORAL DAILY
Qty: 30 CAPSULE | Refills: 11 | Status: SHIPPED | OUTPATIENT
Start: 2019-12-16 | End: 2020-12-18

## 2019-12-16 RX ORDER — FUROSEMIDE 40 MG/1
40 TABLET ORAL
Qty: 60 TABLET | Refills: 2 | OUTPATIENT
Start: 2019-12-16 | End: 2020-12-15

## 2019-12-16 NOTE — TELEPHONE ENCOUNTER
His potassium is low.  Not sure if the lasix needs adjusted.  Will approve duloxetine.  Will need to check with medical doctor regarding diuretic.

## 2019-12-16 NOTE — TELEPHONE ENCOUNTER
Outgoing call to patient's wife, Perlita, to discuss diuretic and low K+ level.     Left Voicemail requesting return call.

## 2019-12-18 DIAGNOSIS — R60.9 EDEMA, UNSPECIFIED TYPE: ICD-10-CM

## 2019-12-18 RX ORDER — FUROSEMIDE 40 MG/1
40 TABLET ORAL
Qty: 60 TABLET | Refills: 2 | Status: SHIPPED | OUTPATIENT
Start: 2019-12-18 | End: 2022-08-26

## 2020-01-06 ENCOUNTER — TELEPHONE (OUTPATIENT)
Dept: NEUROLOGY | Facility: CLINIC | Age: 50
End: 2020-01-06

## 2020-01-06 DIAGNOSIS — T83.511A URINARY TRACT INFECTION ASSOCIATED WITH INDWELLING URETHRAL CATHETER, INITIAL ENCOUNTER: ICD-10-CM

## 2020-01-06 DIAGNOSIS — Z97.8 CHRONIC INDWELLING FOLEY CATHETER: ICD-10-CM

## 2020-01-06 DIAGNOSIS — G12.21 ALS (AMYOTROPHIC LATERAL SCLEROSIS): Primary | ICD-10-CM

## 2020-01-06 DIAGNOSIS — N39.0 URINARY TRACT INFECTION ASSOCIATED WITH INDWELLING URETHRAL CATHETER, INITIAL ENCOUNTER: ICD-10-CM

## 2020-01-06 DIAGNOSIS — Z93.1 PERCUTANEOUS ENDOSCOPIC GASTROSTOMY STATUS: ICD-10-CM

## 2020-01-06 DIAGNOSIS — F41.9 ANXIETY: ICD-10-CM

## 2020-01-06 DIAGNOSIS — G12.21 ALS (AMYOTROPHIC LATERAL SCLEROSIS): ICD-10-CM

## 2020-01-06 RX ORDER — OXYBUTYNIN CHLORIDE 15 MG/1
15 TABLET, EXTENDED RELEASE ORAL DAILY
Qty: 30 TABLET | Refills: 11 | Status: SHIPPED | OUTPATIENT
Start: 2020-01-06 | End: 2021-01-06

## 2020-01-06 RX ORDER — HYDROXYZINE HYDROCHLORIDE 25 MG/1
25 TABLET, FILM COATED ORAL 4 TIMES DAILY PRN
Qty: 120 TABLET | Refills: 11 | Status: SHIPPED | OUTPATIENT
Start: 2020-01-06 | End: 2021-01-06

## 2020-01-06 NOTE — TELEPHONE ENCOUNTER
Oxybutinin approved at 15mg daily.  Agree with urology consultation given the issues with urine and reasonable questions regarding suprapubic catheter.  Atarax renewed.  Await follow up blood work.

## 2020-01-06 NOTE — TELEPHONE ENCOUNTER
Incoming call from Perlita to inquire if Dr. Gray would consider adjusting patient's Ditropan Rx. He currently takes 10mg 24h to help with bladder spasms.    Perlita indicated that munoz cath is clogging due to increase in puss and sediment. She and caregivers are flushing few times a day. Also, Munoz cath is changed twice per month. Perlita indicated that patient urine in colonized with different drug resistant bacteria. Administered Azo OTC; however, ineffective.  She inquired if a suprapubic catheter prove beneficial. RN Coordinator requested that patient receive Urology consult to discuss in more detail.     Caregiver noted that Lasix being administered. Advised Perlita that K+ level was slightly low 3.4 at last clinic visit. RN Coordinator spoke with Rhea,  Nurse, to give verbal order for BMP. Rhea attempted but with increased edema unable to obtain blood draw.  Nurse noted that she would try again on next week.     Also, requesting refill on Atarax 25mg via PEG 4 times daily.

## 2020-01-07 ENCOUNTER — TELEPHONE (OUTPATIENT)
Dept: NEUROLOGY | Facility: CLINIC | Age: 50
End: 2020-01-07

## 2020-01-07 NOTE — TELEPHONE ENCOUNTER
Call received from July Garcia CM with [place] Medicaid Program. CM indicated that patient is changing from Independent Living Program to Medicaid Waiver. July will fax document for completion and signature by Dr. Chaudhary.     LEO notified.

## 2020-01-09 ENCOUNTER — EXTERNAL HOME HEALTH (OUTPATIENT)
Dept: HOME HEALTH SERVICES | Facility: HOSPITAL | Age: 50
End: 2020-01-09
Payer: MEDICARE

## 2020-02-04 RX ORDER — HYDROCODONE BITARTRATE AND ACETAMINOPHEN 10; 325 MG/1; MG/1
1 TABLET ORAL EVERY 6 HOURS PRN
Qty: 120 TABLET | Refills: 0 | Status: SHIPPED | OUTPATIENT
Start: 2020-02-04 | End: 2020-03-20

## 2020-02-04 RX ORDER — HYDROCODONE BITARTRATE AND ACETAMINOPHEN 10; 325 MG/1; MG/1
TABLET ORAL
Qty: 120 TABLET | Refills: 0 | OUTPATIENT
Start: 2020-02-04

## 2020-02-07 ENCOUNTER — TELEPHONE (OUTPATIENT)
Dept: CRITICAL CARE MEDICINE | Facility: HOSPITAL | Age: 50
End: 2020-02-07

## 2020-02-07 ENCOUNTER — TELEPHONE (OUTPATIENT)
Dept: NEUROLOGY | Facility: CLINIC | Age: 50
End: 2020-02-07

## 2020-02-07 DIAGNOSIS — G12.21 ALS (AMYOTROPHIC LATERAL SCLEROSIS): Primary | ICD-10-CM

## 2020-02-07 DIAGNOSIS — J98.8 RESPIRATORY INFECTION: ICD-10-CM

## 2020-02-07 RX ORDER — LEVOFLOXACIN 750 MG/1
TABLET ORAL
Qty: 14 TABLET | Refills: 0 | Status: ON HOLD | OUTPATIENT
Start: 2020-02-07 | End: 2020-02-17 | Stop reason: HOSPADM

## 2020-02-08 ENCOUNTER — HOSPITAL ENCOUNTER (INPATIENT)
Facility: HOSPITAL | Age: 50
LOS: 9 days | Discharge: HOME | DRG: 870 | End: 2020-02-17
Attending: EMERGENCY MEDICINE | Admitting: INTERNAL MEDICINE
Payer: MEDICARE

## 2020-02-08 DIAGNOSIS — Z91.89 AT RISK FOR LONG QT SYNDROME: ICD-10-CM

## 2020-02-08 DIAGNOSIS — J96.01 ACUTE HYPOXEMIC RESPIRATORY FAILURE: Primary | ICD-10-CM

## 2020-02-08 DIAGNOSIS — G12.21 ALS (AMYOTROPHIC LATERAL SCLEROSIS): ICD-10-CM

## 2020-02-08 DIAGNOSIS — A41.9 SEPSIS: ICD-10-CM

## 2020-02-08 PROBLEM — K59.00 CONSTIPATION: Chronic | Status: ACTIVE | Noted: 2020-02-08

## 2020-02-08 PROBLEM — J18.9 PNEUMONIA: Status: ACTIVE | Noted: 2020-02-08

## 2020-02-08 PROBLEM — D72.829 LEUKOCYTOSIS: Status: ACTIVE | Noted: 2020-02-08

## 2020-02-08 PROBLEM — R23.8 SKIN BREAKDOWN: Chronic | Status: ACTIVE | Noted: 2020-02-08

## 2020-02-08 PROBLEM — E87.20 LACTIC ACIDOSIS: Status: ACTIVE | Noted: 2020-02-08

## 2020-02-08 LAB
ALBUMIN SERPL BCP-MCNC: 3.1 G/DL (ref 3.5–5.2)
ALLENS TEST: ABNORMAL
ALP SERPL-CCNC: 71 U/L (ref 55–135)
ALT SERPL W/O P-5'-P-CCNC: 52 U/L (ref 10–44)
ANION GAP SERPL CALC-SCNC: 11 MMOL/L (ref 8–16)
ANION GAP SERPL CALC-SCNC: 12 MMOL/L (ref 8–16)
AST SERPL-CCNC: 45 U/L (ref 10–40)
BACTERIA #/AREA URNS AUTO: ABNORMAL /HPF
BASOPHILS # BLD AUTO: 0.07 K/UL (ref 0–0.2)
BASOPHILS NFR BLD: 0.5 % (ref 0–1.9)
BILIRUB SERPL-MCNC: 1 MG/DL (ref 0.1–1)
BILIRUB UR QL STRIP: NEGATIVE
BUN SERPL-MCNC: 11 MG/DL (ref 6–20)
BUN SERPL-MCNC: 12 MG/DL (ref 6–20)
CALCIUM SERPL-MCNC: 8.6 MG/DL (ref 8.7–10.5)
CALCIUM SERPL-MCNC: 9.1 MG/DL (ref 8.7–10.5)
CHLORIDE SERPL-SCNC: 100 MMOL/L (ref 95–110)
CHLORIDE SERPL-SCNC: 99 MMOL/L (ref 95–110)
CLARITY UR REFRACT.AUTO: ABNORMAL
CO2 SERPL-SCNC: 23 MMOL/L (ref 23–29)
CO2 SERPL-SCNC: 24 MMOL/L (ref 23–29)
COLOR UR AUTO: ABNORMAL
CREAT SERPL-MCNC: 0.5 MG/DL (ref 0.5–1.4)
CREAT SERPL-MCNC: 0.5 MG/DL (ref 0.5–1.4)
DIFFERENTIAL METHOD: ABNORMAL
EOSINOPHIL # BLD AUTO: 0 K/UL (ref 0–0.5)
EOSINOPHIL NFR BLD: 0.1 % (ref 0–8)
ERYTHROCYTE [DISTWIDTH] IN BLOOD BY AUTOMATED COUNT: 16 % (ref 11.5–14.5)
EST. GFR  (AFRICAN AMERICAN): >60 ML/MIN/1.73 M^2
EST. GFR  (AFRICAN AMERICAN): >60 ML/MIN/1.73 M^2
EST. GFR  (NON AFRICAN AMERICAN): >60 ML/MIN/1.73 M^2
EST. GFR  (NON AFRICAN AMERICAN): >60 ML/MIN/1.73 M^2
GLUCOSE SERPL-MCNC: 158 MG/DL (ref 70–110)
GLUCOSE SERPL-MCNC: 189 MG/DL (ref 70–110)
GLUCOSE UR QL STRIP: NEGATIVE
HCO3 UR-SCNC: 25.5 MMOL/L (ref 24–28)
HCT VFR BLD AUTO: 38.4 % (ref 40–54)
HGB BLD-MCNC: 12 G/DL (ref 14–18)
HGB UR QL STRIP: ABNORMAL
HYALINE CASTS UR QL AUTO: 0 /LPF
IMM GRANULOCYTES # BLD AUTO: 0.07 K/UL (ref 0–0.04)
IMM GRANULOCYTES NFR BLD AUTO: 0.5 % (ref 0–0.5)
INFLUENZA A, MOLECULAR: NEGATIVE
INFLUENZA B, MOLECULAR: NEGATIVE
KETONES UR QL STRIP: NEGATIVE
LACTATE SERPL-SCNC: 1.1 MMOL/L (ref 0.5–2.2)
LACTATE SERPL-SCNC: 2 MMOL/L (ref 0.5–2.2)
LACTATE SERPL-SCNC: 2.7 MMOL/L (ref 0.5–2.2)
LEUKOCYTE ESTERASE UR QL STRIP: ABNORMAL
LYMPHOCYTES # BLD AUTO: 0.7 K/UL (ref 1–4.8)
LYMPHOCYTES NFR BLD: 5.1 % (ref 18–48)
MCH RBC QN AUTO: 28.3 PG (ref 27–31)
MCHC RBC AUTO-ENTMCNC: 31.3 G/DL (ref 32–36)
MCV RBC AUTO: 91 FL (ref 82–98)
MICROSCOPIC COMMENT: ABNORMAL
MONOCYTES # BLD AUTO: 1.3 K/UL (ref 0.3–1)
MONOCYTES NFR BLD: 8.9 % (ref 4–15)
NEUTROPHILS # BLD AUTO: 12.3 K/UL (ref 1.8–7.7)
NEUTROPHILS NFR BLD: 84.9 % (ref 38–73)
NITRITE UR QL STRIP: NEGATIVE
NRBC BLD-RTO: 0 /100 WBC
PCO2 BLDA: 37 MMHG (ref 35–45)
PH SMN: 7.45 [PH] (ref 7.35–7.45)
PH UR STRIP: 6 [PH] (ref 5–8)
PLATELET # BLD AUTO: 218 K/UL (ref 150–350)
PMV BLD AUTO: 11.4 FL (ref 9.2–12.9)
PO2 BLDA: 64 MMHG (ref 40–60)
POC BE: 1 MMOL/L
POC SATURATED O2: 93 % (ref 95–100)
POC TCO2: 27 MMOL/L (ref 24–29)
POTASSIUM SERPL-SCNC: 3.8 MMOL/L (ref 3.5–5.1)
POTASSIUM SERPL-SCNC: 4 MMOL/L (ref 3.5–5.1)
PROCALCITONIN SERPL IA-MCNC: 0.26 NG/ML
PROCALCITONIN SERPL IA-MCNC: 0.37 NG/ML
PROT SERPL-MCNC: 6.6 G/DL (ref 6–8.4)
PROT UR QL STRIP: ABNORMAL
RBC # BLD AUTO: 4.24 M/UL (ref 4.6–6.2)
RBC #/AREA URNS AUTO: 50 /HPF (ref 0–4)
SAMPLE: ABNORMAL
SITE: ABNORMAL
SODIUM SERPL-SCNC: 134 MMOL/L (ref 136–145)
SODIUM SERPL-SCNC: 135 MMOL/L (ref 136–145)
SP GR UR STRIP: 1 (ref 1–1.03)
SPECIMEN SOURCE: NORMAL
URN SPEC COLLECT METH UR: ABNORMAL
WBC # BLD AUTO: 14.42 K/UL (ref 3.9–12.7)
WBC #/AREA URNS AUTO: >100 /HPF (ref 0–5)
WBC CLUMPS UR QL AUTO: ABNORMAL

## 2020-02-08 PROCEDURE — 96365 THER/PROPH/DIAG IV INF INIT: CPT

## 2020-02-08 PROCEDURE — 99223 1ST HOSP IP/OBS HIGH 75: CPT | Mod: AI,,, | Performed by: INTERNAL MEDICINE

## 2020-02-08 PROCEDURE — 84145 PROCALCITONIN (PCT): CPT | Mod: 91

## 2020-02-08 PROCEDURE — S0073 INJECTION, AZTREONAM, 500 MG: HCPCS | Performed by: EMERGENCY MEDICINE

## 2020-02-08 PROCEDURE — 63600175 PHARM REV CODE 636 W HCPCS: Performed by: EMERGENCY MEDICINE

## 2020-02-08 PROCEDURE — 96375 TX/PRO/DX INJ NEW DRUG ADDON: CPT

## 2020-02-08 PROCEDURE — 87186 SC STD MICRODIL/AGAR DIL: CPT

## 2020-02-08 PROCEDURE — 85025 COMPLETE CBC W/AUTO DIFF WBC: CPT

## 2020-02-08 PROCEDURE — 83605 ASSAY OF LACTIC ACID: CPT | Mod: 91

## 2020-02-08 PROCEDURE — 51702 INSERT TEMP BLADDER CATH: CPT

## 2020-02-08 PROCEDURE — 99291 PR CRITICAL CARE, E/M 30-74 MINUTES: ICD-10-PCS | Mod: ,,, | Performed by: EMERGENCY MEDICINE

## 2020-02-08 PROCEDURE — S0073 INJECTION, AZTREONAM, 500 MG: HCPCS | Performed by: STUDENT IN AN ORGANIZED HEALTH CARE EDUCATION/TRAINING PROGRAM

## 2020-02-08 PROCEDURE — 99223 PR INITIAL HOSPITAL CARE,LEVL III: ICD-10-PCS | Mod: AI,,, | Performed by: INTERNAL MEDICINE

## 2020-02-08 PROCEDURE — 80047 BASIC METABLC PNL IONIZED CA: CPT

## 2020-02-08 PROCEDURE — 80053 COMPREHEN METABOLIC PANEL: CPT

## 2020-02-08 PROCEDURE — 25500020 PHARM REV CODE 255: Performed by: INTERNAL MEDICINE

## 2020-02-08 PROCEDURE — 99900026 HC AIRWAY MAINTENANCE (STAT)

## 2020-02-08 PROCEDURE — 25000003 PHARM REV CODE 250: Performed by: STUDENT IN AN ORGANIZED HEALTH CARE EDUCATION/TRAINING PROGRAM

## 2020-02-08 PROCEDURE — 84145 PROCALCITONIN (PCT): CPT

## 2020-02-08 PROCEDURE — 87077 CULTURE AEROBIC IDENTIFY: CPT

## 2020-02-08 PROCEDURE — 87086 URINE CULTURE/COLONY COUNT: CPT

## 2020-02-08 PROCEDURE — 93010 EKG 12-LEAD: ICD-10-PCS | Mod: ,,, | Performed by: INTERNAL MEDICINE

## 2020-02-08 PROCEDURE — 87502 INFLUENZA DNA AMP PROBE: CPT

## 2020-02-08 PROCEDURE — 94003 VENT MGMT INPAT SUBQ DAY: CPT

## 2020-02-08 PROCEDURE — 99223 1ST HOSP IP/OBS HIGH 75: CPT | Mod: ,,, | Performed by: INTERNAL MEDICINE

## 2020-02-08 PROCEDURE — 63600175 PHARM REV CODE 636 W HCPCS: Performed by: STUDENT IN AN ORGANIZED HEALTH CARE EDUCATION/TRAINING PROGRAM

## 2020-02-08 PROCEDURE — C9113 INJ PANTOPRAZOLE SODIUM, VIA: HCPCS | Performed by: STUDENT IN AN ORGANIZED HEALTH CARE EDUCATION/TRAINING PROGRAM

## 2020-02-08 PROCEDURE — 81001 URINALYSIS AUTO W/SCOPE: CPT

## 2020-02-08 PROCEDURE — 82803 BLOOD GASES ANY COMBINATION: CPT

## 2020-02-08 PROCEDURE — 25000003 PHARM REV CODE 250: Performed by: EMERGENCY MEDICINE

## 2020-02-08 PROCEDURE — 99291 CRITICAL CARE FIRST HOUR: CPT | Mod: ,,, | Performed by: EMERGENCY MEDICINE

## 2020-02-08 PROCEDURE — 93010 ELECTROCARDIOGRAM REPORT: CPT | Mod: ,,, | Performed by: INTERNAL MEDICINE

## 2020-02-08 PROCEDURE — 87088 URINE BACTERIA CULTURE: CPT

## 2020-02-08 PROCEDURE — 27000221 HC OXYGEN, UP TO 24 HOURS

## 2020-02-08 PROCEDURE — 63600175 PHARM REV CODE 636 W HCPCS: Performed by: INTERNAL MEDICINE

## 2020-02-08 PROCEDURE — 99900035 HC TECH TIME PER 15 MIN (STAT)

## 2020-02-08 PROCEDURE — 94761 N-INVAS EAR/PLS OXIMETRY MLT: CPT

## 2020-02-08 PROCEDURE — 80048 BASIC METABOLIC PNL TOTAL CA: CPT

## 2020-02-08 PROCEDURE — 93005 ELECTROCARDIOGRAM TRACING: CPT

## 2020-02-08 PROCEDURE — 20000000 HC ICU ROOM

## 2020-02-08 PROCEDURE — 99223 PR INITIAL HOSPITAL CARE,LEVL III: ICD-10-PCS | Mod: ,,, | Performed by: INTERNAL MEDICINE

## 2020-02-08 PROCEDURE — 83605 ASSAY OF LACTIC ACID: CPT

## 2020-02-08 PROCEDURE — 96367 TX/PROPH/DG ADDL SEQ IV INF: CPT

## 2020-02-08 PROCEDURE — 99291 CRITICAL CARE FIRST HOUR: CPT | Mod: 25

## 2020-02-08 PROCEDURE — 87040 BLOOD CULTURE FOR BACTERIA: CPT

## 2020-02-08 RX ORDER — DULOXETIN HYDROCHLORIDE 30 MG/1
60 CAPSULE, DELAYED RELEASE ORAL DAILY
Status: DISCONTINUED | OUTPATIENT
Start: 2020-02-09 | End: 2020-02-17 | Stop reason: HOSPADM

## 2020-02-08 RX ORDER — DULOXETIN HYDROCHLORIDE 60 MG/1
60 CAPSULE, DELAYED RELEASE ORAL DAILY
Status: DISCONTINUED | OUTPATIENT
Start: 2020-02-08 | End: 2020-02-08

## 2020-02-08 RX ORDER — SODIUM CHLORIDE 0.9 % (FLUSH) 0.9 %
10 SYRINGE (ML) INJECTION
Status: DISCONTINUED | OUTPATIENT
Start: 2020-02-08 | End: 2020-02-17 | Stop reason: HOSPADM

## 2020-02-08 RX ORDER — AZTREONAM 2 G/1
2000 INJECTION, POWDER, LYOPHILIZED, FOR SOLUTION INTRAMUSCULAR; INTRAVENOUS ONCE
Status: COMPLETED | OUTPATIENT
Start: 2020-02-08 | End: 2020-02-08

## 2020-02-08 RX ORDER — VANCOMYCIN 2 GRAM/500 ML IN 0.9 % SODIUM CHLORIDE INTRAVENOUS
2000
Status: COMPLETED | OUTPATIENT
Start: 2020-02-08 | End: 2020-02-08

## 2020-02-08 RX ORDER — FAMOTIDINE 20 MG/1
20 TABLET, FILM COATED ORAL 2 TIMES DAILY
Status: DISCONTINUED | OUTPATIENT
Start: 2020-02-08 | End: 2020-02-08

## 2020-02-08 RX ORDER — TRAMADOL HYDROCHLORIDE 50 MG/1
50 TABLET ORAL EVERY 6 HOURS
COMMUNITY
End: 2020-04-09

## 2020-02-08 RX ORDER — LEVOFLOXACIN 5 MG/ML
750 INJECTION, SOLUTION INTRAVENOUS EVERY 24 HOURS
Status: DISCONTINUED | OUTPATIENT
Start: 2020-02-09 | End: 2020-02-08

## 2020-02-08 RX ORDER — POLYETHYLENE GLYCOL 3350 17 G/17G
POWDER, FOR SOLUTION ORAL
COMMUNITY

## 2020-02-08 RX ORDER — CHLORHEXIDINE GLUCONATE ORAL RINSE 1.2 MG/ML
15 SOLUTION DENTAL 2 TIMES DAILY
Status: DISCONTINUED | OUTPATIENT
Start: 2020-02-08 | End: 2020-02-17 | Stop reason: HOSPADM

## 2020-02-08 RX ORDER — CEFEPIME HYDROCHLORIDE 2 G/1
2 INJECTION, POWDER, FOR SOLUTION INTRAVENOUS
Status: DISCONTINUED | OUTPATIENT
Start: 2020-02-08 | End: 2020-02-12

## 2020-02-08 RX ORDER — PREGABALIN 50 MG/1
100 CAPSULE ORAL 3 TIMES DAILY
Status: DISCONTINUED | OUTPATIENT
Start: 2020-02-08 | End: 2020-02-12

## 2020-02-08 RX ORDER — OXYBUTYNIN CHLORIDE 15 MG/1
15 TABLET, EXTENDED RELEASE ORAL DAILY
Status: DISCONTINUED | OUTPATIENT
Start: 2020-02-09 | End: 2020-02-11

## 2020-02-08 RX ORDER — AZTREONAM 2 G/1
2000 INJECTION, POWDER, LYOPHILIZED, FOR SOLUTION INTRAMUSCULAR; INTRAVENOUS EVERY 8 HOURS
Status: DISCONTINUED | OUTPATIENT
Start: 2020-02-08 | End: 2020-02-08

## 2020-02-08 RX ORDER — CLONAZEPAM 0.5 MG/1
0.5 TABLET ORAL 3 TIMES DAILY
Status: DISCONTINUED | OUTPATIENT
Start: 2020-02-08 | End: 2020-02-17 | Stop reason: HOSPADM

## 2020-02-08 RX ORDER — NAPROXEN 250 MG/1
250 TABLET ORAL ONCE
Status: COMPLETED | OUTPATIENT
Start: 2020-02-08 | End: 2020-02-08

## 2020-02-08 RX ORDER — ENOXAPARIN SODIUM 100 MG/ML
40 INJECTION SUBCUTANEOUS EVERY 24 HOURS
Status: DISCONTINUED | OUTPATIENT
Start: 2020-02-08 | End: 2020-02-11

## 2020-02-08 RX ORDER — LEVOFLOXACIN 5 MG/ML
500 INJECTION, SOLUTION INTRAVENOUS
Status: COMPLETED | OUTPATIENT
Start: 2020-02-08 | End: 2020-02-08

## 2020-02-08 RX ORDER — AMIODARONE HYDROCHLORIDE 200 MG/1
200 TABLET ORAL 2 TIMES DAILY
Status: DISCONTINUED | OUTPATIENT
Start: 2020-02-08 | End: 2020-02-17 | Stop reason: HOSPADM

## 2020-02-08 RX ORDER — DIPHENHYDRAMINE HCL 50 MG
50 CAPSULE ORAL NIGHTLY PRN
Status: DISCONTINUED | OUTPATIENT
Start: 2020-02-08 | End: 2020-02-17 | Stop reason: HOSPADM

## 2020-02-08 RX ORDER — PANTOPRAZOLE SODIUM 40 MG/10ML
40 INJECTION, POWDER, LYOPHILIZED, FOR SOLUTION INTRAVENOUS 2 TIMES DAILY
Status: DISCONTINUED | OUTPATIENT
Start: 2020-02-08 | End: 2020-02-17 | Stop reason: HOSPADM

## 2020-02-08 RX ORDER — MINERAL OIL
30 OIL (ML) ORAL DAILY PRN
COMMUNITY

## 2020-02-08 RX ORDER — HYDROCODONE BITARTRATE AND ACETAMINOPHEN 10; 325 MG/1; MG/1
1 TABLET ORAL EVERY 6 HOURS PRN
Status: DISCONTINUED | OUTPATIENT
Start: 2020-02-08 | End: 2020-02-16

## 2020-02-08 RX ORDER — HYDROCODONE BITARTRATE AND ACETAMINOPHEN 5; 325 MG/1; MG/1
1 TABLET ORAL EVERY 6 HOURS PRN
Status: DISCONTINUED | OUTPATIENT
Start: 2020-02-08 | End: 2020-02-09

## 2020-02-08 RX ADMIN — CEFEPIME 2 G: 2 INJECTION, POWDER, FOR SOLUTION INTRAVENOUS at 10:02

## 2020-02-08 RX ADMIN — ENOXAPARIN SODIUM 40 MG: 100 INJECTION SUBCUTANEOUS at 04:02

## 2020-02-08 RX ADMIN — CLONAZEPAM 0.5 MG: 0.5 TABLET ORAL at 10:02

## 2020-02-08 RX ADMIN — CLONAZEPAM 0.5 MG: 0.5 TABLET ORAL at 04:02

## 2020-02-08 RX ADMIN — AZTREONAM 2000 MG: 2 INJECTION, POWDER, LYOPHILIZED, FOR SOLUTION INTRAMUSCULAR; INTRAVENOUS at 09:02

## 2020-02-08 RX ADMIN — IOHEXOL 100 ML: 350 INJECTION, SOLUTION INTRAVENOUS at 05:02

## 2020-02-08 RX ADMIN — HYDROCODONE BITARTRATE AND ACETAMINOPHEN 1 TABLET: 5; 325 TABLET ORAL at 04:02

## 2020-02-08 RX ADMIN — PREGABALIN 100 MG: 50 CAPSULE ORAL at 10:02

## 2020-02-08 RX ADMIN — VANCOMYCIN HYDROCHLORIDE 2000 MG: 100 INJECTION, POWDER, LYOPHILIZED, FOR SOLUTION INTRAVENOUS at 10:02

## 2020-02-08 RX ADMIN — PANTOPRAZOLE SODIUM 40 MG: 40 INJECTION, POWDER, FOR SOLUTION INTRAVENOUS at 10:02

## 2020-02-08 RX ADMIN — CHLORHEXIDINE GLUCONATE 0.12% ORAL RINSE 15 ML: 1.2 LIQUID ORAL at 10:02

## 2020-02-08 RX ADMIN — AZTREONAM 2000 MG: 2 INJECTION, POWDER, LYOPHILIZED, FOR SOLUTION INTRAMUSCULAR; INTRAVENOUS at 06:02

## 2020-02-08 RX ADMIN — VANCOMYCIN HYDROCHLORIDE 1500 MG: 1.5 INJECTION, POWDER, LYOPHILIZED, FOR SOLUTION INTRAVENOUS at 10:02

## 2020-02-08 RX ADMIN — PREGABALIN 100 MG: 50 CAPSULE ORAL at 04:02

## 2020-02-08 RX ADMIN — HYDROCODONE BITARTRATE AND ACETAMINOPHEN 1 TABLET: 10; 325 TABLET ORAL at 10:02

## 2020-02-08 RX ADMIN — SODIUM CHLORIDE, SODIUM LACTATE, POTASSIUM CHLORIDE, AND CALCIUM CHLORIDE 1000 ML: .6; .31; .03; .02 INJECTION, SOLUTION INTRAVENOUS at 04:02

## 2020-02-08 RX ADMIN — SODIUM CHLORIDE, SODIUM LACTATE, POTASSIUM CHLORIDE, AND CALCIUM CHLORIDE 1000 ML: .6; .31; .03; .02 INJECTION, SOLUTION INTRAVENOUS at 11:02

## 2020-02-08 RX ADMIN — NAPROXEN 250 MG: 250 TABLET ORAL at 11:02

## 2020-02-08 RX ADMIN — LEVOFLOXACIN 500 MG: 500 INJECTION, SOLUTION INTRAVENOUS at 09:02

## 2020-02-08 RX ADMIN — AMIODARONE HYDROCHLORIDE 200 MG: 200 TABLET ORAL at 10:02

## 2020-02-08 NOTE — ED NOTES
Patient identifiers verified and correct for Bayron Delgado.    LOC: The patient is awake, alert. Eyes open to voice, can blink appropriately to yes or no questions.   APPEARANCE: Patient resting comfortably and in no acute distress, patient is clean and well groomed, patient's clothing is properly fastened.  SKIN: The skin is warm and dry, color consistent with ethnicity, delayed skin turgor.   MUSCULOSKELETAL: Hx of ALS with paralysis. All extremities edematous.   RESPIRATORY: Airway is open and patent, Trached, vented on home ventilator.   CARDIAC: Patient has a normal rate and regular rhythm, capillary refill < 3 seconds.  ABDOMEN: Soft and non tender to palpation, no distention noted, normoactive bowel sounds present in all four quadrants. LUQ Peg tube present.   NEUROLOGIC: PERRL, 3 mm bilaterally, eyes open spontaneously, behavior appropriate to situation, follows commands, facial expression symmetrical.

## 2020-02-08 NOTE — ED NOTES
Pt resting in bed. His wife and HH nurse at bedside with our RT. No complaints at this. VSS. Will continue to monitor.

## 2020-02-08 NOTE — HPI
49 year old male with PMH of ALS, chronic hypoxemic hypercarbic respiratory failure now with trach and vent dependent who was BIB EMS from home for fevers and increased secretion. Per wife, she noticed that patient developed fever up to 101 F on Thursday. During this same period of time, she also started noticing increasing secretions from tracheostomy site and increasing O2 requirements. Fevers continued to persist despite Advil (does not want to give Tylenol with concerns for liver damage or Ibuprofen for allergic reaction) and reached as high as 105 F. Sputum became darker and appeared green. She also reports that he has had more watery stools, normally stools are soft to solid. With EMS, pt's sats were in the upper 80s on home vent settings of room air, improved with application of 4L/min O2. While in the ED, patient has been HDS and started on Vanc, Aztreonam and Levo given PCN allergy. Influenza was negative. WBC of 14.42 and febrile to 101 F. CXR showing left lower lobe PNA. Respiratory cultures ordered.  Critical care was consulted for further management and stabilization of this patient in setting of chronic vent dependent ALS.     Of note, he has a history of of chronic MDR Pseudomonas and ESBL UTI, with no plan for treatment at this time. Wife was sick with bronchitis over the last week, no other sick contacts.

## 2020-02-08 NOTE — PROGRESS NOTES
Pharmacokinetic Initial Assessment: IV Vancomycin    Assessment/Plan:    Initiate intravenous vancomycin with loading dose of 2000 mg once followed by a maintenance dose of vancomycin 1500mg IV every 12 hours  Desired empiric serum trough concentration is 15 to 20 mcg/mL  Draw vancomycin trough level 30 min prior to fourth dose on 2/9/20 at approximately 22:00  Pharmacy will continue to follow and monitor vancomycin.      Please contact pharmacy at extension [phone] with any questions regarding this assessment.     Thank you for the consult,   Justin Mooreh       Patient brief summary:  Bayron Delgado is a 49 y.o. male initiated on antimicrobial therapy with IV Vancomycin for treatment of suspected bacteremia    Drug Allergies:   Review of patient's allergies indicates:   Allergen Reactions    Atropine     Ibuprofen      A-fib    Latex, natural rubber     Penicillins Other (See Comments)     Per wife- his mother stated he was allergic to it.  Had redness associated with cefepime 3/14/2018       Actual Body Weight:   136.1 kg    Renal Function:   Estimated Creatinine Clearance: 244.1 mL/min (based on SCr of 0.5 mg/dL).,     Dialysis Method (if applicable):  none    CBC (last 72 hours):  Recent Labs   Lab Result Units 02/08/20  0853   WBC K/uL 14.42*   Hemoglobin g/dL 12.0*   Hematocrit % 38.4*   Platelets K/uL 218   Gran% % 84.9*   Lymph% % 5.1*   Mono% % 8.9   Eosinophil% % 0.1   Basophil% % 0.5   Differential Method  Automated       Metabolic Panel (last 72 hours):  Recent Labs   Lab Result Units 02/08/20  0853 02/08/20  1119   Sodium mmol/L 134*  --    Potassium mmol/L 4.0  --    Chloride mmol/L 99  --    CO2 mmol/L 23  --    Glucose mg/dL 189*  --    Glucose, UA   --  Negative   BUN, Bld mg/dL 12  --    Creatinine mg/dL 0.5  --    Albumin g/dL 3.1*  --    Total Bilirubin mg/dL 1.0  --    Alkaline Phosphatase U/L 71  --    AST U/L 45*  --    ALT U/L 52*  --        Drug levels (last 3 results):  No results for  input(s): VANCOMYCINRA, VANCOMYCINPE, VANCOMYCINTR in the last 72 hours.    Microbiologic Results:  Microbiology Results (last 7 days)     Procedure Component Value Units Date/Time    Urine culture [[id number]] Collected:  02/08/20 1119    Order Status:  No result Specimen:  Urine Updated:  02/08/20 1155    Influenza A & B by Molecular [[id number]] Collected:  02/08/20 0920    Order Status:  Completed Specimen:  Nasopharyngeal Swab Updated:  02/08/20 0948     Influenza A, Molecular Negative     Influenza B, Molecular Negative     Flu A & B Source Nasal swab    Culture, Respiratory with Gram Stain [[id number]]     Order Status:  No result Specimen:  Respiratory from Sputum     Blood culture x two cultures. Draw prior to antibiotics. [[id number]] Collected:  02/08/20 0853    Order Status:  Sent Specimen:  Blood from Peripheral, Hand, Right Updated:  02/08/20 0903    Blood culture x two cultures. Draw prior to antibiotics. [[id number]] Collected:  02/08/20 0854    Order Status:  Sent Specimen:  Blood from Peripheral, Hand, Left Updated:  02/08/20 0902

## 2020-02-08 NOTE — ED NOTES
ALS pt arrived via paramedics from home on home vent with documented settings on flow sheet. 8.0 cuffed trach clean and patent. Verbal order from Dr Virgie GUERRERO to use patients home vent. Place 4 lpm oxygen in line.

## 2020-02-08 NOTE — ED TRIAGE NOTES
Vent pt coming from home with low o2 sats 88% and elevated temp 105 this am. Running fever since Thursday. Hx of ALS.C/o green sputum, headache, abdominal pain, decreased urine output and diarrhea since yesterday. Took Aleve x 2 and Tylenol x 2 PTA. Temp with .5

## 2020-02-08 NOTE — CONSULTS
Consult to Critical care acknowledged.     Mr. Delgado is a 49 year old male with PMH of ALS, chronic hypoxemic respiratory failure, now vent and trach dependent who was BIB EMS to ED for fever. Sepsis workup in ED concerning for possible pulmonary vs.  etiology. Will admit to ICU for further workup and stabilization.     Full HPI to follow.      Jaquan Hope MD  10:36 AM  02/08/2020

## 2020-02-08 NOTE — ED PROVIDER NOTES
Encounter Date: 2/8/2020       History     Chief Complaint   Patient presents with    Fever     ALS patient on vent, low O2 saturation at home     HPI   This is a 49-year-old male with a history of ALS, chronic hypoxemic hypercarbic respiratory failure status post venting and trach, who is brought in by EMS with report of fever since Thursday.  T-max 105°.  History is provided by Anand by the patient's wife who reports that he has had fever daily since Thursday, increased respiratory secretions, sputum changed from white to green, diarrhea and last night his urine became more concentrated and dark.  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  He is not currently on antibiotics.  His wife recently had a prolonged course of bronchitis, but she tried to stay way from him while she was symptomatic.  Otherwise no sick contacts.  Patient is able to communicate yes or no with his eyes, and he denies pain or shortness of breath at this time.  History is otherwise limited to his nonverbal status.    With EMS, pt's sats were in the upper 80s on home vent settings of room air, improved with application of 4L/min O2.   Review of patient's allergies indicates:   Allergen Reactions    Atropine     Ibuprofen      A-fib    Latex, natural rubber     Penicillins Other (See Comments)     Per wife- his mother stated he was allergic to it.  Had redness associated with cefepime 3/14/2018     Past Medical History:   Diagnosis Date    ALS (amyotrophic lateral sclerosis)     Atrial fibrillation     CHF (congestive heart failure)     Neuropathy     Ventilator dependent      Past Surgical History:   Procedure Laterality Date    APPENDECTOMY      GASTROSTOMY TUBE PLACEMENT  2017    HERNIA REPAIR      TRACHEOSTOMY  03/2018    TRACHEOSTOMY TUBE CHANGE N/A 11/22/2019    Procedure: REPLACEMENT, TRACHEOSTOMY TUBE;  Surgeon: Daniel Horner MD;  Location: [facility] OR [address] [place] [address];  Service:  ENT;  Laterality: N/A;     No family history on file.  Social History     Tobacco Use    Smoking status: Never Smoker   Substance Use Topics    Alcohol use: No    Drug use: No     Review of Systems   Unable to perform ROS: Patient nonverbal       Physical Exam     Initial Vitals [02/08/20 0840]   BP Pulse Resp Temp SpO2   108/78 96 16 (!) 101.5 °F (38.6 °C) (!) 94 %      MAP       --         Physical Exam  Gen:  Alert, unable to assess orientation, NAD, well nourished, grossly volume overloaded on exam, wife reports this is improved from baseline  Eye: sclera anicteric, EOMI, no conjunctivitis, no periorbital edema  ENT: NCAT, OP clear, neck supple with FROM, no stridor, face is flushed  CVS: RRR, no m/r/g, distal pulses intact/symmetric  Pulm:  Bilateral and expiratory wheeze, without diminished air movement, no rales or rhonchi, no increased work of breathing  Abd: soft, nontender, nondistended, no organomegaly, no CVAT, peg tube site clean, dry, intact  Ext:  Anasarca, otherwise no lesions, rashes, or deformity  Neuro: GCS15, moving all extremities, gait intact  Psych: normal affect, cooperative  ED Course   Procedures  Labs Reviewed   CBC W/ AUTO DIFFERENTIAL - Abnormal; Notable for the following components:       Result Value    WBC 14.42 (*)     RBC 4.24 (*)     Hemoglobin 12.0 (*)     Hematocrit 38.4 (*)     Mean Corpuscular Hemoglobin Conc 31.3 (*)     RDW 16.0 (*)     Gran # (ANC) 12.3 (*)     Immature Grans (Abs) 0.07 (*)     Lymph # 0.7 (*)     Mono # 1.3 (*)     Gran% 84.9 (*)     Lymph% 5.1 (*)     All other components within normal limits   COMPREHENSIVE METABOLIC PANEL - Abnormal; Notable for the following components:    Sodium 134 (*)     Glucose 189 (*)     Albumin 3.1 (*)     AST 45 (*)     ALT 52 (*)     All other components within normal limits   ISTAT PROCEDURE - Abnormal; Notable for the following components:    POC PO2 64 (*)     POC SATURATED O2 93 (*)     All other components within  normal limits   INFLUENZA A & B BY MOLECULAR   CULTURE, BLOOD   CULTURE, BLOOD   CULTURE, RESPIRATORY   LACTIC ACID, PLASMA   URINALYSIS, REFLEX TO URINE CULTURE   BASIC METABOLIC PANEL   LACTIC ACID, PLASMA   PROCALCITONIN   ISTAT CHEM8        ECG Results          EKG 12-lead (Final result)  Result time 02/08/20 11:02:02    Final result by Interface, Lab In [facility] (02/08/20 11:02:02)                 Narrative:    Test Reason : A41.9,    Vent. Rate : 071 BPM     Atrial Rate : 071 BPM     P-R Int : 160 ms          QRS Dur : 094 ms      QT Int : 412 ms       P-R-T Axes : 069 082 122 degrees     QTc Int : 447 ms    Normal sinus rhythm  ST and T wave abnormality, consider anterolateral ischemia  Abnormal ECG  When compared with ECG of 22-NOV-2019 10:52,  No significant change was found  Confirmed by MARIS OLSON MD ([id number]) on 2/8/2020 11:01:52 AM    Referred By: [email or web address]   SELF           Confirmed By:MARIS OLSON MD                            Imaging Results           X-Ray Chest AP Portable (Final result)  Result time 02/08/20 09:23:09    Final result by Rosio Powers MD (02/08/20 09:23:09)                 Impression:      The findings in the left lower lobe may be related to overlap of structures given the patient rotation however pneumonia, pleural effusion with atelectasis are also in the differential.    The prominence of the pulmonary interstitial may be related to hypoinflation with crowding of the bronchovascular bundles however pulmonary interstitial edema can have a similar picture.    This report was flagged in Epic as abnormal.    Electronically signed by resident: Scott Riggs  Date:    02/08/2020  Time:    09:02    Electronically signed by: Rosio Powers MD  Date:    02/08/2020  Time:    09:23             Narrative:    EXAMINATION:  XR CHEST AP PORTABLE    CLINICAL HISTORY:  Sepsis;.    TECHNIQUE:  A single frontal portable radiographic view of the chest was performed.    COMPARISON:  Chest  radiograph 03/06/2019.    FINDINGS:  Exam quality is degraded by patient body habitus and AP portable technique.    Monitoring leads project over the patient.  A tracheostomy cannula is partially visualized.  The cardiac silhouette appears unchanged.    The trachea is midline.  The lungs are under expanded.  There is prominence of the pulmonary interstitium.  Silhouetting of the left hemidiaphragm is present.  The patient's left costophrenic angle is not visualized which may largely be related to the patient's rotation.  Some degree of left pleural fluid also likely present. No large pneumothorax.    Osseous structures and visualized upper abdomen show no significant abnormality accounting for limitations.                                 Medical Decision Making:   History:   I obtained history from: someone other than patient.  Old Medical Records: I decided to obtain old medical records.  Old Records Summarized: records from clinic visits and records from previous admission(s).  Initial Assessment:   This is a 49-year-old male with a history of ALS, who has chronic hypercarbic respiratory failure status post stent, who presents today with acute hypoxemic component of his respiratory failure, and is getting a fever.  I am most concerned for sepsis, he does have a history of multi-drug resistant pathogens as well as antibiotic allergies, so after blood cultures were drawn I have given him vancomycin, aztreonam and Levaquin to double cover his Pseudomonas infection.  Given his anasarca on exam, I do not think a 30 cc/kilos bolus is indicated at this time.  He has normal blood pressures we will continue to monitor this closely.  Given his hypoxemia, I think the likely source of his sepsis is pneumonia, as chest x-ray by my independent interpretation is consistent with this.  However he also has chronic UTIs and his urine appears cloudy and with sediment.  We will change his Michele and send a UA from the [facility]  specimen. He is flu negative. Patient's labs are notable for leukocytosis, otherwise are consistent with prior in terms of renal function and electrolytes.  I discussed the patient with the critical care team, who has accepted him for admission to the ICU.  Clinical Tests:   Lab Tests: Ordered and Reviewed  Radiological Study: Ordered and Reviewed  Sepsis Perfusion Assessment: I attest, a sepsis perfusion exam was performed within 6 hours of Septic Shock presentation, following fluid resuscitation.              Attending Attestation:         Attending Critical Care:   Critical Care Times:   Direct Patient Care (initial evaluation, reassessments, and time considering the case)................................................................15 minutes.   Additional History from reviewing old medical records or taking additional history from the family, EMS, PCP, etc.......................5 minutes.   Ordering, Reviewing, and Interpreting Diagnostic Studies...............................................................................................................5 minutes.   Documentation..................................................................................................................................................................................5 minutes.   Consultation with other Physicians. .................................................................................................................................................5 minutes.   Consultation with the patient's family directly relating to the patient's condition, care, and DNR status (when patient unable)......5 minutes.   ==============================================================  · Total Critical Care Time - exclusive of procedural time: 40 minutes.  ==============================================================  Critical care was necessary to treat or prevent imminent or life-threatening deterioration of the following  conditions: respiratory failure and sepsis.   The following critical care procedures were done by me (see procedure notes): pulse oximetry and airway management.   Critical care was time spent personally by me on the following activities: obtaining history from patient or relative, examination of patient, review of old charts, ordering lab, x-rays, and/or EKG, development of treatment plan with patient or relative, ordering and performing treatments and interventions, evaluation of patient's response to treatment, discussion with consultants, interpretation of cardiac measurements, re-evaluation of patient's conition and ventilator management.   Critical Care Condition: life-threatening                             Clinical Impression:       ICD-10-CM ICD-9-CM   1. Acute hypoxemic respiratory failure J96.01 518.81   2. Sepsis A41.9 038.9     995.91   3. ALS (amyotrophic lateral sclerosis) G12.21 335.20                             Perlita Garvin MD  02/08/20 1117

## 2020-02-09 PROBLEM — E87.20 LACTIC ACIDOSIS: Status: RESOLVED | Noted: 2020-02-08 | Resolved: 2020-02-09

## 2020-02-09 PROBLEM — E87.6 HYPOKALEMIA: Status: ACTIVE | Noted: 2020-02-09

## 2020-02-09 PROBLEM — Z93.0 TRACHEOSTOMY IN PLACE: Chronic | Status: ACTIVE | Noted: 2018-06-21

## 2020-02-09 PROBLEM — E83.51 HYPOCALCEMIA: Status: ACTIVE | Noted: 2020-02-09

## 2020-02-09 LAB
ALBUMIN SERPL BCP-MCNC: 2.4 G/DL (ref 3.5–5.2)
ALP SERPL-CCNC: 60 U/L (ref 55–135)
ALT SERPL W/O P-5'-P-CCNC: 39 U/L (ref 10–44)
ANION GAP SERPL CALC-SCNC: 10 MMOL/L (ref 8–16)
AST SERPL-CCNC: 31 U/L (ref 10–40)
BASOPHILS # BLD AUTO: 0.02 K/UL (ref 0–0.2)
BASOPHILS NFR BLD: 0.3 % (ref 0–1.9)
BILIRUB SERPL-MCNC: 0.6 MG/DL (ref 0.1–1)
BUN SERPL-MCNC: 8 MG/DL (ref 6–20)
CALCIUM SERPL-MCNC: 8 MG/DL (ref 8.7–10.5)
CHLORIDE SERPL-SCNC: 108 MMOL/L (ref 95–110)
CO2 SERPL-SCNC: 22 MMOL/L (ref 23–29)
CREAT SERPL-MCNC: 0.4 MG/DL (ref 0.5–1.4)
DIFFERENTIAL METHOD: ABNORMAL
EOSINOPHIL # BLD AUTO: 0.1 K/UL (ref 0–0.5)
EOSINOPHIL NFR BLD: 0.6 % (ref 0–8)
ERYTHROCYTE [DISTWIDTH] IN BLOOD BY AUTOMATED COUNT: 15.7 % (ref 11.5–14.5)
EST. GFR  (AFRICAN AMERICAN): >60 ML/MIN/1.73 M^2
EST. GFR  (NON AFRICAN AMERICAN): >60 ML/MIN/1.73 M^2
GLUCOSE SERPL-MCNC: 137 MG/DL (ref 70–110)
HCT VFR BLD AUTO: 35.9 % (ref 40–54)
HGB BLD-MCNC: 10.8 G/DL (ref 14–18)
IMM GRANULOCYTES # BLD AUTO: 0.04 K/UL (ref 0–0.04)
IMM GRANULOCYTES NFR BLD AUTO: 0.5 % (ref 0–0.5)
KOH PREP SPEC: NORMAL
LACTATE SERPL-SCNC: 1.8 MMOL/L (ref 0.5–2.2)
LYMPHOCYTES # BLD AUTO: 0.4 K/UL (ref 1–4.8)
LYMPHOCYTES NFR BLD: 5.3 % (ref 18–48)
MCH RBC QN AUTO: 28.1 PG (ref 27–31)
MCHC RBC AUTO-ENTMCNC: 30.1 G/DL (ref 32–36)
MCV RBC AUTO: 93 FL (ref 82–98)
MONOCYTES # BLD AUTO: 0.9 K/UL (ref 0.3–1)
MONOCYTES NFR BLD: 11.6 % (ref 4–15)
NEUTROPHILS # BLD AUTO: 6.5 K/UL (ref 1.8–7.7)
NEUTROPHILS NFR BLD: 81.7 % (ref 38–73)
NRBC BLD-RTO: 0 /100 WBC
PLATELET # BLD AUTO: 143 K/UL (ref 150–350)
PMV BLD AUTO: 11 FL (ref 9.2–12.9)
POTASSIUM SERPL-SCNC: 3.3 MMOL/L (ref 3.5–5.1)
PROT SERPL-MCNC: 5.6 G/DL (ref 6–8.4)
RBC # BLD AUTO: 3.85 M/UL (ref 4.6–6.2)
SODIUM SERPL-SCNC: 140 MMOL/L (ref 136–145)
VANCOMYCIN TROUGH SERPL-MCNC: 23.6 UG/ML (ref 10–22)
WBC # BLD AUTO: 7.93 K/UL (ref 3.9–12.7)

## 2020-02-09 PROCEDURE — 25000003 PHARM REV CODE 250: Performed by: EMERGENCY MEDICINE

## 2020-02-09 PROCEDURE — 83605 ASSAY OF LACTIC ACID: CPT

## 2020-02-09 PROCEDURE — 63600175 PHARM REV CODE 636 W HCPCS

## 2020-02-09 PROCEDURE — 25000242 PHARM REV CODE 250 ALT 637 W/ HCPCS: Performed by: INTERNAL MEDICINE

## 2020-02-09 PROCEDURE — 99233 SBSQ HOSP IP/OBS HIGH 50: CPT | Mod: ,,, | Performed by: INTERNAL MEDICINE

## 2020-02-09 PROCEDURE — 87070 CULTURE OTHR SPECIMN AEROBIC: CPT

## 2020-02-09 PROCEDURE — 99233 SBSQ HOSP IP/OBS HIGH 50: CPT | Mod: GC,,, | Performed by: INTERNAL MEDICINE

## 2020-02-09 PROCEDURE — 80053 COMPREHEN METABOLIC PANEL: CPT

## 2020-02-09 PROCEDURE — 99900025 HC BRONCHOSCOPY-ASST (STAT)

## 2020-02-09 PROCEDURE — 85025 COMPLETE CBC W/AUTO DIFF WBC: CPT

## 2020-02-09 PROCEDURE — 97802 MEDICAL NUTRITION INDIV IN: CPT

## 2020-02-09 PROCEDURE — 31622 DX BRONCHOSCOPE/WASH: CPT

## 2020-02-09 PROCEDURE — 94761 N-INVAS EAR/PLS OXIMETRY MLT: CPT

## 2020-02-09 PROCEDURE — 99233 PR SUBSEQUENT HOSPITAL CARE,LEVL III: ICD-10-PCS | Mod: GC,,, | Performed by: INTERNAL MEDICINE

## 2020-02-09 PROCEDURE — 87206 SMEAR FLUORESCENT/ACID STAI: CPT

## 2020-02-09 PROCEDURE — 31645 PR BRONCHOSCOPY,RX ASPIR PULM TREE: ICD-10-PCS | Mod: GC,,, | Performed by: INTERNAL MEDICINE

## 2020-02-09 PROCEDURE — 94003 VENT MGMT INPAT SUBQ DAY: CPT

## 2020-02-09 PROCEDURE — 87015 SPECIMEN INFECT AGNT CONCNTJ: CPT

## 2020-02-09 PROCEDURE — 25000003 PHARM REV CODE 250: Performed by: STUDENT IN AN ORGANIZED HEALTH CARE EDUCATION/TRAINING PROGRAM

## 2020-02-09 PROCEDURE — 87186 SC STD MICRODIL/AGAR DIL: CPT

## 2020-02-09 PROCEDURE — 87106 FUNGI IDENTIFICATION YEAST: CPT

## 2020-02-09 PROCEDURE — 20000000 HC ICU ROOM

## 2020-02-09 PROCEDURE — 87205 SMEAR GRAM STAIN: CPT

## 2020-02-09 PROCEDURE — 94002 VENT MGMT INPAT INIT DAY: CPT

## 2020-02-09 PROCEDURE — C9113 INJ PANTOPRAZOLE SODIUM, VIA: HCPCS | Performed by: STUDENT IN AN ORGANIZED HEALTH CARE EDUCATION/TRAINING PROGRAM

## 2020-02-09 PROCEDURE — 27202055 HC BRONCHOSCOPE, DISP

## 2020-02-09 PROCEDURE — 63600175 PHARM REV CODE 636 W HCPCS: Performed by: EMERGENCY MEDICINE

## 2020-02-09 PROCEDURE — 94668 MNPJ CHEST WALL SBSQ: CPT

## 2020-02-09 PROCEDURE — 94640 AIRWAY INHALATION TREATMENT: CPT

## 2020-02-09 PROCEDURE — 25000003 PHARM REV CODE 250

## 2020-02-09 PROCEDURE — 31645 BRNCHSC W/THER ASPIR 1ST: CPT | Mod: GC,,, | Performed by: INTERNAL MEDICINE

## 2020-02-09 PROCEDURE — 99900026 HC AIRWAY MAINTENANCE (STAT)

## 2020-02-09 PROCEDURE — 63600175 PHARM REV CODE 636 W HCPCS: Performed by: INTERNAL MEDICINE

## 2020-02-09 PROCEDURE — 99900035 HC TECH TIME PER 15 MIN (STAT)

## 2020-02-09 PROCEDURE — 87116 MYCOBACTERIA CULTURE: CPT

## 2020-02-09 PROCEDURE — 94667 MNPJ CHEST WALL 1ST: CPT

## 2020-02-09 PROCEDURE — 63600175 PHARM REV CODE 636 W HCPCS: Performed by: STUDENT IN AN ORGANIZED HEALTH CARE EDUCATION/TRAINING PROGRAM

## 2020-02-09 PROCEDURE — 87632 RESP VIRUS 6-11 TARGETS: CPT

## 2020-02-09 PROCEDURE — 87102 FUNGUS ISOLATION CULTURE: CPT

## 2020-02-09 PROCEDURE — 80202 ASSAY OF VANCOMYCIN: CPT

## 2020-02-09 PROCEDURE — 87077 CULTURE AEROBIC IDENTIFY: CPT

## 2020-02-09 PROCEDURE — 87210 SMEAR WET MOUNT SALINE/INK: CPT

## 2020-02-09 PROCEDURE — 99233 PR SUBSEQUENT HOSPITAL CARE,LEVL III: ICD-10-PCS | Mod: ,,, | Performed by: INTERNAL MEDICINE

## 2020-02-09 RX ORDER — MINERAL OIL
30 OIL (ML) ORAL DAILY PRN
Status: DISCONTINUED | OUTPATIENT
Start: 2020-02-09 | End: 2020-02-17 | Stop reason: HOSPADM

## 2020-02-09 RX ORDER — MIDAZOLAM HYDROCHLORIDE 1 MG/ML
2 INJECTION INTRAMUSCULAR; INTRAVENOUS ONCE
Status: COMPLETED | OUTPATIENT
Start: 2020-02-09 | End: 2020-02-09

## 2020-02-09 RX ORDER — POLYETHYLENE GLYCOL 3350 17 G/17G
17 POWDER, FOR SOLUTION ORAL DAILY
Status: DISCONTINUED | OUTPATIENT
Start: 2020-02-09 | End: 2020-02-17 | Stop reason: HOSPADM

## 2020-02-09 RX ORDER — LIDOCAINE HYDROCHLORIDE 10 MG/ML
10 INJECTION INFILTRATION; PERINEURAL ONCE
Status: COMPLETED | OUTPATIENT
Start: 2020-02-09 | End: 2020-02-09

## 2020-02-09 RX ORDER — POTASSIUM CHLORIDE 20 MEQ/15ML
40 SOLUTION ORAL ONCE
Status: COMPLETED | OUTPATIENT
Start: 2020-02-09 | End: 2020-02-09

## 2020-02-09 RX ORDER — POLYETHYLENE GLYCOL 3350 17 G/17G
17 POWDER, FOR SOLUTION ORAL DAILY
Status: DISCONTINUED | OUTPATIENT
Start: 2020-02-09 | End: 2020-02-09

## 2020-02-09 RX ORDER — IPRATROPIUM BROMIDE AND ALBUTEROL SULFATE 2.5; .5 MG/3ML; MG/3ML
3 SOLUTION RESPIRATORY (INHALATION) EVERY 4 HOURS
Status: DISCONTINUED | OUTPATIENT
Start: 2020-02-09 | End: 2020-02-17 | Stop reason: HOSPADM

## 2020-02-09 RX ORDER — FENTANYL CITRATE 50 UG/ML
50 INJECTION, SOLUTION INTRAMUSCULAR; INTRAVENOUS ONCE
Status: COMPLETED | OUTPATIENT
Start: 2020-02-09 | End: 2020-02-09

## 2020-02-09 RX ADMIN — IPRATROPIUM BROMIDE AND ALBUTEROL SULFATE 3 ML: .5; 3 SOLUTION RESPIRATORY (INHALATION) at 11:02

## 2020-02-09 RX ADMIN — CEFEPIME 2 G: 2 INJECTION, POWDER, FOR SOLUTION INTRAVENOUS at 10:02

## 2020-02-09 RX ADMIN — CHLORHEXIDINE GLUCONATE 0.12% ORAL RINSE 15 ML: 1.2 LIQUID ORAL at 09:02

## 2020-02-09 RX ADMIN — AMIODARONE HYDROCHLORIDE 200 MG: 200 TABLET ORAL at 08:02

## 2020-02-09 RX ADMIN — PREGABALIN 100 MG: 50 CAPSULE ORAL at 02:02

## 2020-02-09 RX ADMIN — POLYETHYLENE GLYCOL 3350 17 G: 17 POWDER, FOR SOLUTION ORAL at 11:02

## 2020-02-09 RX ADMIN — VANCOMYCIN HYDROCHLORIDE 1500 MG: 1.5 INJECTION, POWDER, LYOPHILIZED, FOR SOLUTION INTRAVENOUS at 11:02

## 2020-02-09 RX ADMIN — ENOXAPARIN SODIUM 40 MG: 100 INJECTION SUBCUTANEOUS at 05:02

## 2020-02-09 RX ADMIN — IPRATROPIUM BROMIDE AND ALBUTEROL SULFATE 3 ML: .5; 3 SOLUTION RESPIRATORY (INHALATION) at 03:02

## 2020-02-09 RX ADMIN — AMIODARONE HYDROCHLORIDE 200 MG: 200 TABLET ORAL at 09:02

## 2020-02-09 RX ADMIN — OXYBUTYNIN CHLORIDE 15 MG: 15 TABLET, EXTENDED RELEASE ORAL at 11:02

## 2020-02-09 RX ADMIN — CHLORHEXIDINE GLUCONATE 0.12% ORAL RINSE 15 ML: 1.2 LIQUID ORAL at 08:02

## 2020-02-09 RX ADMIN — CLONAZEPAM 0.5 MG: 0.5 TABLET ORAL at 08:02

## 2020-02-09 RX ADMIN — IPRATROPIUM BROMIDE AND ALBUTEROL SULFATE 3 ML: .5; 3 SOLUTION RESPIRATORY (INHALATION) at 02:02

## 2020-02-09 RX ADMIN — PANTOPRAZOLE SODIUM 40 MG: 40 INJECTION, POWDER, FOR SOLUTION INTRAVENOUS at 08:02

## 2020-02-09 RX ADMIN — CLONAZEPAM 0.5 MG: 0.5 TABLET ORAL at 02:02

## 2020-02-09 RX ADMIN — PREGABALIN 100 MG: 50 CAPSULE ORAL at 09:02

## 2020-02-09 RX ADMIN — MIDAZOLAM HYDROCHLORIDE 2 MG: 1 INJECTION, SOLUTION INTRAMUSCULAR; INTRAVENOUS at 02:02

## 2020-02-09 RX ADMIN — PANTOPRAZOLE SODIUM 40 MG: 40 INJECTION, POWDER, FOR SOLUTION INTRAVENOUS at 09:02

## 2020-02-09 RX ADMIN — HYDROCODONE BITARTRATE AND ACETAMINOPHEN 1 TABLET: 10; 325 TABLET ORAL at 06:02

## 2020-02-09 RX ADMIN — PREGABALIN 100 MG: 50 CAPSULE ORAL at 08:02

## 2020-02-09 RX ADMIN — CEFEPIME 2 G: 2 INJECTION, POWDER, FOR SOLUTION INTRAVENOUS at 06:02

## 2020-02-09 RX ADMIN — CEFEPIME 2 G: 2 INJECTION, POWDER, FOR SOLUTION INTRAVENOUS at 01:02

## 2020-02-09 RX ADMIN — IPRATROPIUM BROMIDE AND ALBUTEROL SULFATE 3 ML: .5; 3 SOLUTION RESPIRATORY (INHALATION) at 07:02

## 2020-02-09 RX ADMIN — FENTANYL CITRATE 50 MCG: 50 INJECTION INTRAMUSCULAR; INTRAVENOUS at 02:02

## 2020-02-09 RX ADMIN — IPRATROPIUM BROMIDE AND ALBUTEROL SULFATE 3 ML: .5; 3 SOLUTION RESPIRATORY (INHALATION) at 08:02

## 2020-02-09 RX ADMIN — CLONAZEPAM 0.5 MG: 0.5 TABLET ORAL at 09:02

## 2020-02-09 RX ADMIN — LIDOCAINE HYDROCHLORIDE 10 ML: 10 INJECTION, SOLUTION INFILTRATION; PERINEURAL at 02:02

## 2020-02-09 RX ADMIN — POTASSIUM CHLORIDE 40 MEQ: 20 SOLUTION ORAL at 07:02

## 2020-02-09 RX ADMIN — DULOXETINE 60 MG: 30 CAPSULE, DELAYED RELEASE ORAL at 08:02

## 2020-02-09 RX ADMIN — HYDROCODONE BITARTRATE AND ACETAMINOPHEN 1 TABLET: 10; 325 TABLET ORAL at 11:02

## 2020-02-09 NOTE — CONSULTS
"  [facility]  Adult Nutrition  Consult Note    SUMMARY     Recommendations    1.) Suggest continuous feeds during hospitalization: Isosource 1.5 @ 55mL/hr to provide 1980 kcals, 90gm protein and 1008mL of free water.  If GRV >500mL, hold TF q4h then restart TF regimen @  20mL/hr.     TF to meet 93% EEN and 81% EPN.   2.) If no IVF, suggest FWF 165mL q4h to provide adequate hydration.   3.) Suggest MVI.   4.) Daily weights    Goals: 1.) Pt to achieve/tolerate >75% EEN and EPN by RD follow up.   Nutrition Goal Status: new  Communication of RD Recs: reviewed with RN    Reason for Assessment    Reason For Assessment: consult  Diagnosis: infection/sepsis  Relevant Medical History: CHF, A Fib, ALS, Gtube + trach  Interdisciplinary Rounds: did not attend  General Information Comments: Pt non-verbal, wife & caregiver to bedside. They report home TF regimen: 4 cans-Isosource 1.5 with 5th feeding consisting of Muscle Milk. (4 cans Isosource 1.5 providing 1500 kcals, 68g protein and 764mL of free water). Wife states at times pt continues with hunger at which time she purees eggs and administers it via PEG. Pt with no GI distress at this time. Wife states UBW >300#, but uncertain of actual amount. Per chart, typical wt 230-240# x 2 years. NFPE completed at bedside with mild wasting to temporal area, taut skin to BLE with no pitting edema present. At this time, pt does not meet malnutrition criteria.   Nutrition Discharge Planning: D/c on home TF regimen with tolerance.     Nutrition Risk Screen    Nutrition Risk Screen: tube feeding or parenteral nutrition    Nutrition/Diet History    Factors Affecting Nutritional Intake: NPO  Nutrition Support Formula Prior to Admit: Isosource 1.5    Anthropometrics    Temp: 99.2 °F (37.3 °C)  Height Method: Stated  Height: 5' 8" (172.7 cm)  Height (inches): 68 in  Weight Method: Bed Scale  Weight: 125.5 kg (276 lb 10.8 oz)  Weight (lb): 276.68 lb  Ideal Body Weight (IBW), " Male: 154 lb  % Ideal Body Weight, Male (lb): 194.81 %  BMI (Calculated): 42.1  BMI Grade: greater than 40 - morbid obesity  Usual Body Weight (UBW), kg: (230-240# x 2 years)       Lab/Procedures/Meds    Pertinent Labs Reviewed: reviewed  Pertinent Labs Comments: K 3.3, Cr 0.4, Glu 137, Ca 8.0  Pertinent Medications Reviewed: reviewed  Pertinent Medications Comments: nebulizer, docusate, protonix, potassium chloride, vancomycin    Estimated/Assessed Needs    Weight Used For Calorie Calculations: 125.5 kg (276 lb 10.8 oz)  Energy Calorie Requirements (kcal): 2125  Energy Need Method: [place] (modified)  Protein Requirements: 105-140(g/day)  Weight Used For Protein Calculations: 70 kg (154 lb 5.2 oz)(1.5-2.0 g/kg)     Estimated Fluid Requirement Method: RDA Method(or per MD)  RDA Method (mL): 2125       Nutrition Prescription Ordered    Current Diet Order: NPO  Current Nutrition Support Formula Ordered: Isosource 1.5  Current Nutrition Support Rate Ordered: 1 (ml)(can)  Current Nutrition Support Frequency Ordered: q4h    Evaluation of Received Nutrient/Fluid Intake    Enteral Calories (kcal): 1500  Enteral Protein (gm): 68  Enteral (Free Water) Fluid (mL): 764  % Kcal Needs: 71  % Protein Needs: 65  I/O: -0.17L since admit  Energy Calories Required: not meeting needs  Protein Required: not meeting needs  Fluid Required: not meeting needs  Comments: LBM 2/9  Tolerance: tolerating  % Intake of Estimated Energy Needs: 0 - 25 %  % Meal Intake: NPO    Nutrition Risk    Level of Risk/Frequency of Follow-up: moderate(x1/week)     Assessment and Plan  Nutrition Problem  Inadequate enteral nutrition infusion    Related to (etiology):   Current TF regimen (1 can q6h)    Signs and Symptoms (as evidenced by):   <75% of nutritional needs being met by currently ordered regimen    Interventions(treatment strategy):  Collaboration of care with other providers  Referral of care  Initiate enteral nutrition-Isosource 1.5 @ 55mL/hr +  FWF 165mL q4h  Vitamin-MVI    Nutrition Diagnosis Status:   New       Monitor and Evaluation    Food and Nutrient Intake: energy intake, enteral nutrition intake  Food and Nutrient Adminstration: enteral and parenteral nutrition administration  Anthropometric Measurements: weight, weight change, body mass index  Biochemical Data, Medical Tests and Procedures: electrolyte and renal panel, gastrointestinal profile, glucose/endocrine profile  Nutrition-Focused Physical Findings: overall appearance     Malnutrition Assessment                 Orbital Region (Subcutaneous Fat Loss): well nourished  Upper Arm Region (Subcutaneous Fat Loss): well nourished   [place] Region (Muscle Loss): mild depletion  Clavicle Bone Region (Muscle Loss): well nourished  Clavicle and Acromion Bone Region (Muscle Loss): well nourished  Scapular Bone Region (Muscle Loss): well nourished  Dorsal Hand (Muscle Loss): well nourished  Anterior Thigh Region (Muscle Loss): other (see comments)(taut)  Posterior Calf Region (Muscle Loss): other (see comments)(taut)   Edema (Fluid Accumulation): 0-->no edema present   Subcutaneous Fat Loss (Final Summary): well nourished  Muscle Loss Evaluation (Final Summary): well nourished         Nutrition Follow-Up    RD Follow-up?: Yes

## 2020-02-09 NOTE — PROGRESS NOTES
[facility]  Critical Care Medicine  Progress Note    Patient Name: Bayron Delgado  MRN: [id number]  Admission Date: 2/8/2020  Hospital Length of Stay: 1 days  Code Status: Full Code  Attending Provider: Kike Fuentes MD  Primary Care Provider: Camille Bustillos NP   Principal Problem: Sepsis    Subjective:     HPI:  49 year old male with PMH of ALS, chronic hypoxemic hypercarbic respiratory failure now with trach and vent dependent who was BIB EMS from home for fevers and increased secretion. Per wife, she noticed that patient developed fever up to 101 F on Thursday. During this same period of time, she also started noticing increasing secretions from tracheostomy site and increasing O2 requirements. Fevers continued to persist despite Advil (does not want to give Tylenol with concerns for liver damage or Ibuprofen for allergic reaction) and reached as high as 105 F. Sputum became darker and appeared green. She also reports that he has had more watery stools, normally stools are soft to solid. With EMS, pt's sats were in the upper 80s on home vent settings of room air, improved with application of 4L/min O2.  While in the ED, patient has been HDS and started on Vanc, Aztreonam and Levo given PCN allergy. Influenza was negative. WBC of 14.42 and febrile to 101 F. CXR showing left lower lobe PNA. Respiratory cultures ordered.  Critical care was consulted for further management and stabilization of this patient in setting of chronic vent dependent ALS.     Of note, he has a history of of chronic MDR Pseudomonas and ESBL UTI, with no plan for treatment at this time. Wife was sick with bronchitis over the last week, no other sick contacts.              Hospital/ICU Course:  No notes on file    Interval History/Significant Events:   Antibiotics now Vanc, cefepime per ID recs.  Febrile to 101.5 overnight, improved with Aleve.  Continued increased secretions.    Review of Systems   Unable to  perform ROS: Patient nonverbal   Skin: Positive for wound (sacral).     Objective:     Vital Signs (Most Recent):  Temp: 99.2 °F (37.3 °C) (02/09/20 0745)  Pulse: 87 (02/09/20 1329)  Resp: 17 (02/09/20 1329)  BP: (!) 113/57 (02/09/20 1200)  SpO2: 100 % (02/09/20 1329) Vital Signs (24h Range):  Temp:  [97.9 °F (36.6 °C)-101.5 °F (38.6 °C)] 99.2 °F (37.3 °C)  Pulse:  [] 87  Resp:  [14-20] 17  SpO2:  [92 %-100 %] 100 %  BP: ()/(52-78) 113/57   Weight: 125.5 kg (276 lb 10.8 oz)  Body mass index is 42.07 kg/m².      Intake/Output Summary (Last 24 hours) at 2/9/2020 1356  Last data filed at 2/9/2020 1200  Gross per 24 hour   Intake 2000 ml   Output 2400 ml   Net -400 ml       Physical Exam   Constitutional: He appears well-developed.   HENT:   Head: Normocephalic and atraumatic.   Facial erythema noted on bilateral cheeks, observed at admission   Eyes: Pupils are equal, round, and reactive to light. Conjunctivae and EOM are normal. No scleral icterus.   Neck: No JVD present.   Tracheostomy patent without obvious drainage, erythema or induration surrounding ostomy.  Limited ROM   Cardiovascular: Normal rate and regular rhythm.   Pulmonary/Chest: No stridor. He has no wheezes.   Mechanical breath sounds bilaterally, diminished in left basilar   Abdominal: He exhibits distension. He exhibits no mass. There is no tenderness. No hernia.   Hypoactive bowel sounds  PEG tube located in epigastrium with some granulation tissue surrounding ostomy. No induration or drainage noted.   Genitourinary:   Genitourinary Comments: Michele catheter in place   Musculoskeletal: He exhibits edema. He exhibits no tenderness.   Neurological: He is alert.   Alert, communicates with yes or no using extrocular movements in response to wife.  Neuro exam otherwise limited given condition.   Skin: Skin is dry.   Flaking skin in upper and lower extremities, no erythema, ecchymoses or deformities noted.  Petechial rash located over right  pectoral, blanching without warmth.   Nursing note and vitals reviewed.      Vents:  Vent Mode: A/C (02/09/20 1329)  Ventilator Initiated: Yes (02/09/20 0004)  Set Rate: 14 BPM (02/09/20 1329)  Vt Set: 450 mL (02/09/20 1329)  Pressure Support: 0 cmH20 (02/09/20 1329)  PEEP/CPAP: 5 cmH20 (02/09/20 1329)  Oxygen Concentration (%): 30 (02/09/20 1329)  Peak Airway Pressure: 22 cmH2O (02/09/20 1329)  Plateau Pressure: 0 cmH20 (02/09/20 1329)  Total Ve: 7.83 mL (02/09/20 1329)  F/VT Ratio<105 (RSBI): (!) 36.09 (02/09/20 1329)  Lines/Drains/Airways     Drain                 Gastrostomy/Enterostomy 01/23/17 1113 Percutaneous endoscopic gastrostomy (PEG) LUQ feeding 1112 days         Urethral Catheter 02/08/20 1118 Non-latex 22 Fr. 1 day          Airway                 Surgical Airway 03/19/18 1355 Shiley Cuffed 692 days          Peripheral Intravenous Line                 Peripheral IV - Single Lumen 11/22/19 1150 18 G Right Forearm 79 days         Peripheral IV - Single Lumen 02/08/20 0847 20 G Left Hand 1 day         Peripheral IV - Single Lumen 02/08/20 0929 20 G Left Hand 1 day              Significant Labs:    CBC/Anemia Profile:  Recent Labs   Lab 02/08/20  0853 02/09/20  0336   WBC 14.42* 7.93   HGB 12.0* 10.8*   HCT 38.4* 35.9*    143*   MCV 91 93   RDW 16.0* 15.7*        Chemistries:  Recent Labs   Lab 02/08/20  0853 02/08/20  1240 02/09/20  0336   * 135* 140   K 4.0 3.8 3.3*   CL 99 100 108   CO2 23 24 22*   BUN 12 11 8   CREATININE 0.5 0.5 0.4*   CALCIUM 9.1 8.6* 8.0*   ALBUMIN 3.1*  --  2.4*   PROT 6.6  --  5.6*   BILITOT 1.0  --  0.6   ALKPHOS 71  --  60   ALT 52*  --  39   AST 45*  --  31       All pertinent labs within the past 24 hours have been reviewed.      Significant Imaging:  I have reviewed and interpreted all pertinent imaging results/findings within the past 24 hours.      [facility]  Recent Labs   Lab 02/08/20  0906   PH 7.446   PO2 64*   PCO2 37.0   HCO3 25.5   BE 1     Assessment/Plan:      Neuro  ALS (amyotrophic lateral sclerosis)  With chronic associated pain  - Continue home lyrica, Duloxetine scheduled, [place] prn    Psychiatric  Anxiety  - Continue home Duloxetine and Clonazepam    ENT  Tracheostomy in place  - Chlorhexidine mouthwash    Derm  Skin breakdown  Candidal rash in sacral region  Continue treating with Nystatin cream     Pulmonary  Acute hypoxemic respiratory failure  Vent and Trach dependent 2/2 ALS  - Chlorhexidine mouthwash bid  - Protonix bid  -ABG prn  - Bronchoscopy today    Vent Mode: A/C  Oxygen Concentration (%):  [30-40] 30  Resp Rate Total:  [14 br/min-20 br/min] 17 br/min  Vt Set:  [450 mL-500 mL] 450 mL  PEEP/CPAP:  [5 cmH20] 5 cmH20  Pressure Support:  [0 cmH20] 0 cmH20  Mean Airway Pressure:  [9.4 orW48-18.6 cmH20] 9.9 cmH20      Pneumonia  Left lower lobe PNA, started on Levaquin, Vanc, Aztreonam in ED  Infectious disease following, recommend Vanc cefepime, will start today      Cardiac/Vascular  Atrial fibrillation  - Continue home Amiodarone 200mg    Renal/  Chronic indwelling Michele catheter  Chronic colonization with MDR Pseudomonas, ESBL  - Oxybutynin daily   - UCx unlikely to yield treatable condition, will not obtain unless source control of infection difficult    ID  * Sepsis  Leukocytosis of 14, Fever to 102 F on presentation. Increased sputum production and O2 requirements  Afebrile overnight, WBC normalized.  - likely 2/2 PNA as source, see pneumonia plan  - chronic indwelling Michele, colonized, unlikely to be source      Oncology  Leukocytosis  Likely 2/2 PNA  - BCx, sputum cx pending  - Chest physiotherapy, duonebs prn    GI  Constipation  Chronic, requires disimpaction by wife  - Colace  - Consider Miralax, enemas for BMs    Other  Presence of externally removable percutaneous endoscopic gastrostomy (PEG) tube  Granulation tissue surrounding site  - Monitor for worsening signs of infection       Critical Care Daily Checklist:    A: Awake: RASS  Goal/Actual Goal:    Actual: Paige Agitation Sedation Scale (RASS): Alert and calm   B: Spontaneous Breathing Trial Performed?  N/A   C: SAT & SBT Coordinated?  N/A                      D: Delirium: CAM-ICU Overall CAM-ICU: Positive   E: Early Mobility Performed? No   F: Feeding Goal: Goals: 1.) Pt to achieve/tolerate >75% EEN and EPN by RD follow up.   Status: Nutrition Goal Status: new   Current Diet Order   Procedures    Diet NPO      AS: Analgesia/Sedation Home pain regimen   T: Thromboembolic Prophylaxis Lovenox   H: HOB > 300 Yes   U: Stress Ulcer Prophylaxis (if needed) Yes   G: Glucose Control    B: Bowel Function Stool Occurrence: 1   I: Indwelling Catheter (Lines & Michele) Necessity Assessed   D: De-escalation of Antimicrobials/Pharmacotherapies Assessed, ID following    Plan for the day/ETD Bronchoscopy today      Code Status:  Family/Goals of Care: Full Code  Continued care in ICU       Critical secondary to Patient has a condition that poses threat to life and bodily function: Severe Respiratory Distress      Critical care was time spent personally by me on the following activities: development of treatment plan with patient or surrogate and bedside caregivers, discussions with consultants, evaluation of patient's response to treatment, examination of patient, ordering and performing treatments and interventions, ordering and review of laboratory studies, ordering and review of radiographic studies, pulse oximetry, re-evaluation of patient's condition. This critical care time did not overlap with that of any other provider or involve time for any procedures.     Jaquan Hope MD  Critical Care Medicine  [facility]

## 2020-02-09 NOTE — HPI
This is a 49-year-old male with a history of ALS, chronic hypoxemic hypercarbic respiratory failure status post venting and trach, who is brought in by EMS with report of fever since Thursday.  T-max 105°.  History is provided by the patient's wife (a nurse) and sister who are at bedside. He has had increased respiratory secretions, sputum changed from white/yellow to green and is very thick. Oxygenation also worse that normal per wife. Diarrhea times one episode. He is on bowel regimen with senna, miralax, and that was held. Last night his urine became more concentrated and dark. 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 He is followed by Dr. María Caruso and they understand he is colonized and they do not check urine cultures. His urine has thick sediment which she flushes and changes munoz usually monthly. Was just changed while in ED. Has had wound on sacrum and also redness of that entire area- tried multiple barrier creams and seemed to respond to nystatin powder and cream. Also in folds of groin at times. He is not currently on antibiotics.  His wife recently had a prolonged course of bronchitis, but she tried to stay way from him while she was symptomatic.  Otherwise no sick contacts.   Also has had intermittent drainage from PEG tube. Applies some silver nitrate at times. About the same currently. Patient is able to communicate yes or no with his eyes, and he denies pain or shortness of breath at this time.  History is otherwise limited to his nonverbal status. Patient is very well cared for at home and wife provides very detailed history.

## 2020-02-09 NOTE — PLAN OF CARE
CMICU DAILY GOALS       A: Awake    RASS: Goal -  0  Actual -  0   Restraint necessity:    B: Breath   SBT: Not attempted   C: Coordinate A & B, analgesics/sedatives   Pain: managed    SAT: Not attempted  D: Delirium   CAM-ICU: Overall CAM-ICU: Negative  E: Early Mobility   MOVE Screen: Fail   Activity: Activity Management: activity clustered for rest period  FAS: Feeding/Nutrition   Diet order: Diet/Nutrition Received: NPO, tube feeding,   Fluid restriction:    T: Thrombus   DVT prophylaxis:    H: HOB Elevation   Head of Bed (HOB): HOB at 30-45 degrees  U: Ulcer Prophylaxis   GI: yes  G: Glucose control   managed    S: Skin   Bundle compliance: yes   Bathing/Skin Care: bath, chlorhexidine, bath, complete, back care, dressed/undressed, incontinence care, linen changed Date: 2/8/20 PM shift   B: Bowel Function   no issues   I: Indwelling Catheters   Michele necessity:      Urethral Catheter 02/08/20 1118 Non-latex 22 Fr.-Reason for Continuing Urinary Catheterization: Critically ill in ICU requiring intensive monitoring   CVC necessity: No   IPAD offered: Not appropriate  D: De-escalation Antibx   Yes  Plan for the day   Treat infection; keep temperature down; place PICC   Family/Goals of care/Code Status   Code Status: Full Code     No acute events throughout day, VS and assessment per flow sheet, patient progressing towards goals as tolerated, plan of care reviewed with Bayron Delgado and family, all concerns addressed, will continue to monitor.

## 2020-02-09 NOTE — ASSESSMENT & PLAN NOTE
This is a 49-year-old male with a history of ALS, chronic hypoxemic hypercarbic respiratory failure status post venting and trach, who is brought in by EMS with report of fever since Thursday. He has had increased respiratory secretions, sputum changed from white/yellow to green and is very thick. Oxygenation also worse that normal.. 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    Discussed plan with critical care resident. The patient's wife reports he has tolerated cefepime in the past. PCN allergy was from childhood but he has tolerated multiple beta lactam antibiotics since then.  -Already given vanc, azactam, levaquin.  -Would obtain CT of abdomin and pelvis to rule out occult source of fever such as renal abscess.   -If has diarrhea more that 3 episodes per day consider C diff testing although wife reports his stools dif not smell or seem c/w C diff.   -Obtain respiratory sample for gram stain and culture.   -Needs wound care consult for sacrum and PEG. Low threshold for diflucan but at this point seems to be responding to nystatin cream and powder.   -PEG tube site does not look good- GI consult.   -Follow up blood culture results.   -Continue vanc and will start cefepime.  Will follow.

## 2020-02-09 NOTE — SUBJECTIVE & OBJECTIVE
Past Medical History:   Diagnosis Date    ALS (amyotrophic lateral sclerosis)     Atrial fibrillation     CHF (congestive heart failure)     Neuropathy     Ventilator dependent        Past Surgical History:   Procedure Laterality Date    APPENDECTOMY      GASTROSTOMY TUBE PLACEMENT  2017    HERNIA REPAIR      TRACHEOSTOMY  03/2018    TRACHEOSTOMY TUBE CHANGE N/A 11/22/2019    Procedure: REPLACEMENT, TRACHEOSTOMY TUBE;  Surgeon: Daniel Horner MD;  Location: [facility] OR [address] [place] [address];  Service: ENT;  Laterality: N/A;       Review of patient's allergies indicates:   Allergen Reactions    Atropine     Ibuprofen      A-fib    Latex, natural rubber     Penicillins Other (See Comments)     Per wife- his mother stated he was allergic to it.  Had redness associated with cefepime 3/14/2018       Family History     None        Tobacco Use    Smoking status: Never Smoker   Substance and Sexual Activity    Alcohol use: No    Drug use: No    Sexual activity: Yes     Partners: Female      Review of Systems   Unable to perform ROS: Patient nonverbal   HENT:        Increased secretions   Gastrointestinal: Positive for diarrhea.   Skin: Positive for wound (sacral).     Objective:     Vital Signs (Most Recent):  Temp: (!) 100.9 °F (38.3 °C) (02/08/20 1752)  Pulse: 97 (02/08/20 2002)  Resp: 16 (02/08/20 1402)  BP: (!) 105/59 (02/08/20 2002)  SpO2: 99 % (02/08/20 2002) Vital Signs (24h Range):  Temp:  [99.3 °F (37.4 °C)-101.5 °F (38.6 °C)] 100.9 °F (38.3 °C)  Pulse:  [67-97] 97  Resp:  [16] 16  SpO2:  [94 %-100 %] 99 %  BP: ()/(49-78) 105/59   Weight: 136.1 kg (300 lb)  Body mass index is 45.61 kg/m².      Intake/Output Summary (Last 24 hours) at 2/8/2020 2007  Last data filed at 2/8/2020 1758  Gross per 24 hour   Intake 500 ml   Output 500 ml   Net 0 ml       Physical Exam   Constitutional: He appears well-developed.   HENT:   Head: Normocephalic and atraumatic.   Eyes: Pupils are equal, round, and reactive to  light. Conjunctivae and EOM are normal. No scleral icterus.   Neck: No JVD present.   Tracheostomy patent without obvious drainage, erythema or induration surrounding ostomy.  Limited ROM   Cardiovascular: Normal rate and regular rhythm.   Pulmonary/Chest: No stridor. He has no wheezes.   Mechanical breath sounds bilaterally, diminished in left basilar   Abdominal: He exhibits distension. He exhibits no mass. There is no tenderness. No hernia.   Hypoactive bowel sounds  PEG tube located in epigastrium with some granulation tissue surrounding ostomy. No induration or drainage noted.   Genitourinary:   Genitourinary Comments: Michele catheter in place   Musculoskeletal: He exhibits edema. He exhibits no tenderness.   Neurological: He is alert.   Alert, communicates with yes or no using extrocular movements in response to wife.  Neuro exam otherwise limited given condition.   Skin: Skin is dry.   Flaking skin in upper and lower extremities, no erythema, ecchymoses or deformities noted.  Petechial rash located over right pectoral, blanching without warmth.   Nursing note and vitals reviewed.      Vents:  Vent Mode: A/C (02/08/20 1700)  Ventilator Initiated: No (02/08/20 0844)  Set Rate: 14 BPM (02/08/20 1700)  PEEP/CPAP: 5 cmH20 (02/08/20 1700)  Oxygen Concentration (%): 21 (02/08/20 0844)  Peak Airway Pressure: 25 cmH2O (02/08/20 1700)  Total Ve: 6.2 mL (02/08/20 1700)  Lines/Drains/Airways     Drain                 Gastrostomy/Enterostomy 01/23/17 1113 Percutaneous endoscopic gastrostomy (PEG) LUQ feeding 1111 days         Urethral Catheter 06/21/18 0115 Non-latex 20 Fr. 597 days         Urethral Catheter 11/22/19 1240 78 days         Urethral Catheter 02/08/20 1118 Non-latex 22 Fr. less than 1 day          Airway                 Surgical Airway 03/19/18 1355 Shiley Cuffed 691 days         Surgical Airway 11/22/19 Shiley Cuffed 78 days          Peripheral Intravenous Line                 Peripheral IV - Single Lumen  11/22/19 1150 18 G Right Forearm 78 days         Peripheral IV - Single Lumen 02/08/20 0847 20 G Left Hand less than 1 day         Peripheral IV - Single Lumen 02/08/20 0929 20 G Left Hand less than 1 day              Significant Labs:    CBC/Anemia Profile:  Recent Labs   Lab 02/08/20  0853   WBC 14.42*   HGB 12.0*   HCT 38.4*      MCV 91   RDW 16.0*        Chemistries:  Recent Labs   Lab 02/08/20  0853 02/08/20  1240   * 135*   K 4.0 3.8   CL 99 100   CO2 23 24   BUN 12 11   CREATININE 0.5 0.5   CALCIUM 9.1 8.6*   ALBUMIN 3.1*  --    PROT 6.6  --    BILITOT 1.0  --    ALKPHOS 71  --    ALT 52*  --    AST 45*  --        All pertinent labs within the past 24 hours have been reviewed.    Significant Imaging: I have reviewed and interpreted all pertinent imaging results/findings within the past 24 hours.

## 2020-02-09 NOTE — ASSESSMENT & PLAN NOTE
Vent and Trach dependent 2/2 ALS  Chlorhexidine mouthwash bid  Protonix bid  -ABG prn  Vent Mode: A/C  Oxygen Concentration (%):  [21] 21  Resp Rate Total:  [14 br/min-15 br/min] 14 br/min  PEEP/CPAP:  [5 cmH20] 5 cmH20

## 2020-02-09 NOTE — ASSESSMENT & PLAN NOTE
Vent and Trach dependent 2/2 ALS  - Chlorhexidine mouthwash bid  - Protonix bid  -ABG prn  - Bronchoscopy today    Vent Mode: A/C  Oxygen Concentration (%):  [30-40] 30  Resp Rate Total:  [14 br/min-20 br/min] 17 br/min  Vt Set:  [450 mL-500 mL] 450 mL  PEEP/CPAP:  [5 cmH20] 5 cmH20  Pressure Support:  [0 cmH20] 0 cmH20  Mean Airway Pressure:  [9.4 xtY24-83.6 cmH20] 9.9 cmH20

## 2020-02-09 NOTE — SUBJECTIVE & OBJECTIVE
Past Medical History:   Diagnosis Date    ALS (amyotrophic lateral sclerosis)     Atrial fibrillation     CHF (congestive heart failure)     Neuropathy     Ventilator dependent        Past Surgical History:   Procedure Laterality Date    APPENDECTOMY      GASTROSTOMY TUBE PLACEMENT  2017    HERNIA REPAIR      TRACHEOSTOMY  03/2018    TRACHEOSTOMY TUBE CHANGE N/A 11/22/2019    Procedure: REPLACEMENT, TRACHEOSTOMY TUBE;  Surgeon: Daniel Horner MD;  Location: [facility] OR [address] [place] [address];  Service: ENT;  Laterality: N/A;       Review of patient's allergies indicates:   Allergen Reactions    Atropine     Ibuprofen      A-fib    Latex, natural rubber     Penicillins Other (See Comments)     Per wife- his mother stated he was allergic to it.  Had redness associated with cefepime 3/14/2018       Medications:  Medications Prior to Admission   Medication Sig    amiodarone (PACERONE) 200 MG Tab 200 mg by Per G Tube route 2 (two) times daily.    carvedilol (COREG) 6.25 MG tablet 1 tablet (6.25 mg total) by Per G Tube route 2 (two) times daily. (Patient taking differently: 12.5 mg by Per G Tube route 2 (two) times daily. )    clonazePAM (KLONOPIN) 0.5 MG tablet 2 tablets (1 mg total) by Per G Tube route 4 (four) times daily. (Patient taking differently: 0.5 mg by Per G Tube route 4 (four) times daily. )    diphenhydrAMINE (BENADRYL) 50 MG tablet Take 50 mg by mouth nightly as needed for Itching.    DULoxetine (CYMBALTA) 60 MG capsule Take 1 capsule (60 mg total) by mouth once daily.    famotidine (PEPCID) 20 MG tablet 1 tablet (20 mg total) by Per G Tube route 2 (two) times daily.    furosemide (LASIX) 40 MG tablet Take 1 tablet (40 mg total) by mouth as needed (use to increased to furosemide 40 mg twice per day for 5 days for excessive edema).    HYDROcodone-acetaminophen (NORCO)  mg per tablet 1 tablet by Per G Tube route every 6 (six) hours as needed for Pain. (Patient taking differently: 1 tablet by  Per G Tube route every 6 (six) hours as needed for Pain. 1/2 a pill tid and 1 tab at hs)    hydrOXYzine HCl (ATARAX) 25 MG tablet 1 tablet (25 mg total) by Per G Tube route 4 (four) times daily as needed for Itching.    LACTOSE-REDUCED FOOD/FIBER (ISOSOURCE 1.5 YURY ORAL) by Per G Tube route 6 (six) times daily.     lidocaine (LIDODERM) 5 % Place 3 patches onto the skin once daily. Remove & Discard patch within 12 hours or as directed by MD    metroNIDAZOLE (FLAGYL) 250 MG tablet Take 1 tablet (250 mg total) via PEG Tube every 8 (eight) hours.    mineral oil liquid Take 30 mLs by mouth daily as needed for Constipation.    nystatin (MYCOSTATIN) cream Apply 30 g topically 2 (two) times daily.    nystatin (MYCOSTATIN) powder Apply 60 g topically 2 (two) times daily.    oxybutynin (DITROPAN XL) 15 MG TR24 Take 1 tablet (15 mg total) by mouth once daily.    polyethylene glycol (GLYCOLAX) 17 gram PwPk Take by mouth.    potassium, sodium phosphates (PHOS-NAK) 280-160-250 mg PwPk Take 1 packet by mouth 2 (two) times daily.    pregabalin (LYRICA) 100 MG capsule 1 capsule (100 mg total) by Per G Tube route 3 (three) times daily.    prochlorperazine (COMPAZINE) 10 MG tablet Take 10 mg by mouth every evening.    scopolamine (TRANSDERM-SCOP) 1.3-1.5 mg (1 mg over 3 days) Place 1 patch onto the skin Every 3 (three) days.    senna-docusate 8.6-50 mg (SENNA WITH DOCUSATE SODIUM) 8.6-50 mg per tablet Take 7 tablets by mouth once daily.    temazepam (RESTORIL) 15 mg Cap 2 capsules (30 mg total) by Per G Tube route every evening.    traMADol (ULTRAM) 50 mg tablet Take 50 mg by mouth every 6 (six) hours.    levoFLOXacin (LEVAQUIN) 750 MG tablet Take 1 tablet (750mg total) via PEG Tube once daily    mupirocin (BACTROBAN) 2 % ointment Apply topically 3 (three) times daily.    silver nitrate applicators 75-25 % applicator Apply topically 3 (three) times a week. Apply to granulation tissue PRN.     Antibiotics (From  admission, onward)    Start     Stop Route Frequency Ordered    02/08/20 2245  ceFEPIme injection 2 g      -- IV Every 8 hours (non-standard times) 02/08/20 2138    02/08/20 2230  vancomycin 1.5 g in dextrose 5 % 250 mL IVPB (ready to mix)      -- IV Every 12 hours (non-standard times) 02/08/20 1308    02/08/20 1255  vancomycin - pharmacy to dose  (vancomycin IVPB)      -- IV pharmacy to manage frequency 02/08/20 1155        Antifungals (From admission, onward)    None        Antivirals (From admission, onward)    None           Immunization History   Administered Date(s) Administered    Pneumococcal Conjugate - 13 Valent 07/25/2016       Family History     None        Social History     Socioeconomic History    Marital status:      Spouse name: Not on file    Number of children: Not on file    Years of education: Not on file    Highest education level: Not on file   Occupational History    Not on file   Social Needs    Financial resource strain: Not on file    Food insecurity:     Worry: Not on file     Inability: Not on file    Transportation needs:     Medical: Not on file     Non-medical: Not on file   Tobacco Use    Smoking status: Never Smoker   Substance and Sexual Activity    Alcohol use: No    Drug use: No    Sexual activity: Yes     Partners: Female   Lifestyle    Physical activity:     Days per week: Not on file     Minutes per session: Not on file    Stress: Not on file   Relationships    Social connections:     Talks on phone: Not on file     Gets together: Not on file     Attends Yazidism service: Not on file     Active member of club or organization: Not on file     Attends meetings of clubs or organizations: Not on file     Relationship status: Not on file   Other Topics Concern    Not on file   Social History Narrative    Not on file     Review of Systems   Unable to perform ROS: Patient nonverbal     Objective:     Vital Signs (Most Recent):  Temp: (!) 100.9 °F (38.3 °C)  (02/08/20 1752)  Pulse: 110 (02/08/20 2100)  Resp: 19 (02/08/20 2100)  BP: (!) 105/59 (02/08/20 2002)  SpO2: (!) 92 % (02/08/20 2100) Vital Signs (24h Range):  Temp:  [99.3 °F (37.4 °C)-101.5 °F (38.6 °C)] 100.9 °F (38.3 °C)  Pulse:  [] 110  Resp:  [16-19] 19  SpO2:  [92 %-100 %] 92 %  BP: ()/(49-78) 105/59     Weight: 136.1 kg (300 lb)  Body mass index is 45.61 kg/m².    Estimated Creatinine Clearance: 244.1 mL/min (based on SCr of 0.5 mg/dL).    Physical Exam   Constitutional: He is oriented to person, place, and time. He appears well-developed and well-nourished. He is cooperative.  Non-toxic appearance. No distress.   nonverbal   HENT:   Head: Normocephalic and atraumatic. Head is without right periorbital erythema and without left periorbital erythema.   Right Ear: External ear normal.   Left Ear: External ear normal.   Nose: Nose normal.   Trach in place with greenish drainage around site.   Eyes: Pupils are equal, round, and reactive to light. Conjunctivae, EOM and lids are normal. Right eye exhibits no discharge. Left eye exhibits no discharge. Right conjunctiva is not injected. Left conjunctiva is not injected. No scleral icterus.   Neck: Normal range of motion. No erythema present.   Cardiovascular: Normal rate and regular rhythm.   No murmur heard.  Pulmonary/Chest: Effort normal and breath sounds normal. No stridor. No tachypnea. No respiratory distress.   Abdominal: Soft. Normal appearance and bowel sounds are normal. He exhibits no distension.   PEG left side- excoriated tissue with ulceration and some hypergranulation at exit. Erythema extends about 2 cms.   Genitourinary: Penis normal.   Genitourinary Comments: Michele in place- clear urine in bag.   Musculoskeletal: He exhibits edema.   Generalized edema.   Neurological: He is alert and oriented to person, place, and time. No cranial nerve deficit. Coordination normal.   Skin: Skin is warm and dry. Rash noted. He is not diaphoretic. No  erythema.   Has multiple pinpoint purplish lesions on chest and arms. Wife reports is chronic and normal.  Sacrum with excoriated ulceration and surrounding erythema. Some fissuring in gluteal fold with a little bleeding. Right groin fold with erythema in the crease. Left looks ok.   Psychiatric: His speech is normal. Cognition and memory are normal.   Unable to assess. Patient does attempt to smile when I talk with him though.   Nursing note and vitals reviewed.      Significant Labs:   Blood Culture:   Recent Labs   Lab 02/08/20  0853 02/08/20  0854   LABBLOO No Growth to date No Growth to date     CBC:   Recent Labs   Lab 02/08/20  0853   WBC 14.42*   HGB 12.0*   HCT 38.4*        CMP:   Recent Labs   Lab 02/08/20  0853 02/08/20  1240   * 135*   K 4.0 3.8   CL 99 100   CO2 23 24   * 158*   BUN 12 11   CREATININE 0.5 0.5   CALCIUM 9.1 8.6*   PROT 6.6  --    ALBUMIN 3.1*  --    BILITOT 1.0  --    ALKPHOS 71  --    AST 45*  --    ALT 52*  --    ANIONGAP 12 11   EGFRNONAA >60.0 >60.0     Lactic Acid:   Recent Labs   Lab 02/08/20  0853 02/08/20  1240 02/08/20  2108   LACTATE 1.1 2.0 2.7*     Procalcitonin:   Recent Labs   Lab 02/08/20  0853 02/08/20  1240   PROCAL 0.26* 0.37*     Respiratory Culture:   Recent Labs   Lab 11/07/19  1154   GSRESP <10 epithelial cells per low power field.  Moderate WBC's  Many Gram negative rods  Few Gram positive cocci   RESPIRATORYC No S aureus isolated.  PSEUDOMONAS AERUGINOSA  Many  *  PROTEUS MIRABILIS  Many  Normal respiratory alverto also present  *     Urine Studies:   Recent Labs   Lab 02/08/20  1119   COLORU Mray   APPEARANCEUA Cloudy*   PHUR 6.0   SPECGRAV 1.005   PROTEINUA 1+*   GLUCUA Negative   KETONESU Negative   BILIRUBINUA Negative   OCCULTUA 3+*   NITRITE Negative   LEUKOCYTESUR 3+*   RBCUA 50*   WBCUA >100*   BACTERIA Many*   HYALINECASTS 0       Significant Imaging: I have reviewed all pertinent imaging results/findings within the past 24  hours.

## 2020-02-09 NOTE — PLAN OF CARE
Problem: Feeding Intolerance (Enteral Nutrition)  Goal: Feeding Tolerance  Outcome: Ongoing, Progressing  Intervention: Prevent and Manage Feeding Intolerance  Flowsheets (Taken 2/9/2020 6449)  Nutrition Support Management: tube feeding formula adjusted; weight trending reviewed; other (see comments)     Recommendations     1.) Suggest continuous feeds during hospitalization: Isosource 1.5 @ 55mL/hr to provide 1980 kcals, 90gm protein and 1008mL of free water.           If GRV >500mL, hold TF q4h then restart TF regimen @    20mL/hr.                        TF to meet 93% EEN and 81% EPN.   2.) If no IVF, suggest FWF 165mL q4h to provide adequate hydration.   3.) Suggest MVI.   4.) Daily weights     Goals: 1.) Pt to achieve/tolerate >75% EEN and EPN by RD follow up.   Nutrition Goal Status: new  Communication of RD Recs: reviewed with RN

## 2020-02-09 NOTE — SUBJECTIVE & OBJECTIVE
Interval History/Significant Events:   Antibiotics now Vanc, cefepime per ID recs.  Febrile to 101.5 overnight, improved with Aleve.  Continued increased secretions.    Review of Systems   Unable to perform ROS: Patient nonverbal   Skin: Positive for wound (sacral).     Objective:     Vital Signs (Most Recent):  Temp: 99.2 °F (37.3 °C) (02/09/20 0745)  Pulse: 87 (02/09/20 1329)  Resp: 17 (02/09/20 1329)  BP: (!) 113/57 (02/09/20 1200)  SpO2: 100 % (02/09/20 1329) Vital Signs (24h Range):  Temp:  [97.9 °F (36.6 °C)-101.5 °F (38.6 °C)] 99.2 °F (37.3 °C)  Pulse:  [] 87  Resp:  [14-20] 17  SpO2:  [92 %-100 %] 100 %  BP: ()/(52-78) 113/57   Weight: 125.5 kg (276 lb 10.8 oz)  Body mass index is 42.07 kg/m².      Intake/Output Summary (Last 24 hours) at 2/9/2020 1356  Last data filed at 2/9/2020 1200  Gross per 24 hour   Intake 2000 ml   Output 2400 ml   Net -400 ml       Physical Exam   Constitutional: He appears well-developed.   HENT:   Head: Normocephalic and atraumatic.   Facial erythema noted on bilateral cheeks, observed at admission   Eyes: Pupils are equal, round, and reactive to light. Conjunctivae and EOM are normal. No scleral icterus.   Neck: No JVD present.   Tracheostomy patent without obvious drainage, erythema or induration surrounding ostomy.  Limited ROM   Cardiovascular: Normal rate and regular rhythm.   Pulmonary/Chest: No stridor. He has no wheezes.   Mechanical breath sounds bilaterally, diminished in left basilar   Abdominal: He exhibits distension. He exhibits no mass. There is no tenderness. No hernia.   Hypoactive bowel sounds  PEG tube located in epigastrium with some granulation tissue surrounding ostomy. No induration or drainage noted.   Genitourinary:   Genitourinary Comments: Michele catheter in place   Musculoskeletal: He exhibits edema. He exhibits no tenderness.   Neurological: He is alert.   Alert, communicates with yes or no using extrocular movements in response to  wife.  Neuro exam otherwise limited given condition.   Skin: Skin is dry.   Flaking skin in upper and lower extremities, no erythema, ecchymoses or deformities noted.  Petechial rash located over right pectoral, blanching without warmth.   Nursing note and vitals reviewed.      Vents:  Vent Mode: A/C (02/09/20 1329)  Ventilator Initiated: Yes (02/09/20 0004)  Set Rate: 14 BPM (02/09/20 1329)  Vt Set: 450 mL (02/09/20 1329)  Pressure Support: 0 cmH20 (02/09/20 1329)  PEEP/CPAP: 5 cmH20 (02/09/20 1329)  Oxygen Concentration (%): 30 (02/09/20 1329)  Peak Airway Pressure: 22 cmH2O (02/09/20 1329)  Plateau Pressure: 0 cmH20 (02/09/20 1329)  Total Ve: 7.83 mL (02/09/20 1329)  F/VT Ratio<105 (RSBI): (!) 36.09 (02/09/20 1329)  Lines/Drains/Airways     Drain                 Gastrostomy/Enterostomy 01/23/17 1113 Percutaneous endoscopic gastrostomy (PEG) LUQ feeding 1112 days         Urethral Catheter 02/08/20 1118 Non-latex 22 Fr. 1 day          Airway                 Surgical Airway 03/19/18 1355 Shiley Cuffed 692 days          Peripheral Intravenous Line                 Peripheral IV - Single Lumen 11/22/19 1150 18 G Right Forearm 79 days         Peripheral IV - Single Lumen 02/08/20 0847 20 G Left Hand 1 day         Peripheral IV - Single Lumen 02/08/20 0929 20 G Left Hand 1 day              Significant Labs:    CBC/Anemia Profile:  Recent Labs   Lab 02/08/20  0853 02/09/20  0336   WBC 14.42* 7.93   HGB 12.0* 10.8*   HCT 38.4* 35.9*    143*   MCV 91 93   RDW 16.0* 15.7*        Chemistries:  Recent Labs   Lab 02/08/20  0853 02/08/20  1240 02/09/20  0336   * 135* 140   K 4.0 3.8 3.3*   CL 99 100 108   CO2 23 24 22*   BUN 12 11 8   CREATININE 0.5 0.5 0.4*   CALCIUM 9.1 8.6* 8.0*   ALBUMIN 3.1*  --  2.4*   PROT 6.6  --  5.6*   BILITOT 1.0  --  0.6   ALKPHOS 71  --  60   ALT 52*  --  39   AST 45*  --  31       All pertinent labs within the past 24 hours have been reviewed.      Significant Imaging:  I have reviewed  and interpreted all pertinent imaging results/findings within the past 24 hours.

## 2020-02-09 NOTE — PT/OT/SLP PROGRESS
Physical Therapy  Consult    Patient Name:  Bayron Delgado   MRN:  [id number]  Admitting Diagnosis:  Sepsis   Recent Surgery: * No surgery found *    Admit Date: 2/8/2020  Length of Stay: 1 days    Physical Therapy orders received and acknowledged. Patient with a history of ALS and is current vent dependent via trach with peg placement. Pt is unable to communicate verbally and primarily communicates via eye motions. Upon PT entrance into room pt sleeping soundly with wife and caregiver present. Pt's wife expressed no PT needs at this time stating they had all necessary medical equipment including a hospital bed, power wheelchair, and zach lift. Wife and caregiver expressed independence in performing PROM with patient to prevent formation of contractures as well as weight shifting techniques to prevent further development of pressure ulcers. At this time pt has no skilled PT needs and does not qualify for progressive mobility at this time due to pt having no volitional movement and the progressive nature of pt's ALS diagnosis. Please re-consult PT/OT if any new equipment needs arise.    Lisa Carreno PT, DPT  2/9/2020   Pager: [phone]

## 2020-02-09 NOTE — CONSULTS
[facility]  Infectious Disease  Consult Note    Patient Name: Bayron Delgado  MRN: [id number]  Admission Date: 2/8/2020  Hospital Length of Stay: 0 days  Attending Physician: Kike Fuentes MD  Primary Care Provider: Camille Bustillos NP     Isolation Status: No active isolations    Patient information was obtained from spouse/SO and ER records.      Consults  Assessment/Plan:     Pneumonia  This is a 49-year-old male with a history of ALS, chronic hypoxemic hypercarbic respiratory failure status post venting and trach, who is brought in by EMS with report of fever since Thursday. He has had increased respiratory secretions, sputum changed from white/yellow to green and is very thick. Oxygenation also worse that normal.. 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    Discussed plan with critical care resident. The patient's wife reports he has tolerated cefepime in the past. PCN allergy was from childhood but he has tolerated multiple beta lactam antibiotics since then.  -Already given vanc, azactam, levaquin.  -Would obtain CT of abdomin and pelvis to rule out occult source of fever such as renal abscess.   -If has diarrhea more that 3 episodes per day consider C diff testing although wife reports his stools dif not smell or seem c/w C diff.   -Obtain respiratory sample for gram stain and culture.   -Needs wound care consult for sacrum and PEG. Low threshold for diflucan but at this point seems to be responding to nystatin cream and powder.   -PEG tube site does not look good- GI consult.   -Follow up blood culture results.   -Continue vanc and will start cefepime.  Will follow.          Thank you for your consult. I will follow-up with patient. Please contact us if you have any additional questions.    Katherine K Baumgarten, MD  Infectious Disease  [facility]    Subjective:     Principal Problem: Sepsis    HPI: This is  a 49-year-old male with a history of ALS, chronic hypoxemic hypercarbic respiratory failure status post venting and trach, who is brought in by EMS with report of fever since Thursday.  T-max 105°.  History is provided by the patient's wife (a nurse) and sister who are at bedside. He has had increased respiratory secretions, sputum changed from white/yellow to green and is very thick. Oxygenation also worse that normal per wife. Diarrhea times one episode. He is on bowel regimen with senna, miralax, and that was held. Last night his urine became more concentrated and dark. 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 He is followed by Dr. María Caruso and they understand he is colonized and they do not check urine cultures. His urine has thick sediment which she flushes and changes munoz usually monthly. Was just changed while in ED. Has had wound on sacrum and also redness of that entire area- tried multiple barrier creams and seemed to respond to nystatin powder and cream. Also in folds of groin at times. He is not currently on antibiotics.  His wife recently had a prolonged course of bronchitis, but she tried to stay way from him while she was symptomatic.  Otherwise no sick contacts.   Also has had intermittent drainage from PEG tube. Applies some silver nitrate at times. About the same currently. Patient is able to communicate yes or no with his eyes, and he denies pain or shortness of breath at this time.  History is otherwise limited to his nonverbal status. Patient is very well cared for at home and wife provides very detailed history.         Past Medical History:   Diagnosis Date    ALS (amyotrophic lateral sclerosis)     Atrial fibrillation     CHF (congestive heart failure)     Neuropathy     Ventilator dependent        Past Surgical History:   Procedure Laterality Date    APPENDECTOMY      GASTROSTOMY TUBE PLACEMENT  2017    HERNIA REPAIR       TRACHEOSTOMY  03/2018    TRACHEOSTOMY TUBE CHANGE N/A 11/22/2019    Procedure: REPLACEMENT, TRACHEOSTOMY TUBE;  Surgeon: Daniel Horner MD;  Location: [facility] OR [address] [place] [address];  Service: ENT;  Laterality: N/A;       Review of patient's allergies indicates:   Allergen Reactions    Atropine     Ibuprofen      A-fib    Latex, natural rubber     Penicillins Other (See Comments)     Per wife- his mother stated he was allergic to it.  Had redness associated with cefepime 3/14/2018       Medications:  Medications Prior to Admission   Medication Sig    amiodarone (PACERONE) 200 MG Tab 200 mg by Per G Tube route 2 (two) times daily.    carvedilol (COREG) 6.25 MG tablet 1 tablet (6.25 mg total) by Per G Tube route 2 (two) times daily. (Patient taking differently: 12.5 mg by Per G Tube route 2 (two) times daily. )    clonazePAM (KLONOPIN) 0.5 MG tablet 2 tablets (1 mg total) by Per G Tube route 4 (four) times daily. (Patient taking differently: 0.5 mg by Per G Tube route 4 (four) times daily. )    diphenhydrAMINE (BENADRYL) 50 MG tablet Take 50 mg by mouth nightly as needed for Itching.    DULoxetine (CYMBALTA) 60 MG capsule Take 1 capsule (60 mg total) by mouth once daily.    famotidine (PEPCID) 20 MG tablet 1 tablet (20 mg total) by Per G Tube route 2 (two) times daily.    furosemide (LASIX) 40 MG tablet Take 1 tablet (40 mg total) by mouth as needed (use to increased to furosemide 40 mg twice per day for 5 days for excessive edema).    HYDROcodone-acetaminophen (NORCO)  mg per tablet 1 tablet by Per G Tube route every 6 (six) hours as needed for Pain. (Patient taking differently: 1 tablet by Per G Tube route every 6 (six) hours as needed for Pain. 1/2 a pill tid and 1 tab at hs)    hydrOXYzine HCl (ATARAX) 25 MG tablet 1 tablet (25 mg total) by Per G Tube route 4 (four) times daily as needed for Itching.    LACTOSE-REDUCED FOOD/FIBER (ISOSOURCE 1.5 YURY ORAL) by Per G Tube route 6 (six) times daily.      lidocaine (LIDODERM) 5 % Place 3 patches onto the skin once daily. Remove & Discard patch within 12 hours or as directed by MD    metroNIDAZOLE (FLAGYL) 250 MG tablet Take 1 tablet (250 mg total) via PEG Tube every 8 (eight) hours.    mineral oil liquid Take 30 mLs by mouth daily as needed for Constipation.    nystatin (MYCOSTATIN) cream Apply 30 g topically 2 (two) times daily.    nystatin (MYCOSTATIN) powder Apply 60 g topically 2 (two) times daily.    oxybutynin (DITROPAN XL) 15 MG TR24 Take 1 tablet (15 mg total) by mouth once daily.    polyethylene glycol (GLYCOLAX) 17 gram PwPk Take by mouth.    potassium, sodium phosphates (PHOS-NAK) 280-160-250 mg PwPk Take 1 packet by mouth 2 (two) times daily.    pregabalin (LYRICA) 100 MG capsule 1 capsule (100 mg total) by Per G Tube route 3 (three) times daily.    prochlorperazine (COMPAZINE) 10 MG tablet Take 10 mg by mouth every evening.    scopolamine (TRANSDERM-SCOP) 1.3-1.5 mg (1 mg over 3 days) Place 1 patch onto the skin Every 3 (three) days.    senna-docusate 8.6-50 mg (SENNA WITH DOCUSATE SODIUM) 8.6-50 mg per tablet Take 7 tablets by mouth once daily.    temazepam (RESTORIL) 15 mg Cap 2 capsules (30 mg total) by Per G Tube route every evening.    traMADol (ULTRAM) 50 mg tablet Take 50 mg by mouth every 6 (six) hours.    levoFLOXacin (LEVAQUIN) 750 MG tablet Take 1 tablet (750mg total) via PEG Tube once daily    mupirocin (BACTROBAN) 2 % ointment Apply topically 3 (three) times daily.    silver nitrate applicators 75-25 % applicator Apply topically 3 (three) times a week. Apply to granulation tissue PRN.     Antibiotics (From admission, onward)    Start     Stop Route Frequency Ordered    02/08/20 2245  ceFEPIme injection 2 g      -- IV Every 8 hours (non-standard times) 02/08/20 2138 02/08/20 2230  vancomycin 1.5 g in dextrose 5 % 250 mL IVPB (ready to mix)      -- IV Every 12 hours (non-standard times) 02/08/20 1308    02/08/20 125   vancomycin - pharmacy to dose  (vancomycin IVPB)      -- IV pharmacy to manage frequency 02/08/20 1155        Antifungals (From admission, onward)    None        Antivirals (From admission, onward)    None           Immunization History   Administered Date(s) Administered    Pneumococcal Conjugate - 13 Valent 07/25/2016       Family History     None        Social History     Socioeconomic History    Marital status:      Spouse name: Not on file    Number of children: Not on file    Years of education: Not on file    Highest education level: Not on file   Occupational History    Not on file   Social Needs    Financial resource strain: Not on file    Food insecurity:     Worry: Not on file     Inability: Not on file    Transportation needs:     Medical: Not on file     Non-medical: Not on file   Tobacco Use    Smoking status: Never Smoker   Substance and Sexual Activity    Alcohol use: No    Drug use: No    Sexual activity: Yes     Partners: Female   Lifestyle    Physical activity:     Days per week: Not on file     Minutes per session: Not on file    Stress: Not on file   Relationships    Social connections:     Talks on phone: Not on file     Gets together: Not on file     Attends Restorationism service: Not on file     Active member of club or organization: Not on file     Attends meetings of clubs or organizations: Not on file     Relationship status: Not on file   Other Topics Concern    Not on file   Social History Narrative    Not on file     Review of Systems   Unable to perform ROS: Patient nonverbal     Objective:     Vital Signs (Most Recent):  Temp: (!) 100.9 °F (38.3 °C) (02/08/20 1752)  Pulse: 110 (02/08/20 2100)  Resp: 19 (02/08/20 2100)  BP: (!) 105/59 (02/08/20 2002)  SpO2: (!) 92 % (02/08/20 2100) Vital Signs (24h Range):  Temp:  [99.3 °F (37.4 °C)-101.5 °F (38.6 °C)] 100.9 °F (38.3 °C)  Pulse:  [] 110  Resp:  [16-19] 19  SpO2:  [92 %-100 %] 92 %  BP: ()/(49-78) 105/59      Weight: 136.1 kg (300 lb)  Body mass index is 45.61 kg/m².    Estimated Creatinine Clearance: 244.1 mL/min (based on SCr of 0.5 mg/dL).    Physical Exam   Constitutional: He is oriented to person, place, and time. He appears well-developed and well-nourished. He is cooperative.  Non-toxic appearance. No distress.   nonverbal   HENT:   Head: Normocephalic and atraumatic. Head is without right periorbital erythema and without left periorbital erythema.   Right Ear: External ear normal.   Left Ear: External ear normal.   Nose: Nose normal.   Trach in place with greenish drainage around site.   Eyes: Pupils are equal, round, and reactive to light. Conjunctivae, EOM and lids are normal. Right eye exhibits no discharge. Left eye exhibits no discharge. Right conjunctiva is not injected. Left conjunctiva is not injected. No scleral icterus.   Neck: Normal range of motion. No erythema present.   Cardiovascular: Normal rate and regular rhythm.   No murmur heard.  Pulmonary/Chest: Effort normal and breath sounds normal. No stridor. No tachypnea. No respiratory distress.   Abdominal: Soft. Normal appearance and bowel sounds are normal. He exhibits no distension.   PEG left side- excoriated tissue with ulceration and some hypergranulation at exit. Erythema extends about 2 cms.   Genitourinary: Penis normal.   Genitourinary Comments: Michele in place- clear urine in bag.   Musculoskeletal: He exhibits edema.   Generalized edema.   Neurological: He is alert and oriented to person, place, and time. No cranial nerve deficit. Coordination normal.   Skin: Skin is warm and dry. Rash noted. He is not diaphoretic. No erythema.   Has multiple pinpoint purplish lesions on chest and arms. Wife reports is chronic and normal.  Sacrum with excoriated ulceration and surrounding erythema. Some fissuring in gluteal fold with a little bleeding. Right groin fold with erythema in the crease. Left looks ok.   Psychiatric: His speech is normal.  Cognition and memory are normal.   Unable to assess. Patient does attempt to smile when I talk with him though.   Nursing note and vitals reviewed.      Significant Labs:   Blood Culture:   Recent Labs   Lab 02/08/20  0853 02/08/20  0854   LABBLOO No Growth to date No Growth to date     CBC:   Recent Labs   Lab 02/08/20  0853   WBC 14.42*   HGB 12.0*   HCT 38.4*        CMP:   Recent Labs   Lab 02/08/20  0853 02/08/20  1240   * 135*   K 4.0 3.8   CL 99 100   CO2 23 24   * 158*   BUN 12 11   CREATININE 0.5 0.5   CALCIUM 9.1 8.6*   PROT 6.6  --    ALBUMIN 3.1*  --    BILITOT 1.0  --    ALKPHOS 71  --    AST 45*  --    ALT 52*  --    ANIONGAP 12 11   EGFRNONAA >60.0 >60.0     Lactic Acid:   Recent Labs   Lab 02/08/20  0853 02/08/20  1240 02/08/20  2108   LACTATE 1.1 2.0 2.7*     Procalcitonin:   Recent Labs   Lab 02/08/20  0853 02/08/20  1240   PROCAL 0.26* 0.37*     Respiratory Culture:   Recent Labs   Lab 11/07/19  1154   GSRESP <10 epithelial cells per low power field.  Moderate WBC's  Many Gram negative rods  Few Gram positive cocci   RESPIRATORYC No S aureus isolated.  PSEUDOMONAS AERUGINOSA  Many  *  PROTEUS MIRABILIS  Many  Normal respiratory alverto also present  *     Urine Studies:   Recent Labs   Lab 02/08/20  1119   COLORU Mary   APPEARANCEUA Cloudy*   PHUR 6.0   SPECGRAV 1.005   PROTEINUA 1+*   GLUCUA Negative   KETONESU Negative   BILIRUBINUA Negative   OCCULTUA 3+*   NITRITE Negative   LEUKOCYTESUR 3+*   RBCUA 50*   WBCUA >100*   BACTERIA Many*   HYALINECASTS 0       Significant Imaging: I have reviewed all pertinent imaging results/findings within the past 24 hours.

## 2020-02-09 NOTE — H&P
[facility]  Critical Care Medicine  History & Physical    Patient Name: Bayron Delgado  MRN: [id number]  Admission Date: 2/8/2020  Hospital Length of Stay: 0 days  Code Status: Full Code  Attending Physician: Kike Fuentes MD   Primary Care Provider: Camille Bustillos NP   Principal Problem: Sepsis    Subjective:     HPI:  49 year old male with PMH of ALS, chronic hypoxemic hypercarbic respiratory failure now with trach and vent dependent who was BIB EMS from home for fevers and increased secretion. Per wife, she noticed that patient developed fever up to 101 F on Thursday. During this same period of time, she also started noticing increasing secretions from tracheostomy site and increasing O2 requirements. Fevers continued to persist despite Advil (does not want to give Tylenol with concerns for liver damage or Ibuprofen for allergic reaction) and reached as high as 105 F. Sputum became darker and appeared green. She also reports that he has had more watery stools, normally stools are soft to solid. With EMS, pt's sats were in the upper 80s on home vent settings of room air, improved with application of 4L/min O2.  While in the ED, patient has been HDS and started on Vanc, Aztreonam and Levo given PCN allergy. Influenza was negative. WBC of 14.42 and febrile to 101 F. CXR showing left lower lobe PNA. Respiratory cultures ordered.  Critical care was consulted for further management and stabilization of this patient in setting of chronic vent dependent ALS.     Of note, he has a history of of chronic MDR Pseudomonas and ESBL UTI, with no plan for treatment at this time. Wife was sick with bronchitis over the last week, no other sick contacts.              Hospital/ICU Course:  No notes on file     Past Medical History:   Diagnosis Date    ALS (amyotrophic lateral sclerosis)     Atrial fibrillation     CHF (congestive heart failure)     Neuropathy     Ventilator dependent        Past  Surgical History:   Procedure Laterality Date    APPENDECTOMY      GASTROSTOMY TUBE PLACEMENT  2017    HERNIA REPAIR      TRACHEOSTOMY  03/2018    TRACHEOSTOMY TUBE CHANGE N/A 11/22/2019    Procedure: REPLACEMENT, TRACHEOSTOMY TUBE;  Surgeon: Daniel Horner MD;  Location: [facility] OR [address] [place] [address];  Service: ENT;  Laterality: N/A;       Review of patient's allergies indicates:   Allergen Reactions    Atropine     Ibuprofen      A-fib    Latex, natural rubber     Penicillins Other (See Comments)     Per wife- his mother stated he was allergic to it.  Had redness associated with cefepime 3/14/2018       Family History     None        Tobacco Use    Smoking status: Never Smoker   Substance and Sexual Activity    Alcohol use: No    Drug use: No    Sexual activity: Yes     Partners: Female      Review of Systems   Unable to perform ROS: Patient nonverbal   HENT:        Increased secretions   Gastrointestinal: Positive for diarrhea.   Skin: Positive for wound (sacral).     Objective:     Vital Signs (Most Recent):  Temp: (!) 100.9 °F (38.3 °C) (02/08/20 1752)  Pulse: 97 (02/08/20 2002)  Resp: 16 (02/08/20 1402)  BP: (!) 105/59 (02/08/20 2002)  SpO2: 99 % (02/08/20 2002) Vital Signs (24h Range):  Temp:  [99.3 °F (37.4 °C)-101.5 °F (38.6 °C)] 100.9 °F (38.3 °C)  Pulse:  [67-97] 97  Resp:  [16] 16  SpO2:  [94 %-100 %] 99 %  BP: ()/(49-78) 105/59   Weight: 136.1 kg (300 lb)  Body mass index is 45.61 kg/m².      Intake/Output Summary (Last 24 hours) at 2/8/2020 2007  Last data filed at 2/8/2020 1758  Gross per 24 hour   Intake 500 ml   Output 500 ml   Net 0 ml       Physical Exam   Constitutional: He appears well-developed.   HENT:   Head: Normocephalic and atraumatic.   Eyes: Pupils are equal, round, and reactive to light. Conjunctivae and EOM are normal. No scleral icterus.   Neck: No JVD present.   Tracheostomy patent without obvious drainage, erythema or induration surrounding ostomy.  Limited ROM    Cardiovascular: Normal rate and regular rhythm.   Pulmonary/Chest: No stridor. He has no wheezes.   Mechanical breath sounds bilaterally, diminished in left basilar   Abdominal: He exhibits distension. He exhibits no mass. There is no tenderness. No hernia.   Hypoactive bowel sounds  PEG tube located in epigastrium with some granulation tissue surrounding ostomy. No induration or drainage noted.   Genitourinary:   Genitourinary Comments: Michele catheter in place   Musculoskeletal: He exhibits edema. He exhibits no tenderness.   Neurological: He is alert.   Alert, communicates with yes or no using extrocular movements in response to wife.  Neuro exam otherwise limited given condition.   Skin: Skin is dry.   Flaking skin in upper and lower extremities, no erythema, ecchymoses or deformities noted.  Petechial rash located over right pectoral, blanching without warmth.   Nursing note and vitals reviewed.      Vents:  Vent Mode: A/C (02/08/20 1700)  Ventilator Initiated: No (02/08/20 0844)  Set Rate: 14 BPM (02/08/20 1700)  PEEP/CPAP: 5 cmH20 (02/08/20 1700)  Oxygen Concentration (%): 21 (02/08/20 0844)  Peak Airway Pressure: 25 cmH2O (02/08/20 1700)  Total Ve: 6.2 mL (02/08/20 1700)  Lines/Drains/Airways     Drain                 Gastrostomy/Enterostomy 01/23/17 1113 Percutaneous endoscopic gastrostomy (PEG) LUQ feeding 1111 days         Urethral Catheter 06/21/18 0115 Non-latex 20 Fr. 597 days         Urethral Catheter 11/22/19 1240 78 days         Urethral Catheter 02/08/20 1118 Non-latex 22 Fr. less than 1 day          Airway                 Surgical Airway 03/19/18 1355 Shiley Cuffed 691 days         Surgical Airway 11/22/19 Shiley Cuffed 78 days          Peripheral Intravenous Line                 Peripheral IV - Single Lumen 11/22/19 1150 18 G Right Forearm 78 days         Peripheral IV - Single Lumen 02/08/20 0847 20 G Left Hand less than 1 day         Peripheral IV - Single Lumen 02/08/20 0929 20 G Left Hand  less than 1 day              Significant Labs:    CBC/Anemia Profile:  Recent Labs   Lab 02/08/20  0853   WBC 14.42*   HGB 12.0*   HCT 38.4*      MCV 91   RDW 16.0*        Chemistries:  Recent Labs   Lab 02/08/20  0853 02/08/20  1240   * 135*   K 4.0 3.8   CL 99 100   CO2 23 24   BUN 12 11   CREATININE 0.5 0.5   CALCIUM 9.1 8.6*   ALBUMIN 3.1*  --    PROT 6.6  --    BILITOT 1.0  --    ALKPHOS 71  --    ALT 52*  --    AST 45*  --        All pertinent labs within the past 24 hours have been reviewed.    Significant Imaging: I have reviewed and interpreted all pertinent imaging results/findings within the past 24 hours.    Assessment/Plan:     Neuro  ALS (amyotrophic lateral sclerosis)  With chronic associated pain  - Begin home lyrica, Duloxetine scheduled, [place] prn    Psychiatric  Anxiety  - Start home Duloxetine and Clonazepam    ENT  Tracheostomy in place  - Chlorhexidine mouthwash    Derm  Skin breakdown  Candidal rash in sacral region  Continue treating with Nystatin cream     Pulmonary  Acute hypoxemic respiratory failure  Vent and Trach dependent 2/2 ALS  Chlorhexidine mouthwash bid  Protonix bid  -ABG prn  Vent Mode: A/C  Oxygen Concentration (%):  [21] 21  Resp Rate Total:  [14 br/min-15 br/min] 14 br/min  PEEP/CPAP:  [5 cmH20] 5 cmH20      Pneumonia  Left lower lobe PNA, started on Levaquin, Vanc, Aztreonam  Infectious disease following, appreciate recs      Cardiac/Vascular  Atrial fibrillation  - Start home Amiodarone 200mg    Renal/  Lactic acidosis  Up trending likely 2/2 infection  - Continue to trend  - IV fluids prn    Chronic indwelling Michele catheter  Chronic colonization with MDR Pseudomonas, ESBL  - Oxybutynin daily   - UCx unlikely to yield treatable condition, will not obtain unless source control of infection difficult    ID  * Sepsis  Leukocytosis of 14, Fever to 102 F, increased sputum production and O2 requirements  - likely 2/2 PNA as source,   - chronic indwelling Michele,  colonized, unlikely to be source      Oncology  Leukocytosis  Likely 2/2 PNA  - BCx, sputum cx pending    GI  Constipation  Chronic, requires disimpaction by wife  - Colace  - Consider Miralax, enemas for BMs    Other  Presence of externally removable percutaneous endoscopic gastrostomy (PEG) tube  Granulation tissue surrounding site  - Monitor for signs of infection      Critical Care Daily Checklist:    A: Awake: RASS Goal/Actual Goal:  0  Actual:  0   B: Spontaneous Breathing Trial Performed?  N/A   C: SAT & SBT Coordinated?  N/A                      D: Delirium: CAM-ICU     E: Early Mobility Performed? Yes   F: Feeding Goal:  TFs  Status:     Current Diet Order   Procedures    Diet NPO      AS: Analgesia/Sedation Home regimen   T: Thromboembolic Prophylaxis Lovenox   H: HOB > 300 Yes   U: Stress Ulcer Prophylaxis (if needed) yes   G: Glucose Control assessed   B: Bowel Function  Colace   I: Indwelling Catheter (Lines & Michele) Necessity assessed   D: De-escalation of Antimicrobials/Pharmacotherapies assessed    Plan for the day/ETD Admit to ICU    Code Status:  Family/Goals of Care: Full Code  Continue cre       Critical secondary to Patient has a condition that poses threat to life and bodily function: Severe Respiratory Distress     Critical care was time spent personally by me on the following activities: development of treatment plan with patient or surrogate and bedside caregivers, discussions with consultants, evaluation of patient's response to treatment, examination of patient, ordering and performing treatments and interventions, ordering and review of laboratory studies, ordering and review of radiographic studies, pulse oximetry, re-evaluation of patient's condition. This critical care time did not overlap with that of any other provider or involve time for any procedures.     Jaquan Hope MD  Critical Care Medicine  [facility]

## 2020-02-09 NOTE — ASSESSMENT & PLAN NOTE
Leukocytosis of 14, Fever to 102 F, increased sputum production and O2 requirements  - likely 2/2 PNA as source,   - chronic indwelling Michele, colonized, unlikely to be source

## 2020-02-09 NOTE — PROCEDURES
[facility]  Bronchoscopy   Procedure Note    SUMMARY     Date of Procedure: 2/9/2020    Procedure: Bronchoscopy, Diagnostic  Bronchoscopy, Therapeutic  Bronchoalveolar lavage, BAL    Provider: Barney Buck MD    Assisting Provider: Kike Fuentes MD    Pre-Procedure Diagnosis: LLL pneumonia with acute hypoxemic respiratory failure    Post-Procedure Diagnosis: Same    Indication: Mucous plugging and pneumonia, VAP    Anesthesia: Moderate Sedation    Technical Procedures Used: Fiberoptic bronchoscopy    Description of the Findings of the Procedure: Moderate amount of thick purulent secretions in left lower lobe segmental airway, easily suctioned. Minimal secretions in the right lower lung.    Patient's wife was consented for the procedure with all risk and benefit of the procedure explained in detail.  Patient's wife was given the opportunity to ask questions and all concerns were answered.  The bronchocope was inserted into the main airway via the tracheostomy tube. An anatomical survey was done of the main airways and the subsegmental bronchus.  The findings are reported above.  Sample of purulent material suctioned into sterile collection trap for culture.    Significant Surgical Tasks Conducted by the Assistant(s), if Applicable: n/a    Complications: None; patient tolerated the procedure well.    Estimated Blood Loss (EBL): Minimal           Implants: n/a    Specimens: Sent purulent fluid       Condition: Stable        Disposition: ICU - intubated and hemodynamically stable.    Attestation: I performed the procedure.

## 2020-02-09 NOTE — ASSESSMENT & PLAN NOTE
Left lower lobe PNA, started on Levaquin, Vanc, Aztreonam  Infectious disease following, appreciate recs

## 2020-02-09 NOTE — ASSESSMENT & PLAN NOTE
This is a 49-year-old male with a history of ALS, chronic hypoxemic hypercarbic respiratory failure status post venting and trach, who is brought in by EMS with report of fever since Thursday. He has had increased respiratory secretions, sputum changed from white/yellow to green and is very thick. Oxygenation also worse that normal.. 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   .  -CT of abdomin and pelvis with stool. Also new LLL opacification.  -Obtain respiratory sample for gram stain and culture.   -Needs wound care consult for sacrum and PEG. Low threshold for diflucan but at this point seems to be responding to nystatin cream and powder.   -PEG tube site does not look good- GI consult.   -Follow up blood culture results-negative thus far..   -Continue vanc and cefepime.  -urine culture with enterococcus species but no evidence of obstructive picture of clinic symptoms concerning for infection- c/w colonization  Will follow.

## 2020-02-09 NOTE — ASSESSMENT & PLAN NOTE
Leukocytosis of 14, Fever to 102 F on presentation. Increased sputum production and O2 requirements  Afebrile overnight, WBC normalized.  - likely 2/2 PNA as source, see pneumonia plan  - chronic indwelling Michele, colonized, unlikely to be source

## 2020-02-09 NOTE — ASSESSMENT & PLAN NOTE
Left lower lobe PNA, started on Levaquin, Vanc, Aztreonam in ED  Infectious disease following, recommend Vanc cefepime, will start today

## 2020-02-09 NOTE — PT/OT/SLP PROGRESS
Occupational Therapy      Patient Name:  Bayron Delgado   MRN:  [id number]    OT orders received and acknowledged. Patient with a history of ALS and is current vent dependent via trach with PEG placement. Pt is unable to communicate verbally at this time and primarily communicates via eye motions. Upon OT entry, pt was found sleeping soundly with wife and pt's caregiver present. Pt's wife reports that pt does not require skilled OT intervention and they own all necessary DME including zach lift, power wheelchair, hospital bed, and vent equipment. Pt has 24/7 caregivers along with assistance from wife. Pt's wife and caregiver report no concerns with performing daily PROM to prevent contractures and maintaining joint integrity as well as daily pressure reliefs to prevent pressure ulcers. At this time pt is to be d/c from acute OT as he does not qualify for skilled OT intervention at this time due to the progressive nature of pt's ADLs diagnosis and pt's wife and caregiver report no need for OT services in the acute setting. Please re-consult PT/OT of OT needs arise while in the acute setting. No billable units charged     REBECCA Arredondo/GOKUL  Pager: [phone]  2/9/2020

## 2020-02-09 NOTE — ASSESSMENT & PLAN NOTE
Chronic colonization with MDR Pseudomonas, ESBL  - Oxybutynin daily   - UCx unlikely to yield treatable condition, will not obtain unless source control of infection difficult

## 2020-02-10 PROBLEM — L89.153 PRESSURE INJURY OF COCCYGEAL REGION, STAGE 3: Status: ACTIVE | Noted: 2020-02-10

## 2020-02-10 PROBLEM — B37.2 SKIN YEAST INFECTION: Status: ACTIVE | Noted: 2020-02-10

## 2020-02-10 PROBLEM — Z29.9 PREVENTIVE MEASURE: Status: ACTIVE | Noted: 2020-02-10

## 2020-02-10 PROBLEM — R23.9 ALTERATION IN SKIN INTEGRITY IN ADULT: Status: ACTIVE | Noted: 2020-02-10

## 2020-02-10 PROBLEM — D72.829 LEUKOCYTOSIS: Status: RESOLVED | Noted: 2020-02-08 | Resolved: 2020-02-10

## 2020-02-10 PROBLEM — R23.9 ALTERATION IN SKIN INTEGRITY DUE TO MOISTURE: Status: ACTIVE | Noted: 2020-02-10

## 2020-02-10 LAB
ALBUMIN SERPL BCP-MCNC: 2.6 G/DL (ref 3.5–5.2)
ALP SERPL-CCNC: 68 U/L (ref 55–135)
ALT SERPL W/O P-5'-P-CCNC: 38 U/L (ref 10–44)
ANION GAP SERPL CALC-SCNC: 10 MMOL/L (ref 8–16)
AST SERPL-CCNC: 25 U/L (ref 10–40)
BACTERIA UR CULT: ABNORMAL
BASOPHILS # BLD AUTO: 0.05 K/UL (ref 0–0.2)
BASOPHILS NFR BLD: 0.6 % (ref 0–1.9)
BILIRUB SERPL-MCNC: 0.6 MG/DL (ref 0.1–1)
BUN SERPL-MCNC: 10 MG/DL (ref 6–20)
BUN SERPL-MCNC: 14 MG/DL (ref 6–30)
CALCIUM SERPL-MCNC: 9.1 MG/DL (ref 8.7–10.5)
CHLORIDE SERPL-SCNC: 106 MMOL/L (ref 95–110)
CHLORIDE SERPL-SCNC: 97 MMOL/L (ref 95–110)
CO2 SERPL-SCNC: 24 MMOL/L (ref 23–29)
CREAT SERPL-MCNC: 0.4 MG/DL (ref 0.5–1.4)
CREAT SERPL-MCNC: <0.2 MG/DL (ref 0.5–1.4)
DIFFERENTIAL METHOD: ABNORMAL
EOSINOPHIL # BLD AUTO: 0.4 K/UL (ref 0–0.5)
EOSINOPHIL NFR BLD: 4.5 % (ref 0–8)
ERYTHROCYTE [DISTWIDTH] IN BLOOD BY AUTOMATED COUNT: 15.4 % (ref 11.5–14.5)
EST. GFR  (AFRICAN AMERICAN): >60 ML/MIN/1.73 M^2
EST. GFR  (NON AFRICAN AMERICAN): >60 ML/MIN/1.73 M^2
GLUCOSE SERPL-MCNC: 116 MG/DL (ref 70–110)
GLUCOSE SERPL-MCNC: 191 MG/DL (ref 70–110)
HCT VFR BLD AUTO: 36.5 % (ref 40–54)
HCT VFR BLD CALC: 36 %PCV (ref 36–54)
HGB BLD-MCNC: 11 G/DL (ref 14–18)
IMM GRANULOCYTES # BLD AUTO: 0.08 K/UL (ref 0–0.04)
IMM GRANULOCYTES NFR BLD AUTO: 0.9 % (ref 0–0.5)
LYMPHOCYTES # BLD AUTO: 0.7 K/UL (ref 1–4.8)
LYMPHOCYTES NFR BLD: 7.9 % (ref 18–48)
MAGNESIUM SERPL-MCNC: 1.9 MG/DL (ref 1.6–2.6)
MCH RBC QN AUTO: 27.7 PG (ref 27–31)
MCHC RBC AUTO-ENTMCNC: 30.1 G/DL (ref 32–36)
MCV RBC AUTO: 92 FL (ref 82–98)
MONOCYTES # BLD AUTO: 1 K/UL (ref 0.3–1)
MONOCYTES NFR BLD: 12 % (ref 4–15)
NEUTROPHILS # BLD AUTO: 6.3 K/UL (ref 1.8–7.7)
NEUTROPHILS NFR BLD: 74.1 % (ref 38–73)
NRBC BLD-RTO: 0 /100 WBC
PHOSPHATE SERPL-MCNC: 2.6 MG/DL (ref 2.7–4.5)
PLATELET # BLD AUTO: 182 K/UL (ref 150–350)
PMV BLD AUTO: 11.2 FL (ref 9.2–12.9)
POC IONIZED CALCIUM: 1.04 MMOL/L (ref 1.06–1.42)
POC TCO2 (MEASURED): 28 MMOL/L (ref 23–29)
POTASSIUM BLD-SCNC: 4.6 MMOL/L (ref 3.5–5.1)
POTASSIUM SERPL-SCNC: 3.6 MMOL/L (ref 3.5–5.1)
PROT SERPL-MCNC: 6.2 G/DL (ref 6–8.4)
RBC # BLD AUTO: 3.97 M/UL (ref 4.6–6.2)
SAMPLE: ABNORMAL
SODIUM BLD-SCNC: 131 MMOL/L (ref 136–145)
SODIUM SERPL-SCNC: 140 MMOL/L (ref 136–145)
WBC # BLD AUTO: 8.52 K/UL (ref 3.9–12.7)

## 2020-02-10 PROCEDURE — 25000003 PHARM REV CODE 250: Performed by: STUDENT IN AN ORGANIZED HEALTH CARE EDUCATION/TRAINING PROGRAM

## 2020-02-10 PROCEDURE — 25000242 PHARM REV CODE 250 ALT 637 W/ HCPCS: Performed by: INTERNAL MEDICINE

## 2020-02-10 PROCEDURE — 63600175 PHARM REV CODE 636 W HCPCS: Performed by: INTERNAL MEDICINE

## 2020-02-10 PROCEDURE — 99233 PR SUBSEQUENT HOSPITAL CARE,LEVL III: ICD-10-PCS | Mod: GC,,, | Performed by: INTERNAL MEDICINE

## 2020-02-10 PROCEDURE — 84100 ASSAY OF PHOSPHORUS: CPT

## 2020-02-10 PROCEDURE — 99233 SBSQ HOSP IP/OBS HIGH 50: CPT | Mod: GC,,, | Performed by: INTERNAL MEDICINE

## 2020-02-10 PROCEDURE — 27200966 HC CLOSED SUCTION SYSTEM

## 2020-02-10 PROCEDURE — 63600175 PHARM REV CODE 636 W HCPCS: Performed by: STUDENT IN AN ORGANIZED HEALTH CARE EDUCATION/TRAINING PROGRAM

## 2020-02-10 PROCEDURE — 94640 AIRWAY INHALATION TREATMENT: CPT

## 2020-02-10 PROCEDURE — 94003 VENT MGMT INPAT SUBQ DAY: CPT

## 2020-02-10 PROCEDURE — 27000221 HC OXYGEN, UP TO 24 HOURS

## 2020-02-10 PROCEDURE — 99900035 HC TECH TIME PER 15 MIN (STAT)

## 2020-02-10 PROCEDURE — 94668 MNPJ CHEST WALL SBSQ: CPT

## 2020-02-10 PROCEDURE — 25000003 PHARM REV CODE 250: Performed by: EMERGENCY MEDICINE

## 2020-02-10 PROCEDURE — 83735 ASSAY OF MAGNESIUM: CPT

## 2020-02-10 PROCEDURE — 99900026 HC AIRWAY MAINTENANCE (STAT)

## 2020-02-10 PROCEDURE — C9113 INJ PANTOPRAZOLE SODIUM, VIA: HCPCS | Performed by: STUDENT IN AN ORGANIZED HEALTH CARE EDUCATION/TRAINING PROGRAM

## 2020-02-10 PROCEDURE — 80053 COMPREHEN METABOLIC PANEL: CPT

## 2020-02-10 PROCEDURE — 25000003 PHARM REV CODE 250: Performed by: INTERNAL MEDICINE

## 2020-02-10 PROCEDURE — 94761 N-INVAS EAR/PLS OXIMETRY MLT: CPT

## 2020-02-10 PROCEDURE — 20000000 HC ICU ROOM

## 2020-02-10 PROCEDURE — 85025 COMPLETE CBC W/AUTO DIFF WBC: CPT

## 2020-02-10 RX ORDER — NAPROXEN 250 MG/1
250 TABLET ORAL ONCE
Status: COMPLETED | OUTPATIENT
Start: 2020-02-10 | End: 2020-02-11

## 2020-02-10 RX ORDER — POTASSIUM CHLORIDE 20 MEQ/15ML
40 SOLUTION ORAL ONCE
Status: COMPLETED | OUTPATIENT
Start: 2020-02-10 | End: 2020-02-10

## 2020-02-10 RX ADMIN — VANCOMYCIN HYDROCHLORIDE 1500 MG: 1.5 INJECTION, POWDER, LYOPHILIZED, FOR SOLUTION INTRAVENOUS at 12:02

## 2020-02-10 RX ADMIN — CEFEPIME 2 G: 2 INJECTION, POWDER, FOR SOLUTION INTRAVENOUS at 06:02

## 2020-02-10 RX ADMIN — AMIODARONE HYDROCHLORIDE 200 MG: 200 TABLET ORAL at 08:02

## 2020-02-10 RX ADMIN — PANTOPRAZOLE SODIUM 40 MG: 40 INJECTION, POWDER, FOR SOLUTION INTRAVENOUS at 08:02

## 2020-02-10 RX ADMIN — CLONAZEPAM 0.5 MG: 0.5 TABLET ORAL at 09:02

## 2020-02-10 RX ADMIN — IPRATROPIUM BROMIDE AND ALBUTEROL SULFATE 3 ML: .5; 3 SOLUTION RESPIRATORY (INHALATION) at 11:02

## 2020-02-10 RX ADMIN — CLONAZEPAM 0.5 MG: 0.5 TABLET ORAL at 08:02

## 2020-02-10 RX ADMIN — PANTOPRAZOLE SODIUM 40 MG: 40 INJECTION, POWDER, FOR SOLUTION INTRAVENOUS at 09:02

## 2020-02-10 RX ADMIN — IPRATROPIUM BROMIDE AND ALBUTEROL SULFATE 3 ML: .5; 3 SOLUTION RESPIRATORY (INHALATION) at 03:02

## 2020-02-10 RX ADMIN — DULOXETINE 60 MG: 30 CAPSULE, DELAYED RELEASE ORAL at 08:02

## 2020-02-10 RX ADMIN — ENOXAPARIN SODIUM 40 MG: 100 INJECTION SUBCUTANEOUS at 05:02

## 2020-02-10 RX ADMIN — POTASSIUM CHLORIDE 40 MEQ: 20 SOLUTION ORAL at 06:02

## 2020-02-10 RX ADMIN — POLYETHYLENE GLYCOL 3350 17 G: 17 POWDER, FOR SOLUTION ORAL at 08:02

## 2020-02-10 RX ADMIN — HYDROCODONE BITARTRATE AND ACETAMINOPHEN 1 TABLET: 10; 325 TABLET ORAL at 09:02

## 2020-02-10 RX ADMIN — PREGABALIN 100 MG: 50 CAPSULE ORAL at 03:02

## 2020-02-10 RX ADMIN — VANCOMYCIN HYDROCHLORIDE 1250 MG: 1.25 INJECTION, POWDER, LYOPHILIZED, FOR SOLUTION INTRAVENOUS at 11:02

## 2020-02-10 RX ADMIN — CHLORHEXIDINE GLUCONATE 0.12% ORAL RINSE 15 ML: 1.2 LIQUID ORAL at 09:02

## 2020-02-10 RX ADMIN — CEFEPIME 2 G: 2 INJECTION, POWDER, FOR SOLUTION INTRAVENOUS at 03:02

## 2020-02-10 RX ADMIN — PREGABALIN 100 MG: 50 CAPSULE ORAL at 09:02

## 2020-02-10 RX ADMIN — AMIODARONE HYDROCHLORIDE 200 MG: 200 TABLET ORAL at 09:02

## 2020-02-10 RX ADMIN — IPRATROPIUM BROMIDE AND ALBUTEROL SULFATE 3 ML: .5; 3 SOLUTION RESPIRATORY (INHALATION) at 07:02

## 2020-02-10 RX ADMIN — CLONAZEPAM 0.5 MG: 0.5 TABLET ORAL at 03:02

## 2020-02-10 RX ADMIN — OXYBUTYNIN CHLORIDE 15 MG: 15 TABLET, EXTENDED RELEASE ORAL at 08:02

## 2020-02-10 RX ADMIN — CHLORHEXIDINE GLUCONATE 0.12% ORAL RINSE 15 ML: 1.2 LIQUID ORAL at 08:02

## 2020-02-10 RX ADMIN — CEFEPIME 2 G: 2 INJECTION, POWDER, FOR SOLUTION INTRAVENOUS at 09:02

## 2020-02-10 RX ADMIN — MICONAZOLE NITRATE: 20 OINTMENT TOPICAL at 09:02

## 2020-02-10 NOTE — HOSPITAL COURSE
Bronchoscopy performed, cultures sent. ID consulted and Vanc and cefepime started per ID recs. Bronchoscopy cultures positive for pseudomonas, proteus, and ESBL kleb. ID subsequently recommended meropenem for 5 day course, and other antibiotics were discontinued. Patient noted to have decreased urine output and lasix was given with improvement output and improvement in edema. He completed his 5 day course of antibiotics and was discharged home.

## 2020-02-10 NOTE — PLAN OF CARE
CMICU DAILY GOALS       A: Awake    RASS: Goal -  0  Actual -  0   Restraint necessity:    B: Breath   SBT: Not attempted   C: Coordinate A & B, analgesics/sedatives   Pain: managed    SAT: Not attempted  D: Delirium   CAM-ICU: Overall CAM-ICU: Negative  E: Early Mobility   MOVE Screen: Fail   Activity: Activity Management: activity clustered for rest period, bedrest maintained per order  FAS: Feeding/Nutrition   Diet order: Diet/Nutrition Received: tube feeding,   Fluid restriction:    T: Thrombus   DVT prophylaxis: VTE Required Core Measure: Pharmacological prophylaxis initiated/maintained  H: HOB Elevation   Head of Bed (HOB): HOB at 30-45 degrees  U: Ulcer Prophylaxis   GI: yes  G: Glucose control   managed    S: Skin   Bundle compliance: yes   Bathing/Skin Care: bath, chlorhexidine, bath, complete, back care, dressed/undressed, incontinence care, linen changed Date: 2/9/20 AM shift   B: Bowel Function   no issues   I: Indwelling Catheters   Michele necessity:      Urethral Catheter 02/08/20 1118 Non-latex 22 Fr.-Reason for Continuing Urinary Catheterization: Critically ill in ICU requiring intensive monitoring   CVC necessity: No   IPAD offered: Not appropriate  D: De-escalation Antibx   Yes  Plan for the day   Suction and respiratory treatments; abx therapy.   Family/Goals of care/Code Status   Code Status: Full Code     No acute events throughout day, VS and assessment per flow sheet, patient progressing towards goals as tolerated, plan of care reviewed with Bayron Delgado and family, all concerns addressed, will continue to monitor.

## 2020-02-10 NOTE — CONSULTS
Wound care consulted for sacral skin breakdown  PMH:     ALS, chronic hypoxemic hypercarbic respiratory failure now with trach and vent dependent who was BIB EMS from home for fevers and increased secretion, watery stools.  Assessment:  The PEG insertion site on the upper LUQ has red/isolated lesions, PEG tube secured to the skin, skin dry, no leaking at present  The Abdominal Pannus skin folds are moist/no redness at present, no skin breakdown, foul -yeast odor from the left side.   The buttocks have a resolving yeast infection with MASD with skin breakdown on the lower buttocks. The coccyx area has skin breakdown - shallow stage 3 pressure injury with MASD to the kanika-wound/wound edges ~ the wound bed is red/moist with jagged wound edges ~ 2 cm L x 2 cm W x  0.2 cm D  The patient uses a computer to talk and must be set with his eyes- which according to the care giver takes quite a while. The patient/caregiver doesn't want to turn due to communication needs.  An Immerse mattress was ordered, heel foam dressings in place off-loaded feet on a pillow. The patient doesn't like heel protector boots.   His overall skin is very red, especially his face/neck upper chest/arms region.   Caregiver at bedside with family  Recommendations:  PEG site- Aquacel Ag foam dressing around PEG site to provide antimicrobial protection/clear redness and protect skin from tube anchor.   Abdominal pannus- InterDry cloth in the skin folds to wick away moisture and provide antimicrobial protection .   Buttocks/upper posterior thighs- Miconazole ointment to the buttocks/upper thighs BID  Coccyx area- Stage 3- Triad ointment to wound bed BID. Triad ointment is an autolytic hydrophilic debridement agent that pulls fluid from beneath the wound bed and 'washes' the debrie off the wound bed as it promotes healing from below.  The wounds will generally look worse right before they get better- all the debrie/slough is soft and pulling away from the  wound bed. A thin layer is better than a thick layer.  Discussed with caregiver/family at bedside- verbalized understanding  Nursing to provide care, wound care to follow-up alireza Fuentes RN, [facility]  [phone]

## 2020-02-10 NOTE — PROGRESS NOTES
Pharmacokinetic Assessment Follow Up: IV Vancomycin    Vancomycin serum concentration assessment(s):    Vancomycin trough level was drawn approximately 10 hours after the previous dose. Goal trough is 15 to 20 mcg/mL.     Urine output has decreased, although serum creatinine remains stable.     Drug levels (last 3 results):  Recent Labs   Lab Result Units 02/09/20  2208   Vancomycin-Trough ug/mL 23.6*     Vancomycin Regimen Plan:    Since patient is likely to accumulate, decrease to vancomycin 1250 mg IV every 12 hours with next serum trough concentration measured on 2/11 1130, prior to 3rd dose of new regimen.     Pharmacy will continue to follow and monitor vancomycin.    Please contact pharmacy at extension [phone] for questions regarding this assessment.    Thank you for the consult,   Sri Brooke, PharmD, BCCCP             Patient brief summary:  Bayron Delgado is a 49 y.o. male initiated on antimicrobial therapy with IV vancomycin for treatment of sepsis    Drug Allergies:   Review of patient's allergies indicates:   Allergen Reactions    Atropine     Ibuprofen      A-fib    Latex, natural rubber     Penicillins Other (See Comments)     Per wife- his mother stated he was allergic to it.  Had redness associated with cefepime 3/14/2018       Actual Body Weight:   125.5 kg     Renal Function:   Estimated Creatinine Clearance: 291.3 mL/min (A) (based on SCr of 0.4 mg/dL (L)).    CBC (last 72 hours):  Recent Labs   Lab Result Units 02/08/20  0853 02/09/20  0336 02/10/20  0344   WBC K/uL 14.42* 7.93 8.52   Hemoglobin g/dL 12.0* 10.8* 11.0*   Hematocrit % 38.4* 35.9* 36.5*   Platelets K/uL 218 143* 182   Gran% % 84.9* 81.7* 74.1*   Lymph% % 5.1* 5.3* 7.9*   Mono% % 8.9 11.6 12.0   Eosinophil% % 0.1 0.6 4.5   Basophil% % 0.5 0.3 0.6   Differential Method  Automated Automated Automated       Metabolic Panel (last 72 hours):  Recent Labs   Lab Result Units 02/08/20  0853 02/08/20  1119 02/08/20  1240 02/09/20  0336  02/10/20  0344   Sodium mmol/L 134*  --  135* 140 140   Potassium mmol/L 4.0  --  3.8 3.3* 3.6   Chloride mmol/L 99  --  100 108 106   CO2 mmol/L 23  --  24 22* 24   Glucose mg/dL 189*  --  158* 137* 116*   Glucose, UA   --  Negative  --   --   --    BUN, Bld mg/dL 12  --  11 8 10   Creatinine mg/dL 0.5  --  0.5 0.4* 0.4*   Albumin g/dL 3.1*  --   --  2.4* 2.6*   Total Bilirubin mg/dL 1.0  --   --  0.6 0.6   Alkaline Phosphatase U/L 71  --   --  60 68   AST U/L 45*  --   --  31 25   ALT U/L 52*  --   --  39 38   Magnesium mg/dL  --   --   --   --  1.9   Phosphorus mg/dL  --   --   --   --  2.6*       Vancomycin Administrations:  vancomycin given in the last 96 hours                   vancomycin 1.25 g in dextrose 5% 250 mL IVPB (ready to mix) (mg) 1,250 mg New Bag 02/10/20 1108    vancomycin 1.5 g in dextrose 5 % 250 mL IVPB (ready to mix) (mg) 1,500 mg New Bag 02/10/20 0005    vancomycin 1.5 g in dextrose 5 % 250 mL IVPB (ready to mix) (mg) 1,500 mg New Bag 02/09/20 1147     1,500 mg New Bag 02/08/20 2209                Microbiologic Results:  Microbiology Results (last 7 days)     Procedure Component Value Units Date/Time    Urine culture [[id number]]  (Abnormal)  (Susceptibility) Collected:  02/08/20 1119    Order Status:  Completed Specimen:  Urine Updated:  02/10/20 1503     Urine Culture, Routine ENTEROCOCCUS FAECALIS  50,000 - 99,999 cfu/ml  No other significant isolate      Narrative:       Preferred Collection Type->Urine, Clean Catch    Blood culture x two cultures. Draw prior to antibiotics. [[id number]] Collected:  02/08/20 0854    Order Status:  Completed Specimen:  Blood from Peripheral, Hand, Left Updated:  02/10/20 1012     Blood Culture, Routine No Growth to date      No Growth to date      No Growth to date    Narrative:       Aerobic and anaerobic    Blood culture x two cultures. Draw prior to antibiotics. [[id number]] Collected:  02/08/20 0853    Order Status:  Completed Specimen:  Blood from  Peripheral, Hand, Right Updated:  02/10/20 1012     Blood Culture, Routine No Growth to date      No Growth to date      No Growth to date    Narrative:       Aerobic and anaerobic    Culture, Respiratory with Gram Stain [[id number]]  (Abnormal) Collected:  02/09/20 1452    Order Status:  Completed Specimen:  Respiratory from Bronchial Wash, LLL Updated:  02/10/20 0905     Respiratory Culture GRAM NEGATIVE MARINA  Moderate  Identification and susceptibility pending       Gram Stain (Respiratory) <10 epithelial cells per low power field.     Gram Stain (Respiratory) Many WBC's     Gram Stain (Respiratory) Few Gram negative rods     Gram Stain (Respiratory) Rare Gram positive cocci    DALTON prep [[id number]] Collected:  02/09/20 1452    Order Status:  Completed Specimen:  Body Fluid from Bronchial Wash Updated:  02/09/20 1726     KOH Prep No yeast or fungal elements seen    Narrative:       Bronchial Wash    Respiratory Viral Panel by PCR [facility]; Sputum (bronchial wash) [[id number]] Collected:  02/09/20 1452    Order Status:  Sent Specimen:  Bronchial Wash Updated:  02/09/20 1556    Fungus culture [[id number]] Collected:  02/09/20 1452    Order Status:  Sent Specimen:  Body Fluid from Bronchial Wash Updated:  02/09/20 1555    AFB Culture & Smear [[id number]] Collected:  02/09/20 1452    Order Status:  Sent Specimen:  Body Fluid from Bronchial Wash Updated:  02/09/20 1555    Blood culture [[id number]]     Order Status:  Canceled Specimen:  Blood     Blood culture [[id number]]     Order Status:  Canceled Specimen:  Blood     Influenza A & B by Molecular [[id number]] Collected:  02/08/20 0920    Order Status:  Completed Specimen:  Nasopharyngeal Swab Updated:  02/08/20 0948     Influenza A, Molecular Negative     Influenza B, Molecular Negative     Flu A & B Source Nasal swab    Culture, Respiratory with Gram Stain [[id number]]     Order Status:  No result Specimen:  Respiratory from Sputum

## 2020-02-10 NOTE — PROGRESS NOTES
[facility]  Critical Care Medicine  Progress Note    Patient Name: Bayron Delgado  MRN: [id number]  Admission Date: 2/8/2020  Hospital Length of Stay: 2 days  Code Status: Full Code  Attending Provider: Ade Ward MD  Primary Care Provider: Camille Bustillos NP   Principal Problem: Sepsis    Subjective:     HPI:  49 year old male with PMH of ALS, chronic hypoxemic hypercarbic respiratory failure now with trach and vent dependent who was BIB EMS from home for fevers and increased secretion. Per wife, she noticed that patient developed fever up to 101 F on Thursday. During this same period of time, she also started noticing increasing secretions from tracheostomy site and increasing O2 requirements. Fevers continued to persist despite Advil (does not want to give Tylenol with concerns for liver damage or Ibuprofen for allergic reaction) and reached as high as 105 F. Sputum became darker and appeared green. She also reports that he has had more watery stools, normally stools are soft to solid. With EMS, pt's sats were in the upper 80s on home vent settings of room air, improved with application of 4L/min O2.  While in the ED, patient has been HDS and started on Vanc, Aztreonam and Levo given PCN allergy. Influenza was negative. WBC of 14.42 and febrile to 101 F. CXR showing left lower lobe PNA. Respiratory cultures ordered.  Critical care was consulted for further management and stabilization of this patient in setting of chronic vent dependent ALS.     Of note, he has a history of of chronic MDR Pseudomonas and ESBL UTI, with no plan for treatment at this time. Wife was sick with bronchitis over the last week, no other sick contacts.       Hospital/ICU Course:  2/9-  brochoscopy performed, cultures sent. Vanc and cefepime per ID recs.    Interval History/Significant Events: No fevers overnight. WBC downtrending. Minimal vent settings.      Review of Systems   Unable to perform ROS:  Patient nonverbal     Objective:     Vital Signs (Most Recent):  Temp: 98.6 °F (37 °C) (02/10/20 1115)  Pulse: 79 (02/10/20 1400)  Resp: 16 (02/10/20 1400)  BP: 114/60 (02/10/20 1400)  SpO2: 97 % (02/10/20 1400) Vital Signs (24h Range):  Temp:  [98.2 °F (36.8 °C)-98.9 °F (37.2 °C)] 98.6 °F (37 °C)  Pulse:  [78-95] 79  Resp:  [14-19] 16  SpO2:  [94 %-100 %] 97 %  BP: ()/(53-79) 114/60   Weight: 125.5 kg (276 lb 10.8 oz)  Body mass index is 42.07 kg/m².      Intake/Output Summary (Last 24 hours) at 2/10/2020 1456  Last data filed at 2/10/2020 1400  Gross per 24 hour   Intake 740 ml   Output 815 ml   Net -75 ml       Physical Exam   Constitutional: He appears well-nourished.   HENT:   Head: Normocephalic and atraumatic.   Mouth/Throat: Oropharynx is clear and moist.   Eyes: Pupils are equal, round, and reactive to light. EOM are normal. No scleral icterus.   Neck: Normal range of motion. Neck supple.   Trach in place   Cardiovascular: Normal rate, regular rhythm and intact distal pulses.   No murmur heard.  Pulmonary/Chest:   Mechanical breath sounds, reduced in LLL   Abdominal: Soft. He exhibits no distension.   G-tube in place   Genitourinary:   Genitourinary Comments: Michele in place   Musculoskeletal: He exhibits edema (lower extremities).   Neurological:   Dense quadriplegia. Non-verbal. Uses eye-tracking computer to communicate.    Skin: Skin is warm. He is not diaphoretic. There is erythema (to face, baseline).   Psychiatric:   Unable to assess   Nursing note and vitals reviewed.      Vents:  Vent Mode: A/C (02/10/20 1253)  Ventilator Initiated: Yes (02/09/20 0004)  Set Rate: 14 BPM (02/10/20 1253)  Vt Set: 450 mL (02/10/20 1253)  Pressure Support: 0 cmH20 (02/10/20 1253)  PEEP/CPAP: 5 cmH20 (02/10/20 1253)  Oxygen Concentration (%): 30 (02/10/20 1400)  Peak Airway Pressure: 20 cmH2O (02/10/20 1253)  Plateau Pressure: 0 cmH20 (02/10/20 1253)  Total Ve: 7.18 mL (02/10/20 1253)  F/VT Ratio<105 (RSBI): (!)  33.56 (02/10/20 1253)  Lines/Drains/Airways     Drain                 Gastrostomy/Enterostomy 01/23/17 1113 Percutaneous endoscopic gastrostomy (PEG) LUQ feeding 1113 days         Urethral Catheter 02/08/20 1118 Non-latex 22 Fr. 2 days          Airway                 Surgical Airway 03/19/18 1355 Shiley Cuffed 693 days          Peripheral Intravenous Line                 Peripheral IV - Single Lumen 11/22/19 1150 18 G Right Forearm 80 days         Peripheral IV - Single Lumen 02/08/20 0847 20 G Left Hand 2 days         Peripheral IV - Single Lumen 02/08/20 0929 20 G Left Hand 2 days              Significant Labs:    CBC/Anemia Profile:  Recent Labs   Lab 02/09/20  0336 02/10/20  0344   WBC 7.93 8.52   HGB 10.8* 11.0*   HCT 35.9* 36.5*   * 182   MCV 93 92   RDW 15.7* 15.4*        Chemistries:  Recent Labs   Lab 02/09/20  0336 02/10/20  0344    140   K 3.3* 3.6    106   CO2 22* 24   BUN 8 10   CREATININE 0.4* 0.4*   CALCIUM 8.0* 9.1   ALBUMIN 2.4* 2.6*   PROT 5.6* 6.2   BILITOT 0.6 0.6   ALKPHOS 60 68   ALT 39 38   AST 31 25   MG  --  1.9   PHOS  --  2.6*       All pertinent labs within the past 24 hours have been reviewed.    Significant Imaging:  I have reviewed all pertinent imaging results/findings within the past 24 hours.      ABG  Recent Labs   Lab 02/08/20  0906   PH 7.446   PO2 64*   PCO2 37.0   HCO3 25.5   BE 1     Assessment/Plan:     Neuro  ALS (amyotrophic lateral sclerosis)  With chronic associated pain  - Continue home lyrica, Duloxetine scheduled, [place] prn    Psychiatric  Anxiety  - Continue home Duloxetine and Clonazepam    ENT  Tracheostomy in place  - Chlorhexidine mouthwash    Derm  Skin breakdown  Candidal rash in sacral region with stage III coccyx wound   Continue treating with Nystatin cream   Appreciate wound care assistance and recs    Pulmonary  Pneumonia  Left lower lobe PNA, started on Levaquin, Vanc, Aztreonam in ED  Switched to Vanc cefepime per ID recs.   Blood  cultures NGTD  Resp cultures growing GNR, Identification and susceptibility pending  ID following    Acute hypoxemic respiratory failure  Vent and Trach dependent 2/2 ALS  - Chlorhexidine mouthwash bid  - Protonix bid  -ABG prn  - Bronchoscopy 2/9, cultures pending    Vent Mode: A/C  Oxygen Concentration (%):  [30] 30  Resp Rate Total:  [14 br/min-19 br/min] 16 br/min  Vt Set:  [450 mL] 450 mL  PEEP/CPAP:  [5 cmH20] 5 cmH20  Pressure Support:  [0 cmH20] 0 cmH20  Mean Airway Pressure:  [9.3 evJ05-47 cmH20] 9.7 cmH20      Cardiac/Vascular  Atrial fibrillation  - Continue home Amiodarone 200mg    Renal/  Chronic indwelling Michele catheter  Chronic colonization with MDR Pseudomonas, ESBL  - Oxybutynin daily   - UCx consistent with colonization    ID  * Sepsis  Leukocytosis of 14, Fever to 102 F on presentation. Increased sputum production and O2 requirements  Afebrile overnight, WBC normalized.  - likely 2/2 PNA as source, see pneumonia plan  - chronic indwelling Michele, colonized, unlikely to be source      GI  Constipation  Chronic, requires disimpaction by wife  - Colace  - Consider Miralax, enemas for BMs    Other  Pressure injury of coccygeal region, stage 3  Appreciate wound care recs  Pt does not want to turn 2/2 it takes a long time for him to set up his eye-tracking computer again once he moves. Will continue to encourage turning.     Presence of externally removable percutaneous endoscopic gastrostomy (PEG) tube  Granulation tissue surrounding site, will  Monitor       Critical Care Daily Checklist:    A: Awake: RASS Goal/Actual Goal:    Actual: Paige Agitation Sedation Scale (RASS): Alert and calm   B: Spontaneous Breathing Trial Performed?     C: SAT & SBT Coordinated?  N/a pt is vent-dependent                      D: Delirium: CAM-ICU Overall CAM-ICU: Negative   E: Early Mobility Performed? No   F: Feeding Goal: Goals: 1.) Pt to achieve/tolerate >75% EEN and EPN by RD follow up.   Status: Nutrition Goal  Status: new   Current Diet Order   Procedures    Diet NPO      AS: Analgesia/Sedation n/a   T: Thromboembolic Prophylaxis lovenox   H: HOB > 300 Yes   U: Stress Ulcer Prophylaxis (if needed) Pantoprazole BID   G: Glucose Control n/a   B: Bowel Function Stool Occurrence: 1   I: Indwelling Catheter (Lines & Munoz) Necessity Trach, peg, munoz   D: De-escalation of Antimicrobials/Pharmacotherapies Pending susceptibilities    Plan for the day/ETD Continue abx, monitor resp status     Code Status:  Family/Goals of Care: Full Code         Critical secondary to Patient has a condition that poses threat to life and bodily function: chronic respiratory failure      Critical care was time spent personally by me on the following activities: development of treatment plan with patient or surrogate and bedside caregivers, discussions with consultants, evaluation of patient's response to treatment, examination of patient, ordering and performing treatments and interventions, ordering and review of laboratory studies, ordering and review of radiographic studies, pulse oximetry, re-evaluation of patient's condition. This critical care time did not overlap with that of any other provider or involve time for any procedures.     Kayli Martinez MD  Critical Care Medicine  [facility]

## 2020-02-10 NOTE — SUBJECTIVE & OBJECTIVE
Interval History/Significant Events: No fevers overnight. WBC downtrending. Minimal vent settings.      Review of Systems   Unable to perform ROS: Patient nonverbal     Objective:     Vital Signs (Most Recent):  Temp: 98.6 °F (37 °C) (02/10/20 1115)  Pulse: 79 (02/10/20 1400)  Resp: 16 (02/10/20 1400)  BP: 114/60 (02/10/20 1400)  SpO2: 97 % (02/10/20 1400) Vital Signs (24h Range):  Temp:  [98.2 °F (36.8 °C)-98.9 °F (37.2 °C)] 98.6 °F (37 °C)  Pulse:  [78-95] 79  Resp:  [14-19] 16  SpO2:  [94 %-100 %] 97 %  BP: ()/(53-79) 114/60   Weight: 125.5 kg (276 lb 10.8 oz)  Body mass index is 42.07 kg/m².      Intake/Output Summary (Last 24 hours) at 2/10/2020 1456  Last data filed at 2/10/2020 1400  Gross per 24 hour   Intake 740 ml   Output 815 ml   Net -75 ml       Physical Exam   Constitutional: He appears well-nourished.   HENT:   Head: Normocephalic and atraumatic.   Mouth/Throat: Oropharynx is clear and moist.   Eyes: Pupils are equal, round, and reactive to light. EOM are normal. No scleral icterus.   Neck: Normal range of motion. Neck supple.   Trach in place   Cardiovascular: Normal rate, regular rhythm and intact distal pulses.   No murmur heard.  Pulmonary/Chest:   Mechanical breath sounds, reduced in LLL   Abdominal: Soft. He exhibits no distension.   G-tube in place   Genitourinary:   Genitourinary Comments: Michele in place   Musculoskeletal: He exhibits edema (lower extremities).   Neurological:   Dense quadriplegia. Non-verbal. Uses eye-tracking computer to communicate.    Skin: Skin is warm. He is not diaphoretic. There is erythema (to face, baseline).   Psychiatric:   Unable to assess   Nursing note and vitals reviewed.      Vents:  Vent Mode: A/C (02/10/20 1253)  Ventilator Initiated: Yes (02/09/20 0004)  Set Rate: 14 BPM (02/10/20 1253)  Vt Set: 450 mL (02/10/20 1253)  Pressure Support: 0 cmH20 (02/10/20 1253)  PEEP/CPAP: 5 cmH20 (02/10/20 1253)  Oxygen Concentration (%): 30 (02/10/20 1400)  Peak  Airway Pressure: 20 cmH2O (02/10/20 1253)  Plateau Pressure: 0 cmH20 (02/10/20 1253)  Total Ve: 7.18 mL (02/10/20 1253)  F/VT Ratio<105 (RSBI): (!) 33.56 (02/10/20 1253)  Lines/Drains/Airways     Drain                 Gastrostomy/Enterostomy 01/23/17 1113 Percutaneous endoscopic gastrostomy (PEG) LUQ feeding 1113 days         Urethral Catheter 02/08/20 1118 Non-latex 22 Fr. 2 days          Airway                 Surgical Airway 03/19/18 1355 Shiley Cuffed 693 days          Peripheral Intravenous Line                 Peripheral IV - Single Lumen 11/22/19 1150 18 G Right Forearm 80 days         Peripheral IV - Single Lumen 02/08/20 0847 20 G Left Hand 2 days         Peripheral IV - Single Lumen 02/08/20 0929 20 G Left Hand 2 days              Significant Labs:    CBC/Anemia Profile:  Recent Labs   Lab 02/09/20  0336 02/10/20  0344   WBC 7.93 8.52   HGB 10.8* 11.0*   HCT 35.9* 36.5*   * 182   MCV 93 92   RDW 15.7* 15.4*        Chemistries:  Recent Labs   Lab 02/09/20  0336 02/10/20  0344    140   K 3.3* 3.6    106   CO2 22* 24   BUN 8 10   CREATININE 0.4* 0.4*   CALCIUM 8.0* 9.1   ALBUMIN 2.4* 2.6*   PROT 5.6* 6.2   BILITOT 0.6 0.6   ALKPHOS 60 68   ALT 39 38   AST 31 25   MG  --  1.9   PHOS  --  2.6*       All pertinent labs within the past 24 hours have been reviewed.    Significant Imaging:  I have reviewed all pertinent imaging results/findings within the past 24 hours.

## 2020-02-10 NOTE — ASSESSMENT & PLAN NOTE
Candidal rash in sacral region with stage III coccyx wound   Continue treating with Nystatin cream   Appreciate wound care assistance and recs

## 2020-02-10 NOTE — ASSESSMENT & PLAN NOTE
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

## 2020-02-10 NOTE — ASSESSMENT & PLAN NOTE
Chronic colonization with MDR Pseudomonas, ESBL  - Oxybutynin daily   - UCx consistent with colonization

## 2020-02-10 NOTE — PLAN OF CARE
CMICU DAILY GOALS       A: Awake    RASS: Goal -  0  Actual -  0   Restraint necessity: N/A  B: Breath   SBT: NA   C: Coordinate A & B, analgesics/sedatives   Pain: managed    SAT: NA  D: Delirium   CAM-ICU: Overall CAM-ICU: Positive  E: Early Mobility   MOVE Screen: Fail   Activity: Activity Management: activity adjusted per tolerance, activity clustered for rest period, bedrest maintained per order  FAS: Feeding/Nutrition   Diet order: Diet/Nutrition Received: NPO,   Fluid restriction:    T: Thrombus   DVT prophylaxis: VTE Required Core Measure: Pharmacological prophylaxis initiated/maintained  H: HOB Elevation   Head of Bed (HOB): HOB at 30-45 degrees  U: Ulcer Prophylaxis   GI: yes  G: Glucose control   managed    S: Skin   Bundle compliance: yes   Bathing/Skin Care: bath, chlorhexidine, bath, complete, back care, dressed/undressed, incontinence care, linen changed Date: [unfilled]  B: Bowel Function   diarrhea   I: Indwelling Catheters   Michele necessity:      Urethral Catheter 02/08/20 1118 Non-latex 22 Fr.-Reason for Continuing Urinary Catheterization: Critically ill in ICU requiring intensive monitoring   CVC necessity: No   IPAD offered: Not appropriate  D: De-escalation Antibx   Yes  Plan for the day   Bronch wash completed. Continue antibiotic regimen.  Family/Goals of care/Code Status   Code Status: Full Code     No acute events throughout day, VS and assessment per flow sheet, patient progressing towards goals as tolerated, plan of care reviewed with Bayron Delgado and family, all concerns addressed, will continue to monitor.

## 2020-02-10 NOTE — ASSESSMENT & PLAN NOTE
Left lower lobe PNA, started on Levaquin, Vanc, Aztreonam in ED  Switched to Vanc cefepime per ID recs.   Blood cultures NGTD  Resp cultures growing GNR, Identification and susceptibility pending  ID following

## 2020-02-10 NOTE — ASSESSMENT & PLAN NOTE
Appreciate wound care recs  Pt does not want to turn 2/2 it takes a long time for him to set up his eye-tracking computer again once he moves. Will continue to encourage turning.

## 2020-02-10 NOTE — PLAN OF CARE
CM met with patient and mother, Patsy Delgado at the bedside to discuss D/C POC needs. Patient trached and unable to verify demographics in the chart are correct. Also called wife, Perlita, to verify home services.  CM name and contact number listed on the patient's white board.  CM provided explanation of discharge plan process. CM left blue folder at the bedside with explanation of qualification for placement and facility resources. Patient's family expressed understanding. CM remains available for any further patient needs or concerns.        Camille Bustillos NP  [address] FLO[address] / [facility] [place] [address]      [facility] - [place] - [address]  [address]  [place] [address]  Phone: [phone] Fax: [phone]    [id number] - [place], [place] - [address] AT [facility] [address]  [address]  [place] [address]  Phone: [phone] Fax: [phone]      Extended Emergency Contact Information  Primary Emergency Contact: Perlita Delgado  Address: [address] [address] [place] [address] [place]  Mobile Phone: [phone]  Relation: Spouse  Secondary Emergency Contact: Alberto Delgado   [facility]  Home Phone: [phone]  Relation: Brother    No future appointments.    Payor: MEDICARE / Plan: MEDICARE PART A & B / Product Type: [facility] /        02/10/20 1118   Discharge Assessment   Assessment Type Discharge Planning Assessment   Assessment information obtained from? Caregiver;Other  (mother)   Prior to hospitilization cognitive status: Alert/Oriented   Prior to hospitalization functional status: Completely Dependent   Current cognitive status: Unable to Assess   Current Functional Status: Completely Dependent   Facility Arrived From: ED   Lives With spouse   Able to Return to Prior Arrangements other (see comments)  (TBD)   Is patient able to care for self after discharge? Unable to  determine at this time (comments)   Who are your caregiver(s) and their phone number(s)? Perlita Delgado, wife, [phone]   Patient currently being followed by outpatient case management? No   Patient currently receives any other outside agency services? Yes   Name and contact number of agency or person providing outside services [facility], and [facility] for tube feedings   Equipment Currently Used at Home ventilator;power chair;hospital bed;lift device   Do you have any problems affording any of your prescribed medications? No   Is the patient taking medications as prescribed? yes   Does the patient have transportation home? Yes   Transportation Anticipated health plan transportation   Does the patient receive services at the [facility]? No   Discharge Plan A Home with family   Discharge Plan B Home with family   DME Needed Upon Discharge  other (see comments)  (TBD)       Rajeev Benz RN MSN  Critical Care-   Ext. [phone]

## 2020-02-10 NOTE — SUBJECTIVE & OBJECTIVE
Past Medical History:   Diagnosis Date    ALS (amyotrophic lateral sclerosis)     Atrial fibrillation     CHF (congestive heart failure)     Neuropathy     Ventilator dependent        Past Surgical History:   Procedure Laterality Date    APPENDECTOMY      GASTROSTOMY TUBE PLACEMENT  2017    HERNIA REPAIR      TRACHEOSTOMY  03/2018    TRACHEOSTOMY TUBE CHANGE N/A 11/22/2019    Procedure: REPLACEMENT, TRACHEOSTOMY TUBE;  Surgeon: Daniel Horner MD;  [facility] OR [address] [place] [address];  Service: ENT;  Laterality: N/A;       Review of patient's allergies indicates:   Allergen Reactions    Atropine     Ibuprofen      A-fib    Latex, natural rubber     Penicillins Other (See Comments)     Per wife- his mother stated he was allergic to it.  Had redness associated with cefepime 3/14/2018       Medications:  Medications Prior to Admission   Medication Sig    amiodarone (PACERONE) 200 MG Tab 200 mg by Per G Tube route 2 (two) times daily.    carvedilol (COREG) 6.25 MG tablet 1 tablet (6.25 mg total) by Per G Tube route 2 (two) times daily. (Patient taking differently: 12.5 mg by Per G Tube route 2 (two) times daily. )    clonazePAM (KLONOPIN) 0.5 MG tablet 2 tablets (1 mg total) by Per G Tube route 4 (four) times daily. (Patient taking differently: 0.5 mg by Per G Tube route 4 (four) times daily. )    diphenhydrAMINE (BENADRYL) 50 MG tablet Take 50 mg by mouth nightly as needed for Itching.    DULoxetine (CYMBALTA) 60 MG capsule Take 1 capsule (60 mg total) by mouth once daily.    famotidine (PEPCID) 20 MG tablet 1 tablet (20 mg total) by Per G Tube route 2 (two) times daily.    furosemide (LASIX) 40 MG tablet Take 1 tablet (40 mg total) by mouth as needed (use to increased to furosemide 40 mg twice per day for 5 days for excessive edema).    HYDROcodone-acetaminophen (NORCO)  mg per tablet 1 tablet by Per G Tube route every 6 (six) hours as needed for Pain. (Patient taking differently: 1 tablet by  Per G Tube route every 6 (six) hours as needed for Pain. 1/2 a pill tid and 1 tab at hs)    hydrOXYzine HCl (ATARAX) 25 MG tablet 1 tablet (25 mg total) by Per G Tube route 4 (four) times daily as needed for Itching.    LACTOSE-REDUCED FOOD/FIBER (ISOSOURCE 1.5 YURY ORAL) by Per G Tube route 6 (six) times daily.     lidocaine (LIDODERM) 5 % Place 3 patches onto the skin once daily. Remove & Discard patch within 12 hours or as directed by MD    metroNIDAZOLE (FLAGYL) 250 MG tablet Take 1 tablet (250 mg total) via PEG Tube every 8 (eight) hours.    mineral oil liquid Take 30 mLs by mouth daily as needed for Constipation.    nystatin (MYCOSTATIN) cream Apply 30 g topically 2 (two) times daily.    nystatin (MYCOSTATIN) powder Apply 60 g topically 2 (two) times daily.    oxybutynin (DITROPAN XL) 15 MG TR24 Take 1 tablet (15 mg total) by mouth once daily.    polyethylene glycol (GLYCOLAX) 17 gram PwPk Take by mouth.    potassium, sodium phosphates (PHOS-NAK) 280-160-250 mg PwPk Take 1 packet by mouth 2 (two) times daily.    pregabalin (LYRICA) 100 MG capsule 1 capsule (100 mg total) by Per G Tube route 3 (three) times daily.    prochlorperazine (COMPAZINE) 10 MG tablet Take 10 mg by mouth every evening.    scopolamine (TRANSDERM-SCOP) 1.3-1.5 mg (1 mg over 3 days) Place 1 patch onto the skin Every 3 (three) days.    senna-docusate 8.6-50 mg (SENNA WITH DOCUSATE SODIUM) 8.6-50 mg per tablet Take 7 tablets by mouth once daily.    temazepam (RESTORIL) 15 mg Cap 2 capsules (30 mg total) by Per G Tube route every evening.    traMADol (ULTRAM) 50 mg tablet Take 50 mg by mouth every 6 (six) hours.    levoFLOXacin (LEVAQUIN) 750 MG tablet Take 1 tablet (750mg total) via PEG Tube once daily    mupirocin (BACTROBAN) 2 % ointment Apply topically 3 (three) times daily.    silver nitrate applicators 75-25 % applicator Apply topically 3 (three) times a week. Apply to granulation tissue PRN.     Antibiotics (From  admission, onward)    Start     Stop Route Frequency Ordered    02/08/20 2245  ceFEPIme injection 2 g      -- IV Every 8 hours (non-standard times) 02/08/20 2138    02/08/20 2230  vancomycin 1.5 g in dextrose 5 % 250 mL IVPB (ready to mix)      -- IV Every 12 hours (non-standard times) 02/08/20 1308        Antifungals (From admission, onward)    None        Antivirals (From admission, onward)    None           Immunization History   Administered Date(s) Administered    Pneumococcal Conjugate - 13 Valent 07/25/2016       Family History     None        Social History     Socioeconomic History    Marital status:      Spouse name: Not on file    Number of children: Not on file    Years of education: Not on file    Highest education level: Not on file   Occupational History    Not on file   Social Needs    Financial resource strain: Not on file    Food insecurity:     Worry: Not on file     Inability: Not on file    Transportation needs:     Medical: Not on file     Non-medical: Not on file   Tobacco Use    Smoking status: Never Smoker   Substance and Sexual Activity    Alcohol use: No    Drug use: No    Sexual activity: Yes     Partners: Female   Lifestyle    Physical activity:     Days per week: Not on file     Minutes per session: Not on file    Stress: Not on file   Relationships    Social connections:     Talks on phone: Not on file     Gets together: Not on file     Attends Jainism service: Not on file     Active member of club or organization: Not on file     Attends meetings of clubs or organizations: Not on file     Relationship status: Not on file   Other Topics Concern    Not on file   Social History Narrative    Not on file     Review of Systems   Unable to perform ROS: Patient nonverbal     Interval History per nurse: Parents and caregiver at bedside.  No acute events overnight.    Objective:     Vital Signs (Most Recent):  Temp: 98.2 °F (36.8 °C) (02/09/20 1500)  Pulse: 90  (02/09/20 1716)  Resp: 17 (02/09/20 1900)  BP: 128/67 (02/09/20 1800)  SpO2: 97 % (02/09/20 1900) Vital Signs (24h Range):  Temp:  [97.9 °F (36.6 °C)-101.5 °F (38.6 °C)] 98.2 °F (36.8 °C)  Pulse:  [] 90  Resp:  [14-20] 17  SpO2:  [92 %-100 %] 97 %  BP: (101-160)/(57-79) 128/67     Weight: 125.5 kg (276 lb 10.8 oz)  Body mass index is 42.07 kg/m².    Estimated Creatinine Clearance: 291.3 mL/min (A) (based on SCr of 0.4 mg/dL (L)).    Physical Exam   Constitutional: He is oriented to person, place, and time. He appears well-developed and well-nourished. He is cooperative.  Non-toxic appearance. No distress.   nonverbal   HENT:   Head: Normocephalic and atraumatic. Head is without right periorbital erythema and without left periorbital erythema.   Right Ear: External ear normal.   Left Ear: External ear normal.   Nose: Nose normal.   Trach in place   Eyes: Pupils are equal, round, and reactive to light. Conjunctivae, EOM and lids are normal. Right eye exhibits no discharge. Left eye exhibits no discharge. Right conjunctiva is not injected. Left conjunctiva is not injected. No scleral icterus.   Neck: Normal range of motion. No erythema present.   Cardiovascular: Normal rate and regular rhythm.   No murmur heard.  Pulmonary/Chest: Effort normal and breath sounds normal. No stridor. No tachypnea. No respiratory distress.   Abdominal: Soft. Normal appearance and bowel sounds are normal. He exhibits no distension.   PEG left side- excoriated tissue with ulceration and some hypergranulation at exit. Erythema extends about 2 cms.   Genitourinary: Penis normal.   Genitourinary Comments: Michele in place- clear urine in bag.   Musculoskeletal: He exhibits edema.   Generalized edema.   Neurological: He is alert and oriented to person, place, and time. No cranial nerve deficit. Coordination normal.   Skin: Skin is warm and dry. Rash noted. He is not diaphoretic. No erythema.   Has multiple pinpoint purplish lesions on chest  and arms. Same as yesterday.   Psychiatric: His speech is normal. Cognition and memory are normal.   Unable to assess. Patient with eyes open.   Nursing note and vitals reviewed.      Significant Labs:   Blood Culture:   Recent Labs   Lab 02/08/20  0853 02/08/20  0854   LABBLOO No Growth to date  No Growth to date No Growth to date  No Growth to date     CBC:   Recent Labs   Lab 02/08/20  0853 02/09/20  0336   WBC 14.42* 7.93   HGB 12.0* 10.8*   HCT 38.4* 35.9*    143*     CMP:   Recent Labs   Lab 02/08/20  0853 02/08/20  1240 02/09/20  0336   * 135* 140   K 4.0 3.8 3.3*   CL 99 100 108   CO2 23 24 22*   * 158* 137*   BUN 12 11 8   CREATININE 0.5 0.5 0.4*   CALCIUM 9.1 8.6* 8.0*   PROT 6.6  --  5.6*   ALBUMIN 3.1*  --  2.4*   BILITOT 1.0  --  0.6   ALKPHOS 71  --  60   AST 45*  --  31   ALT 52*  --  39   ANIONGAP 12 11 10   EGFRNONAA >60.0 >60.0 >60.0     Lactic Acid:   Recent Labs   Lab 02/08/20  1240 02/08/20  2108 02/09/20  0144   LACTATE 2.0 2.7* 1.8     Procalcitonin:   Recent Labs   Lab 02/08/20  0853 02/08/20  1240   PROCAL 0.26* 0.37*     Respiratory Culture:   Recent Labs   Lab 11/07/19  1154 02/09/20  1452   GSRESP <10 epithelial cells per low power field.  Moderate WBC's  Many Gram negative rods  Few Gram positive cocci <10 epithelial cells per low power field.  Many WBC's  Few Gram negative rods  Rare Gram positive cocci   RESPIRATORYC No S aureus isolated.  PSEUDOMONAS AERUGINOSA  Many  *  PROTEUS MIRABILIS  Many  Normal respiratory alverto also present  *  --      Urine Studies:   Recent Labs   Lab 02/08/20  1119   COLORU Mary   APPEARANCEUA Cloudy*   PHUR 6.0   SPECGRAV 1.005   PROTEINUA 1+*   GLUCUA Negative   KETONESU Negative   BILIRUBINUA Negative   OCCULTUA 3+*   NITRITE Negative   LEUKOCYTESUR 3+*   RBCUA 50*   WBCUA >100*   BACTERIA Many*   HYALINECASTS 0       Significant Imaging: I have reviewed all pertinent imaging results/findings within the past 24 hours.

## 2020-02-10 NOTE — PROGRESS NOTES
[facility]  Infectious Disease  Progress Note    Patient Name: Bayron Delgado  MRN: [id number]  Admission Date: 2/8/2020  Length of Stay: 1 days  Attending Physician: Kike Fuentes MD  Primary Care Provider: Camille Bustillos NP    Isolation Status: No active isolations  Assessment/Plan:      Pneumonia  This is a 49-year-old male with a history of ALS, chronic hypoxemic hypercarbic respiratory failure status post venting and trach, who is brought in by EMS with report of fever since Thursday. He has had increased respiratory secretions, sputum changed from white/yellow to green and is very thick. Oxygenation also worse that normal.. 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   .  -CT of abdomin and pelvis with stool. Also new LLL opacification.  -Obtain respiratory sample for gram stain and culture.   -Needs wound care consult for sacrum and PEG. Low threshold for diflucan but at this point seems to be responding to nystatin cream and powder.   -PEG tube site does not look good- GI consult.   -Follow up blood culture results-negative thus far..   -Continue vanc and cefepime.  -urine culture with enterococcus species but no evidence of obstructive picture of clinic symptoms concerning for infection- c/w colonization  Will follow.          Thank you for your consult. I will follow-up with patient. Please contact us if you have any additional questions.    Katherine K Baumgarten, MD  Infectious Disease  [facility]    Subjective:     Principal Problem:Sepsis    HPI: This is a 49-year-old male with a history of ALS, chronic hypoxemic hypercarbic respiratory failure status post venting and trach, who is brought in by EMS with report of fever since Thursday.  T-max 105°.  History is provided by the patient's wife (a nurse) and sister who are at bedside. He has had increased respiratory secretions, sputum changed from  white/yellow to green and is very thick. Oxygenation also worse that normal per wife. Diarrhea times one episode. He is on bowel regimen with senna, miralax, and that was held. Last night his urine became more concentrated and dark. 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 He is followed by Dr. María Caruso and they understand he is colonized and they do not check urine cultures. His urine has thick sediment which she flushes and changes munoz usually monthly. Was just changed while in ED. Has had wound on sacrum and also redness of that entire area- tried multiple barrier creams and seemed to respond to nystatin powder and cream. Also in folds of groin at times. He is not currently on antibiotics.  His wife recently had a prolonged course of bronchitis, but she tried to stay way from him while she was symptomatic.  Otherwise no sick contacts.   Also has had intermittent drainage from PEG tube. Applies some silver nitrate at times. About the same currently. Patient is able to communicate yes or no with his eyes, and he denies pain or shortness of breath at this time.  History is otherwise limited to his nonverbal status. Patient is very well cared for at home and wife provides very detailed history.       Past Medical History:   Diagnosis Date    ALS (amyotrophic lateral sclerosis)     Atrial fibrillation     CHF (congestive heart failure)     Neuropathy     Ventilator dependent        Past Surgical History:   Procedure Laterality Date    APPENDECTOMY      GASTROSTOMY TUBE PLACEMENT  2017    HERNIA REPAIR      TRACHEOSTOMY  03/2018    TRACHEOSTOMY TUBE CHANGE N/A 11/22/2019    Procedure: REPLACEMENT, TRACHEOSTOMY TUBE;  Surgeon: Daniel Horner MD;  Location: [facility] OR [address] [place] [address];  Service: ENT;  Laterality: N/A;       Review of patient's allergies indicates:   Allergen Reactions    Atropine     Ibuprofen      A-fib    Latex, natural rubber      Penicillins Other (See Comments)     Per wife- his mother stated he was allergic to it.  Had redness associated with cefepime 3/14/2018       Medications:  Medications Prior to Admission   Medication Sig    amiodarone (PACERONE) 200 MG Tab 200 mg by Per G Tube route 2 (two) times daily.    carvedilol (COREG) 6.25 MG tablet 1 tablet (6.25 mg total) by Per G Tube route 2 (two) times daily. (Patient taking differently: 12.5 mg by Per G Tube route 2 (two) times daily. )    clonazePAM (KLONOPIN) 0.5 MG tablet 2 tablets (1 mg total) by Per G Tube route 4 (four) times daily. (Patient taking differently: 0.5 mg by Per G Tube route 4 (four) times daily. )    diphenhydrAMINE (BENADRYL) 50 MG tablet Take 50 mg by mouth nightly as needed for Itching.    DULoxetine (CYMBALTA) 60 MG capsule Take 1 capsule (60 mg total) by mouth once daily.    famotidine (PEPCID) 20 MG tablet 1 tablet (20 mg total) by Per G Tube route 2 (two) times daily.    furosemide (LASIX) 40 MG tablet Take 1 tablet (40 mg total) by mouth as needed (use to increased to furosemide 40 mg twice per day for 5 days for excessive edema).    HYDROcodone-acetaminophen (NORCO)  mg per tablet 1 tablet by Per G Tube route every 6 (six) hours as needed for Pain. (Patient taking differently: 1 tablet by Per G Tube route every 6 (six) hours as needed for Pain. 1/2 a pill tid and 1 tab at hs)    hydrOXYzine HCl (ATARAX) 25 MG tablet 1 tablet (25 mg total) by Per G Tube route 4 (four) times daily as needed for Itching.    LACTOSE-REDUCED FOOD/FIBER (ISOSOURCE 1.5 YURY ORAL) by Per G Tube route 6 (six) times daily.     lidocaine (LIDODERM) 5 % Place 3 patches onto the skin once daily. Remove & Discard patch within 12 hours or as directed by MD    metroNIDAZOLE (FLAGYL) 250 MG tablet Take 1 tablet (250 mg total) via PEG Tube every 8 (eight) hours.    mineral oil liquid Take 30 mLs by mouth daily as needed for Constipation.    nystatin (MYCOSTATIN) cream Apply  30 g topically 2 (two) times daily.    nystatin (MYCOSTATIN) powder Apply 60 g topically 2 (two) times daily.    oxybutynin (DITROPAN XL) 15 MG TR24 Take 1 tablet (15 mg total) by mouth once daily.    polyethylene glycol (GLYCOLAX) 17 gram PwPk Take by mouth.    potassium, sodium phosphates (PHOS-NAK) 280-160-250 mg PwPk Take 1 packet by mouth 2 (two) times daily.    pregabalin (LYRICA) 100 MG capsule 1 capsule (100 mg total) by Per G Tube route 3 (three) times daily.    prochlorperazine (COMPAZINE) 10 MG tablet Take 10 mg by mouth every evening.    scopolamine (TRANSDERM-SCOP) 1.3-1.5 mg (1 mg over 3 days) Place 1 patch onto the skin Every 3 (three) days.    senna-docusate 8.6-50 mg (SENNA WITH DOCUSATE SODIUM) 8.6-50 mg per tablet Take 7 tablets by mouth once daily.    temazepam (RESTORIL) 15 mg Cap 2 capsules (30 mg total) by Per G Tube route every evening.    traMADol (ULTRAM) 50 mg tablet Take 50 mg by mouth every 6 (six) hours.    levoFLOXacin (LEVAQUIN) 750 MG tablet Take 1 tablet (750mg total) via PEG Tube once daily    mupirocin (BACTROBAN) 2 % ointment Apply topically 3 (three) times daily.    silver nitrate applicators 75-25 % applicator Apply topically 3 (three) times a week. Apply to granulation tissue PRN.     Antibiotics (From admission, onward)    Start     Stop Route Frequency Ordered    02/08/20 2245  ceFEPIme injection 2 g      -- IV Every 8 hours (non-standard times) 02/08/20 2138    02/08/20 2230  vancomycin 1.5 g in dextrose 5 % 250 mL IVPB (ready to mix)      -- IV Every 12 hours (non-standard times) 02/08/20 1308        Antifungals (From admission, onward)    None        Antivirals (From admission, onward)    None           Immunization History   Administered Date(s) Administered    Pneumococcal Conjugate - 13 Valent 07/25/2016       Family History     None        Social History     Socioeconomic History    Marital status:      Spouse name: Not on file    Number of  children: Not on file    Years of education: Not on file    Highest education level: Not on file   Occupational History    Not on file   Social Needs    Financial resource strain: Not on file    Food insecurity:     Worry: Not on file     Inability: Not on file    Transportation needs:     Medical: Not on file     Non-medical: Not on file   Tobacco Use    Smoking status: Never Smoker   Substance and Sexual Activity    Alcohol use: No    Drug use: No    Sexual activity: Yes     Partners: Female   Lifestyle    Physical activity:     Days per week: Not on file     Minutes per session: Not on file    Stress: Not on file   Relationships    Social connections:     Talks on phone: Not on file     Gets together: Not on file     Attends [employer] service: Not on file     Active member of club or organization: Not on file     Attends meetings of clubs or organizations: Not on file     Relationship status: Not on file   Other Topics Concern    Not on file   Social History Narrative    Not on file     Review of Systems   Unable to perform ROS: Patient nonverbal     Interval History per nurse: Parents and caregiver at bedside.  No acute events overnight.    Objective:     Vital Signs (Most Recent):  Temp: 98.2 °F (36.8 °C) (02/09/20 1500)  Pulse: 90 (02/09/20 1716)  Resp: 17 (02/09/20 1900)  BP: 128/67 (02/09/20 1800)  SpO2: 97 % (02/09/20 1900) Vital Signs (24h Range):  Temp:  [97.9 °F (36.6 °C)-101.5 °F (38.6 °C)] 98.2 °F (36.8 °C)  Pulse:  [] 90  Resp:  [14-20] 17  SpO2:  [92 %-100 %] 97 %  BP: (101-160)/(57-79) 128/67     Weight: 125.5 kg (276 lb 10.8 oz)  Body mass index is 42.07 kg/m².    Estimated Creatinine Clearance: 291.3 mL/min (A) (based on SCr of 0.4 mg/dL (L)).    Physical Exam   Constitutional: He is oriented to person, place, and time. He appears well-developed and well-nourished. He is cooperative.  Non-toxic appearance. No distress.   nonverbal   HENT:   Head: Normocephalic and atraumatic.  Head is without right periorbital erythema and without left periorbital erythema.   Right Ear: External ear normal.   Left Ear: External ear normal.   Nose: Nose normal.   Trach in place   Eyes: Pupils are equal, round, and reactive to light. Conjunctivae, EOM and lids are normal. Right eye exhibits no discharge. Left eye exhibits no discharge. Right conjunctiva is not injected. Left conjunctiva is not injected. No scleral icterus.   Neck: Normal range of motion. No erythema present.   Cardiovascular: Normal rate and regular rhythm.   No murmur heard.  Pulmonary/Chest: Effort normal and breath sounds normal. No stridor. No tachypnea. No respiratory distress.   Abdominal: Soft. Normal appearance and bowel sounds are normal. He exhibits no distension.   PEG left side- excoriated tissue with ulceration and some hypergranulation at exit. Erythema extends about 2 cms.   Genitourinary: Penis normal.   Genitourinary Comments: Michele in place- clear urine in bag.   Musculoskeletal: He exhibits edema.   Generalized edema.   Neurological: He is alert and oriented to person, place, and time. No cranial nerve deficit. Coordination normal.   Skin: Skin is warm and dry. Rash noted. He is not diaphoretic. No erythema.   Has multiple pinpoint purplish lesions on chest and arms. Same as yesterday.   Psychiatric: His speech is normal. Cognition and memory are normal.   Unable to assess. Patient with eyes open.   Nursing note and vitals reviewed.      Significant Labs:   Blood Culture:   Recent Labs   Lab 02/08/20  0853 02/08/20  0854   LABBLOO No Growth to date  No Growth to date No Growth to date  No Growth to date     CBC:   Recent Labs   Lab 02/08/20  0853 02/09/20  0336   WBC 14.42* 7.93   HGB 12.0* 10.8*   HCT 38.4* 35.9*    143*     CMP:   Recent Labs   Lab 02/08/20  0853 02/08/20  1240 02/09/20  0336   * 135* 140   K 4.0 3.8 3.3*   CL 99 100 108   CO2 23 24 22*   * 158* 137*   BUN 12 11 8   CREATININE 0.5  0.5 0.4*   CALCIUM 9.1 8.6* 8.0*   PROT 6.6  --  5.6*   ALBUMIN 3.1*  --  2.4*   BILITOT 1.0  --  0.6   ALKPHOS 71  --  60   AST 45*  --  31   ALT 52*  --  39   ANIONGAP 12 11 10   EGFRNONAA >60.0 >60.0 >60.0     Lactic Acid:   Recent Labs   Lab 02/08/20  1240 02/08/20  2108 02/09/20  0144   LACTATE 2.0 2.7* 1.8     Procalcitonin:   Recent Labs   Lab 02/08/20  0853 02/08/20  1240   PROCAL 0.26* 0.37*     Respiratory Culture:   Recent Labs   Lab 11/07/19  1154 02/09/20  1452   GSRESP <10 epithelial cells per low power field.  Moderate WBC's  Many Gram negative rods  Few Gram positive cocci <10 epithelial cells per low power field.  Many WBC's  Few Gram negative rods  Rare Gram positive cocci   RESPIRATORYC No S aureus isolated.  PSEUDOMONAS AERUGINOSA  Many  *  PROTEUS MIRABILIS  Many  Normal respiratory alverto also present  *  --      Urine Studies:   Recent Labs   Lab 02/08/20  1119   COLORU Mary   APPEARANCEUA Cloudy*   PHUR 6.0   SPECGRAV 1.005   PROTEINUA 1+*   GLUCUA Negative   KETONESU Negative   BILIRUBINUA Negative   OCCULTUA 3+*   NITRITE Negative   LEUKOCYTESUR 3+*   RBCUA 50*   WBCUA >100*   BACTERIA Many*   HYALINECASTS 0       Significant Imaging: I have reviewed all pertinent imaging results/findings within the past 24 hours.

## 2020-02-10 NOTE — PROGRESS NOTES
Pharmacokinetic Assessment Follow Up: IV Vancomycin    Vancomycin serum concentration assessment(s):    The vancomycin trough was drawn early based on the 1147 am administration. True trough predicted to be approximately 16 mcg/ml .       Vancomycin Regimen Plan:    Continue regimen to Vancomycin 1500 mg IV every 12 hours with next serum trough concentration measured at 1130 prior to 4th dose on 02/11/20    Drug levels (last 3 results):  Recent Labs   Lab Result Units 02/09/20  2208   Vancomycin-Trough ug/mL 23.6*       Pharmacy will continue to follow and monitor vancomycin.    Please contact pharmacy at extension [phone] for questions regarding this assessment.    Thank you for the consult,   Alberto Conway       Patient brief summary:  Bayron Delgado is a 49 y.o. male initiated on antimicrobial therapy with IV Vancomycin for treatment of bacteremia        Drug Allergies:   Review of patient's allergies indicates:   Allergen Reactions    Atropine     Ibuprofen      A-fib    Latex, natural rubber     Penicillins Other (See Comments)     Per wife- his mother stated he was allergic to it.  Had redness associated with cefepime 3/14/2018       Actual Body Weight:   125.5 kg      Renal Function:   Estimated Creatinine Clearance: 291.3 mL/min (A) (based on SCr of 0.4 mg/dL (L)).,     Dialysis Method (if applicable):  N/A    CBC (last 72 hours):  Recent Labs   Lab Result Units 02/08/20  0853 02/09/20  0336   WBC K/uL 14.42* 7.93   Hemoglobin g/dL 12.0* 10.8*   Hematocrit % 38.4* 35.9*   Platelets K/uL 218 143*   Gran% % 84.9* 81.7*   Lymph% % 5.1* 5.3*   Mono% % 8.9 11.6   Eosinophil% % 0.1 0.6   Basophil% % 0.5 0.3   Differential Method  Automated Automated       Metabolic Panel (last 72 hours):  Recent Labs   Lab Result Units 02/08/20  0853 02/08/20  1119 02/08/20  1240 02/09/20  0336   Sodium mmol/L 134*  --  135* 140   Potassium mmol/L 4.0  --  3.8 3.3*   Chloride mmol/L 99  --  100 108   CO2 mmol/L 23  --  24 22*    Glucose mg/dL 189*  --  158* 137*   Glucose, UA   --  Negative  --   --    BUN, Bld mg/dL 12  --  11 8   Creatinine mg/dL 0.5  --  0.5 0.4*   Albumin g/dL 3.1*  --   --  2.4*   Total Bilirubin mg/dL 1.0  --   --  0.6   Alkaline Phosphatase U/L 71  --   --  60   AST U/L 45*  --   --  31   ALT U/L 52*  --   --  39       Vancomycin Administrations:  vancomycin given in the last 96 hours                   vancomycin 1.5 g in dextrose 5 % 250 mL IVPB (ready to mix) (mg) 1,500 mg New Bag 02/09/20 1147     1,500 mg New Bag 02/08/20 2209    vancomycin 2 g in 0.9% sodium chloride 500 mL IVPB (mg) 2,000 mg New Bag 02/08/20 1024                Microbiologic Results:  Microbiology Results (last 7 days)     Procedure Component Value Units Date/Time    Culture, Respiratory with Gram Stain [[id number]] Collected:  02/09/20 1452    Order Status:  Completed Specimen:  Respiratory from Bronchial Wash, LLL Updated:  02/09/20 1819     Gram Stain (Respiratory) <10 epithelial cells per low power field.     Gram Stain (Respiratory) Many WBC's     Gram Stain (Respiratory) Few Gram negative rods     Gram Stain (Respiratory) Rare Gram positive cocci    DALTON prep [[id number]] Collected:  02/09/20 1452    Order Status:  Completed Specimen:  Body Fluid from Bronchial Wash Updated:  02/09/20 1726     KOH Prep No yeast or fungal elements seen    Narrative:       Bronchial Wash    Respiratory Viral Panel by PCR [facility]; Sputum (bronchial wash) [[id number]] Collected:  02/09/20 1452    Order Status:  Sent Specimen:  Bronchial Wash Updated:  02/09/20 1556    Fungus culture [[id number]] Collected:  02/09/20 1452    Order Status:  Sent Specimen:  Body Fluid from Bronchial Wash Updated:  02/09/20 1555    AFB Culture & Smear [[id number]] Collected:  02/09/20 1452    Order Status:  Sent Specimen:  Body Fluid from Bronchial Wash Updated:  02/09/20 1555    Urine culture [[id number]]  (Abnormal) Collected:  02/08/20 1119    Order Status:  Completed Specimen:   Urine Updated:  02/09/20 1121     Urine Culture, Routine ENTEROCOCCUS FAECALIS  50,000 - 99,999 cfu/ml  Susceptibility pending  No other significant isolate      Narrative:       Preferred Collection Type->Urine, Clean Catch    Blood culture x two cultures. Draw prior to antibiotics. [[id number]] Collected:  02/08/20 0853    Order Status:  Completed Specimen:  Blood from Peripheral, Hand, Right Updated:  02/09/20 1012     Blood Culture, Routine No Growth to date      No Growth to date    Narrative:       Aerobic and anaerobic    Blood culture x two cultures. Draw prior to antibiotics. [[id number]] Collected:  02/08/20 0854    Order Status:  Completed Specimen:  Blood from Peripheral, Hand, Left Updated:  02/09/20 1012     Blood Culture, Routine No Growth to date      No Growth to date    Narrative:       Aerobic and anaerobic    Blood culture [[id number]]     Order Status:  Canceled Specimen:  Blood     Blood culture [[id number]]     Order Status:  Canceled Specimen:  Blood     Influenza A & B by Molecular [[id number]] Collected:  02/08/20 0920    Order Status:  Completed Specimen:  Nasopharyngeal Swab Updated:  02/08/20 0948     Influenza A, Molecular Negative     Influenza B, Molecular Negative     Flu A & B Source Nasal swab    Culture, Respiratory with Gram Stain [[id number]]     Order Status:  No result Specimen:  Respiratory from Sputum

## 2020-02-11 LAB
ALBUMIN SERPL BCP-MCNC: 2.7 G/DL (ref 3.5–5.2)
ALP SERPL-CCNC: 73 U/L (ref 55–135)
ALT SERPL W/O P-5'-P-CCNC: 37 U/L (ref 10–44)
ANION GAP SERPL CALC-SCNC: 9 MMOL/L (ref 8–16)
AST SERPL-CCNC: 34 U/L (ref 10–40)
BASOPHILS # BLD AUTO: 0.05 K/UL (ref 0–0.2)
BASOPHILS NFR BLD: 1.1 % (ref 0–1.9)
BILIRUB SERPL-MCNC: 0.4 MG/DL (ref 0.1–1)
BUN SERPL-MCNC: 10 MG/DL (ref 6–20)
CALCIUM SERPL-MCNC: 9.3 MG/DL (ref 8.7–10.5)
CHLORIDE SERPL-SCNC: 107 MMOL/L (ref 95–110)
CO2 SERPL-SCNC: 23 MMOL/L (ref 23–29)
CREAT SERPL-MCNC: 0.4 MG/DL (ref 0.5–1.4)
DIFFERENTIAL METHOD: ABNORMAL
EOSINOPHIL # BLD AUTO: 0.4 K/UL (ref 0–0.5)
EOSINOPHIL NFR BLD: 7.4 % (ref 0–8)
ERYTHROCYTE [DISTWIDTH] IN BLOOD BY AUTOMATED COUNT: 15.5 % (ref 11.5–14.5)
EST. GFR  (AFRICAN AMERICAN): >60 ML/MIN/1.73 M^2
EST. GFR  (NON AFRICAN AMERICAN): >60 ML/MIN/1.73 M^2
GLUCOSE SERPL-MCNC: 150 MG/DL (ref 70–110)
HCT VFR BLD AUTO: 35.1 % (ref 40–54)
HGB BLD-MCNC: 11.3 G/DL (ref 14–18)
IMM GRANULOCYTES # BLD AUTO: 0.07 K/UL (ref 0–0.04)
IMM GRANULOCYTES NFR BLD AUTO: 1.5 % (ref 0–0.5)
LYMPHOCYTES # BLD AUTO: 0.7 K/UL (ref 1–4.8)
LYMPHOCYTES NFR BLD: 15.6 % (ref 18–48)
MAGNESIUM SERPL-MCNC: 2 MG/DL (ref 1.6–2.6)
MCH RBC QN AUTO: 28.4 PG (ref 27–31)
MCHC RBC AUTO-ENTMCNC: 32.2 G/DL (ref 32–36)
MCV RBC AUTO: 88 FL (ref 82–98)
MONOCYTES # BLD AUTO: 0.3 K/UL (ref 0.3–1)
MONOCYTES NFR BLD: 6.5 % (ref 4–15)
NEUTROPHILS # BLD AUTO: 3.2 K/UL (ref 1.8–7.7)
NEUTROPHILS NFR BLD: 67.9 % (ref 38–73)
NRBC BLD-RTO: 0 /100 WBC
PHOSPHATE SERPL-MCNC: 2.9 MG/DL (ref 2.7–4.5)
PLATELET # BLD AUTO: 195 K/UL (ref 150–350)
PMV BLD AUTO: 11.6 FL (ref 9.2–12.9)
POTASSIUM SERPL-SCNC: 3.9 MMOL/L (ref 3.5–5.1)
PROT SERPL-MCNC: 6.5 G/DL (ref 6–8.4)
RBC # BLD AUTO: 3.98 M/UL (ref 4.6–6.2)
SODIUM SERPL-SCNC: 139 MMOL/L (ref 136–145)
VANCOMYCIN TROUGH SERPL-MCNC: 16 UG/ML (ref 10–22)
WBC # BLD AUTO: 4.53 K/UL (ref 3.9–12.7)

## 2020-02-11 PROCEDURE — 99900026 HC AIRWAY MAINTENANCE (STAT)

## 2020-02-11 PROCEDURE — 94003 VENT MGMT INPAT SUBQ DAY: CPT

## 2020-02-11 PROCEDURE — 94668 MNPJ CHEST WALL SBSQ: CPT

## 2020-02-11 PROCEDURE — 25000003 PHARM REV CODE 250: Performed by: STUDENT IN AN ORGANIZED HEALTH CARE EDUCATION/TRAINING PROGRAM

## 2020-02-11 PROCEDURE — 94640 AIRWAY INHALATION TREATMENT: CPT

## 2020-02-11 PROCEDURE — 63600175 PHARM REV CODE 636 W HCPCS: Performed by: STUDENT IN AN ORGANIZED HEALTH CARE EDUCATION/TRAINING PROGRAM

## 2020-02-11 PROCEDURE — 99233 PR SUBSEQUENT HOSPITAL CARE,LEVL III: ICD-10-PCS | Mod: GC,,, | Performed by: INTERNAL MEDICINE

## 2020-02-11 PROCEDURE — 94761 N-INVAS EAR/PLS OXIMETRY MLT: CPT

## 2020-02-11 PROCEDURE — 83735 ASSAY OF MAGNESIUM: CPT

## 2020-02-11 PROCEDURE — 36415 COLL VENOUS BLD VENIPUNCTURE: CPT

## 2020-02-11 PROCEDURE — 85025 COMPLETE CBC W/AUTO DIFF WBC: CPT

## 2020-02-11 PROCEDURE — 99900035 HC TECH TIME PER 15 MIN (STAT)

## 2020-02-11 PROCEDURE — 84100 ASSAY OF PHOSPHORUS: CPT

## 2020-02-11 PROCEDURE — 99233 SBSQ HOSP IP/OBS HIGH 50: CPT | Mod: GC,,, | Performed by: INTERNAL MEDICINE

## 2020-02-11 PROCEDURE — 25000242 PHARM REV CODE 250 ALT 637 W/ HCPCS: Performed by: INTERNAL MEDICINE

## 2020-02-11 PROCEDURE — 80202 ASSAY OF VANCOMYCIN: CPT

## 2020-02-11 PROCEDURE — 20000000 HC ICU ROOM

## 2020-02-11 PROCEDURE — 63600175 PHARM REV CODE 636 W HCPCS: Performed by: INTERNAL MEDICINE

## 2020-02-11 PROCEDURE — 25000003 PHARM REV CODE 250: Performed by: EMERGENCY MEDICINE

## 2020-02-11 PROCEDURE — 27200966 HC CLOSED SUCTION SYSTEM

## 2020-02-11 PROCEDURE — 80053 COMPREHEN METABOLIC PANEL: CPT

## 2020-02-11 PROCEDURE — C9113 INJ PANTOPRAZOLE SODIUM, VIA: HCPCS | Performed by: STUDENT IN AN ORGANIZED HEALTH CARE EDUCATION/TRAINING PROGRAM

## 2020-02-11 RX ORDER — OXYBUTYNIN CHLORIDE 5 MG/5ML
5 SYRUP ORAL 3 TIMES DAILY
Status: DISCONTINUED | OUTPATIENT
Start: 2020-02-12 | End: 2020-02-17 | Stop reason: HOSPADM

## 2020-02-11 RX ORDER — POTASSIUM CHLORIDE 20 MEQ/15ML
30 SOLUTION ORAL ONCE
Status: COMPLETED | OUTPATIENT
Start: 2020-02-11 | End: 2020-02-11

## 2020-02-11 RX ORDER — ENOXAPARIN SODIUM 100 MG/ML
40 INJECTION SUBCUTANEOUS EVERY 12 HOURS
Status: DISCONTINUED | OUTPATIENT
Start: 2020-02-11 | End: 2020-02-17 | Stop reason: HOSPADM

## 2020-02-11 RX ADMIN — OXYBUTYNIN CHLORIDE 15 MG: 15 TABLET, EXTENDED RELEASE ORAL at 08:02

## 2020-02-11 RX ADMIN — HYDROCODONE BITARTRATE AND ACETAMINOPHEN 1 TABLET: 10; 325 TABLET ORAL at 08:02

## 2020-02-11 RX ADMIN — NAPROXEN 250 MG: 250 TABLET ORAL at 01:02

## 2020-02-11 RX ADMIN — CHLORHEXIDINE GLUCONATE 0.12% ORAL RINSE 15 ML: 1.2 LIQUID ORAL at 08:02

## 2020-02-11 RX ADMIN — CLONAZEPAM 0.5 MG: 0.5 TABLET ORAL at 03:02

## 2020-02-11 RX ADMIN — IPRATROPIUM BROMIDE AND ALBUTEROL SULFATE 3 ML: .5; 3 SOLUTION RESPIRATORY (INHALATION) at 07:02

## 2020-02-11 RX ADMIN — PANTOPRAZOLE SODIUM 40 MG: 40 INJECTION, POWDER, FOR SOLUTION INTRAVENOUS at 08:02

## 2020-02-11 RX ADMIN — CLONAZEPAM 0.5 MG: 0.5 TABLET ORAL at 09:02

## 2020-02-11 RX ADMIN — PANTOPRAZOLE SODIUM 40 MG: 40 INJECTION, POWDER, FOR SOLUTION INTRAVENOUS at 09:02

## 2020-02-11 RX ADMIN — POTASSIUM CHLORIDE 30 MEQ: 20 SOLUTION ORAL at 05:02

## 2020-02-11 RX ADMIN — PREGABALIN 100 MG: 50 CAPSULE ORAL at 08:02

## 2020-02-11 RX ADMIN — PREGABALIN 100 MG: 50 CAPSULE ORAL at 09:02

## 2020-02-11 RX ADMIN — CEFEPIME 2 G: 2 INJECTION, POWDER, FOR SOLUTION INTRAVENOUS at 05:02

## 2020-02-11 RX ADMIN — AMIODARONE HYDROCHLORIDE 200 MG: 200 TABLET ORAL at 08:02

## 2020-02-11 RX ADMIN — MICONAZOLE NITRATE: 20 OINTMENT TOPICAL at 08:02

## 2020-02-11 RX ADMIN — CLONAZEPAM 0.5 MG: 0.5 TABLET ORAL at 08:02

## 2020-02-11 RX ADMIN — IPRATROPIUM BROMIDE AND ALBUTEROL SULFATE 3 ML: .5; 3 SOLUTION RESPIRATORY (INHALATION) at 11:02

## 2020-02-11 RX ADMIN — DULOXETINE 60 MG: 30 CAPSULE, DELAYED RELEASE ORAL at 08:02

## 2020-02-11 RX ADMIN — CEFEPIME 2 G: 2 INJECTION, POWDER, FOR SOLUTION INTRAVENOUS at 03:02

## 2020-02-11 RX ADMIN — MICONAZOLE NITRATE: 20 OINTMENT TOPICAL at 09:02

## 2020-02-11 RX ADMIN — ENOXAPARIN SODIUM 40 MG: 100 INJECTION SUBCUTANEOUS at 09:02

## 2020-02-11 RX ADMIN — POLYETHYLENE GLYCOL 3350 17 G: 17 POWDER, FOR SOLUTION ORAL at 08:02

## 2020-02-11 RX ADMIN — PREGABALIN 100 MG: 50 CAPSULE ORAL at 03:02

## 2020-02-11 RX ADMIN — IPRATROPIUM BROMIDE AND ALBUTEROL SULFATE 3 ML: .5; 3 SOLUTION RESPIRATORY (INHALATION) at 03:02

## 2020-02-11 RX ADMIN — VANCOMYCIN HYDROCHLORIDE 1250 MG: 1.25 INJECTION, POWDER, LYOPHILIZED, FOR SOLUTION INTRAVENOUS at 05:02

## 2020-02-11 RX ADMIN — VANCOMYCIN HYDROCHLORIDE 1250 MG: 1.25 INJECTION, POWDER, LYOPHILIZED, FOR SOLUTION INTRAVENOUS at 04:02

## 2020-02-11 RX ADMIN — CEFEPIME 2 G: 2 INJECTION, POWDER, FOR SOLUTION INTRAVENOUS at 11:02

## 2020-02-11 RX ADMIN — CHLORHEXIDINE GLUCONATE 0.12% ORAL RINSE 15 ML: 1.2 LIQUID ORAL at 09:02

## 2020-02-11 RX ADMIN — AMIODARONE HYDROCHLORIDE 200 MG: 200 TABLET ORAL at 09:02

## 2020-02-11 NOTE — PROGRESS NOTES
[facility]  Infectious Disease  Progress Note    Patient Name: Bayron Delgado  MRN: [id number]  Admission Date: 2/8/2020  Length of Stay: 2 days  Attending Physician: Ade Ward MD  Primary Care Provider: Camille Bustillos NP    Isolation Status: No active isolations  Assessment/Plan:      Pneumonia  This is a 49-year-old male with a history of ALS, chronic hypoxemic hypercarbic respiratory failure status post venting and trach, who is brought in by EMS with report of fever since Thursday. He has had increased respiratory secretions, sputum changed from white/yellow to green and is very thick. Oxygenation also worse that normal.. 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 CT of abd/ pelvis with LLL opacification s/p bronch 2/9, GNRs growing from culture.    -F/u GNR identification and susceptibility  -As gram stain w/ GPCs and GNRs would continue vanc and cefepime for now. If cultures negative for MRSA after 48 hrs will discontinue vanc  -F/u blood cultures  -c/w wound care        Thank you for your consult. I will follow-up with patient. Please contact us if you have any additional questions.    Derrick Nye MD  Infectious Disease  [facility]    Subjective:     Principal Problem:Sepsis    HPI: This is a 49-year-old male with a history of ALS, chronic hypoxemic hypercarbic respiratory failure status post venting and trach, who is brought in by EMS with report of fever since Thursday.  T-max 105°.  History is provided by the patient's wife (a nurse) and sister who are at bedside. He has had increased respiratory secretions, sputum changed from white/yellow to green and is very thick. Oxygenation also worse that normal per wife. Diarrhea times one episode. He is on bowel regimen with senna, miralax, and that was held. Last night his urine became more concentrated and dark. He does have chronic multidrug resistant  Pseudomonas and ESBL urinary tract infections. He is followed by Dr. María Caruso and they understand he is colonized and they do not check urine cultures. His urine has thick sediment which she flushes and changes munoz usually monthly. Was just changed while in ED. Has had wound on sacrum and also redness of that entire area- tried multiple barrier creams and seemed to respond to nystatin powder and cream. Also in folds of groin at times. He is not currently on antibiotics.  His wife recently had a prolonged course of bronchitis, but she tried to stay way from him while she was symptomatic.  Otherwise no sick contacts.   Also has had intermittent drainage from PEG tube. Applies some silver nitrate at times. About the same currently. Patient is able to communicate yes or no with his eyes, and he denies pain or shortness of breath at this time.  History is otherwise limited to his nonverbal status. Patient is very well cared for at home and wife provides very detailed history.       Interval History: Afebrile. Bronchoscopy performed yesterday, purulent secretions suctioned. GNRs growing from cultures. Gram stain w/ GNRs and GPCs.     Review of Systems   Unable to perform ROS: Patient nonverbal     Objective:     Vital Signs (Most Recent):  Temp: 98.6 °F (37 °C) (02/10/20 1115)  Pulse: 86 (02/10/20 1939)  Resp: 15 (02/10/20 1939)  BP: 120/67 (02/10/20 1800)  SpO2: 99 % (02/10/20 1939) Vital Signs (24h Range):  Temp:  [98.2 °F (36.8 °C)-98.9 °F (37.2 °C)] 98.6 °F (37 °C)  Pulse:  [78-95] 86  Resp:  [13-21] 15  SpO2:  [94 %-100 %] 99 %  BP: ()/(53-76) 120/67     Weight: 125.5 kg (276 lb 10.8 oz)  Body mass index is 42.07 kg/m².    Estimated Creatinine Clearance: 291.3 mL/min (A) (based on SCr of 0.4 mg/dL (L)).    Physical Exam   Constitutional: He is oriented to person, place, and time. He appears well-developed and well-nourished. He is cooperative.  Non-toxic appearance. No distress.   nonverbal   HENT:    Head: Normocephalic and atraumatic. Head is without right periorbital erythema and without left periorbital erythema.   Right Ear: External ear normal.   Left Ear: External ear normal.   Nose: Nose normal.   Trach in place  Facial erythema   Eyes: Pupils are equal, round, and reactive to light. Conjunctivae, EOM and lids are normal. Right eye exhibits no discharge. Left eye exhibits no discharge. Right conjunctiva is not injected. Left conjunctiva is not injected. No scleral icterus.   Neck: Normal range of motion. No erythema present.   Cardiovascular: Normal rate and regular rhythm.   No murmur heard.  Pulmonary/Chest: Effort normal and breath sounds normal. No stridor. No tachypnea. No respiratory distress.   Abdominal: Soft. Normal appearance and bowel sounds are normal. He exhibits no distension.   PEG left side- excoriated tissue with ulceration and some hypergranulation at exit. Erythema extends about 2 cms.   Genitourinary: Penis normal.   Genitourinary Comments: Michele in place- clear urine in bag.   Musculoskeletal: He exhibits edema.   Generalized edema.   Neurological: He is alert and oriented to person, place, and time. No cranial nerve deficit. Coordination normal.   Skin: Skin is warm and dry. Rash noted. He is not diaphoretic. No erythema.   Has multiple pinpoint purplish lesions on chest and arms. Same as yesterday.   Psychiatric: His speech is normal. Cognition and memory are normal.   Unable to assess. Patient with eyes open.   Nursing note and vitals reviewed.      Significant Labs:   Blood Culture:   Recent Labs   Lab 02/08/20  0853 02/08/20  0854   LABBLOO No Growth to date  No Growth to date  No Growth to date No Growth to date  No Growth to date  No Growth to date     CBC:   Recent Labs   Lab 02/09/20  0336 02/10/20  0344   WBC 7.93 8.52   HGB 10.8* 11.0*   HCT 35.9* 36.5*   * 182     CMP:   Recent Labs   Lab 02/09/20  0336 02/10/20  0344    140   K 3.3* 3.6    106    CO2 22* 24   * 116*   BUN 8 10   CREATININE 0.4* 0.4*   CALCIUM 8.0* 9.1   PROT 5.6* 6.2   ALBUMIN 2.4* 2.6*   BILITOT 0.6 0.6   ALKPHOS 60 68   AST 31 25   ALT 39 38   ANIONGAP 10 10   EGFRNONAA >60.0 >60.0     Microbiology Results (last 7 days)     Procedure Component Value Units Date/Time    AFB Culture & Smear [[id number]] Collected:  02/09/20 1452    Order Status:  Completed Specimen:  Body Fluid from Bronchial Wash Updated:  02/10/20 1604     AFB CULTURE STAIN No acid fast bacilli seen.    Narrative:       Bronchial Wash    Urine culture [[id number]]  (Abnormal)  (Susceptibility) Collected:  02/08/20 1119    Order Status:  Completed Specimen:  Urine Updated:  02/10/20 1503     Urine Culture, Routine ENTEROCOCCUS FAECALIS  50,000 - 99,999 cfu/ml  No other significant isolate      Narrative:       Preferred Collection Type->Urine, Clean Catch    Blood culture x two cultures. Draw prior to antibiotics. [[id number]] Collected:  02/08/20 0854    Order Status:  Completed Specimen:  Blood from Peripheral, Hand, Left Updated:  02/10/20 1012     Blood Culture, Routine No Growth to date      No Growth to date      No Growth to date    Narrative:       Aerobic and anaerobic    Blood culture x two cultures. Draw prior to antibiotics. [[id number]] Collected:  02/08/20 0853    Order Status:  Completed Specimen:  Blood from Peripheral, Hand, Right Updated:  02/10/20 1012     Blood Culture, Routine No Growth to date      No Growth to date      No Growth to date    Narrative:       Aerobic and anaerobic    Culture, Respiratory with Gram Stain [[id number]]  (Abnormal) Collected:  02/09/20 1452    Order Status:  Completed Specimen:  Respiratory from Bronchial Wash, LLL Updated:  02/10/20 0905     Respiratory Culture GRAM NEGATIVE MARINA  Moderate  Identification and susceptibility pending       Gram Stain (Respiratory) <10 epithelial cells per low power field.     Gram Stain (Respiratory) Many WBC's     Gram Stain (Respiratory)  Few Gram negative rods     Gram Stain (Respiratory) Rare Gram positive cocci    DALTON prep [[id number]] Collected:  02/09/20 1452    Order Status:  Completed Specimen:  Body Fluid from Bronchial Wash Updated:  02/09/20 1726     KOH Prep No yeast or fungal elements seen    Narrative:       Bronchial Wash    Respiratory Viral Panel by PCR [facility]; Sputum (bronchial wash) [[id number]] Collected:  02/09/20 1452    Order Status:  Sent Specimen:  Bronchial Wash Updated:  02/09/20 1556    Fungus culture [[id number]] Collected:  02/09/20 1452    Order Status:  Sent Specimen:  Body Fluid from Bronchial Wash Updated:  02/09/20 1555    Blood culture [[id number]]     Order Status:  Canceled Specimen:  Blood     Blood culture [[id number]]     Order Status:  Canceled Specimen:  Blood     Influenza A & B by Molecular [[id number]] Collected:  02/08/20 0920    Order Status:  Completed Specimen:  Nasopharyngeal Swab Updated:  02/08/20 0948     Influenza A, Molecular Negative     Influenza B, Molecular Negative     Flu A & B Source Nasal swab    Culture, Respiratory with Gram Stain [[id number]]     Order Status:  No result Specimen:  Respiratory from Sputum           Significant Imaging: I have reviewed all pertinent imaging results/findings within the past 24 hours.

## 2020-02-11 NOTE — PLAN OF CARE
[facility] DAILY GOALS       A: Awake    RASS: Goal -    Actual - RASS (Paige Agitation-Sedation Scale): 0-->alert and calm   Restraint necessity:    B: Breath   SBT: Not attempted   C: Coordinate A & B, analgesics/sedatives   Pain: managed    SAT: Not attempted  D: Delirium   CAM-ICU: Overall CAM-ICU: Negative  E: Early Mobility   MOVE Screen: Fail   Activity: Activity Management: activity clustered for rest period, activity adjusted per tolerance  FAS: Feeding/Nutrition   Diet order: Diet/Nutrition Received: tube feeding,   Fluid restriction:    T: Thrombus   DVT prophylaxis: VTE Required Core Measure: Pharmacological prophylaxis initiated/maintained, (SCDs) Sequential compression device initiated/maintained  H: HOB Elevation   Head of Bed (HOB): HOB at 30-45 degrees  U: Ulcer Prophylaxis   GI: yes  G: Glucose control   managed    S: Skin   Bundle compliance: yes   Bathing/Skin Care: bath, chlorhexidine, bath, complete, back care, dressed/undressed, linen changed, incontinence care, bedtime care Date: 2/10/20 PM shift   B: Bowel Function   no issues   I: Indwelling Catheters   Michele necessity:      Urethral Catheter 02/08/20 1118 Non-latex 22 Fr.-Reason for Continuing Urinary Catheterization: Critically ill in ICU requiring intensive monitoring   CVC necessity: No   IPAD offered: Not appropriate  D: De-escalation Antibx   Yes  Plan for the day   Abx treatment; monitor respiratory status; manage secretions.   Family/Goals of care/Code Status   Code Status: Full Code     No acute events throughout day, VS and assessment per flow sheet, patient progressing towards goals as tolerated, plan of care reviewed with Bayron Delgado and family, all concerns addressed, will continue to monitor.

## 2020-02-11 NOTE — SUBJECTIVE & OBJECTIVE
Interval History: Afebrile. Bronchoscopy performed yesterday, purulent secretions suctioned. GNRs growing from cultures. Gram stain w/ GNRs and GPCs.     Review of Systems   Unable to perform ROS: Patient nonverbal     Objective:     Vital Signs (Most Recent):  Temp: 98.6 °F (37 °C) (02/10/20 1115)  Pulse: 86 (02/10/20 1939)  Resp: 15 (02/10/20 1939)  BP: 120/67 (02/10/20 1800)  SpO2: 99 % (02/10/20 1939) Vital Signs (24h Range):  Temp:  [98.2 °F (36.8 °C)-98.9 °F (37.2 °C)] 98.6 °F (37 °C)  Pulse:  [78-95] 86  Resp:  [13-21] 15  SpO2:  [94 %-100 %] 99 %  BP: ()/(53-76) 120/67     Weight: 125.5 kg (276 lb 10.8 oz)  Body mass index is 42.07 kg/m².    Estimated Creatinine Clearance: 291.3 mL/min (A) (based on SCr of 0.4 mg/dL (L)).    Physical Exam   Constitutional: He is oriented to person, place, and time. He appears well-developed and well-nourished. He is cooperative.  Non-toxic appearance. No distress.   nonverbal   HENT:   Head: Normocephalic and atraumatic. Head is without right periorbital erythema and without left periorbital erythema.   Right Ear: External ear normal.   Left Ear: External ear normal.   Nose: Nose normal.   Trach in place  Facial erythema   Eyes: Pupils are equal, round, and reactive to light. Conjunctivae, EOM and lids are normal. Right eye exhibits no discharge. Left eye exhibits no discharge. Right conjunctiva is not injected. Left conjunctiva is not injected. No scleral icterus.   Neck: Normal range of motion. No erythema present.   Cardiovascular: Normal rate and regular rhythm.   No murmur heard.  Pulmonary/Chest: Effort normal and breath sounds normal. No stridor. No tachypnea. No respiratory distress.   Abdominal: Soft. Normal appearance and bowel sounds are normal. He exhibits no distension.   PEG left side- excoriated tissue with ulceration and some hypergranulation at exit. Erythema extends about 2 cms.   Genitourinary: Penis normal.   Genitourinary Comments: Michele in place-  clear urine in bag.   Musculoskeletal: He exhibits edema.   Generalized edema.   Neurological: He is alert and oriented to person, place, and time. No cranial nerve deficit. Coordination normal.   Skin: Skin is warm and dry. Rash noted. He is not diaphoretic. No erythema.   Has multiple pinpoint purplish lesions on chest and arms. Same as yesterday.   Psychiatric: His speech is normal. Cognition and memory are normal.   Unable to assess. Patient with eyes open.   Nursing note and vitals reviewed.      Significant Labs:   Blood Culture:   Recent Labs   Lab 02/08/20  0853 02/08/20  0854   LABBLOO No Growth to date  No Growth to date  No Growth to date No Growth to date  No Growth to date  No Growth to date     CBC:   Recent Labs   Lab 02/09/20  0336 02/10/20  0344   WBC 7.93 8.52   HGB 10.8* 11.0*   HCT 35.9* 36.5*   * 182     CMP:   Recent Labs   Lab 02/09/20  0336 02/10/20  0344    140   K 3.3* 3.6    106   CO2 22* 24   * 116*   BUN 8 10   CREATININE 0.4* 0.4*   CALCIUM 8.0* 9.1   PROT 5.6* 6.2   ALBUMIN 2.4* 2.6*   BILITOT 0.6 0.6   ALKPHOS 60 68   AST 31 25   ALT 39 38   ANIONGAP 10 10   EGFRNONAA >60.0 >60.0     Microbiology Results (last 7 days)     Procedure Component Value Units Date/Time    AFB Culture & Smear [[id number]] Collected:  02/09/20 1452    Order Status:  Completed Specimen:  Body Fluid from Bronchial Wash Updated:  02/10/20 1604     AFB CULTURE STAIN No acid fast bacilli seen.    Narrative:       Bronchial Wash    Urine culture [[id number]]  (Abnormal)  (Susceptibility) Collected:  02/08/20 1119    Order Status:  Completed Specimen:  Urine Updated:  02/10/20 1503     Urine Culture, Routine ENTEROCOCCUS FAECALIS  50,000 - 99,999 cfu/ml  No other significant isolate      Narrative:       Preferred Collection Type->Urine, Clean Catch    Blood culture x two cultures. Draw prior to antibiotics. [[id number]] Collected:  02/08/20 0854    Order Status:  Completed Specimen:   Blood from Peripheral, Hand, Left Updated:  02/10/20 1012     Blood Culture, Routine No Growth to date      No Growth to date      No Growth to date    Narrative:       Aerobic and anaerobic    Blood culture x two cultures. Draw prior to antibiotics. [[id number]] Collected:  02/08/20 0853    Order Status:  Completed Specimen:  Blood from Peripheral, Hand, Right Updated:  02/10/20 1012     Blood Culture, Routine No Growth to date      No Growth to date      No Growth to date    Narrative:       Aerobic and anaerobic    Culture, Respiratory with Gram Stain [[id number]]  (Abnormal) Collected:  02/09/20 1452    Order Status:  Completed Specimen:  Respiratory from Bronchial Wash, LLL Updated:  02/10/20 0905     Respiratory Culture GRAM NEGATIVE MARINA  Moderate  Identification and susceptibility pending       Gram Stain (Respiratory) <10 epithelial cells per low power field.     Gram Stain (Respiratory) Many WBC's     Gram Stain (Respiratory) Few Gram negative rods     Gram Stain (Respiratory) Rare Gram positive cocci    ADLTON prep [[id number]] Collected:  02/09/20 1452    Order Status:  Completed Specimen:  Body Fluid from Bronchial Wash Updated:  02/09/20 1726     KOH Prep No yeast or fungal elements seen    Narrative:       Bronchial Wash    Respiratory Viral Panel by PCR [facility]; Sputum (bronchial wash) [[id number]] Collected:  02/09/20 1452    Order Status:  Sent Specimen:  Bronchial Wash Updated:  02/09/20 1556    Fungus culture [[id number]] Collected:  02/09/20 1452    Order Status:  Sent Specimen:  Body Fluid from Bronchial Wash Updated:  02/09/20 1555    Blood culture [[id number]]     Order Status:  Canceled Specimen:  Blood     Blood culture [[id number]]     Order Status:  Canceled Specimen:  Blood     Influenza A & B by Molecular [[id number]] Collected:  02/08/20 0920    Order Status:  Completed Specimen:  Nasopharyngeal Swab Updated:  02/08/20 0948     Influenza A, Molecular Negative     Influenza B, Molecular Negative      Flu A & B Source Nasal swab    Culture, Respiratory with Gram Stain [[id number]]     Order Status:  No result Specimen:  Respiratory from Sputum           Significant Imaging: I have reviewed all pertinent imaging results/findings within the past 24 hours.

## 2020-02-11 NOTE — SUBJECTIVE & OBJECTIVE
Interval History/Significant Events: NAEO. No fevers.    Review of Systems   Unable to perform ROS: Patient nonverbal      Objective:     Vital Signs (Most Recent):  Temp: 98.4 °F (36.9 °C) (02/11/20 1100)  Pulse: 82 (02/11/20 1312)  Resp: 14 (02/11/20 1312)  BP: (!) 141/71 (02/11/20 1300)  SpO2: 98 % (02/11/20 1312) Vital Signs (24h Range):  Temp:  [98.4 °F (36.9 °C)-99 °F (37.2 °C)] 98.4 °F (36.9 °C)  Pulse:  [77-95] 82  Resp:  [13-21] 14  SpO2:  [96 %-100 %] 98 %  BP: (119-155)/(59-93) 141/71   Weight: 124.8 kg (275 lb 2.2 oz)  Body mass index is 41.83 kg/m².      Intake/Output Summary (Last 24 hours) at 2/11/2020 1509  Last data filed at 2/11/2020 1300  Gross per 24 hour   Intake 970 ml   Output 1135 ml   Net -165 ml       Physical Exam   Constitutional: He appears well-nourished.   HENT:   Head: Normocephalic and atraumatic.   Mouth/Throat: Oropharynx is clear and moist.   Eyes: Pupils are equal, round, and reactive to light. EOM are normal. No scleral icterus.   Neck: Normal range of motion. Neck supple.   Trach in place   Cardiovascular: Normal rate, regular rhythm and intact distal pulses.   No murmur heard.  Pulmonary/Chest:   Mechanical breath sounds, reduced in LLL   Abdominal: Soft. He exhibits no distension.   G-tube in place   Genitourinary:   Genitourinary Comments: Michele in place   Musculoskeletal: He exhibits edema (upper and lower extremities).   Neurological:   Dense quadriplegia. Non-verbal. Uses eye-tracking computer to communicate.    Skin: Skin is warm. He is not diaphoretic. There is erythema (to face, baseline).   Psychiatric:   Unable to assess   Nursing note and vitals reviewed.      Vents:  Vent Mode: A/C (02/11/20 1312)  Ventilator Initiated: Yes (02/09/20 0004)  Set Rate: 14 BPM (02/11/20 1312)  Vt Set: 450 mL (02/11/20 1312)  Pressure Support: 0 cmH20 (02/11/20 1312)  PEEP/CPAP: 5 cmH20 (02/11/20 1312)  Oxygen Concentration (%): 30 (02/11/20 1312)  Peak Airway Pressure: 21 cmH2O  (02/11/20 1312)  Plateau Pressure: 0 cmH20 (02/11/20 1312)  Total Ve: 7.72 mL (02/11/20 1312)  F/VT Ratio<105 (RSBI): (!) 25.83 (02/11/20 1312)  Lines/Drains/Airways     Drain                 Gastrostomy/Enterostomy 01/23/17 1113 Percutaneous endoscopic gastrostomy (PEG) LUQ feeding 1114 days         Urethral Catheter 02/08/20 1118 Non-latex 22 Fr. 3 days          Airway                 Surgical Airway 03/19/18 1355 Shiley Cuffed 694 days          Peripheral Intravenous Line                 Peripheral IV - Single Lumen 11/22/19 1150 18 G Right Forearm 81 days         Peripheral IV - Single Lumen 02/08/20 0847 20 G Left Hand 3 days         Peripheral IV - Single Lumen 02/08/20 0929 20 G Left Hand 3 days              Significant Labs:    CBC/Anemia Profile:  Recent Labs   Lab 02/10/20  0344 02/11/20  0619   WBC 8.52 4.53   HGB 11.0* 11.3*   HCT 36.5* 35.1*    195   MCV 92 88   RDW 15.4* 15.5*        Chemistries:  Recent Labs   Lab 02/10/20  0344 02/11/20  0351    139   K 3.6 3.9    107   CO2 24 23   BUN 10 10   CREATININE 0.4* 0.4*   CALCIUM 9.1 9.3   ALBUMIN 2.6* 2.7*   PROT 6.2 6.5   BILITOT 0.6 0.4   ALKPHOS 68 73   ALT 38 37   AST 25 34   MG 1.9 2.0   PHOS 2.6* 2.9       All pertinent labs within the past 24 hours have been reviewed.    Significant Imaging:  I have reviewed all pertinent imaging results/findings within the past 24 hours.

## 2020-02-11 NOTE — PROGRESS NOTES
Pharmacokinetic Assessment Follow Up: IV Vancomycin    Vancomycin serum concentration assessment(s):    Vancomycin trough level resulted at 16 mcg/mL drawn appropriately. Goal trough is 15 to 20 mcg/mL.     Drug levels (last 3 results):  Recent Labs   Lab Result Units 02/09/20 2208 02/11/20  1541   Vancomycin-Trough ug/mL 23.6* 16.0     Vancomycin Regimen Plan:    Continue vancomycin 1250 mg IV every 12 hours with next serum trough concentration measured as needed for renal function and duration of therapy.     Pharmacy will continue to follow and monitor vancomycin.    Please contact pharmacy at extension [phone] for questions regarding this assessment.    Thank you for the consult,   Sri Brooke, PharmD, BCCCP             Patient brief summary:  Bayron Delgado is a 49 y.o. male initiated on antimicrobial therapy with IV vancomycin for treatment of sepsis    Drug Allergies:   Review of patient's allergies indicates:   Allergen Reactions    Atropine     Ibuprofen      A-fib    Latex, natural rubber     Penicillins Other (See Comments)     Per wife- his mother stated he was allergic to it.  Had redness associated with cefepime 3/14/2018       Actual Body Weight:   125.5 kg     Renal Function:   Estimated Creatinine Clearance: 290.7 mL/min (A) (based on SCr of 0.4 mg/dL (L)).    CBC (last 72 hours):  Recent Labs   Lab Result Units 02/09/20  0336 02/10/20  0344 02/11/20  0619   WBC K/uL 7.93 8.52 4.53   Hemoglobin g/dL 10.8* 11.0* 11.3*   Hematocrit % 35.9* 36.5* 35.1*   Platelets K/uL 143* 182 195   Gran% % 81.7* 74.1* 67.9   Lymph% % 5.3* 7.9* 15.6*   Mono% % 11.6 12.0 6.5   Eosinophil% % 0.6 4.5 7.4   Basophil% % 0.3 0.6 1.1   Differential Method  Automated Automated Automated       Metabolic Panel (last 72 hours):  Recent Labs   Lab Result Units 02/09/20  0336 02/10/20  0344 02/11/20  0351   Sodium mmol/L 140 140 139   Potassium mmol/L 3.3* 3.6 3.9   Chloride mmol/L 108 106 107   CO2 mmol/L 22* 24 23    Glucose mg/dL 137* 116* 150*   BUN, Bld mg/dL 8 10 10   Creatinine mg/dL 0.4* 0.4* 0.4*   Albumin g/dL 2.4* 2.6* 2.7*   Total Bilirubin mg/dL 0.6 0.6 0.4   Alkaline Phosphatase U/L 60 68 73   AST U/L 31 25 34   ALT U/L 39 38 37   Magnesium mg/dL  --  1.9 2.0   Phosphorus mg/dL  --  2.6* 2.9       Vancomycin Administrations:  vancomycin given in the last 96 hours                   vancomycin 1.25 g in dextrose 5% 250 mL IVPB (ready to mix) (mg) 1,250 mg New Bag 02/10/20 1108    vancomycin 1.5 g in dextrose 5 % 250 mL IVPB (ready to mix) (mg) 1,500 mg New Bag 02/10/20 0005    vancomycin 1.5 g in dextrose 5 % 250 mL IVPB (ready to mix) (mg) 1,500 mg New Bag 02/09/20 1147     1,500 mg New Bag 02/08/20 2209                Microbiologic Results:  Microbiology Results (last 7 days)     Procedure Component Value Units Date/Time    Blood culture x two cultures. Draw prior to antibiotics. [[id number]] Collected:  02/08/20 0854    Order Status:  Completed Specimen:  Blood from Peripheral, Hand, Left Updated:  02/11/20 1012     Blood Culture, Routine No Growth to date      No Growth to date      No Growth to date      No Growth to date    Narrative:       Aerobic and anaerobic    Blood culture x two cultures. Draw prior to antibiotics. [[id number]] Collected:  02/08/20 0853    Order Status:  Completed Specimen:  Blood from Peripheral, Hand, Right Updated:  02/11/20 1012     Blood Culture, Routine No Growth to date      No Growth to date      No Growth to date      No Growth to date    Narrative:       Aerobic and anaerobic    Culture, Respiratory with Gram Stain [[id number]]  (Abnormal) Collected:  02/09/20 1452    Order Status:  Completed Specimen:  Respiratory from Bronchial Wash, LLL Updated:  02/11/20 0939     Respiratory Culture No S aureus isolated.      GRAM NEGATIVE MARINA  Moderate  Identification and susceptibility pending  Normal respiratory alverto also present       Gram Stain (Respiratory) <10 epithelial cells per low  power field.     Gram Stain (Respiratory) Many WBC's     Gram Stain (Respiratory) Few Gram negative rods     Gram Stain (Respiratory) Rare Gram positive cocci    AFB Culture & Smear [[id number]] Collected:  02/09/20 1452    Order Status:  Completed Specimen:  Body Fluid from Bronchial Wash Updated:  02/10/20 2127     AFB Culture & Smear Culture in progress     AFB CULTURE STAIN No acid fast bacilli seen.    Narrative:       Bronchial Wash    Urine culture [[id number]]  (Abnormal)  (Susceptibility) Collected:  02/08/20 1119    Order Status:  Completed Specimen:  Urine Updated:  02/10/20 1503     Urine Culture, Routine ENTEROCOCCUS FAECALIS  50,000 - 99,999 cfu/ml  No other significant isolate      Narrative:       Preferred Collection Type->Urine, Clean Catch    KOH prep [[id number]] Collected:  02/09/20 1452    Order Status:  Completed Specimen:  Body Fluid from Bronchial Wash Updated:  02/09/20 1726     KOH Prep No yeast or fungal elements seen    Narrative:       Bronchial Wash    Respiratory Viral Panel by PCR [facility]; Sputum (bronchial wash) [[id number]] Collected:  02/09/20 1452    Order Status:  Sent Specimen:  Bronchial Wash Updated:  02/09/20 1556    Fungus culture [[id number]] Collected:  02/09/20 1452    Order Status:  Sent Specimen:  Body Fluid from Bronchial Wash Updated:  02/09/20 1555    Blood culture [[id number]]     Order Status:  Canceled Specimen:  Blood     Blood culture [[id number]]     Order Status:  Canceled Specimen:  Blood     Influenza A & B by Molecular [[id number]] Collected:  02/08/20 0920    Order Status:  Completed Specimen:  Nasopharyngeal Swab Updated:  02/08/20 0948     Influenza A, Molecular Negative     Influenza B, Molecular Negative     Flu A & B Source Nasal swab    Culture, Respiratory with Gram Stain [[id number]]     Order Status:  Canceled Specimen:  Respiratory from Sputum

## 2020-02-11 NOTE — PROGRESS NOTES
[facility]  Critical Care Medicine  Progress Note    Patient Name: Bayron Delgado  MRN: [id number]  Admission Date: 2/8/2020  Hospital Length of Stay: 3 days  Code Status: Full Code  Attending Provider: Ade Ward MD  Primary Care Provider: Camille Bustillos NP   Principal Problem: Sepsis    Subjective:     HPI:  49 year old male with PMH of ALS, chronic hypoxemic hypercarbic respiratory failure now with trach and vent dependent who was BIB EMS from home for fevers and increased secretion. Per wife, she noticed that patient developed fever up to 101 F on Thursday. During this same period of time, she also started noticing increasing secretions from tracheostomy site and increasing O2 requirements. Fevers continued to persist despite Advil (does not want to give Tylenol with concerns for liver damage or Ibuprofen for allergic reaction) and reached as high as 105 F. Sputum became darker and appeared green. She also reports that he has had more watery stools, normally stools are soft to solid. With EMS, pt's sats were in the upper 80s on home vent settings of room air, improved with application of 4L/min O2.  While in the ED, patient has been HDS and started on Vanc, Aztreonam and Levo given PCN allergy. Influenza was negative. WBC of 14.42 and febrile to 101 F. CXR showing left lower lobe PNA. Respiratory cultures ordered.  Critical care was consulted for further management and stabilization of this patient in setting of chronic vent dependent ALS.     Of note, he has a history of of chronic MDR Pseudomonas and ESBL UTI, with no plan for treatment at this time. Wife was sick with bronchitis over the last week, no other sick contacts.       Hospital/ICU Course:  2/9-  brochoscopy performed, cultures sent. Vanc and cefepime per ID recs.    Interval History/Significant Events: NAEO. No fevers.    Review of Systems   Unable to perform ROS: Patient nonverbal      Objective:     Vital Signs  (Most Recent):  Temp: 98.4 °F (36.9 °C) (02/11/20 1100)  Pulse: 82 (02/11/20 1312)  Resp: 14 (02/11/20 1312)  BP: (!) 141/71 (02/11/20 1300)  SpO2: 98 % (02/11/20 1312) Vital Signs (24h Range):  Temp:  [98.4 °F (36.9 °C)-99 °F (37.2 °C)] 98.4 °F (36.9 °C)  Pulse:  [77-95] 82  Resp:  [13-21] 14  SpO2:  [96 %-100 %] 98 %  BP: (119-155)/(59-93) 141/71   Weight: 124.8 kg (275 lb 2.2 oz)  Body mass index is 41.83 kg/m².      Intake/Output Summary (Last 24 hours) at 2/11/2020 1509  Last data filed at 2/11/2020 1300  Gross per 24 hour   Intake 970 ml   Output 1135 ml   Net -165 ml       Physical Exam   Constitutional: He appears well-nourished.   HENT:   Head: Normocephalic and atraumatic.   Mouth/Throat: Oropharynx is clear and moist.   Eyes: Pupils are equal, round, and reactive to light. EOM are normal. No scleral icterus.   Neck: Normal range of motion. Neck supple.   Trach in place   Cardiovascular: Normal rate, regular rhythm and intact distal pulses.   No murmur heard.  Pulmonary/Chest:   Mechanical breath sounds, reduced in LLL   Abdominal: Soft. He exhibits no distension.   G-tube in place   Genitourinary:   Genitourinary Comments: Michele in place   Musculoskeletal: He exhibits edema (upper and lower extremities).   Neurological:   Dense quadriplegia. Non-verbal. Uses eye-tracking computer to communicate.    Skin: Skin is warm. He is not diaphoretic. There is erythema (to face, baseline).   Psychiatric:   Unable to assess   Nursing note and vitals reviewed.      Vents:  Vent Mode: A/C (02/11/20 1312)  Ventilator Initiated: Yes (02/09/20 0004)  Set Rate: 14 BPM (02/11/20 1312)  Vt Set: 450 mL (02/11/20 1312)  Pressure Support: 0 cmH20 (02/11/20 1312)  PEEP/CPAP: 5 cmH20 (02/11/20 1312)  Oxygen Concentration (%): 30 (02/11/20 1312)  Peak Airway Pressure: 21 cmH2O (02/11/20 1312)  Plateau Pressure: 0 cmH20 (02/11/20 1312)  Total Ve: 7.72 mL (02/11/20 1312)  F/VT Ratio<105 (RSBI): (!) 25.83 (02/11/20  1312)  Lines/Drains/Airways     Drain                 Gastrostomy/Enterostomy 01/23/17 1113 Percutaneous endoscopic gastrostomy (PEG) LUQ feeding 1114 days         Urethral Catheter 02/08/20 1118 Non-latex 22 Fr. 3 days          Airway                 Surgical Airway 03/19/18 1355 Shiley Cuffed 694 days          Peripheral Intravenous Line                 Peripheral IV - Single Lumen 11/22/19 1150 18 G Right Forearm 81 days         Peripheral IV - Single Lumen 02/08/20 0847 20 G Left Hand 3 days         Peripheral IV - Single Lumen 02/08/20 0929 20 G Left Hand 3 days              Significant Labs:    CBC/Anemia Profile:  Recent Labs   Lab 02/10/20  0344 02/11/20  0619   WBC 8.52 4.53   HGB 11.0* 11.3*   HCT 36.5* 35.1*    195   MCV 92 88   RDW 15.4* 15.5*        Chemistries:  Recent Labs   Lab 02/10/20  0344 02/11/20  0351    139   K 3.6 3.9    107   CO2 24 23   BUN 10 10   CREATININE 0.4* 0.4*   CALCIUM 9.1 9.3   ALBUMIN 2.6* 2.7*   PROT 6.2 6.5   BILITOT 0.6 0.4   ALKPHOS 68 73   ALT 38 37   AST 25 34   MG 1.9 2.0   PHOS 2.6* 2.9       All pertinent labs within the past 24 hours have been reviewed.    Significant Imaging:  I have reviewed all pertinent imaging results/findings within the past 24 hours.      ABG  Recent Labs   Lab 02/08/20  0906   PH 7.446   PO2 64*   PCO2 37.0   HCO3 25.5   BE 1     Assessment/Plan:     Neuro  ALS (amyotrophic lateral sclerosis)  With chronic associated pain  - Continue home lyrica, Duloxetine scheduled, [place] prn    Psychiatric  Anxiety  - Continue home Duloxetine and Clonazepam    ENT  Tracheostomy in place  - Chlorhexidine mouthwash    Derm  Skin breakdown  Candidal rash in sacral region with stage III coccyx wound   Continue treating with Nystatin cream   Appreciate wound care assistance and recs    Pulmonary  Pneumonia  Left lower lobe PNA, started on Levaquin, Vanc, Aztreonam in ED  Switched to Vanc cefepime per ID recs.   Blood cultures NGTD  Resp  cultures growing GNR, Identification and susceptibility pending  ID following    Acute hypoxemic respiratory failure  Vent and Trach dependent 2/2 ALS  - Chlorhexidine mouthwash bid  - Protonix bid  -ABG prn  - Bronchoscopy 2/9, cultures pending    Vent Mode: A/C  Oxygen Concentration (%):  [30] 30  Resp Rate Total:  [14 br/min-19 br/min] 16 br/min  Vt Set:  [450 mL] 450 mL  PEEP/CPAP:  [5 cmH20] 5 cmH20  Pressure Support:  [0 cmH20] 0 cmH20  Mean Airway Pressure:  [9.3 eyW53-52 cmH20] 9.7 cmH20      Cardiac/Vascular  Atrial fibrillation  - Continue home Amiodarone 200mg    Renal/  Chronic indwelling Michele catheter  Chronic colonization with MDR Pseudomonas, ESBL  - Oxybutynin daily   - UCx consistent with colonization    ID  * Sepsis  Leukocytosis of 14, Fever to 102 F on presentation. Increased sputum production and O2 requirements  Afebrile overnight, WBC normalized.  - likely 2/2 PNA as source, see pneumonia plan  - chronic indwelling Michele, colonized, unlikely to be source      GI  Constipation  Chronic, requires disimpaction by wife  - Colace  - stooling regularly per nursing documentation    Other  Pressure injury of coccygeal region, stage 3  Appreciate wound care recs  Pt does not want to turn 2/2 it takes a long time for him to set up his eye-tracking computer again once he moves. Will continue to encourage turning.     Presence of externally removable percutaneous endoscopic gastrostomy (PEG) tube  Granulation tissue surrounding site, will  Monitor       Critical Care Daily Checklist:    A: Awake: RASS Goal/Actual Goal:    Actual: Paige Agitation Sedation Scale (RASS): Drowsy   B: Spontaneous Breathing Trial Performed? Spon. Breathing Trial Initiated?: Not initiated(pt. is vent dependent) (02/11/20 2560)   C: SAT & SBT Coordinated?  n/a                      D: Delirium: CAM-ICU Overall CAM-ICU: Negative   E: Early Mobility Performed? No   F: Feeding Goal: Goals: 1.) Pt to achieve/tolerate >75% EEN  and EPN by RD follow up.   Status: Nutrition Goal Status: new   Current Diet Order   Procedures    Diet NPO      AS: Analgesia/Sedation n/a   T: Thromboembolic Prophylaxis Yes lovenox   H: HOB > 300 Yes   U: Stress Ulcer Prophylaxis (if needed) yes   G: Glucose Control n/a   B: Bowel Function Stool Occurrence: 1   I: Indwelling Catheter (Lines & Michele) Necessity Michele, trach, G-tube   D: De-escalation of Antimicrobials/Pharmacotherapies pending sensitivities     Plan for the day/ETD Narrow abx    Code Status:  Family/Goals of Care: Full Code         Critical secondary to Patient has a condition that poses threat to life and bodily function: respiratory failure      Critical care was time spent personally by me on the following activities: development of treatment plan with patient or surrogate and bedside caregivers, discussions with consultants, evaluation of patient's response to treatment, examination of patient, ordering and performing treatments and interventions, ordering and review of laboratory studies, ordering and review of radiographic studies, pulse oximetry, re-evaluation of patient's condition. This critical care time did not overlap with that of any other provider or involve time for any procedures.     Kayli Martinez MD  Critical Care Medicine  [facility]

## 2020-02-11 NOTE — ASSESSMENT & PLAN NOTE
This is a 49-year-old male with a history of ALS, chronic hypoxemic hypercarbic respiratory failure status post venting and trach, who is brought in by EMS with report of fever since Thursday. He has had increased respiratory secretions, sputum changed from white/yellow to green and is very thick. Oxygenation also worse that normal.. 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 CT of abd/ pelvis with LLL opacification s/p bronch 2/9, GNRs growing from culture.    -F/u GNR identification and susceptibility  -As gram stain w/ GPCs and GNRs would continue vanc and cefepime for now. If cultures negative for MRSA after 48 hrs will discontinue vanc  -F/u blood cultures  -c/w wound care

## 2020-02-12 PROBLEM — A41.9 SEPSIS: Status: RESOLVED | Noted: 2020-02-08 | Resolved: 2020-02-12

## 2020-02-12 PROBLEM — Z29.9 PREVENTIVE MEASURE: Status: RESOLVED | Noted: 2020-02-10 | Resolved: 2020-02-12

## 2020-02-12 LAB
ALBUMIN SERPL BCP-MCNC: 2.7 G/DL (ref 3.5–5.2)
ALP SERPL-CCNC: 66 U/L (ref 55–135)
ALT SERPL W/O P-5'-P-CCNC: 44 U/L (ref 10–44)
ANION GAP SERPL CALC-SCNC: 12 MMOL/L (ref 8–16)
AST SERPL-CCNC: 55 U/L (ref 10–40)
BACTERIA SPEC AEROBE CULT: ABNORMAL
BASOPHILS # BLD AUTO: 0.11 K/UL (ref 0–0.2)
BASOPHILS NFR BLD: 1.6 % (ref 0–1.9)
BILIRUB SERPL-MCNC: 0.4 MG/DL (ref 0.1–1)
BUN SERPL-MCNC: 13 MG/DL (ref 6–20)
CALCIUM SERPL-MCNC: 9 MG/DL (ref 8.7–10.5)
CHLORIDE SERPL-SCNC: 106 MMOL/L (ref 95–110)
CO2 SERPL-SCNC: 22 MMOL/L (ref 23–29)
CREAT SERPL-MCNC: 0.4 MG/DL (ref 0.5–1.4)
DIFFERENTIAL METHOD: ABNORMAL
EOSINOPHIL # BLD AUTO: 0.4 K/UL (ref 0–0.5)
EOSINOPHIL NFR BLD: 5.3 % (ref 0–8)
ERYTHROCYTE [DISTWIDTH] IN BLOOD BY AUTOMATED COUNT: 15.4 % (ref 11.5–14.5)
EST. GFR  (AFRICAN AMERICAN): >60 ML/MIN/1.73 M^2
EST. GFR  (NON AFRICAN AMERICAN): >60 ML/MIN/1.73 M^2
GLUCOSE SERPL-MCNC: 171 MG/DL (ref 70–110)
GRAM STN SPEC: ABNORMAL
HCT VFR BLD AUTO: 35.8 % (ref 40–54)
HGB BLD-MCNC: 11.4 G/DL (ref 14–18)
IMM GRANULOCYTES # BLD AUTO: 0.22 K/UL (ref 0–0.04)
IMM GRANULOCYTES NFR BLD AUTO: 3.3 % (ref 0–0.5)
LYMPHOCYTES # BLD AUTO: 1.1 K/UL (ref 1–4.8)
LYMPHOCYTES NFR BLD: 15.8 % (ref 18–48)
MAGNESIUM SERPL-MCNC: 1.9 MG/DL (ref 1.6–2.6)
MCH RBC QN AUTO: 28.6 PG (ref 27–31)
MCHC RBC AUTO-ENTMCNC: 31.8 G/DL (ref 32–36)
MCV RBC AUTO: 90 FL (ref 82–98)
MONOCYTES # BLD AUTO: 0.6 K/UL (ref 0.3–1)
MONOCYTES NFR BLD: 9.2 % (ref 4–15)
NEUTROPHILS # BLD AUTO: 4.4 K/UL (ref 1.8–7.7)
NEUTROPHILS NFR BLD: 64.8 % (ref 38–73)
NRBC BLD-RTO: 0 /100 WBC
PHOSPHATE SERPL-MCNC: 2.8 MG/DL (ref 2.7–4.5)
PLATELET # BLD AUTO: 209 K/UL (ref 150–350)
PMV BLD AUTO: 11.1 FL (ref 9.2–12.9)
POTASSIUM SERPL-SCNC: 4.1 MMOL/L (ref 3.5–5.1)
PROT SERPL-MCNC: 6.2 G/DL (ref 6–8.4)
RBC # BLD AUTO: 3.98 M/UL (ref 4.6–6.2)
SODIUM SERPL-SCNC: 140 MMOL/L (ref 136–145)
WBC # BLD AUTO: 6.73 K/UL (ref 3.9–12.7)

## 2020-02-12 PROCEDURE — 25000003 PHARM REV CODE 250: Performed by: STUDENT IN AN ORGANIZED HEALTH CARE EDUCATION/TRAINING PROGRAM

## 2020-02-12 PROCEDURE — 99900035 HC TECH TIME PER 15 MIN (STAT)

## 2020-02-12 PROCEDURE — 63600175 PHARM REV CODE 636 W HCPCS: Performed by: INTERNAL MEDICINE

## 2020-02-12 PROCEDURE — 80053 COMPREHEN METABOLIC PANEL: CPT

## 2020-02-12 PROCEDURE — 83735 ASSAY OF MAGNESIUM: CPT

## 2020-02-12 PROCEDURE — 63600175 PHARM REV CODE 636 W HCPCS: Performed by: STUDENT IN AN ORGANIZED HEALTH CARE EDUCATION/TRAINING PROGRAM

## 2020-02-12 PROCEDURE — 94761 N-INVAS EAR/PLS OXIMETRY MLT: CPT

## 2020-02-12 PROCEDURE — 27000221 HC OXYGEN, UP TO 24 HOURS

## 2020-02-12 PROCEDURE — 99233 PR SUBSEQUENT HOSPITAL CARE,LEVL III: ICD-10-PCS | Mod: GC,,, | Performed by: INTERNAL MEDICINE

## 2020-02-12 PROCEDURE — 99233 SBSQ HOSP IP/OBS HIGH 50: CPT | Mod: GC,,, | Performed by: INTERNAL MEDICINE

## 2020-02-12 PROCEDURE — 94003 VENT MGMT INPAT SUBQ DAY: CPT

## 2020-02-12 PROCEDURE — 20000000 HC ICU ROOM

## 2020-02-12 PROCEDURE — 85025 COMPLETE CBC W/AUTO DIFF WBC: CPT

## 2020-02-12 PROCEDURE — 25000242 PHARM REV CODE 250 ALT 637 W/ HCPCS: Performed by: INTERNAL MEDICINE

## 2020-02-12 PROCEDURE — C9113 INJ PANTOPRAZOLE SODIUM, VIA: HCPCS | Performed by: STUDENT IN AN ORGANIZED HEALTH CARE EDUCATION/TRAINING PROGRAM

## 2020-02-12 PROCEDURE — 25000003 PHARM REV CODE 250: Performed by: INTERNAL MEDICINE

## 2020-02-12 PROCEDURE — 27200966 HC CLOSED SUCTION SYSTEM

## 2020-02-12 PROCEDURE — 25000003 PHARM REV CODE 250: Performed by: EMERGENCY MEDICINE

## 2020-02-12 PROCEDURE — 84100 ASSAY OF PHOSPHORUS: CPT

## 2020-02-12 PROCEDURE — 99900026 HC AIRWAY MAINTENANCE (STAT)

## 2020-02-12 PROCEDURE — 94640 AIRWAY INHALATION TREATMENT: CPT

## 2020-02-12 PROCEDURE — 94668 MNPJ CHEST WALL SBSQ: CPT

## 2020-02-12 RX ORDER — PREGABALIN 50 MG/1
100 CAPSULE ORAL 3 TIMES DAILY
Status: DISCONTINUED | OUTPATIENT
Start: 2020-02-12 | End: 2020-02-17 | Stop reason: HOSPADM

## 2020-02-12 RX ORDER — FUROSEMIDE 10 MG/ML
40 INJECTION INTRAMUSCULAR; INTRAVENOUS ONCE
Status: COMPLETED | OUTPATIENT
Start: 2020-02-12 | End: 2020-02-12

## 2020-02-12 RX ADMIN — CEFEPIME 2 G: 2 INJECTION, POWDER, FOR SOLUTION INTRAVENOUS at 06:02

## 2020-02-12 RX ADMIN — OXYBUTYNIN CHLORIDE 5 MG: 5 SOLUTION ORAL at 09:02

## 2020-02-12 RX ADMIN — CLONAZEPAM 0.5 MG: 0.5 TABLET ORAL at 09:02

## 2020-02-12 RX ADMIN — MEROPENEM 2 G: 1 INJECTION, POWDER, FOR SOLUTION INTRAVENOUS at 06:02

## 2020-02-12 RX ADMIN — POLYETHYLENE GLYCOL 3350 17 G: 17 POWDER, FOR SOLUTION ORAL at 09:02

## 2020-02-12 RX ADMIN — FUROSEMIDE 40 MG: 10 INJECTION, SOLUTION INTRAMUSCULAR; INTRAVENOUS at 11:02

## 2020-02-12 RX ADMIN — ENOXAPARIN SODIUM 40 MG: 100 INJECTION SUBCUTANEOUS at 09:02

## 2020-02-12 RX ADMIN — VANCOMYCIN HYDROCHLORIDE 1250 MG: 1.25 INJECTION, POWDER, LYOPHILIZED, FOR SOLUTION INTRAVENOUS at 04:02

## 2020-02-12 RX ADMIN — AMIODARONE HYDROCHLORIDE 200 MG: 200 TABLET ORAL at 09:02

## 2020-02-12 RX ADMIN — DULOXETINE 60 MG: 30 CAPSULE, DELAYED RELEASE ORAL at 09:02

## 2020-02-12 RX ADMIN — PANTOPRAZOLE SODIUM 40 MG: 40 INJECTION, POWDER, FOR SOLUTION INTRAVENOUS at 10:02

## 2020-02-12 RX ADMIN — IPRATROPIUM BROMIDE AND ALBUTEROL SULFATE 3 ML: .5; 3 SOLUTION RESPIRATORY (INHALATION) at 03:02

## 2020-02-12 RX ADMIN — IPRATROPIUM BROMIDE AND ALBUTEROL SULFATE 3 ML: .5; 3 SOLUTION RESPIRATORY (INHALATION) at 07:02

## 2020-02-12 RX ADMIN — CHLORHEXIDINE GLUCONATE 0.12% ORAL RINSE 15 ML: 1.2 LIQUID ORAL at 09:02

## 2020-02-12 RX ADMIN — CLONAZEPAM 0.5 MG: 0.5 TABLET ORAL at 04:02

## 2020-02-12 RX ADMIN — IPRATROPIUM BROMIDE AND ALBUTEROL SULFATE 3 ML: .5; 3 SOLUTION RESPIRATORY (INHALATION) at 11:02

## 2020-02-12 RX ADMIN — MICONAZOLE NITRATE: 20 OINTMENT TOPICAL at 09:02

## 2020-02-12 RX ADMIN — PREGABALIN 100 MG: 50 CAPSULE ORAL at 09:02

## 2020-02-12 RX ADMIN — OXYBUTYNIN CHLORIDE 5 MG: 5 SOLUTION ORAL at 04:02

## 2020-02-12 RX ADMIN — PREGABALIN 100 MG: 50 CAPSULE ORAL at 04:02

## 2020-02-12 RX ADMIN — PANTOPRAZOLE SODIUM 40 MG: 40 INJECTION, POWDER, FOR SOLUTION INTRAVENOUS at 09:02

## 2020-02-12 NOTE — PROGRESS NOTES
Pharmacokinetic Assessment Follow Up: IV Vancomycin    Therapy with vancomycin complete and consult discontinued by provider.  Pharmacy will sign off, please re-consult as needed.    Sri Brooke, PharmD, BCCCP  [phone]

## 2020-02-12 NOTE — ASSESSMENT & PLAN NOTE
This is a 49-year-old male with a history of ALS, chronic hypoxemic hypercarbic respiratory failure status post venting and trach, who is brought in by EMS with report of fever since Thursday. He has had increased respiratory secretions, sputum changed from white/yellow to green and is very thick. Oxygenation also worse that normal.. 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 CT of abd/ pelvis with LLL opacification s/p bronch 2/9, GNRs growing from culture.    -F/u GNR identification and susceptibility  -As gram stain w/ GPCs and GNRs would continue vanc and cefepime for now. Re-assess treatment once ID.   -F/u blood cultures  -c/w wound care

## 2020-02-12 NOTE — PROGRESS NOTES
"  [facility]  Adult Nutrition  Consult Note    SUMMARY     Recommendations    1. As tolerated, increase TF rate (of Isosource 1.5) to 55 mL/hr to provide 1980 kcals, 90 g of protein, 1008 mL fluid.  2. RD to monitor & follow-up.    Goals: 1.) Pt to achieve/tolerate >75% EEN and EPN by RD follow up.   Nutrition Goal Status: progressing towards goal  Communication of RD Recs: reviewed with RN    Reason for Assessment    Reason For Assessment: RD follow-up  Diagnosis: infection/sepsis  Relevant Medical History: CHF, A Fib, ALS, Gtube + trach  Interdisciplinary Rounds: did not attend    General Information Comments: Pt intubated, trach/PEG. Pt tolerating current TF regimen, progressing towards goal. Per chart, typical wt 230-240# x 2 years. NFPE completed at bedside 2/9 - pt does not meet malnutrition criteria.   Nutrition Discharge Planning: D/c on home TF regimen with tolerance.     Nutrition/Diet History    Factors Affecting Nutritional Intake: NPO, on mechanical ventilation  Nutrition Support Formula Prior to Admit: Isosource 1.5    Anthropometrics    Temp: 97.8 °F (36.6 °C)  Height Method: Stated  Height: 5' 8" (172.7 cm)  Height (inches): 68 in  Weight Method: Bed Scale  Weight: 124.8 kg (275 lb 2.2 oz)  Weight (lb): 275.14 lb  Ideal Body Weight (IBW), Male: 154 lb  % Ideal Body Weight, Male (lb): 178.66 %  BMI (Calculated): 41.8  BMI Grade: greater than 40 - morbid obesity  Usual Body Weight (UBW), kg: (230-240# x 2 years)     Lab/Procedures/Meds    Pertinent Labs Reviewed: reviewed  Pertinent Labs Comments: Gluc 171  Pertinent Medications Reviewed: reviewed  Pertinent Medications Comments: -    Estimated/Assessed Needs    Weight Used For Calorie Calculations: 124.8 kg (275 lb 2.2 oz)     Energy Calorie Requirements (kcal): 2121 kcal/d  Energy Need Method: [place] (modified)     Protein Requirements: 105-140(g/day)  Weight Used For Protein Calculations: 70 kg (154 lb 5.2 oz)(1.5-2.0 g/kg) "     Estimated Fluid Requirement Method: RDA Method(or per MD)    Nutrition Prescription Ordered    Current Diet Order: NPO  Current Nutrition Support Formula Ordered: Isosource 1.5  Current Nutrition Support Rate Ordered: 40 mL/hr    Evaluation of Received Nutrient/Fluid Intake    Enteral Calories (kcal): 1440  Enteral Protein (gm): 65  Enteral (Free Water) Fluid (mL): 733    % Kcal Needs: 68%  % Protein Needs: 62%    Energy Calories Required: not meeting needs  Protein Required: not meeting needs  Fluid Required: Per MD or 1 mL/kcal    Comments: LBM: 2/10    Tolerance: tolerating    Nutrition Risk    Level of Risk/Frequency of Follow-up: (1x/week)     Assessment and Plan    Nutrition Problem  Inadequate enteral nutrition infusion     Related to (etiology):   Current TF regimen (1 can q6h)     Signs and Symptoms (as evidenced by):   <75% of nutritional needs being met by currently ordered regimen     Interventions(treatment strategy):  Collaboration of care with other providers     Nutrition Diagnosis Status:   Continues     Monitor and Evaluation    Food and Nutrient Intake: energy intake, enteral nutrition intake  Food and Nutrient Adminstration: enteral and parenteral nutrition administration  Anthropometric Measurements: weight, weight change, body mass index  Biochemical Data, Medical Tests and Procedures: electrolyte and renal panel, gastrointestinal profile, glucose/endocrine profile  Nutrition-Focused Physical Findings: overall appearance     Malnutrition Assessment    Orbital Region (Subcutaneous Fat Loss): well nourished  Upper Arm Region (Subcutaneous Fat Loss): well nourished   [place] Region (Muscle Loss): mild depletion  Clavicle Bone Region (Muscle Loss): well nourished  Clavicle and Acromion Bone Region (Muscle Loss): well nourished  Scapular Bone Region (Muscle Loss): well nourished  Dorsal Hand (Muscle Loss): well nourished  Anterior Thigh Region (Muscle Loss): other (see comments)(taut)  Posterior  Calf Region (Muscle Loss): other (see comments)(taut)   Edema (Fluid Accumulation): 0-->no edema present   Subcutaneous Fat Loss (Final Summary): well nourished  Muscle Loss Evaluation (Final Summary): well nourished      Nutrition Follow-Up    RD Follow-up?: Yes

## 2020-02-12 NOTE — SUBJECTIVE & OBJECTIVE
Interval History: Patient stable clinically. Oxygen requirements are coming down. Still having secretions. HD stable. Remains afebrile.     Review of Systems  Objective:     Vital Signs (Most Recent):  Temp: 98.4 °F (36.9 °C) (02/11/20 1100)  Pulse: 82 (02/11/20 1800)  Resp: 15 (02/11/20 1800)  BP: 132/79 (02/11/20 1800)  SpO2: 98 % (02/11/20 1800) Vital Signs (24h Range):  Temp:  [98.4 °F (36.9 °C)-98.8 °F (37.1 °C)] 98.4 °F (36.9 °C)  Pulse:  [77-95] 82  Resp:  [13-21] 15  SpO2:  [96 %-100 %] 98 %  BP: (119-157)/(59-93) 132/79     Weight: 124.8 kg (275 lb 2.2 oz)  Body mass index is 41.83 kg/m².    Estimated Creatinine Clearance: 290.7 mL/min (A) (based on SCr of 0.4 mg/dL (L)).    Physical Exam   Constitutional: He is oriented to person, place, and time. He appears well-developed and well-nourished. He is cooperative.  Non-toxic appearance. No distress.   nonverbal   HENT:   Head: Normocephalic and atraumatic. Head is without right periorbital erythema and without left periorbital erythema.   Right Ear: External ear normal.   Left Ear: External ear normal.   Nose: Nose normal.   Trach in place  Facial erythema   Eyes: Pupils are equal, round, and reactive to light. Conjunctivae, EOM and lids are normal. Right eye exhibits no discharge. Left eye exhibits no discharge. Right conjunctiva is not injected. Left conjunctiva is not injected. No scleral icterus.   Neck: Normal range of motion. No erythema present.   Cardiovascular: Normal rate and regular rhythm.   No murmur heard.  Pulmonary/Chest: Effort normal and breath sounds normal. No stridor. No tachypnea. No respiratory distress.   Abdominal: Soft. Normal appearance and bowel sounds are normal. He exhibits no distension.   PEG left side- excoriated tissue with ulceration and some hypergranulation at exit. Erythema extends about 2 cms.   Genitourinary: Penis normal.   Genitourinary Comments: Michele in place- clear urine in bag.   Musculoskeletal: He exhibits  edema.   Generalized edema.   Neurological: He is alert and oriented to person, place, and time. No cranial nerve deficit. Coordination normal.   Skin: Skin is warm and dry. Rash noted. He is not diaphoretic. No erythema.   Has multiple pinpoint purplish lesions on chest and arms. Same as yesterday.   Psychiatric: His speech is normal. Cognition and memory are normal.   Unable to assess. Patient with eyes open.   Nursing note and vitals reviewed.      Significant Labs:   CBC:   Recent Labs   Lab 02/10/20  0344 02/11/20  0619   WBC 8.52 4.53   HGB 11.0* 11.3*   HCT 36.5* 35.1*    195     CMP:   Recent Labs   Lab 02/10/20  0344 02/11/20  0351    139   K 3.6 3.9    107   CO2 24 23   * 150*   BUN 10 10   CREATININE 0.4* 0.4*   CALCIUM 9.1 9.3   PROT 6.2 6.5   ALBUMIN 2.6* 2.7*   BILITOT 0.6 0.4   ALKPHOS 68 73   AST 25 34   ALT 38 37   ANIONGAP 10 9   EGFRNONAA >60.0 >60.0       Significant Imaging: I have reviewed all pertinent imaging results/findings within the past 24 hours.

## 2020-02-12 NOTE — PLAN OF CARE
CMICU DAILY GOALS       A: Awake    RASS: Goal -    Actual - RASS (Paige Agitation-Sedation Scale): 0-->alert and calm   Restraint necessity:    B: Breath   SBT: Not attempted   C: Coordinate A & B, analgesics/sedatives   Pain: managed    SAT: Not attempted  D: Delirium   CAM-ICU: Overall CAM-ICU: Negative  E: Early Mobility   MOVE Screen: Fail   Activity: Activity Management: activity clustered for rest period, activity adjusted per tolerance, bedrest maintained per order  FAS: Feeding/Nutrition   Diet order: Diet/Nutrition Received: tube feeding,   Fluid restriction:    T: Thrombus   DVT prophylaxis: VTE Required Core Measure: Pharmacological prophylaxis initiated/maintained  H: HOB Elevation   Head of Bed (HOB): HOB at 30-45 degrees  U: Ulcer Prophylaxis   GI: yes  G: Glucose control   managed    S: Skin   Bundle compliance: yes   Bathing/Skin Care: bath, chlorhexidine, bath, complete, back care, bedtime care, dressed/undressed, incontinence care, linen changed Date: 2/11/20 PM shift   B: Bowel Function   no issues   I: Indwelling Catheters   Michele necessity:      Urethral Catheter 02/08/20 1118 Non-latex 22 Fr.-Reason for Continuing Urinary Catheterization: Critically ill in ICU requiring intensive monitoring   CVC necessity: No   IPAD offered: Not appropriate  D: De-escalation Antibx   Yes  Plan for the day   Continue abx tx; monitor for s/s of infection; monitor respiratory status.   Family/Goals of care/Code Status   Code Status: Full Code     No acute events throughout day, VS and assessment per flow sheet, patient progressing towards goals as tolerated, plan of care reviewed with Bayron Delgado and family, all concerns addressed, will continue to monitor.

## 2020-02-12 NOTE — PROGRESS NOTES
[facility]  Infectious Disease  Progress Note    Patient Name: Bayron Delgado  MRN: [id number]  Admission Date: 2/8/2020  Length of Stay: 3 days  Attending Physician: dAe Ward MD  Primary Care Provider: Camille Bustillos NP    Isolation Status: No active isolations  Assessment/Plan:      Pneumonia  This is a 49-year-old male with a history of ALS, chronic hypoxemic hypercarbic respiratory failure status post venting and trach, who is brought in by EMS with report of fever since Thursday. He has had increased respiratory secretions, sputum changed from white/yellow to green and is very thick. Oxygenation also worse that normal.. 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 CT of abd/ pelvis with LLL opacification s/p bronch 2/9, GNRs growing from culture.    -F/u GNR identification and susceptibility  -As gram stain w/ GPCs and GNRs would continue vanc and cefepime for now. Re-assess treatment once ID.   -F/u blood cultures  -c/w wound care    Jaren Nassar MD  Infectious Disease  [facility]    Subjective:     Principal Problem:Sepsis    HPI: This is a 49-year-old male with a history of ALS, chronic hypoxemic hypercarbic respiratory failure status post venting and trach, who is brought in by EMS with report of fever since Thursday.  T-max 105°.  History is provided by the patient's wife (a nurse) and sister who are at bedside. He has had increased respiratory secretions, sputum changed from white/yellow to green and is very thick. Oxygenation also worse that normal per wife. Diarrhea times one episode. He is on bowel regimen with senna, miralax, and that was held. Last night his urine became more concentrated and dark. 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 He is followed by Dr. María Caruso and they understand he is colonized and they do not check urine cultures. His  urine has thick sediment which she flushes and changes munoz usually monthly. Was just changed while in ED. Has had wound on sacrum and also redness of that entire area- tried multiple barrier creams and seemed to respond to nystatin powder and cream. Also in folds of groin at times. He is not currently on antibiotics.  His wife recently had a prolonged course of bronchitis, but she tried to stay way from him while she was symptomatic.  Otherwise no sick contacts.   Also has had intermittent drainage from PEG tube. Applies some silver nitrate at times. About the same currently. Patient is able to communicate yes or no with his eyes, and he denies pain or shortness of breath at this time.  History is otherwise limited to his nonverbal status. Patient is very well cared for at home and wife provides very detailed history.       Interval History: Patient stable clinically. Oxygen requirements are coming down. Still having secretions. HD stable. Remains afebrile.     Review of Systems  Objective:     Vital Signs (Most Recent):  Temp: 98.4 °F (36.9 °C) (02/11/20 1100)  Pulse: 82 (02/11/20 1800)  Resp: 15 (02/11/20 1800)  BP: 132/79 (02/11/20 1800)  SpO2: 98 % (02/11/20 1800) Vital Signs (24h Range):  Temp:  [98.4 °F (36.9 °C)-98.8 °F (37.1 °C)] 98.4 °F (36.9 °C)  Pulse:  [77-95] 82  Resp:  [13-21] 15  SpO2:  [96 %-100 %] 98 %  BP: (119-157)/(59-93) 132/79     Weight: 124.8 kg (275 lb 2.2 oz)  Body mass index is 41.83 kg/m².    Estimated Creatinine Clearance: 290.7 mL/min (A) (based on SCr of 0.4 mg/dL (L)).    Physical Exam   Constitutional: He is oriented to person, place, and time. He appears well-developed and well-nourished. He is cooperative.  Non-toxic appearance. No distress.   nonverbal   HENT:   Head: Normocephalic and atraumatic. Head is without right periorbital erythema and without left periorbital erythema.   Right Ear: External ear normal.   Left Ear: External ear normal.   Nose: Nose normal.   Trach in  place  Facial erythema   Eyes: Pupils are equal, round, and reactive to light. Conjunctivae, EOM and lids are normal. Right eye exhibits no discharge. Left eye exhibits no discharge. Right conjunctiva is not injected. Left conjunctiva is not injected. No scleral icterus.   Neck: Normal range of motion. No erythema present.   Cardiovascular: Normal rate and regular rhythm.   No murmur heard.  Pulmonary/Chest: Effort normal and breath sounds normal. No stridor. No tachypnea. No respiratory distress.   Abdominal: Soft. Normal appearance and bowel sounds are normal. He exhibits no distension.   PEG left side- excoriated tissue with ulceration and some hypergranulation at exit. Erythema extends about 2 cms.   Genitourinary: Penis normal.   Genitourinary Comments: Michele in place- clear urine in bag.   Musculoskeletal: He exhibits edema.   Generalized edema.   Neurological: He is alert and oriented to person, place, and time. No cranial nerve deficit. Coordination normal.   Skin: Skin is warm and dry. Rash noted. He is not diaphoretic. No erythema.   Has multiple pinpoint purplish lesions on chest and arms. Same as yesterday.   Psychiatric: His speech is normal. Cognition and memory are normal.   Unable to assess. Patient with eyes open.   Nursing note and vitals reviewed.      Significant Labs:   CBC:   Recent Labs   Lab 02/10/20  0344 02/11/20  0619   WBC 8.52 4.53   HGB 11.0* 11.3*   HCT 36.5* 35.1*    195     CMP:   Recent Labs   Lab 02/10/20  0344 02/11/20  0351    139   K 3.6 3.9    107   CO2 24 23   * 150*   BUN 10 10   CREATININE 0.4* 0.4*   CALCIUM 9.1 9.3   PROT 6.2 6.5   ALBUMIN 2.6* 2.7*   BILITOT 0.6 0.4   ALKPHOS 68 73   AST 25 34   ALT 38 37   ANIONGAP 10 9   EGFRNONAA >60.0 >60.0       Significant Imaging: I have reviewed all pertinent imaging results/findings within the past 24 hours.

## 2020-02-13 LAB
ALBUMIN SERPL BCP-MCNC: 2.8 G/DL (ref 3.5–5.2)
ALP SERPL-CCNC: 65 U/L (ref 55–135)
ALT SERPL W/O P-5'-P-CCNC: 42 U/L (ref 10–44)
ANION GAP SERPL CALC-SCNC: 9 MMOL/L (ref 8–16)
AST SERPL-CCNC: 44 U/L (ref 10–40)
BACTERIA BLD CULT: NORMAL
BACTERIA BLD CULT: NORMAL
BASOPHILS # BLD AUTO: 0.12 K/UL (ref 0–0.2)
BASOPHILS NFR BLD: 1.2 % (ref 0–1.9)
BILIRUB SERPL-MCNC: 0.5 MG/DL (ref 0.1–1)
BUN SERPL-MCNC: 11 MG/DL (ref 6–20)
CALCIUM SERPL-MCNC: 9.2 MG/DL (ref 8.7–10.5)
CHLORIDE SERPL-SCNC: 104 MMOL/L (ref 95–110)
CO2 SERPL-SCNC: 28 MMOL/L (ref 23–29)
CREAT SERPL-MCNC: 0.4 MG/DL (ref 0.5–1.4)
DIFFERENTIAL METHOD: ABNORMAL
ENTEROVIRUS: NOT DETECTED
EOSINOPHIL # BLD AUTO: 0.2 K/UL (ref 0–0.5)
EOSINOPHIL NFR BLD: 2.4 % (ref 0–8)
ERYTHROCYTE [DISTWIDTH] IN BLOOD BY AUTOMATED COUNT: 15 % (ref 11.5–14.5)
EST. GFR  (AFRICAN AMERICAN): >60 ML/MIN/1.73 M^2
EST. GFR  (NON AFRICAN AMERICAN): >60 ML/MIN/1.73 M^2
GLUCOSE SERPL-MCNC: 131 MG/DL (ref 70–110)
HCT VFR BLD AUTO: 34.1 % (ref 40–54)
HGB BLD-MCNC: 10.5 G/DL (ref 14–18)
HUMAN BOCAVIRUS: NOT DETECTED
HUMAN CORONAVIRUS, COMMON COLD VIRUS: NOT DETECTED
IMM GRANULOCYTES # BLD AUTO: 0.24 K/UL (ref 0–0.04)
IMM GRANULOCYTES NFR BLD AUTO: 2.4 % (ref 0–0.5)
INFLUENZA A - H1N1-09: NOT DETECTED
LYMPHOCYTES # BLD AUTO: 1.6 K/UL (ref 1–4.8)
LYMPHOCYTES NFR BLD: 15.6 % (ref 18–48)
MAGNESIUM SERPL-MCNC: 2 MG/DL (ref 1.6–2.6)
MCH RBC QN AUTO: 27.8 PG (ref 27–31)
MCHC RBC AUTO-ENTMCNC: 30.8 G/DL (ref 32–36)
MCV RBC AUTO: 90 FL (ref 82–98)
MONOCYTES # BLD AUTO: 0.9 K/UL (ref 0.3–1)
MONOCYTES NFR BLD: 8.6 % (ref 4–15)
NEUTROPHILS # BLD AUTO: 7 K/UL (ref 1.8–7.7)
NEUTROPHILS NFR BLD: 69.8 % (ref 38–73)
NRBC BLD-RTO: 0 /100 WBC
PARAINFLUENZA: NOT DETECTED
PHOSPHATE SERPL-MCNC: 2.7 MG/DL (ref 2.7–4.5)
PLATELET # BLD AUTO: 222 K/UL (ref 150–350)
PMV BLD AUTO: 11.1 FL (ref 9.2–12.9)
POTASSIUM SERPL-SCNC: 3.5 MMOL/L (ref 3.5–5.1)
POTASSIUM SERPL-SCNC: 3.7 MMOL/L (ref 3.5–5.1)
PROT SERPL-MCNC: 6.3 G/DL (ref 6–8.4)
RBC # BLD AUTO: 3.78 M/UL (ref 4.6–6.2)
RVP - ADENOVIRUS: NOT DETECTED
RVP - HUMAN METAPNEUMOVIRUS (HMPV): NOT DETECTED
RVP - INFLUENZA A: NOT DETECTED
RVP - INFLUENZA B: NOT DETECTED
RVP - RESPIRATORY SYNCTIAL VIRUS (RSV) A: NOT DETECTED
RVP - RESPIRATORY VIRAL PANEL, SOURCE: NORMAL
RVP - RHINOVIRUS: NOT DETECTED
SODIUM SERPL-SCNC: 141 MMOL/L (ref 136–145)
WBC # BLD AUTO: 10.08 K/UL (ref 3.9–12.7)

## 2020-02-13 PROCEDURE — 84132 ASSAY OF SERUM POTASSIUM: CPT

## 2020-02-13 PROCEDURE — 25000003 PHARM REV CODE 250: Performed by: INTERNAL MEDICINE

## 2020-02-13 PROCEDURE — C9113 INJ PANTOPRAZOLE SODIUM, VIA: HCPCS | Performed by: STUDENT IN AN ORGANIZED HEALTH CARE EDUCATION/TRAINING PROGRAM

## 2020-02-13 PROCEDURE — 20000000 HC ICU ROOM

## 2020-02-13 PROCEDURE — 94668 MNPJ CHEST WALL SBSQ: CPT

## 2020-02-13 PROCEDURE — 63600175 PHARM REV CODE 636 W HCPCS: Performed by: STUDENT IN AN ORGANIZED HEALTH CARE EDUCATION/TRAINING PROGRAM

## 2020-02-13 PROCEDURE — 25000003 PHARM REV CODE 250: Performed by: STUDENT IN AN ORGANIZED HEALTH CARE EDUCATION/TRAINING PROGRAM

## 2020-02-13 PROCEDURE — 99233 SBSQ HOSP IP/OBS HIGH 50: CPT | Mod: GC,,, | Performed by: INTERNAL MEDICINE

## 2020-02-13 PROCEDURE — 84100 ASSAY OF PHOSPHORUS: CPT

## 2020-02-13 PROCEDURE — 99900035 HC TECH TIME PER 15 MIN (STAT)

## 2020-02-13 PROCEDURE — 25000003 PHARM REV CODE 250: Performed by: EMERGENCY MEDICINE

## 2020-02-13 PROCEDURE — 94761 N-INVAS EAR/PLS OXIMETRY MLT: CPT

## 2020-02-13 PROCEDURE — 94640 AIRWAY INHALATION TREATMENT: CPT

## 2020-02-13 PROCEDURE — 94003 VENT MGMT INPAT SUBQ DAY: CPT

## 2020-02-13 PROCEDURE — 25000242 PHARM REV CODE 250 ALT 637 W/ HCPCS: Performed by: INTERNAL MEDICINE

## 2020-02-13 PROCEDURE — 83735 ASSAY OF MAGNESIUM: CPT

## 2020-02-13 PROCEDURE — 85025 COMPLETE CBC W/AUTO DIFF WBC: CPT

## 2020-02-13 PROCEDURE — 80053 COMPREHEN METABOLIC PANEL: CPT

## 2020-02-13 PROCEDURE — 99900026 HC AIRWAY MAINTENANCE (STAT)

## 2020-02-13 PROCEDURE — 99233 PR SUBSEQUENT HOSPITAL CARE,LEVL III: ICD-10-PCS | Mod: GC,,, | Performed by: INTERNAL MEDICINE

## 2020-02-13 PROCEDURE — 27000221 HC OXYGEN, UP TO 24 HOURS

## 2020-02-13 RX ORDER — POTASSIUM CHLORIDE 20 MEQ/1
40 TABLET, EXTENDED RELEASE ORAL ONCE
Status: COMPLETED | OUTPATIENT
Start: 2020-02-13 | End: 2020-02-13

## 2020-02-13 RX ADMIN — ENOXAPARIN SODIUM 40 MG: 100 INJECTION SUBCUTANEOUS at 09:02

## 2020-02-13 RX ADMIN — DULOXETINE 60 MG: 30 CAPSULE, DELAYED RELEASE ORAL at 09:02

## 2020-02-13 RX ADMIN — IPRATROPIUM BROMIDE AND ALBUTEROL SULFATE 3 ML: .5; 3 SOLUTION RESPIRATORY (INHALATION) at 11:02

## 2020-02-13 RX ADMIN — CHLORHEXIDINE GLUCONATE 0.12% ORAL RINSE 15 ML: 1.2 LIQUID ORAL at 09:02

## 2020-02-13 RX ADMIN — MEROPENEM 2 G: 1 INJECTION, POWDER, FOR SOLUTION INTRAVENOUS at 03:02

## 2020-02-13 RX ADMIN — CLONAZEPAM 0.5 MG: 0.5 TABLET ORAL at 09:02

## 2020-02-13 RX ADMIN — PREGABALIN 100 MG: 50 CAPSULE ORAL at 09:02

## 2020-02-13 RX ADMIN — AMIODARONE HYDROCHLORIDE 200 MG: 200 TABLET ORAL at 09:02

## 2020-02-13 RX ADMIN — PREGABALIN 100 MG: 50 CAPSULE ORAL at 03:02

## 2020-02-13 RX ADMIN — IPRATROPIUM BROMIDE AND ALBUTEROL SULFATE 3 ML: .5; 3 SOLUTION RESPIRATORY (INHALATION) at 03:02

## 2020-02-13 RX ADMIN — PANTOPRAZOLE SODIUM 40 MG: 40 INJECTION, POWDER, FOR SOLUTION INTRAVENOUS at 09:02

## 2020-02-13 RX ADMIN — MEROPENEM 2 G: 1 INJECTION, POWDER, FOR SOLUTION INTRAVENOUS at 06:02

## 2020-02-13 RX ADMIN — IPRATROPIUM BROMIDE AND ALBUTEROL SULFATE 3 ML: .5; 3 SOLUTION RESPIRATORY (INHALATION) at 07:02

## 2020-02-13 RX ADMIN — POLYETHYLENE GLYCOL 3350 17 G: 17 POWDER, FOR SOLUTION ORAL at 09:02

## 2020-02-13 RX ADMIN — MEROPENEM 2 G: 1 INJECTION, POWDER, FOR SOLUTION INTRAVENOUS at 11:02

## 2020-02-13 RX ADMIN — CLONAZEPAM 0.5 MG: 0.5 TABLET ORAL at 03:02

## 2020-02-13 RX ADMIN — MICONAZOLE NITRATE: 20 OINTMENT TOPICAL at 09:02

## 2020-02-13 RX ADMIN — OXYBUTYNIN CHLORIDE 5 MG: 5 SOLUTION ORAL at 09:02

## 2020-02-13 RX ADMIN — OXYBUTYNIN CHLORIDE 5 MG: 5 SOLUTION ORAL at 03:02

## 2020-02-13 RX ADMIN — POTASSIUM CHLORIDE 40 MEQ: 1500 TABLET, EXTENDED RELEASE ORAL at 06:02

## 2020-02-13 RX ADMIN — PREGABALIN 100 MG: 50 CAPSULE ORAL at 08:02

## 2020-02-13 NOTE — SUBJECTIVE & OBJECTIVE
Interval History: Pt remains clinically stable. Start meropenem for 5 days.     Review of Systems  Objective:     Vital Signs (Most Recent):  Temp: 99.1 °F (37.3 °C) (02/12/20 1600)  Pulse: 64 (02/12/20 1915)  Resp: 16 (02/12/20 1915)  BP: (!) 175/97 (02/12/20 1800)  SpO2: 100 % (02/12/20 1915) Vital Signs (24h Range):  Temp:  [97.8 °F (36.6 °C)-99.5 °F (37.5 °C)] 99.1 °F (37.3 °C)  Pulse:  [] 64  Resp:  [8-20] 16  SpO2:  [93 %-100 %] 100 %  BP: (114-175)/(60-97) 175/97     Weight: 124.8 kg (275 lb 2.2 oz)  Body mass index is 41.83 kg/m².    Estimated Creatinine Clearance: 290.7 mL/min (A) (based on SCr of 0.4 mg/dL (L)).    Physical Exam   Constitutional: He is oriented to person, place, and time. He appears well-developed and well-nourished. He is cooperative.  Non-toxic appearance. No distress.   nonverbal   HENT:   Head: Normocephalic and atraumatic. Head is without right periorbital erythema and without left periorbital erythema.   Right Ear: External ear normal.   Left Ear: External ear normal.   Nose: Nose normal.   Trach in place  Facial erythema   Eyes: Pupils are equal, round, and reactive to light. Conjunctivae, EOM and lids are normal. Right eye exhibits no discharge. Left eye exhibits no discharge. Right conjunctiva is not injected. Left conjunctiva is not injected. No scleral icterus.   Neck: Normal range of motion. No erythema present.   Cardiovascular: Normal rate and regular rhythm.   No murmur heard.  Pulmonary/Chest: Effort normal and breath sounds normal. No stridor. No tachypnea. No respiratory distress.   Abdominal: Soft. Normal appearance and bowel sounds are normal. He exhibits no distension.   PEG left side- excoriated tissue with ulceration and some hypergranulation at exit. Erythema extends about 2 cms.   Genitourinary: Penis normal.   Genitourinary Comments: Michele in place- clear urine in bag.   Musculoskeletal: He exhibits edema.   Generalized edema.   Neurological: He is alert and  oriented to person, place, and time. No cranial nerve deficit. Coordination normal.   Skin: Skin is warm and dry. Rash noted. He is not diaphoretic. No erythema.   Has multiple pinpoint purplish lesions on chest and arms. Same as yesterday.   Psychiatric: His speech is normal. Cognition and memory are normal.   Unable to assess. Patient with eyes open.   Nursing note and vitals reviewed.      Significant Labs:   CBC:   Recent Labs   Lab 02/11/20  0619 02/12/20  0502   WBC 4.53 6.73   HGB 11.3* 11.4*   HCT 35.1* 35.8*    209     CMP:   Recent Labs   Lab 02/11/20  0351 02/12/20  0502    140   K 3.9 4.1    106   CO2 23 22*   * 171*   BUN 10 13   CREATININE 0.4* 0.4*   CALCIUM 9.3 9.0   PROT 6.5 6.2   ALBUMIN 2.7* 2.7*   BILITOT 0.4 0.4   ALKPHOS 73 66   AST 34 55*   ALT 37 44   ANIONGAP 9 12   EGFRNONAA >60.0 >60.0       Significant Imaging: I have reviewed all pertinent imaging results/findings within the past 24 hours.

## 2020-02-13 NOTE — NURSING
MD notified that after flushing patient's munoz. Pt output for 2 hours was 35 mL.      No further orders at this time.   Will continue to monitor.

## 2020-02-13 NOTE — PHYSICIAN QUERY
PT Name: Bayron Delgado  MR #: [id number]     Physician Query Form - Documentation Clarification      CDS/: DONALD Landers, RN, CDS               Contact information:[email or web address]    This form is a permanent document in the medical record.     Query Date: February 13, 2020    By submitting this query, we are merely seeking further clarification of documentation.  Please utilize your independent clinical judgment when addressing the question(s) below.     The medical record contains the following:  Indicators Supporting Clinical Findings Location in Medical Record    Dementia      Contractures     X Total Care or Max Assist Wife and caregiver expressed independence in performing PROM with patient to prevent formation of contractures as well as weight shifting techniques    Pt having no volitional movement and the progressive nature of pt's ALS diagnosis    Completely Dependent PT notes, 2/9 at 3:19 PM              Care management, 2/10 at 11:29 AM    Immobility or Debility     X Chronic Illness: ALS and dense quadriparesis    Chronic hypoxemic hypercarbic respiratory failure now with trach and vent dependent     Dense quadriplegia. Non-verbal. Uses eye-tracking computer to communicate.     H&P, Dr. Hope/Dr. Fuentes, 2/8          [facility], Dr. Martinez/Dr. Ward, 2/10    Medications      Treatment     X Other Alert, communicates with yes or no using extrocular movement    Chronic indwelling Michele catheter, (PEG) tube   H&P, Dr. Hope/Dr. Fuentes, 2/8       Please document if one of the following accurately describes any associated additional diagnosis for this patient based on the above clinical findings.         [   x] Functional Quadriplegia   [   ] Other (please specify):   [  ] Clinically Undetermined                    Please document in your progress notes daily for the duration of treatment, until resolved, and include in your discharge summary.

## 2020-02-13 NOTE — PHYSICIAN QUERY
PT Name: Bayron Delgado  MR #: [id number]    Physician Query Form - Atrial Fibrillation Specificity     CDS/: DONALD Landers, RN, CDS               Contact information:[email or web address]     This form is a permanent document in the medical record.     Query Date: February 13, 2020    By submitting this query, we are merely seeking further clarification of documentation. Please utilize your independent clinical judgment when addressing the question(s) below.    The medical record contains the following:   Indicators     Supporting Clinical Findings Location in Medical Record   X Atrial Fibrillation  Atrial fibrillation    Dr. Robb NICE/Dr. Ward, 2/13   X EKG results  EKG interpretation: Normal sinus rhythm  ST and T wave abnormality, consider anterolateral ischemia  Abnormal ECG    EKG 2/8   X Medication  Continue home Amiodarone 200mg    Dr. Robb NICE/Dr. Ward, 2/13    Treatment      Other         Provider, please further specify the Atrial Fibrillation diagnosis.    [ x ] Paroxysmal   [  ] Other (please specify):   [  ] Clinically Undetermined       Please document in your progress notes daily for the duration of treatment until resolved, and include in your discharge summary.

## 2020-02-13 NOTE — ASSESSMENT & PLAN NOTE
Left lower lobe PNA, started on Levaquin, Vanc, Aztreonam in ED  Switched to Vanc cefepime per ID recs.   Blood cultures NGTD  Resp cultures growing GNR,   Today resulted with ESBL Klebsiella, proteus and pseudomonas  5 days of Meropenem per ID  ID following

## 2020-02-13 NOTE — SUBJECTIVE & OBJECTIVE
Interval History/Significant Events: As above    Review of Systems   Unable to perform ROS: Patient nonverbal     Objective:     Vital Signs (Most Recent):  Temp: 99 °F (37.2 °C) (02/13/20 0800)  Pulse: 82 (02/13/20 0900)  Resp: 14 (02/13/20 0900)  BP: 128/65 (02/13/20 0900)  SpO2: 100 % (02/13/20 0900) Vital Signs (24h Range):  Temp:  [99 °F (37.2 °C)-99.5 °F (37.5 °C)] 99 °F (37.2 °C)  Pulse:  [] 82  Resp:  [8-24] 14  SpO2:  [93 %-100 %] 100 %  BP: (105-178)/(62-97) 128/65   Weight: 124.8 kg (275 lb 2.2 oz)  Body mass index is 41.83 kg/m².      Intake/Output Summary (Last 24 hours) at 2/13/2020 0924  Last data filed at 2/13/2020 0900  Gross per 24 hour   Intake 1430 ml   Output 3158 ml   Net -1728 ml       Physical Exam   Constitutional: He appears well-developed and well-nourished.   HENT:   Head: Normocephalic and atraumatic.   Mouth/Throat: Oropharynx is clear and moist.   Eyes: Pupils are equal, round, and reactive to light. No scleral icterus.   Neck: No JVD present. No tracheal deviation present.   Cardiovascular: Normal rate, regular rhythm, normal heart sounds and intact distal pulses.   Pulmonary/Chest:   Mechanical breath sounds. Equal bilateral, no rales    Abdominal: Soft. He exhibits no distension.   Musculoskeletal: He exhibits edema (pitting edema to all 4 extremities).   Neurological:   Dense quadriplegia, pt moves eyes only   Skin: Skin is warm and dry. Rash (erythema across cheeks, chest) noted.   Psychiatric:   Unable to assess   Nursing note and vitals reviewed.      Vents:  Vent Mode: A/C (02/13/20 0742)  Ventilator Initiated: Yes (02/09/20 0004)  Set Rate: 14 BPM (02/13/20 0742)  Vt Set: 450 mL (02/13/20 0742)  Pressure Support: 0 cmH20 (02/13/20 0742)  PEEP/CPAP: 5 cmH20 (02/13/20 0742)  Oxygen Concentration (%): 100 (02/13/20 0900)  Peak Airway Pressure: 20 cmH2O (02/13/20 0742)  Plateau Pressure: 0 cmH20 (02/13/20 0742)  Total Ve: 6.96 mL (02/13/20 0742)  F/VT Ratio<105 (RSBI): (!)  27.94 (02/13/20 0742)  Lines/Drains/Airways     Drain                 Gastrostomy/Enterostomy 01/23/17 1113 Percutaneous endoscopic gastrostomy (PEG) LUQ feeding 1115 days         Urethral Catheter 02/08/20 1118 Non-latex 22 Fr. 4 days          Airway                 Surgical Airway 03/19/18 1355 Shiley Cuffed 695 days          Peripheral Intravenous Line                 Peripheral IV - Single Lumen 11/22/19 1150 18 G Right Forearm 82 days         Peripheral IV - Single Lumen 02/12/20 0924 18 G;1 3/4 in Right Forearm 1 day         Peripheral IV - Single Lumen 02/12/20 0928 20 G;1 3/4 in Left Forearm less than 1 day              Significant Labs:    CBC/Anemia Profile:  Recent Labs   Lab 02/12/20  0502 02/13/20  0304   WBC 6.73 10.08   HGB 11.4* 10.5*   HCT 35.8* 34.1*    222   MCV 90 90   RDW 15.4* 15.0*        Chemistries:  Recent Labs   Lab 02/12/20  0502 02/13/20  0304    141   K 4.1 3.5    104   CO2 22* 28   BUN 13 11   CREATININE 0.4* 0.4*   CALCIUM 9.0 9.2   ALBUMIN 2.7* 2.8*   PROT 6.2 6.3   BILITOT 0.4 0.5   ALKPHOS 66 65   ALT 44 42   AST 55* 44*   MG 1.9 2.0   PHOS 2.8 2.7

## 2020-02-13 NOTE — PLAN OF CARE
CMICU DAILY GOALS       A: Awake    RASS: Goal -    Actual - RASS (Paige Agitation-Sedation Scale): 0-->alert and calm   Restraint necessity:    B: Breath   SBT: Not attempted   C: Coordinate A & B, analgesics/sedatives   Pain: managed    SAT: NA  D: Delirium   CAM-ICU: Overall CAM-ICU: Negative  E: Early Mobility   MOVE Screen: Fail   Activity: Activity Management: activity adjusted per tolerance, activity clustered for rest period  FAS: Feeding/Nutrition   Diet order: Diet/Nutrition Received: tube feeding,   Fluid restriction:    T: Thrombus   DVT prophylaxis: VTE Required Core Measure: Pharmacological prophylaxis initiated/maintained  H: HOB Elevation   Head of Bed (HOB): HOB at 30 degrees  U: Ulcer Prophylaxis   GI: yes  G: Glucose control   managed    S: Skin   Bundle compliance: yes ; mepilex dressings bilateral lower extremities; specialty bed  Bathing/Skin Care: bath, chlorhexidine, bath, complete, back care, bedtime care, dressed/undressed, incontinence care, linen changed Date: pm bath  B: Bowel Function   diarrhea   I: Indwelling Catheters   Munoz necessity:      Urethral Catheter 02/08/20 1118 Non-latex 22 Fr.-Reason for Continuing Urinary Catheterization: Critically ill in ICU requiring intensive monitoring   CVC necessity: No   IPAD offered: No  D: De-escalation Antibx   See ID note and emar   Plan for the day   See note below   Family/Goals of care/Code Status   Code Status: Full Code     No acute events throughout day, VS and assessment per flow sheet, patient progressing towards goals as tolerated, plan of care reviewed with Bayron Danny and family, all concerns addressed, will continue to monitor.     Neurological: communicating with sitter and wife. Low grade temperature this shift will continue to monitor     Pulmonary: vent; trach    Cardiovascular: see flow sheets    Gastrointestinal: bm 1; tube feeds at 40    Genitourinary: munoz; pt. Responded to lasix this shift     Endocrine:      Integumentary/Other: HAPI prevention bundle in place; coccyx and buttocks sites cleaned and nystatin ointment, triad ointment and nystatin powder applied; wife noted that pt. Starting to have red yeast patched in lower chest/ upper ABD      Infusions: meropenem;     POC: Pt. Has been placed on contact precautions this shift.  will continue to promote comfort. Pt. Has been changed to meropenem antibiotics this shift. Will promote skin healing.

## 2020-02-13 NOTE — PLAN OF CARE
CMICU DAILY GOALS       A: Awake    RASS: Goal - RASS Goal: 0-->alert and calm  Actual - RASS (Paige Agitation-Sedation Scale): 0-->alert and calm   Restraint necessity:    B: Breath   SBT: Not intubated   C: Coordinate A & B, analgesics/sedatives   Pain: N/A     SAT: Not intubated  D: Delirium   CAM-ICU: Overall CAM-ICU: Negative  E: Early Mobility   MOVE Screen: Fail   Activity: Activity Management: activity clustered for rest period  FAS: Feeding/Nutrition   Diet order: Diet/Nutrition Received: tube feeding,   Fluid restriction:    T: Thrombus   DVT prophylaxis: VTE Required Core Measure: Pharmacological prophylaxis initiated/maintained  H: HOB Elevation   Head of Bed (HOB): HOB at 30-45 degrees  U: Ulcer Prophylaxis   GI: yes  G: Glucose control   Not monitoring     S: Skin   Bundle compliance: no   Bathing/Skin Care: bath, complete, bath, chlorhexidine, linen changed Date: bath on 02 12 2020  B: Bowel Function   No BM    I: Indwelling Catheters   Michele necessity:      Urethral Catheter 02/08/20 1118 Non-latex 22 Fr.-Reason for Continuing Urinary Catheterization: Critically ill in ICU requiring intensive monitoring   CVC necessity: No   IPAD offered: Not appropriate  D: De-escalation Antibx   Yes  Plan for the day   Continue to treat patient with antibiotics.   Family/Goals of care/Code Status   Code Status: Full Code     No acute events throughout day, VS and assessment per flow sheet, patient progressing towards goals as tolerated, plan of care reviewed with Bayron Delgado and family, all concerns addressed, will continue to monitor.

## 2020-02-13 NOTE — PROGRESS NOTES
[facility]  Critical Care Medicine  Progress Note    Patient Name: Bayron Delgado  MRN: [id number]  Admission Date: 2/8/2020  Hospital Length of Stay: 4 days  Code Status: Full Code  Attending Provider: Ade Ward MD  Primary Care Provider: Camille Bustillos NP   Principal Problem: Sepsis    Subjective:     HPI:  49 year old male with PMH of ALS, chronic hypoxemic hypercarbic respiratory failure now with trach and vent dependent who was BIB EMS from home for fevers and increased secretion. Per wife, she noticed that patient developed fever up to 101 F on Thursday. During this same period of time, she also started noticing increasing secretions from tracheostomy site and increasing O2 requirements. Fevers continued to persist despite Advil (does not want to give Tylenol with concerns for liver damage or Ibuprofen for allergic reaction) and reached as high as 105 F. Sputum became darker and appeared green. She also reports that he has had more watery stools, normally stools are soft to solid. With EMS, pt's sats were in the upper 80s on home vent settings of room air, improved with application of 4L/min O2.  While in the ED, patient has been HDS and started on Vanc, Aztreonam and Levo given PCN allergy. Influenza was negative. WBC of 14.42 and febrile to 101 F. CXR showing left lower lobe PNA. Respiratory cultures ordered.  Critical care was consulted for further management and stabilization of this patient in setting of chronic vent dependent ALS.     Of note, he has a history of of chronic MDR Pseudomonas and ESBL UTI, with no plan for treatment at this time. Wife was sick with bronchitis over the last week, no other sick contacts.       Hospital/ICU Course:  2/9-  brochoscopy performed, cultures sent. Vanc and cefepime per ID recs.    Interval History/Significant Events: NAEO    Review of Systems   Unable to perform ROS: Patient nonverbal     Objective:     Vital Signs (Most  Recent):  Temp: 99.1 °F (37.3 °C) (02/12/20 1600)  Pulse: 64 (02/12/20 1915)  Resp: 16 (02/12/20 1915)  BP: (!) 175/97 (02/12/20 1800)  SpO2: 100 % (02/12/20 1915) Vital Signs (24h Range):  Temp:  [97.8 °F (36.6 °C)-99.5 °F (37.5 °C)] 99.1 °F (37.3 °C)  Pulse:  [] 64  Resp:  [8-20] 16  SpO2:  [93 %-100 %] 100 %  BP: (114-175)/(60-97) 175/97   Weight: 124.8 kg (275 lb 2.2 oz)  Body mass index is 41.83 kg/m².      Intake/Output Summary (Last 24 hours) at 2/12/2020 1928  Last data filed at 2/12/2020 1831  Gross per 24 hour   Intake 1450 ml   Output 3055 ml   Net -1605 ml       Physical Exam   Constitutional: He appears well-developed and well-nourished.   HENT:   Head: Normocephalic and atraumatic.   Mouth/Throat: Oropharynx is clear and moist.   Eyes: Pupils are equal, round, and reactive to light. No scleral icterus.   Neck: No JVD present. No tracheal deviation present.   Cardiovascular: Normal rate, regular rhythm, normal heart sounds and intact distal pulses.   Pulmonary/Chest:   Mechanical breath sounds. Equal bilateral, no rales    Abdominal: Soft. He exhibits no distension.   Musculoskeletal: He exhibits edema (pitting edema to all 4 extremities).   Neurological:   Dense quadriplegia, pt moves eyes only   Skin: Skin is warm and dry. Rash (erythema across cheeks, chest) noted.   Psychiatric:   Unable to assess   Nursing note and vitals reviewed.      Vents:  Vent Mode: A/C (02/12/20 1733)  Ventilator Initiated: Yes (02/09/20 0004)  Set Rate: 14 BPM (02/12/20 1733)  Vt Set: 450 mL (02/12/20 1733)  Pressure Support: 0 cmH20 (02/12/20 1733)  PEEP/CPAP: 5 cmH20 (02/12/20 1733)  Oxygen Concentration (%): 100 (02/12/20 1800)  Peak Airway Pressure: 21 cmH2O (02/12/20 1733)  Plateau Pressure: 0 cmH20 (02/12/20 1733)  Total Ve: 9.17 mL (02/12/20 1733)  F/VT Ratio<105 (RSBI): (!) 34.99 (02/12/20 1733)  Lines/Drains/Airways     Drain                 Gastrostomy/Enterostomy 01/23/17 1113 Percutaneous endoscopic  gastrostomy (PEG) LUQ feeding 1115 days         Urethral Catheter 02/08/20 1118 Non-latex 22 Fr. 4 days          Airway                 Surgical Airway 03/19/18 1355 Shiley Cuffed 695 days          Peripheral Intravenous Line                 Peripheral IV - Single Lumen 11/22/19 1150 18 G Right Forearm 82 days         Peripheral IV - Single Lumen 02/12/20 0924 18 G;1 3/4 in Right Forearm less than 1 day         Peripheral IV - Single Lumen 02/12/20 0928 20 G;1 3/4 in Left Forearm less than 1 day              Significant Labs:    CBC/Anemia Profile:  Recent Labs   Lab 02/11/20  0619 02/12/20  0502   WBC 4.53 6.73   HGB 11.3* 11.4*   HCT 35.1* 35.8*    209   MCV 88 90   RDW 15.5* 15.4*        Chemistries:  Recent Labs   Lab 02/11/20  0351 02/12/20  0502    140   K 3.9 4.1    106   CO2 23 22*   BUN 10 13   CREATININE 0.4* 0.4*   CALCIUM 9.3 9.0   ALBUMIN 2.7* 2.7*   PROT 6.5 6.2   BILITOT 0.4 0.4   ALKPHOS 73 66   ALT 37 44   AST 34 55*   MG 2.0 1.9   PHOS 2.9 2.8       All pertinent labs within the past 24 hours have been reviewed.    Significant Imaging:  I have reviewed all pertinent imaging results/findings within the past 24 hours.      ABG  Recent Labs   Lab 02/08/20  0906   PH 7.446   PO2 64*   PCO2 37.0   HCO3 25.5   BE 1     Assessment/Plan:     Neuro  ALS (amyotrophic lateral sclerosis)  With chronic associated pain  - Continue home lyrica, Duloxetine scheduled, Drums prn    Psychiatric  Anxiety  - Continue home Duloxetine and Clonazepam    ENT  Tracheostomy in place  - Chlorhexidine mouthwash    Derm  Skin breakdown  Candidal rash in sacral region with stage III coccyx wound   Continue treating with Nystatin cream   Appreciate wound care assistance and recs    Pulmonary  Pneumonia  Left lower lobe PNA, started on Levaquin, Vanc, Aztreonam in ED  Switched to Vanc cefepime per ID recs.   Blood cultures NGTD  Resp cultures growing GNR,   Today resulted with ESBL Klebsiella, proteus and  pseudomonas  5 days of Meropenem per ID  ID following    Acute hypoxemic respiratory failure  Vent and Trach dependent 2/2 ALS  - Chlorhexidine mouthwash bid  - Protonix bid  -ABG prn  - Bronchoscopy 2/9, cultures pending    Vent Mode: A/C  Oxygen Concentration (%):  [] 100  Resp Rate Total:  [14 br/min-20 br/min] 14 br/min  Vt Set:  [450 mL] 450 mL  PEEP/CPAP:  [5 cmH20] 5 cmH20  Pressure Support:  [0 cmH20] 0 cmH20  Mean Airway Pressure:  [9.4 scU65-59 cmH20] 10 cmH20      Cardiac/Vascular  Atrial fibrillation  - Continue home Amiodarone 200mg    Renal/  Chronic indwelling Michele catheter  Chronic colonization with MDR Pseudomonas, ESBL  - Oxybutynin daily   - UCx consistent with colonization    GI  Constipation  Chronic, requires disimpaction by wife  - Colace  - stooling regularly per nursing documentation    Other  Pressure injury of coccygeal region, stage 3  Appreciate wound care recs  Pt does not want to turn 2/2 it takes a long time for him to set up his eye-tracking computer again once he moves. Will continue to encourage turning.     Presence of externally removable percutaneous endoscopic gastrostomy (PEG) tube  Granulation tissue surrounding site, will  Monitor    Anasarca  Single dose of IV lasix with good UOP  Re-start home lasix per Gtube       Critical Care Daily Checklist:    A: Awake: RASS Goal/Actual Goal:    Actual: Paige Agitation Sedation Scale (RASS): Alert and calm   B: Spontaneous Breathing Trial Performed? Spon. Breathing Trial Initiated?: Not initiated(pt. is vent dependent) (02/11/20 0919)   C: SAT & SBT Coordinated?  N/A                      D: Delirium: CAM-ICU Overall CAM-ICU: Negative   E: Early Mobility Performed? No   F: Feeding Goal: Goals: 1.) Pt to achieve/tolerate >75% EEN and EPN by RD follow up.   Status: Nutrition Goal Status: progressing towards goal   Current Diet Order   Procedures    Diet NPO      AS: Analgesia/Sedation n/a   T: Thromboembolic Prophylaxis  lovenox   H: HOB > 300 Yes   U: Stress Ulcer Prophylaxis (if needed) yes   G: Glucose Control yes   B: Bowel Function Stool Occurrence: 1   I: Indwelling Catheter (Lines & Munoz) Necessity Trach, josé luis, munoz   D: De-escalation of Antimicrobials/Pharmacotherapies Yes, yasmeen only, x5 days per cx results    Plan for the day/ETD abx     Code Status:  Family/Goals of Care: Full Code         Critical secondary to Patient has a condition that poses threat to life and bodily function: chronic respiratory Failure       Critical care was time spent personally by me on the following activities: development of treatment plan with patient or surrogate and bedside caregivers, discussions with consultants, evaluation of patient's response to treatment, examination of patient, ordering and performing treatments and interventions, ordering and review of laboratory studies, ordering and review of radiographic studies, pulse oximetry, re-evaluation of patient's condition. This critical care time did not overlap with that of any other provider or involve time for any procedures.     Kayli Martinez MD  Critical Care Medicine  [facility]

## 2020-02-13 NOTE — ASSESSMENT & PLAN NOTE
With chronic associated pain  - Continue home lyrica, Duloxetine scheduled, Norco prn  - s/p tracheostomy 3/2018  - s/p PEG

## 2020-02-13 NOTE — PROGRESS NOTES
[facility]  Critical Care Medicine  Progress Note    Patient Name: Bayron Delgado  MRN: [id number]  Admission Date: 2/8/2020  Hospital Length of Stay: 5 days  Code Status: Full Code  Attending Provider: Ade Ward MD  Primary Care Provider: Camille Bustillos NP   Principal Problem: Sepsis    Subjective:     HPI:  49 year old male with PMH of ALS, chronic hypoxemic hypercarbic respiratory failure now with trach and vent dependent who was BIB EMS from home for fevers and increased secretion. Per wife, she noticed that patient developed fever up to 101 F on Thursday. During this same period of time, she also started noticing increasing secretions from tracheostomy site and increasing O2 requirements. Fevers continued to persist despite Advil (does not want to give Tylenol with concerns for liver damage or Ibuprofen for allergic reaction) and reached as high as 105 F. Sputum became darker and appeared green. She also reports that he has had more watery stools, normally stools are soft to solid. With EMS, pt's sats were in the upper 80s on home vent settings of room air, improved with application of 4L/min O2.  While in the ED, patient has been HDS and started on Vanc, Aztreonam and Levo given PCN allergy. Influenza was negative. WBC of 14.42 and febrile to 101 F. CXR showing left lower lobe PNA. Respiratory cultures ordered.  Critical care was consulted for further management and stabilization of this patient in setting of chronic vent dependent ALS.     Of note, he has a history of of chronic MDR Pseudomonas and ESBL UTI, with no plan for treatment at this time. Wife was sick with bronchitis over the last week, no other sick contacts.     Hospital/ICU Course:  2/9: brochoscopy performed, cultures sent. Vanc and cefepime per ID recs.  Cultures positive for pseudomonas, proteus, and ESBL kleb. ID recommended meropenem for 5 day course.   2/12: Given one time lasix dose 40 mg, subsequently  had 3L uop.   2/13: NAEON. Minimal uop this am. Michele flushed    Interval History/Significant Events: As above    Review of Systems   Unable to perform ROS: Patient nonverbal     Objective:     Vital Signs (Most Recent):  Temp: 99 °F (37.2 °C) (02/13/20 0800)  Pulse: 82 (02/13/20 0900)  Resp: 14 (02/13/20 0900)  BP: 128/65 (02/13/20 0900)  SpO2: 100 % (02/13/20 0900) Vital Signs (24h Range):  Temp:  [99 °F (37.2 °C)-99.5 °F (37.5 °C)] 99 °F (37.2 °C)  Pulse:  [] 82  Resp:  [8-24] 14  SpO2:  [93 %-100 %] 100 %  BP: (105-178)/(62-97) 128/65   Weight: 124.8 kg (275 lb 2.2 oz)  Body mass index is 41.83 kg/m².      Intake/Output Summary (Last 24 hours) at 2/13/2020 0924  Last data filed at 2/13/2020 0900  Gross per 24 hour   Intake 1430 ml   Output 3158 ml   Net -1728 ml       Physical Exam   Constitutional: He appears well-developed and well-nourished.   HENT:   Head: Normocephalic and atraumatic.   Mouth/Throat: Oropharynx is clear and moist.   Eyes: Pupils are equal, round, and reactive to light. No scleral icterus.   Neck: No JVD present. No tracheal deviation present.   Cardiovascular: Normal rate, regular rhythm, normal heart sounds and intact distal pulses.   Pulmonary/Chest:   Mechanical breath sounds. Equal bilateral, no rales    Abdominal: Soft. He exhibits no distension.   Musculoskeletal: He exhibits edema (pitting edema to all 4 extremities).   Neurological:   Dense quadriplegia, pt moves eyes only   Skin: Skin is warm and dry. Rash (erythema across cheeks, chest) noted.   Psychiatric:   Unable to assess   Nursing note and vitals reviewed.      Vents:  Vent Mode: A/C (02/13/20 0742)  Ventilator Initiated: Yes (02/09/20 0004)  Set Rate: 14 BPM (02/13/20 0742)  Vt Set: 450 mL (02/13/20 0742)  Pressure Support: 0 cmH20 (02/13/20 0742)  PEEP/CPAP: 5 cmH20 (02/13/20 0742)  Oxygen Concentration (%): 100 (02/13/20 0900)  Peak Airway Pressure: 20 cmH2O (02/13/20 0742)  Plateau Pressure: 0 cmH20 (02/13/20  0742)  Total Ve: 6.96 mL (02/13/20 0742)  F/VT Ratio<105 (RSBI): (!) 27.94 (02/13/20 0742)  Lines/Drains/Airways     Drain                 Gastrostomy/Enterostomy 01/23/17 1113 Percutaneous endoscopic gastrostomy (PEG) LUQ feeding 1115 days         Urethral Catheter 02/08/20 1118 Non-latex 22 Fr. 4 days          Airway                 Surgical Airway 03/19/18 1355 Shiley Cuffed 695 days          Peripheral Intravenous Line                 Peripheral IV - Single Lumen 11/22/19 1150 18 G Right Forearm 82 days         Peripheral IV - Single Lumen 02/12/20 0924 18 G;1 3/4 in Right Forearm 1 day         Peripheral IV - Single Lumen 02/12/20 0928 20 G;1 3/4 in Left Forearm less than 1 day              Significant Labs:    CBC/Anemia Profile:  Recent Labs   Lab 02/12/20  0502 02/13/20  0304   WBC 6.73 10.08   HGB 11.4* 10.5*   HCT 35.8* 34.1*    222   MCV 90 90   RDW 15.4* 15.0*        Chemistries:  Recent Labs   Lab 02/12/20  0502 02/13/20  0304    141   K 4.1 3.5    104   CO2 22* 28   BUN 13 11   CREATININE 0.4* 0.4*   CALCIUM 9.0 9.2   ALBUMIN 2.7* 2.8*   PROT 6.2 6.3   BILITOT 0.4 0.5   ALKPHOS 66 65   ALT 44 42   AST 55* 44*   MG 1.9 2.0   PHOS 2.8 2.7           ABG  Recent Labs   Lab 02/08/20  0906   PH 7.446   PO2 64*   PCO2 37.0   HCO3 25.5   BE 1     Assessment/Plan:     Neuro  ALS (amyotrophic lateral sclerosis)  With chronic associated pain  - Continue home lyrica, Duloxetine scheduled, Norco prn  - s/p tracheostomy 3/2018  - s/p PEG    Psychiatric  Anxiety  - Continue home Duloxetine and Clonazepam    ENT  Tracheostomy in place  - Chlorhexidine mouthwash    Derm  Skin breakdown  Candidal rash in sacral region with stage III coccyx wound   Continue treating with Nystatin cream   Appreciate wound care assistance and recs    Pulmonary  Pneumonia  Left lower lobe PNA, started on Levaquin, Vanc, Aztreonam in ED  Infectious disease consulted, appreciate assistance  Switched to Vanc cefepime per ID  recs.   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 ALS  - Chlorhexidine mouthwash bid  - Protonix BID  -ABG prn  - Bronchoscopy 2/9, cultures as above (see PNA)  - continue chest PT, cough assist q4h & PRN    Vent Mode: A/C  Oxygen Concentration (%):  [] 100  Resp Rate Total:  [14 br/min-23 br/min] 16 br/min  Vt Set:  [450 mL] 450 mL  PEEP/CPAP:  [5 cmH20] 5 cmH20  Pressure Support:  [0 cmH20] 0 cmH20  Mean Airway Pressure:  [9.2 glD40-51 cmH20] 9.2 cmH20      Cardiac/Vascular  Atrial fibrillation  - Continue home Amiodarone 200mg    Renal/  Chronic indwelling Michele catheter  Chronic colonization with MDR Pseudomonas, ESBL  - Oxybutynin daily   - UCx consistent with colonization    GI  Constipation  Chronic, requires disimpaction by wife  - Continue miralax  - stooling regularly per nursing documentation    Other  Pressure injury of coccygeal region, stage 3  Appreciate wound care recs  Pt does not want to turn 2/2 it takes a long time for him to set up his eye-tracking computer again once he moves. Will continue to encourage turning.     Presence of externally removable percutaneous endoscopic gastrostomy (PEG) tube  Granulation tissue surrounding site, will  Monitor    Anasarca  On lasix 40 mg po at home prn edema  2/12: Single dose of IV lasix with good UOP     Critical Care Daily Checklist:    A: Awake: RASS Goal/Actual Goal: RASS Goal: 0-->alert and calm  Actual: Paige Agitation Sedation Scale (RASS): Alert and calm   B: Spontaneous Breathing Trial Performed? Spon. Breathing Trial Initiated?: Not initiated(pt. is vent dependent) (02/11/20 6651)   C: SAT & SBT Coordinated?  No - vent dep                      D: Delirium: CAM-ICU Overall CAM-ICU: Negative   E: Early Mobility Performed? Yes   F: Feeding Goal: Goals: 1.) Pt to  achieve/tolerate >75% EEN and EPN by RD follow up.   Status: Nutrition Goal Status: progressing towards goal   Current Diet Order   Procedures    Diet NPO      AS: Analgesia/Sedation none   T: Thromboembolic Prophylaxis lovenox   H: HOB > 300 Yes   U: Stress Ulcer Prophylaxis (if needed) protonix   G: Glucose Control    B: Bowel Function Stool Occurrence: 1   I: Indwelling Catheter (Lines & Munoz) Necessity munoz   D: De-escalation of Antimicrobials/Pharmacotherapies Meropenem for 5 days    Plan for the day/ETD     Code Status:  Family/Goals of Care: Full Code         Critical secondary to Patient has a condition that poses threat to life and bodily function: Severe Respiratory Distress      Critical care was time spent personally by me on the following activities: development of treatment plan with patient or surrogate and bedside caregivers, discussions with consultants, evaluation of patient's response to treatment, examination of patient, ordering and performing treatments and interventions, ordering and review of laboratory studies, ordering and review of radiographic studies, pulse oximetry, re-evaluation of patient's condition. This critical care time did not overlap with that of any other provider or involve time for any procedures.     Nick Zamudio MD  Critical Care Medicine  [facility]

## 2020-02-13 NOTE — ASSESSMENT & PLAN NOTE
Vent and Trach dependent 2/2 ALS  - Chlorhexidine mouthwash bid  - Protonix bid  -ABG prn  - Bronchoscopy 2/9, cultures pending    Vent Mode: A/C  Oxygen Concentration (%):  [] 100  Resp Rate Total:  [14 br/min-20 br/min] 14 br/min  Vt Set:  [450 mL] 450 mL  PEEP/CPAP:  [5 cmH20] 5 cmH20  Pressure Support:  [0 cmH20] 0 cmH20  Mean Airway Pressure:  [9.4 poK78-50 cmH20] 10 cmH20

## 2020-02-13 NOTE — ASSESSMENT & PLAN NOTE
Chronic, requires disimpaction by wife  - Continue miralax  - stooling regularly per nursing documentation

## 2020-02-13 NOTE — NURSING
Md notified that patient's urine output at 0400 was 21 mL and that patient's urine output has decreased hourly throughout the shift.      MD jackson review patient's daily I/O.   Ordered to do bladder scan.   No further orders at this time.

## 2020-02-13 NOTE — PROGRESS NOTES
[facility]  Infectious Disease  Progress Note    Patient Name: Bayron Delgado  MRN: [id number]  Admission Date: 2/8/2020  Length of Stay: 4 days  Attending Physician: Ade Ward MD  Primary Care Provider: Camille Bustillos NP    Isolation Status: Contact  Assessment/Plan:      Pneumonia  This is a 49-year-old male with a history of ALS, chronic hypoxemic hypercarbic respiratory failure status post venting and trach, who is brought in by EMS with report of fever since Thursday. He has had increased respiratory secretions, sputum changed from white/yellow to green and is very thick. Oxygenation also worse that normal.. 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 CT of abd/ pelvis with LLL opacification s/p bronch 2/9, GNRs growing from culture.    Respiratory culture/bronch with ESBL Klebsiella, proteus and pseudomonas  Recommend 5 days of Meropenem   E.faecalis on urine culture most likely colonization - don't treat        Jaren Nassar MD  Infectious Disease  [facility]    Subjective:     Principal Problem:Sepsis    HPI: This is a 49-year-old male with a history of ALS, chronic hypoxemic hypercarbic respiratory failure status post venting and trach, who is brought in by EMS with report of fever since Thursday.  T-max 105°.  History is provided by the patient's wife (a nurse) and sister who are at bedside. He has had increased respiratory secretions, sputum changed from white/yellow to green and is very thick. Oxygenation also worse that normal per wife. Diarrhea times one episode. He is on bowel regimen with senna, miralax, and that was held. Last night his urine became more concentrated and dark. 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 He is followed by Dr. María Caruso and they understand he is colonized and they do not check urine cultures. His urine has thick  sediment which she flushes and changes munoz usually monthly. Was just changed while in ED. Has had wound on sacrum and also redness of that entire area- tried multiple barrier creams and seemed to respond to nystatin powder and cream. Also in folds of groin at times. He is not currently on antibiotics.  His wife recently had a prolonged course of bronchitis, but she tried to stay way from him while she was symptomatic.  Otherwise no sick contacts.   Also has had intermittent drainage from PEG tube. Applies some silver nitrate at times. About the same currently. Patient is able to communicate yes or no with his eyes, and he denies pain or shortness of breath at this time.  History is otherwise limited to his nonverbal status. Patient is very well cared for at home and wife provides very detailed history.       Interval History: Pt remains clinically stable. Start meropenem for 5 days.     Review of Systems  Objective:     Vital Signs (Most Recent):  Temp: 99.1 °F (37.3 °C) (02/12/20 1600)  Pulse: 64 (02/12/20 1915)  Resp: 16 (02/12/20 1915)  BP: (!) 175/97 (02/12/20 1800)  SpO2: 100 % (02/12/20 1915) Vital Signs (24h Range):  Temp:  [97.8 °F (36.6 °C)-99.5 °F (37.5 °C)] 99.1 °F (37.3 °C)  Pulse:  [] 64  Resp:  [8-20] 16  SpO2:  [93 %-100 %] 100 %  BP: (114-175)/(60-97) 175/97     Weight: 124.8 kg (275 lb 2.2 oz)  Body mass index is 41.83 kg/m².    Estimated Creatinine Clearance: 290.7 mL/min (A) (based on SCr of 0.4 mg/dL (L)).    Physical Exam   Constitutional: He is oriented to person, place, and time. He appears well-developed and well-nourished. He is cooperative.  Non-toxic appearance. No distress.   nonverbal   HENT:   Head: Normocephalic and atraumatic. Head is without right periorbital erythema and without left periorbital erythema.   Right Ear: External ear normal.   Left Ear: External ear normal.   Nose: Nose normal.   Trach in place  Facial erythema   Eyes: Pupils are equal, round, and reactive to  light. Conjunctivae, EOM and lids are normal. Right eye exhibits no discharge. Left eye exhibits no discharge. Right conjunctiva is not injected. Left conjunctiva is not injected. No scleral icterus.   Neck: Normal range of motion. No erythema present.   Cardiovascular: Normal rate and regular rhythm.   No murmur heard.  Pulmonary/Chest: Effort normal and breath sounds normal. No stridor. No tachypnea. No respiratory distress.   Abdominal: Soft. Normal appearance and bowel sounds are normal. He exhibits no distension.   PEG left side- excoriated tissue with ulceration and some hypergranulation at exit. Erythema extends about 2 cms.   Genitourinary: Penis normal.   Genitourinary Comments: Michele in place- clear urine in bag.   Musculoskeletal: He exhibits edema.   Generalized edema.   Neurological: He is alert and oriented to person, place, and time. No cranial nerve deficit. Coordination normal.   Skin: Skin is warm and dry. Rash noted. He is not diaphoretic. No erythema.   Has multiple pinpoint purplish lesions on chest and arms. Same as yesterday.   Psychiatric: His speech is normal. Cognition and memory are normal.   Unable to assess. Patient with eyes open.   Nursing note and vitals reviewed.      Significant Labs:   CBC:   Recent Labs   Lab 02/11/20  0619 02/12/20  0502   WBC 4.53 6.73   HGB 11.3* 11.4*   HCT 35.1* 35.8*    209     CMP:   Recent Labs   Lab 02/11/20  0351 02/12/20  0502    140   K 3.9 4.1    106   CO2 23 22*   * 171*   BUN 10 13   CREATININE 0.4* 0.4*   CALCIUM 9.3 9.0   PROT 6.5 6.2   ALBUMIN 2.7* 2.7*   BILITOT 0.4 0.4   ALKPHOS 73 66   AST 34 55*   ALT 37 44   ANIONGAP 9 12   EGFRNONAA >60.0 >60.0       Significant Imaging: I have reviewed all pertinent imaging results/findings within the past 24 hours.

## 2020-02-13 NOTE — SUBJECTIVE & OBJECTIVE
Interval History/Significant Events: NAEO    Review of Systems   Unable to perform ROS: Patient nonverbal     Objective:     Vital Signs (Most Recent):  Temp: 99.1 °F (37.3 °C) (02/12/20 1600)  Pulse: 64 (02/12/20 1915)  Resp: 16 (02/12/20 1915)  BP: (!) 175/97 (02/12/20 1800)  SpO2: 100 % (02/12/20 1915) Vital Signs (24h Range):  Temp:  [97.8 °F (36.6 °C)-99.5 °F (37.5 °C)] 99.1 °F (37.3 °C)  Pulse:  [] 64  Resp:  [8-20] 16  SpO2:  [93 %-100 %] 100 %  BP: (114-175)/(60-97) 175/97   Weight: 124.8 kg (275 lb 2.2 oz)  Body mass index is 41.83 kg/m².      Intake/Output Summary (Last 24 hours) at 2/12/2020 1928  Last data filed at 2/12/2020 1831  Gross per 24 hour   Intake 1450 ml   Output 3055 ml   Net -1605 ml       Physical Exam   Constitutional: He appears well-developed and well-nourished.   HENT:   Head: Normocephalic and atraumatic.   Mouth/Throat: Oropharynx is clear and moist.   Eyes: Pupils are equal, round, and reactive to light. No scleral icterus.   Neck: No JVD present. No tracheal deviation present.   Cardiovascular: Normal rate, regular rhythm, normal heart sounds and intact distal pulses.   Pulmonary/Chest:   Mechanical breath sounds. Equal bilateral, no rales    Abdominal: Soft. He exhibits no distension.   Musculoskeletal: He exhibits edema (pitting edema to all 4 extremities).   Neurological:   Dense quadriplegia, pt moves eyes only   Skin: Skin is warm and dry. Rash (erythema across cheeks, chest) noted.   Psychiatric:   Unable to assess   Nursing note and vitals reviewed.      Vents:  Vent Mode: A/C (02/12/20 1733)  Ventilator Initiated: Yes (02/09/20 0004)  Set Rate: 14 BPM (02/12/20 1733)  Vt Set: 450 mL (02/12/20 1733)  Pressure Support: 0 cmH20 (02/12/20 1733)  PEEP/CPAP: 5 cmH20 (02/12/20 1733)  Oxygen Concentration (%): 100 (02/12/20 1800)  Peak Airway Pressure: 21 cmH2O (02/12/20 1733)  Plateau Pressure: 0 cmH20 (02/12/20 1733)  Total Ve: 9.17 mL (02/12/20 1733)  F/VT Ratio<105 (RSBI):  (!) 34.99 (02/12/20 1733)  Lines/Drains/Airways     Drain                 Gastrostomy/Enterostomy 01/23/17 1113 Percutaneous endoscopic gastrostomy (PEG) LUQ feeding 1115 days         Urethral Catheter 02/08/20 1118 Non-latex 22 Fr. 4 days          Airway                 Surgical Airway 03/19/18 1355 Shiley Cuffed 695 days          Peripheral Intravenous Line                 Peripheral IV - Single Lumen 11/22/19 1150 18 G Right Forearm 82 days         Peripheral IV - Single Lumen 02/12/20 0924 18 G;1 3/4 in Right Forearm less than 1 day         Peripheral IV - Single Lumen 02/12/20 0928 20 G;1 3/4 in Left Forearm less than 1 day              Significant Labs:    CBC/Anemia Profile:  Recent Labs   Lab 02/11/20  0619 02/12/20  0502   WBC 4.53 6.73   HGB 11.3* 11.4*   HCT 35.1* 35.8*    209   MCV 88 90   RDW 15.5* 15.4*        Chemistries:  Recent Labs   Lab 02/11/20  0351 02/12/20  0502    140   K 3.9 4.1    106   CO2 23 22*   BUN 10 13   CREATININE 0.4* 0.4*   CALCIUM 9.3 9.0   ALBUMIN 2.7* 2.7*   PROT 6.5 6.2   BILITOT 0.4 0.4   ALKPHOS 73 66   ALT 37 44   AST 34 55*   MG 2.0 1.9   PHOS 2.9 2.8       All pertinent labs within the past 24 hours have been reviewed.    Significant Imaging:  I have reviewed all pertinent imaging results/findings within the past 24 hours.

## 2020-02-13 NOTE — PLAN OF CARE
CMICU DAILY GOALS       A: Awake    RASS: Goal - RASS Goal: 0-->alert and calm  Actual - RASS (Paige Agitation-Sedation Scale): 0-->alert and calm   Restraint necessity:    B: Breath   SBT: Not attempted   C: Coordinate A & B, analgesics/sedatives   Pain: managed    SAT: NA  D: Delirium   CAM-ICU: Overall CAM-ICU: Negative  E: Early Mobility   MOVE Screen: weight shift assistance provided     Activity: Activity Management: rolling - L1  FAS: Feeding/Nutrition   Diet order: Diet/Nutrition Received: tube feeding,   Fluid restriction:    T: Thrombus   DVT prophylaxis: VTE Required Core Measure: Pharmacological prophylaxis initiated/maintained  H: HOB Elevation   Head of Bed (HOB): HOB at 30 degrees  U: Ulcer Prophylaxis   GI: yes  G: Glucose control   See results review      S: Skin   Bundle compliance: specialty bed; mepilex dressings to heals;   Bathing/Skin Care: incontinence care Date: pm bath; incontinence care provided today   B: Bowel Function   diarrhea   I: Indwelling Catheters   Michele necessity:      Urethral Catheter 02/08/20 1118 Non-latex 22 Fr.-Reason for Continuing Urinary Catheterization: Critically ill in ICU requiring intensive monitoring   CVC necessity: No   IPAD offered: No  D: De-escalation Antibx   See ID notes    Plan for the day   See notes below  Family/Goals of care/Code Status   Code Status: Full Code     No acute events throughout day, VS and assessment per flow sheet, plan of care reviewed with Bayron Delgado and family, all concerns addressed, will continue to monitor. No acute events throughout shift. See vital signs and assessments in flowsheets. See below for updates on today's progress.     Neurological: communicating with caregiver and wife    Pulmonary: vent;     Cardiovascular: HR decreased to upper 50s low 60s; see flowsheets; bilateral upper ext. Cool. hot packs provided; arms elevated     Gastrointestinal: bm x 1 this shift; tube feeding; no residual; plan to insert flexi to  promote skin healing     Genitourinary: munoz flushed x 2 this morning;     Endocrine:     Integumentary/Other:  turned multiple times throughout the day; pillows and wedge used for support;     Infusions: ivpb and kvo     POC: plan to continue to administer antibiotics; will continue to monitor and promote comfort and skin healing;

## 2020-02-13 NOTE — PHYSICIAN QUERY
PT Name: Bayron Delgado  MR #: [id number]    Physician Query Form -Systemic Infectious Process Clarification     CDS/: DONALD Landers, RN, CDS               Contact information:[email or web address]   This form is a permanent document in the medical record.     Query Date: February 13, 2020     By submitting this query, we are merely seeking further clarification of documentation. Please utilize your independent clinical judgment when addressing the question(s) below.    The Medical record contains the following:     Indicators   Supporting Clinical Findings   Location in Medical Record   X HR RR BP Temp  /78 HR 96 RR 16 Temp 101.5 SpO2 94%    ED provider note, Dr. Garvin, 2/8   X Lactic Acid             Procalcitonin   Lactate, Bobby 2.7 (H)       Procalcitonin 0.26 (H) 0.37 (H)         Lab 2/8       Lab 2/8   X WBC                Bands                     CRP    2/8 2/9 2/10 2/11 2/12 2/13   WBC 14.42 (H) 7.93 8.52 4.53 6.73 10.08         Lab 2/8- 2/13   X Culture(s)  Respiratory cultures growing ESBL Klebsiella, proteus and pseudomonas    Lab 2/9    AMS, Confusion, LOC, etc.     X Organ Dysfunction / Failure  Acute hypoxemic respiratory failure    H&P, Dr. Hope/Dr. Fuentes, 2/8   X Bacteremia or Sepsis / Septic  Sepsis    H&P, Dr. Hope/Dr. Fuentes, 2/8   X Known or Suspected Source of Infection documented  Likely 2/2 PNA as source    H&P, Dr. Hope/Dr. Fuentes, 2/8    (Failed) Outpatient Treatment      Medication     X Treatment  Started on IV Levaquin, Vanc, Aztreonam     IV Meropenem per ID    H&P, Dr. Hope/Dr. Fuentes, 2/8     [facility], Dr. Martinez/Dr. Ward, 2/12    Other       Provider, please specify diagnosis or diagnoses associated with above clinical findings.      [  x ] Sepsis   [   ] Severe Sepsis with Acute Organ Dysfunction/Failure (please specify  organ dysfunction/failure): ___________________   [   ] Other Infectious Disease (please specify): _________________________________    [   ] Other: __________________________________   [  ]  Clinically Undetermined         Please document in your progress notes daily for the duration of treatment until resolved and include in your discharge summary.

## 2020-02-13 NOTE — ASSESSMENT & PLAN NOTE
Left lower lobe PNA, started on Levaquin, Vanc, Aztreonam in ED  Infectious disease consulted, appreciate assistance  Switched to Vanc cefepime per ID recs.   Blood cultures NGTD  Resp cultures growing GNR,   Today resulted with ESBL Klebsiella, proteus and pseudomonas  5 days of Meropenem per ID - started 2/12

## 2020-02-13 NOTE — ASSESSMENT & PLAN NOTE
This is a 49-year-old male with a history of ALS, chronic hypoxemic hypercarbic respiratory failure status post venting and trach, who is brought in by EMS with report of fever since Thursday. He has had increased respiratory secretions, sputum changed from white/yellow to green and is very thick. Oxygenation also worse that normal.. 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 CT of abd/ pelvis with LLL opacification s/p bronch 2/9, GNRs growing from culture.    Respiratory culture/bronch with ESBL Klebsiella, proteus and pseudomonas  Recommend 5 days of Meropenem   E.faecalis on urine culture most likely colonization - don't treat

## 2020-02-14 LAB
ALBUMIN SERPL BCP-MCNC: 2.7 G/DL (ref 3.5–5.2)
ALP SERPL-CCNC: 61 U/L (ref 55–135)
ALT SERPL W/O P-5'-P-CCNC: 45 U/L (ref 10–44)
ANION GAP SERPL CALC-SCNC: 10 MMOL/L (ref 8–16)
ANISOCYTOSIS BLD QL SMEAR: SLIGHT
AST SERPL-CCNC: 52 U/L (ref 10–40)
BASO STIPL BLD QL SMEAR: ABNORMAL
BASOPHILS # BLD AUTO: ABNORMAL K/UL (ref 0–0.2)
BASOPHILS NFR BLD: 1 % (ref 0–1.9)
BILIRUB SERPL-MCNC: 0.3 MG/DL (ref 0.1–1)
BUN SERPL-MCNC: 12 MG/DL (ref 6–20)
CALCIUM SERPL-MCNC: 9.2 MG/DL (ref 8.7–10.5)
CHLORIDE SERPL-SCNC: 105 MMOL/L (ref 95–110)
CO2 SERPL-SCNC: 26 MMOL/L (ref 23–29)
CREAT SERPL-MCNC: 0.4 MG/DL (ref 0.5–1.4)
DACRYOCYTES BLD QL SMEAR: ABNORMAL
DIFFERENTIAL METHOD: ABNORMAL
EOSINOPHIL # BLD AUTO: ABNORMAL K/UL (ref 0–0.5)
EOSINOPHIL NFR BLD: 4 % (ref 0–8)
ERYTHROCYTE [DISTWIDTH] IN BLOOD BY AUTOMATED COUNT: 15.2 % (ref 11.5–14.5)
EST. GFR  (AFRICAN AMERICAN): >60 ML/MIN/1.73 M^2
EST. GFR  (NON AFRICAN AMERICAN): >60 ML/MIN/1.73 M^2
GLUCOSE SERPL-MCNC: 147 MG/DL (ref 70–110)
HCT VFR BLD AUTO: 34.2 % (ref 40–54)
HGB BLD-MCNC: 10.4 G/DL (ref 14–18)
IMM GRANULOCYTES # BLD AUTO: ABNORMAL K/UL (ref 0–0.04)
IMM GRANULOCYTES NFR BLD AUTO: ABNORMAL % (ref 0–0.5)
LYMPHOCYTES # BLD AUTO: ABNORMAL K/UL (ref 1–4.8)
LYMPHOCYTES NFR BLD: 27 % (ref 18–48)
MAGNESIUM SERPL-MCNC: 2 MG/DL (ref 1.6–2.6)
MCH RBC QN AUTO: 28 PG (ref 27–31)
MCHC RBC AUTO-ENTMCNC: 30.4 G/DL (ref 32–36)
MCV RBC AUTO: 92 FL (ref 82–98)
METAMYELOCYTES NFR BLD MANUAL: 3 %
MONOCYTES # BLD AUTO: ABNORMAL K/UL (ref 0.3–1)
MONOCYTES NFR BLD: 11 % (ref 4–15)
MYELOCYTES NFR BLD MANUAL: 1 %
NEUTROPHILS NFR BLD: 52 % (ref 38–73)
NEUTS BAND NFR BLD MANUAL: 1 %
NRBC BLD-RTO: 0 /100 WBC
OVALOCYTES BLD QL SMEAR: ABNORMAL
PHOSPHATE SERPL-MCNC: 2 MG/DL (ref 2.7–4.5)
PLATELET # BLD AUTO: 249 K/UL (ref 150–350)
PLATELET BLD QL SMEAR: ABNORMAL
PMV BLD AUTO: 10.9 FL (ref 9.2–12.9)
POIKILOCYTOSIS BLD QL SMEAR: SLIGHT
POLYCHROMASIA BLD QL SMEAR: ABNORMAL
POTASSIUM SERPL-SCNC: 3.9 MMOL/L (ref 3.5–5.1)
PROT SERPL-MCNC: 6.2 G/DL (ref 6–8.4)
RBC # BLD AUTO: 3.72 M/UL (ref 4.6–6.2)
SODIUM SERPL-SCNC: 141 MMOL/L (ref 136–145)
WBC # BLD AUTO: 8.44 K/UL (ref 3.9–12.7)

## 2020-02-14 PROCEDURE — 85027 COMPLETE CBC AUTOMATED: CPT

## 2020-02-14 PROCEDURE — 99900026 HC AIRWAY MAINTENANCE (STAT)

## 2020-02-14 PROCEDURE — 94668 MNPJ CHEST WALL SBSQ: CPT

## 2020-02-14 PROCEDURE — 25000003 PHARM REV CODE 250: Performed by: STUDENT IN AN ORGANIZED HEALTH CARE EDUCATION/TRAINING PROGRAM

## 2020-02-14 PROCEDURE — 63600175 PHARM REV CODE 636 W HCPCS: Performed by: STUDENT IN AN ORGANIZED HEALTH CARE EDUCATION/TRAINING PROGRAM

## 2020-02-14 PROCEDURE — 27000221 HC OXYGEN, UP TO 24 HOURS

## 2020-02-14 PROCEDURE — 99900035 HC TECH TIME PER 15 MIN (STAT)

## 2020-02-14 PROCEDURE — 83735 ASSAY OF MAGNESIUM: CPT

## 2020-02-14 PROCEDURE — C9113 INJ PANTOPRAZOLE SODIUM, VIA: HCPCS | Performed by: STUDENT IN AN ORGANIZED HEALTH CARE EDUCATION/TRAINING PROGRAM

## 2020-02-14 PROCEDURE — 84100 ASSAY OF PHOSPHORUS: CPT

## 2020-02-14 PROCEDURE — 80053 COMPREHEN METABOLIC PANEL: CPT

## 2020-02-14 PROCEDURE — 94761 N-INVAS EAR/PLS OXIMETRY MLT: CPT

## 2020-02-14 PROCEDURE — 25000003 PHARM REV CODE 250: Performed by: INTERNAL MEDICINE

## 2020-02-14 PROCEDURE — 94640 AIRWAY INHALATION TREATMENT: CPT

## 2020-02-14 PROCEDURE — 25000003 PHARM REV CODE 250: Performed by: EMERGENCY MEDICINE

## 2020-02-14 PROCEDURE — 20000000 HC ICU ROOM

## 2020-02-14 PROCEDURE — 99233 SBSQ HOSP IP/OBS HIGH 50: CPT | Mod: GC,,, | Performed by: INTERNAL MEDICINE

## 2020-02-14 PROCEDURE — 25000242 PHARM REV CODE 250 ALT 637 W/ HCPCS: Performed by: INTERNAL MEDICINE

## 2020-02-14 PROCEDURE — 85007 BL SMEAR W/DIFF WBC COUNT: CPT

## 2020-02-14 PROCEDURE — 82803 BLOOD GASES ANY COMBINATION: CPT

## 2020-02-14 PROCEDURE — 94003 VENT MGMT INPAT SUBQ DAY: CPT

## 2020-02-14 PROCEDURE — 99233 PR SUBSEQUENT HOSPITAL CARE,LEVL III: ICD-10-PCS | Mod: GC,,, | Performed by: INTERNAL MEDICINE

## 2020-02-14 PROCEDURE — 37799 UNLISTED PX VASCULAR SURGERY: CPT

## 2020-02-14 RX ORDER — FUROSEMIDE 10 MG/ML
20 INJECTION INTRAMUSCULAR; INTRAVENOUS DAILY
Status: DISCONTINUED | OUTPATIENT
Start: 2020-02-14 | End: 2020-02-16

## 2020-02-14 RX ADMIN — CLONAZEPAM 0.5 MG: 0.5 TABLET ORAL at 09:02

## 2020-02-14 RX ADMIN — ENOXAPARIN SODIUM 40 MG: 100 INJECTION SUBCUTANEOUS at 08:02

## 2020-02-14 RX ADMIN — MEROPENEM 2 G: 1 INJECTION, POWDER, FOR SOLUTION INTRAVENOUS at 02:02

## 2020-02-14 RX ADMIN — IPRATROPIUM BROMIDE AND ALBUTEROL SULFATE 3 ML: .5; 3 SOLUTION RESPIRATORY (INHALATION) at 12:02

## 2020-02-14 RX ADMIN — MEROPENEM 2 G: 1 INJECTION, POWDER, FOR SOLUTION INTRAVENOUS at 09:02

## 2020-02-14 RX ADMIN — IPRATROPIUM BROMIDE AND ALBUTEROL SULFATE 3 ML: .5; 3 SOLUTION RESPIRATORY (INHALATION) at 04:02

## 2020-02-14 RX ADMIN — ENOXAPARIN SODIUM 40 MG: 100 INJECTION SUBCUTANEOUS at 09:02

## 2020-02-14 RX ADMIN — OXYBUTYNIN CHLORIDE 5 MG: 5 SOLUTION ORAL at 08:02

## 2020-02-14 RX ADMIN — POLYETHYLENE GLYCOL 3350 17 G: 17 POWDER, FOR SOLUTION ORAL at 08:02

## 2020-02-14 RX ADMIN — CLONAZEPAM 0.5 MG: 0.5 TABLET ORAL at 02:02

## 2020-02-14 RX ADMIN — IPRATROPIUM BROMIDE AND ALBUTEROL SULFATE 3 ML: .5; 3 SOLUTION RESPIRATORY (INHALATION) at 11:02

## 2020-02-14 RX ADMIN — PANTOPRAZOLE SODIUM 40 MG: 40 INJECTION, POWDER, FOR SOLUTION INTRAVENOUS at 08:02

## 2020-02-14 RX ADMIN — OXYBUTYNIN CHLORIDE 5 MG: 5 SOLUTION ORAL at 09:02

## 2020-02-14 RX ADMIN — MICONAZOLE NITRATE: 20 OINTMENT TOPICAL at 08:02

## 2020-02-14 RX ADMIN — PREGABALIN 100 MG: 50 CAPSULE ORAL at 02:02

## 2020-02-14 RX ADMIN — MICONAZOLE NITRATE: 20 OINTMENT TOPICAL at 09:02

## 2020-02-14 RX ADMIN — PANTOPRAZOLE SODIUM 40 MG: 40 INJECTION, POWDER, FOR SOLUTION INTRAVENOUS at 09:02

## 2020-02-14 RX ADMIN — IPRATROPIUM BROMIDE AND ALBUTEROL SULFATE 3 ML: .5; 3 SOLUTION RESPIRATORY (INHALATION) at 03:02

## 2020-02-14 RX ADMIN — MEROPENEM 2 G: 1 INJECTION, POWDER, FOR SOLUTION INTRAVENOUS at 06:02

## 2020-02-14 RX ADMIN — IPRATROPIUM BROMIDE AND ALBUTEROL SULFATE 3 ML: .5; 3 SOLUTION RESPIRATORY (INHALATION) at 07:02

## 2020-02-14 RX ADMIN — DULOXETINE 60 MG: 30 CAPSULE, DELAYED RELEASE ORAL at 08:02

## 2020-02-14 RX ADMIN — AMIODARONE HYDROCHLORIDE 200 MG: 200 TABLET ORAL at 08:02

## 2020-02-14 RX ADMIN — PREGABALIN 100 MG: 50 CAPSULE ORAL at 08:02

## 2020-02-14 RX ADMIN — CLONAZEPAM 0.5 MG: 0.5 TABLET ORAL at 08:02

## 2020-02-14 RX ADMIN — CHLORHEXIDINE GLUCONATE 0.12% ORAL RINSE 15 ML: 1.2 LIQUID ORAL at 09:02

## 2020-02-14 RX ADMIN — OXYBUTYNIN CHLORIDE 5 MG: 5 SOLUTION ORAL at 02:02

## 2020-02-14 RX ADMIN — CHLORHEXIDINE GLUCONATE 0.12% ORAL RINSE 15 ML: 1.2 LIQUID ORAL at 08:02

## 2020-02-14 RX ADMIN — FUROSEMIDE 20 MG: 10 INJECTION, SOLUTION INTRAMUSCULAR; INTRAVENOUS at 12:02

## 2020-02-14 RX ADMIN — AMIODARONE HYDROCHLORIDE 200 MG: 200 TABLET ORAL at 09:02

## 2020-02-14 RX ADMIN — PREGABALIN 100 MG: 50 CAPSULE ORAL at 09:02

## 2020-02-14 NOTE — PLAN OF CARE
SAMANTHA Skin Integrity Evaluation      Subjective    SAMANTHA skin integrity champion evaluation of patient as part of the comprehensive skin care team .       Bayron Delgado is being evaluated as per the Epic  pressure injury skin report.  He has been admitted to CMICU for 6 days. Skin injury was noted on 2/8/2020.       Limited Physical exam     Lesion 1: Gluteal Cleft - full thickness skin breakdown with pink moist tissue          Assessment :       Lesion 1:  Stage 3 Pressure Ulcer      POA : yes    Consults: Wound Care, Nutrition and Support surfaces as per WOCN recommendations        Follow up:    Patient will be re evaluated weekly.     Rosa Pineda PA-C  Critical Care Medicine  2/14/2020   11:47 AM

## 2020-02-14 NOTE — PROGRESS NOTES
[facility]  Critical Care Medicine  Progress Note    Patient Name: Bayron Delgado  MRN: [id number]  Admission Date: 2/8/2020  Hospital Length of Stay: 6 days  Code Status: Full Code  Attending Provider: Ade Ward MD  Primary Care Provider: Camille Bustillos NP   Principal Problem: Sepsis    Subjective:     HPI:  49 year old male with PMH of ALS, chronic hypoxemic hypercarbic respiratory failure now with trach and vent dependent who was BIB EMS from home for fevers and increased secretion. Per wife, she noticed that patient developed fever up to 101 F on Thursday. During this same period of time, she also started noticing increasing secretions from tracheostomy site and increasing O2 requirements. Fevers continued to persist despite Advil (does not want to give Tylenol with concerns for liver damage or Ibuprofen for allergic reaction) and reached as high as 105 F. Sputum became darker and appeared green. She also reports that he has had more watery stools, normally stools are soft to solid. With EMS, pt's sats were in the upper 80s on home vent settings of room air, improved with application of 4L/min O2.  While in the ED, patient has been HDS and started on Vanc, Aztreonam and Levo given PCN allergy. Influenza was negative. WBC of 14.42 and febrile to 101 F. CXR showing left lower lobe PNA. Respiratory cultures ordered.  Critical care was consulted for further management and stabilization of this patient in setting of chronic vent dependent ALS.     Of note, he has a history of of chronic MDR Pseudomonas and ESBL UTI, with no plan for treatment at this time. Wife was sick with bronchitis over the last week, no other sick contacts.     Hospital/ICU Course:  2/9: brochoscopy performed, cultures sent. Vanc and cefepime per ID recs.  Cultures positive for pseudomonas, proteus, and ESBL kleb. ID recommended meropenem for 5 day course.   2/12: Given one time lasix dose 40 mg, subsequently  had 3L uop.   2/13: NAEON. Minimal uop this am. Michele flushed  2/14: NAEON. UOP 1130. Day 3 of meropenem.     Interval History/Significant Events: as above    Review of Systems   Unable to perform ROS: Patient nonverbal     Objective:     Vital Signs (Most Recent):  Temp: 99.2 °F (37.3 °C) (02/14/20 0300)  Pulse: 78 (02/14/20 0745)  Resp: 14 (02/14/20 0745)  BP: (!) 100/58 (02/14/20 0600)  SpO2: 95 % (02/14/20 0600) Vital Signs (24h Range):  Temp:  [97.6 °F (36.4 °C)-99.2 °F (37.3 °C)] 99.2 °F (37.3 °C)  Pulse:  [60-94] 78  Resp:  [10-23] 14  SpO2:  [91 %-100 %] 95 %  BP: ()/() 100/58   Weight: 124.8 kg (275 lb 2.2 oz)  Body mass index is 41.83 kg/m².      Intake/Output Summary (Last 24 hours) at 2/14/2020 0858  Last data filed at 2/14/2020 0700  Gross per 24 hour   Intake 1705 ml   Output 1108 ml   Net 597 ml       Physical Exam   Constitutional: He appears well-developed and well-nourished.   HENT:   Head: Normocephalic and atraumatic.   Mouth/Throat: Oropharynx is clear and moist.   Eyes: Pupils are equal, round, and reactive to light. No scleral icterus.   Neck: No JVD present. No tracheal deviation present.   Cardiovascular: Normal rate, regular rhythm, normal heart sounds and intact distal pulses.   Pulmonary/Chest:   Mechanical breath sounds. Equal bilateral, no rales    Abdominal: Soft. He exhibits no distension.   Musculoskeletal: He exhibits edema (pitting edema to all 4 extremities).   Neurological:   Dense quadriplegia, pt moves eyes only   Skin: Skin is warm and dry. Rash (erythema across cheeks, chest) noted.   Psychiatric:   Unable to assess       Vents:  Vent Mode: A/C (02/14/20 0541)  Ventilator Initiated: Yes (02/09/20 0004)  Set Rate: 14 BPM (02/14/20 0541)  Vt Set: 450 mL (02/14/20 0541)  Pressure Support: 0 cmH20 (02/14/20 0541)  PEEP/CPAP: 5 cmH20 (02/14/20 0541)  Oxygen Concentration (%): 21 (02/14/20 0600)  Peak Airway Pressure: 19 cmH2O (02/14/20 0541)  Plateau Pressure: 0 cmH20  (02/14/20 0541)  Total Ve: 7.57 mL (02/14/20 0541)  F/VT Ratio<105 (RSBI): (!) 25.88 (02/14/20 0541)  Lines/Drains/Airways     Drain                 Gastrostomy/Enterostomy 01/23/17 1113 Percutaneous endoscopic gastrostomy (PEG) LUQ feeding 1116 days         Urethral Catheter 02/08/20 1118 Non-latex 22 Fr. 5 days         Rectal Tube 02/13/20 2300 rectal tube w/ balloon (indicate number of mLs) less than 1 day          Airway                 Surgical Airway 03/19/18 1355 Shiley Cuffed 696 days          Peripheral Intravenous Line                 Peripheral IV - Single Lumen 11/22/19 1150 18 G Right Forearm 83 days         Peripheral IV - Single Lumen 02/12/20 0924 18 G;1 3/4 in Right Forearm 1 day         Peripheral IV - Single Lumen 02/12/20 0928 20 G;1 3/4 in Left Forearm 1 day              Significant Labs:    CBC/Anemia Profile:  Recent Labs   Lab 02/13/20  0304 02/14/20  0250   WBC 10.08 8.44   HGB 10.5* 10.4*   HCT 34.1* 34.2*    249   MCV 90 92   RDW 15.0* 15.2*        Chemistries:  Recent Labs   Lab 02/13/20  0304 02/13/20  1127 02/14/20  0250     --  141   K 3.5 3.7 3.9     --  105   CO2 28  --  26   BUN 11  --  12   CREATININE 0.4*  --  0.4*   CALCIUM 9.2  --  9.2   ALBUMIN 2.8*  --  2.7*   PROT 6.3  --  6.2   BILITOT 0.5  --  0.3   ALKPHOS 65  --  61   ALT 42  --  45*   AST 44*  --  52*   MG 2.0  --  2.0   PHOS 2.7  --  2.0*           ABG  Recent Labs   Lab 02/08/20  0906   PH 7.446   PO2 64*   PCO2 37.0   HCO3 25.5   BE 1     Assessment/Plan:     Neuro  ALS (amyotrophic lateral sclerosis)  With chronic associated pain  - Continue home lyrica, Duloxetine scheduled, Norco prn  - s/p tracheostomy 3/2018  - s/p PEG    Psychiatric  Anxiety  - Continue home Duloxetine and Clonazepam    ENT  Tracheostomy in place  - Chlorhexidine mouthwash    Derm  Skin breakdown  Candidal rash in sacral region with stage III coccyx wound   Continue treating with Nystatin cream   Appreciate wound care  assistance and recs    Pulmonary  Pneumonia  Left lower lobe PNA, started on Levaquin, Vanc, Aztreonam in ED  Infectious disease consulted, appreciate assistance  Switched to Vanc cefepime per ID recs.   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 ALS  - Chlorhexidine mouthwash bid  - Protonix BID  -ABG prn  - Bronchoscopy 2/9, cultures as above (see PNA)  - continue chest PT, cough assist q4h & PRN    Vent Mode: A/C  Oxygen Concentration (%):  [] 100  Resp Rate Total:  [14 br/min-23 br/min] 16 br/min  Vt Set:  [450 mL] 450 mL  PEEP/CPAP:  [5 cmH20] 5 cmH20  Pressure Support:  [0 cmH20] 0 cmH20  Mean Airway Pressure:  [9.2 [id number] cmH20] 9.2 cmH20      Cardiac/Vascular  Atrial fibrillation  - Continue home Amiodarone 200mg    Renal/  Chronic indwelling Michele catheter  Chronic colonization with MDR Pseudomonas, ESBL  - Oxybutynin daily   - UCx consistent with colonization    GI  Constipation  Chronic, requires disimpaction by wife  - Continue miralax  - stooling regularly per nursing documentation    Other  Pressure injury of coccygeal region, stage 3  Appreciate wound care recs  Pt does not want to turn 2/2 it takes a long time for him to set up his eye-tracking computer again once he moves. Will continue to encourage turning.     Presence of externally removable percutaneous endoscopic gastrostomy (PEG) tube  Granulation tissue surrounding site, will  Monitor    Anasarca  On lasix 40 mg po at home prn edema  2/12: Single dose of IV lasix with good UOP       Critical Care Daily Checklist:    A: Awake: RASS Goal/Actual Goal: RASS Goal: 0-->alert and calm  Actual: Paige Agitation Sedation Scale (RASS): Alert and calm   B: Spontaneous Breathing Trial Performed? Spon. Breathing Trial Initiated?: Not initiated(pt. is vent dependent)  (02/11/20 0919)   C: SAT & SBT Coordinated?  Vent dep                      D: Delirium: CAM-ICU Overall CAM-ICU: Negative   E: Early Mobility Performed? Yes   F: Feeding Goal: Goals: 1.) Pt to achieve/tolerate >75% EEN and EPN by RD follow up.   Status: Nutrition Goal Status: progressing towards goal   Current Diet Order   Procedures    Diet NPO      AS: Analgesia/Sedation none   T: Thromboembolic Prophylaxis lovenox   H: HOB > 300 Yes   U: Stress Ulcer Prophylaxis (if needed) protonix   G: Glucose Control    B: Bowel Function Stool Occurrence: 1   I: Indwelling Catheter (Lines & Michele) Necessity    D: De-escalation of Antimicrobials/Pharmacotherapies meropenem    Plan for the day/ETD     Code Status:  Family/Goals of Care: Full Code         Critical secondary to Patient has a condition that poses threat to life and bodily function: Severe Respiratory Distress      Critical care was time spent personally by me on the following activities: development of treatment plan with patient or surrogate and bedside caregivers, discussions with consultants, evaluation of patient's response to treatment, examination of patient, ordering and performing treatments and interventions, ordering and review of laboratory studies, ordering and review of radiographic studies, pulse oximetry, re-evaluation of patient's condition. This critical care time did not overlap with that of any other provider or involve time for any procedures.     Nick Zamudio MD  Critical Care Medicine  [facility]

## 2020-02-14 NOTE — PHYSICIAN QUERY
PT Name: Bayron Delgado  MR #: [id number]     Physician Query Form - Documentation Clarification      CDS/: DONALD Landers, RN, CDS               Contact information:[email or web address]    This form is a permanent document in the medical record.     Query Date: February 14, 2020    By submitting this query, we are merely seeking further clarification of documentation. Please utilize your independent clinical judgment when addressing the question(s) below.    The Medical record reflects the following:    Supporting Clinical Findings Location in Medical Record   Pneumonia   Left lower lobe PNA, started on Levaquin, Vanc, Aztreonam     Mucous plugging and pneumonia, Ventilator associated Pneumonia        LLL pneumonia: bronchoscopy on 2/9/20  Respiratory cultures growing ESBL Klebsiella, proteus and pseudomonas     Neuromuscular respiratory failure 2/2 ALS: vent dependent    H&P, Dr. Hope/Dr. Fuentes, 2/8       Pulm, Bronchoscopy, Diagnostic, 2/9       [facility], Dr. Zamudio/Dr. Ward, 2/13                                                                            Doctor, Please further specify the type of pneumonia diagnoses associated with above clinical findings. Please check all that apply:    Provider Use Only     [ x ] ESBL Klebsiella Pneumonia      [ x ] Proteus Pneumonia     [ x ] Pseudomonas Pneumonia     [  ] Ventilator Assoicated Pneumonia     [  ] Other, please specify:___________________________________                                                                                                               [  ] Clinically Undetermined

## 2020-02-14 NOTE — SUBJECTIVE & OBJECTIVE
Interval History/Significant Events: as above    Review of Systems   Unable to perform ROS: Patient nonverbal     Objective:     Vital Signs (Most Recent):  Temp: 99.2 °F (37.3 °C) (02/14/20 0300)  Pulse: 78 (02/14/20 0745)  Resp: 14 (02/14/20 0745)  BP: (!) 100/58 (02/14/20 0600)  SpO2: 95 % (02/14/20 0600) Vital Signs (24h Range):  Temp:  [97.6 °F (36.4 °C)-99.2 °F (37.3 °C)] 99.2 °F (37.3 °C)  Pulse:  [60-94] 78  Resp:  [10-23] 14  SpO2:  [91 %-100 %] 95 %  BP: ()/() 100/58   Weight: 124.8 kg (275 lb 2.2 oz)  Body mass index is 41.83 kg/m².      Intake/Output Summary (Last 24 hours) at 2/14/2020 0858  Last data filed at 2/14/2020 0700  Gross per 24 hour   Intake 1705 ml   Output 1108 ml   Net 597 ml       Physical Exam   Constitutional: He appears well-developed and well-nourished.   HENT:   Head: Normocephalic and atraumatic.   Mouth/Throat: Oropharynx is clear and moist.   Eyes: Pupils are equal, round, and reactive to light. No scleral icterus.   Neck: No JVD present. No tracheal deviation present.   Cardiovascular: Normal rate, regular rhythm, normal heart sounds and intact distal pulses.   Pulmonary/Chest:   Mechanical breath sounds. Equal bilateral, no rales    Abdominal: Soft. He exhibits no distension.   Musculoskeletal: He exhibits edema (pitting edema to all 4 extremities).   Neurological:   Dense quadriplegia, pt moves eyes only   Skin: Skin is warm and dry. Rash (erythema across cheeks, chest) noted.   Psychiatric:   Unable to assess       Vents:  Vent Mode: A/C (02/14/20 0541)  Ventilator Initiated: Yes (02/09/20 0004)  Set Rate: 14 BPM (02/14/20 0541)  Vt Set: 450 mL (02/14/20 0541)  Pressure Support: 0 cmH20 (02/14/20 0541)  PEEP/CPAP: 5 cmH20 (02/14/20 0541)  Oxygen Concentration (%): 21 (02/14/20 0600)  Peak Airway Pressure: 19 cmH2O (02/14/20 0541)  Plateau Pressure: 0 cmH20 (02/14/20 0541)  Total Ve: 7.57 mL (02/14/20 0541)  F/VT Ratio<105 (RSBI): (!) 25.88 (02/14/20  0541)  Lines/Drains/Airways     Drain                 Gastrostomy/Enterostomy 01/23/17 1113 Percutaneous endoscopic gastrostomy (PEG) LUQ feeding 1116 days         Urethral Catheter 02/08/20 1118 Non-latex 22 Fr. 5 days         Rectal Tube 02/13/20 2300 rectal tube w/ balloon (indicate number of mLs) less than 1 day          Airway                 Surgical Airway 03/19/18 1355 Shiley Cuffed 696 days          Peripheral Intravenous Line                 Peripheral IV - Single Lumen 11/22/19 1150 18 G Right Forearm 83 days         Peripheral IV - Single Lumen 02/12/20 0924 18 G;1 3/4 in Right Forearm 1 day         Peripheral IV - Single Lumen 02/12/20 0928 20 G;1 3/4 in Left Forearm 1 day              Significant Labs:    CBC/Anemia Profile:  Recent Labs   Lab 02/13/20  0304 02/14/20  0250   WBC 10.08 8.44   HGB 10.5* 10.4*   HCT 34.1* 34.2*    249   MCV 90 92   RDW 15.0* 15.2*        Chemistries:  Recent Labs   Lab 02/13/20  0304 02/13/20  1127 02/14/20  0250     --  141   K 3.5 3.7 3.9     --  105   CO2 28  --  26   BUN 11  --  12   CREATININE 0.4*  --  0.4*   CALCIUM 9.2  --  9.2   ALBUMIN 2.8*  --  2.7*   PROT 6.3  --  6.2   BILITOT 0.5  --  0.3   ALKPHOS 65  --  61   ALT 42  --  45*   AST 44*  --  52*   MG 2.0  --  2.0   PHOS 2.7  --  2.0*

## 2020-02-14 NOTE — PLAN OF CARE
CM discussed patient D/C POC needs with CCM team in IDT rounds. Per CCM team, Patient not medically stable for stepdown at this time. Patient continues to require CCM service for:   Vent dependent  Day 3/5 meropenem    CM remains available for any further patient/family needs or concerns.     Payor: MEDICARE / Plan: MEDICARE PART A & B / Product Type: [facility] /        02/14/20 1453   Discharge Reassessment   Assessment Type Discharge Planning Reassessment   Do you have any problems affording any of your prescribed medications? Yes   Discharge Plan A Home with family   Discharge Plan B Home with family   DME Needed Upon Discharge  other (see comments)  (TBD)   Anticipated Discharge Disposition Home-Health   Can the patient/caregiver answer the patient profile reliably? Yes, cognitively intact   Describe the patient's ability to walk at the present time. Does not walk or unable to take any steps at all   How often would a person be available to care for the patient? Whenever needed   Number of comorbid conditions (as recorded on the chart) Five or more   Post-Acute Status   Post-Acute Authorization Home Health/Hospice   Home Health/Hospice Status Awaiting Orders for HH       Rajeev Benz RN MSN  Critical Care-   Ext. [phone]

## 2020-02-14 NOTE — PLAN OF CARE
CMICU DAILY GOALS       A: Awake    RASS: Goal - RASS Goal: 0-->alert and calm  Actual - RASS (Paige Agitation-Sedation Scale): 0-->alert and calm   Restraint necessity:  no  B: Breath   SBT: Not intubated   C: Coordinate A & B, analgesics/sedatives   Pain: managed    SAT: NA  D: Delirium   CAM-ICU: Overall CAM-ICU: Negative  E: Early Mobility   MOVE Screen: Pass   Activity: Activity Management: activity clustered for rest period, activity adjusted per tolerance  FAS: Feeding/Nutrition   Diet order: Diet/Nutrition Received: tube feeding,   Fluid restriction:    T: Thrombus   DVT prophylaxis: VTE Required Core Measure: Pharmacological prophylaxis initiated/maintained  H: HOB Elevation   Head of Bed (HOB): HOB at 30 degrees  U: Ulcer Prophylaxis   GI: yes  G: Glucose control   No BG monitoring ordered   S: Skin   Bundle compliance: yes   Bathing/Skin Care: back care, bath, chlorhexidine, bath, complete, dressed/undressed, foot care, incontinence care, linen changed Date:02/13/20 @ 2300  B: Bowel Function   diarrhea ; flexiseal in situ  I: Indwelling Catheters   Michele necessity:      Urethral Catheter 02/08/20 1118 Non-latex 22 Fr.-Reason for Continuing Urinary Catheterization: Critically ill in ICU requiring intensive monitoring   CVC necessity: No   IPAD offered: No  D: De-escalation Antibx   No  Plan for the day   Continue current management  Family/Goals of care/Code Status   Code Status: Full Code     VS and assessment per flow sheet, patient progressing towards goals as tolerated, plan of care reviewed with Bayron Delgado and caregiver, all concerns addressed, will continue to monitor.

## 2020-02-14 NOTE — PLAN OF CARE
SAMANTHA Skin Integrity Evaluation      Subjective    SAMANTHA skin integrity champion evaluation of patient as part of the comprehensive skin care team .       Bayron Delgado is being evaluated as per the Epic  pressure injury skin report.  He has been admitted to CMICU for 6 days .   Skin injury was noted on 2/8/2020      Limited Physical exam     Lesion 1: Intergluteal fold near rectum -  Full thickness skin breakdown with pink moist tissue        Assessment :       Lesion 1:  Moisture associated dermatitis with full thickness skin breakdown.     POA : yes    Consults: Wound Care and Support surfaces as per WOCN recommendations          Follow up:    Patient will be re evaluated weekly.       Rosa Pineda PA-C  Critical Care Medicine  2/14/2020   4:22 PM

## 2020-02-15 LAB
ALBUMIN SERPL BCP-MCNC: 2.9 G/DL (ref 3.5–5.2)
ALP SERPL-CCNC: 62 U/L (ref 55–135)
ALT SERPL W/O P-5'-P-CCNC: 51 U/L (ref 10–44)
ANION GAP SERPL CALC-SCNC: 9 MMOL/L (ref 8–16)
ANISOCYTOSIS BLD QL SMEAR: SLIGHT
AST SERPL-CCNC: 66 U/L (ref 10–40)
BASOPHILS NFR BLD: 1 % (ref 0–1.9)
BILIRUB SERPL-MCNC: 0.4 MG/DL (ref 0.1–1)
BUN SERPL-MCNC: 10 MG/DL (ref 6–20)
CALCIUM SERPL-MCNC: 9.6 MG/DL (ref 8.7–10.5)
CHLORIDE SERPL-SCNC: 104 MMOL/L (ref 95–110)
CO2 SERPL-SCNC: 27 MMOL/L (ref 23–29)
CREAT SERPL-MCNC: 0.4 MG/DL (ref 0.5–1.4)
DIFFERENTIAL METHOD: ABNORMAL
EOSINOPHIL NFR BLD: 3 % (ref 0–8)
ERYTHROCYTE [DISTWIDTH] IN BLOOD BY AUTOMATED COUNT: 15.2 % (ref 11.5–14.5)
EST. GFR  (AFRICAN AMERICAN): >60 ML/MIN/1.73 M^2
EST. GFR  (NON AFRICAN AMERICAN): >60 ML/MIN/1.73 M^2
GLUCOSE SERPL-MCNC: 125 MG/DL (ref 70–110)
HCT VFR BLD AUTO: 34.4 % (ref 40–54)
HGB BLD-MCNC: 10.7 G/DL (ref 14–18)
IMM GRANULOCYTES # BLD AUTO: ABNORMAL K/UL (ref 0–0.04)
IMM GRANULOCYTES NFR BLD AUTO: ABNORMAL % (ref 0–0.5)
LYMPHOCYTES NFR BLD: 29 % (ref 18–48)
MAGNESIUM SERPL-MCNC: 2.1 MG/DL (ref 1.6–2.6)
MCH RBC QN AUTO: 28.2 PG (ref 27–31)
MCHC RBC AUTO-ENTMCNC: 31.1 G/DL (ref 32–36)
MCV RBC AUTO: 91 FL (ref 82–98)
MONOCYTES NFR BLD: 11 % (ref 4–15)
MYELOCYTES NFR BLD MANUAL: 1 %
NEUTROPHILS NFR BLD: 54 % (ref 38–73)
NEUTS BAND NFR BLD MANUAL: 1 %
NRBC BLD-RTO: 0 /100 WBC
PHOSPHATE SERPL-MCNC: 2 MG/DL (ref 2.7–4.5)
PLATELET # BLD AUTO: 286 K/UL (ref 150–350)
PMV BLD AUTO: 10.8 FL (ref 9.2–12.9)
POLYCHROMASIA BLD QL SMEAR: ABNORMAL
POTASSIUM SERPL-SCNC: 3.8 MMOL/L (ref 3.5–5.1)
PROT SERPL-MCNC: 6.6 G/DL (ref 6–8.4)
RBC # BLD AUTO: 3.79 M/UL (ref 4.6–6.2)
SODIUM SERPL-SCNC: 140 MMOL/L (ref 136–145)
WBC # BLD AUTO: 8.57 K/UL (ref 3.9–12.7)

## 2020-02-15 PROCEDURE — 25000003 PHARM REV CODE 250: Performed by: EMERGENCY MEDICINE

## 2020-02-15 PROCEDURE — 80053 COMPREHEN METABOLIC PANEL: CPT

## 2020-02-15 PROCEDURE — 84100 ASSAY OF PHOSPHORUS: CPT

## 2020-02-15 PROCEDURE — 63600175 PHARM REV CODE 636 W HCPCS: Performed by: STUDENT IN AN ORGANIZED HEALTH CARE EDUCATION/TRAINING PROGRAM

## 2020-02-15 PROCEDURE — C9113 INJ PANTOPRAZOLE SODIUM, VIA: HCPCS | Performed by: STUDENT IN AN ORGANIZED HEALTH CARE EDUCATION/TRAINING PROGRAM

## 2020-02-15 PROCEDURE — 94640 AIRWAY INHALATION TREATMENT: CPT

## 2020-02-15 PROCEDURE — 94003 VENT MGMT INPAT SUBQ DAY: CPT

## 2020-02-15 PROCEDURE — 99900035 HC TECH TIME PER 15 MIN (STAT)

## 2020-02-15 PROCEDURE — 99900026 HC AIRWAY MAINTENANCE (STAT)

## 2020-02-15 PROCEDURE — 25000003 PHARM REV CODE 250: Performed by: STUDENT IN AN ORGANIZED HEALTH CARE EDUCATION/TRAINING PROGRAM

## 2020-02-15 PROCEDURE — 85027 COMPLETE CBC AUTOMATED: CPT

## 2020-02-15 PROCEDURE — 99232 PR SUBSEQUENT HOSPITAL CARE,LEVL II: ICD-10-PCS | Mod: GC,,, | Performed by: INTERNAL MEDICINE

## 2020-02-15 PROCEDURE — 85007 BL SMEAR W/DIFF WBC COUNT: CPT

## 2020-02-15 PROCEDURE — 94668 MNPJ CHEST WALL SBSQ: CPT

## 2020-02-15 PROCEDURE — 25000242 PHARM REV CODE 250 ALT 637 W/ HCPCS: Performed by: INTERNAL MEDICINE

## 2020-02-15 PROCEDURE — 99232 SBSQ HOSP IP/OBS MODERATE 35: CPT | Mod: GC,,, | Performed by: INTERNAL MEDICINE

## 2020-02-15 PROCEDURE — 25000003 PHARM REV CODE 250: Performed by: INTERNAL MEDICINE

## 2020-02-15 PROCEDURE — 83735 ASSAY OF MAGNESIUM: CPT

## 2020-02-15 PROCEDURE — 27000221 HC OXYGEN, UP TO 24 HOURS

## 2020-02-15 PROCEDURE — 20000000 HC ICU ROOM

## 2020-02-15 PROCEDURE — 94761 N-INVAS EAR/PLS OXIMETRY MLT: CPT

## 2020-02-15 RX ORDER — SODIUM,POTASSIUM PHOSPHATES 280-250MG
1 POWDER IN PACKET (EA) ORAL ONCE
Status: COMPLETED | OUTPATIENT
Start: 2020-02-15 | End: 2020-02-15

## 2020-02-15 RX ADMIN — FUROSEMIDE 20 MG: 10 INJECTION, SOLUTION INTRAMUSCULAR; INTRAVENOUS at 08:02

## 2020-02-15 RX ADMIN — AMIODARONE HYDROCHLORIDE 200 MG: 200 TABLET ORAL at 08:02

## 2020-02-15 RX ADMIN — CLONAZEPAM 0.5 MG: 0.5 TABLET ORAL at 03:02

## 2020-02-15 RX ADMIN — CLONAZEPAM 0.5 MG: 0.5 TABLET ORAL at 09:02

## 2020-02-15 RX ADMIN — AMIODARONE HYDROCHLORIDE 200 MG: 200 TABLET ORAL at 09:02

## 2020-02-15 RX ADMIN — IPRATROPIUM BROMIDE AND ALBUTEROL SULFATE 3 ML: .5; 3 SOLUTION RESPIRATORY (INHALATION) at 11:02

## 2020-02-15 RX ADMIN — DULOXETINE 60 MG: 30 CAPSULE, DELAYED RELEASE ORAL at 08:02

## 2020-02-15 RX ADMIN — ENOXAPARIN SODIUM 40 MG: 100 INJECTION SUBCUTANEOUS at 09:02

## 2020-02-15 RX ADMIN — OXYBUTYNIN CHLORIDE 5 MG: 5 SOLUTION ORAL at 09:02

## 2020-02-15 RX ADMIN — PANTOPRAZOLE SODIUM 40 MG: 40 INJECTION, POWDER, FOR SOLUTION INTRAVENOUS at 09:02

## 2020-02-15 RX ADMIN — CHLORHEXIDINE GLUCONATE 0.12% ORAL RINSE 15 ML: 1.2 LIQUID ORAL at 08:02

## 2020-02-15 RX ADMIN — IPRATROPIUM BROMIDE AND ALBUTEROL SULFATE 3 ML: .5; 3 SOLUTION RESPIRATORY (INHALATION) at 03:02

## 2020-02-15 RX ADMIN — PANTOPRAZOLE SODIUM 40 MG: 40 INJECTION, POWDER, FOR SOLUTION INTRAVENOUS at 08:02

## 2020-02-15 RX ADMIN — MICONAZOLE NITRATE: 20 OINTMENT TOPICAL at 08:02

## 2020-02-15 RX ADMIN — CHLORHEXIDINE GLUCONATE 0.12% ORAL RINSE 15 ML: 1.2 LIQUID ORAL at 09:02

## 2020-02-15 RX ADMIN — PREGABALIN 100 MG: 50 CAPSULE ORAL at 08:02

## 2020-02-15 RX ADMIN — PREGABALIN 100 MG: 50 CAPSULE ORAL at 03:02

## 2020-02-15 RX ADMIN — HYDROCODONE BITARTRATE AND ACETAMINOPHEN 1 TABLET: 10; 325 TABLET ORAL at 01:02

## 2020-02-15 RX ADMIN — POTASSIUM & SODIUM PHOSPHATES POWDER PACK 280-160-250 MG 1 PACKET: 280-160-250 PACK at 06:02

## 2020-02-15 RX ADMIN — MEROPENEM 2 G: 1 INJECTION, POWDER, FOR SOLUTION INTRAVENOUS at 03:02

## 2020-02-15 RX ADMIN — OXYBUTYNIN CHLORIDE 5 MG: 5 SOLUTION ORAL at 03:02

## 2020-02-15 RX ADMIN — IPRATROPIUM BROMIDE AND ALBUTEROL SULFATE 3 ML: .5; 3 SOLUTION RESPIRATORY (INHALATION) at 07:02

## 2020-02-15 RX ADMIN — POLYETHYLENE GLYCOL 3350 17 G: 17 POWDER, FOR SOLUTION ORAL at 08:02

## 2020-02-15 RX ADMIN — MEROPENEM 2 G: 1 INJECTION, POWDER, FOR SOLUTION INTRAVENOUS at 10:02

## 2020-02-15 RX ADMIN — ENOXAPARIN SODIUM 40 MG: 100 INJECTION SUBCUTANEOUS at 08:02

## 2020-02-15 RX ADMIN — OXYBUTYNIN CHLORIDE 5 MG: 5 SOLUTION ORAL at 08:02

## 2020-02-15 RX ADMIN — MEROPENEM 2 G: 1 INJECTION, POWDER, FOR SOLUTION INTRAVENOUS at 06:02

## 2020-02-15 RX ADMIN — PREGABALIN 100 MG: 50 CAPSULE ORAL at 09:02

## 2020-02-15 RX ADMIN — CLONAZEPAM 0.5 MG: 0.5 TABLET ORAL at 08:02

## 2020-02-15 RX ADMIN — MICONAZOLE NITRATE: 20 OINTMENT TOPICAL at 09:02

## 2020-02-15 NOTE — ASSESSMENT & PLAN NOTE
On lasix 40 mg po at home prn edema  2/12: Single dose of IV lasix with good UOP  2/14: Started daily lasix IV 20 mg

## 2020-02-15 NOTE — PROGRESS NOTES
[facility]  Critical Care Medicine  Progress Note    Patient Name: Bayron Delgado  MRN: [id number]  Admission Date: 2/8/2020  Hospital Length of Stay: 7 days  Code Status: Full Code  Attending Provider: Ade Ward MD  Primary Care Provider: Camille Bustillos NP   Principal Problem: Sepsis    Subjective:     HPI:  49 year old male with PMH of ALS, chronic hypoxemic hypercarbic respiratory failure now with trach and vent dependent who was BIB EMS from home for fevers and increased secretion. Per wife, she noticed that patient developed fever up to 101 F on Thursday. During this same period of time, she also started noticing increasing secretions from tracheostomy site and increasing O2 requirements. Fevers continued to persist despite Advil (does not want to give Tylenol with concerns for liver damage or Ibuprofen for allergic reaction) and reached as high as 105 F. Sputum became darker and appeared green. She also reports that he has had more watery stools, normally stools are soft to solid. With EMS, pt's sats were in the upper 80s on home vent settings of room air, improved with application of 4L/min O2.  While in the ED, patient has been HDS and started on Vanc, Aztreonam and Levo given PCN allergy. Influenza was negative. WBC of 14.42 and febrile to 101 F. CXR showing left lower lobe PNA. Respiratory cultures ordered.  Critical care was consulted for further management and stabilization of this patient in setting of chronic vent dependent ALS.     Of note, he has a history of of chronic MDR Pseudomonas and ESBL UTI, with no plan for treatment at this time. Wife was sick with bronchitis over the last week, no other sick contacts.     Hospital/ICU Course:  2/9: brochoscopy performed, cultures sent. Vanc and cefepime per ID recs.  Cultures positive for pseudomonas, proteus, and ESBL kleb. ID recommended meropenem for 5 day course.   2/12: Given one time lasix dose 40 mg, subsequently  had 3L uop.   2/13: NAEON. Minimal uop this am. Michele flushed  2/14: NAEON. UOP 1130. Day 3 of meropenem.   2/15: Started lasix yesterday with good uop (3015 cc).     Interval History/Significant Events: as above    Review of Systems   Unable to perform ROS: Patient nonverbal     Objective:     Vital Signs (Most Recent):  Temp: 99 °F (37.2 °C) (02/15/20 0300)  Pulse: 60 (02/15/20 0736)  Resp: 20 (02/15/20 0736)  BP: 124/67 (02/15/20 0600)  SpO2: 95 % (02/15/20 0736) Vital Signs (24h Range):  Temp:  [97.9 °F (36.6 °C)-99 °F (37.2 °C)] 99 °F (37.2 °C)  Pulse:  [60-96] 60  Resp:  [14-28] 20  SpO2:  [93 %-100 %] 95 %  BP: (109-141)/(60-77) 124/67   Weight: 124.8 kg (275 lb 2.2 oz)  Body mass index is 41.83 kg/m².      Intake/Output Summary (Last 24 hours) at 2/15/2020 0846  Last data filed at 2/15/2020 0600  Gross per 24 hour   Intake 1505 ml   Output 2910 ml   Net -1405 ml       Physical Exam   Constitutional: He appears well-developed and well-nourished.   HENT:   Head: Normocephalic and atraumatic.   Mouth/Throat: Oropharynx is clear and moist.   Eyes: Pupils are equal, round, and reactive to light. No scleral icterus.   Neck: No JVD present. No tracheal deviation present.   Cardiovascular: Normal rate, regular rhythm, normal heart sounds and intact distal pulses.   Pulmonary/Chest:   Mechanical breath sounds. Equal bilateral, no rales    Abdominal: Soft. He exhibits no distension.   Musculoskeletal: He exhibits edema (pitting edema to all 4 extremities).   Neurological:   Dense quadriplegia, pt moves eyes only   Skin: Skin is warm and dry. Rash (erythema across cheeks, chest) noted.   Psychiatric:   Unable to assess       Vents:  Vent Mode: A/C (02/15/20 0736)  Ventilator Initiated: Yes (02/09/20 0004)  Set Rate: 14 BPM (02/15/20 0736)  Vt Set: 450 mL (02/15/20 0736)  Pressure Support: 0 cmH20 (02/15/20 0736)  PEEP/CPAP: 5 cmH20 (02/15/20 0736)  Oxygen Concentration (%): 21 (02/15/20 0736)  Peak Airway Pressure: 27  cmH2O (02/15/20 0736)  Plateau Pressure: 0 cmH20 (02/15/20 0736)  Total Ve: 17.9 mL (02/15/20 0736)  F/VT Ratio<105 (RSBI): (!) 68.26 (02/15/20 0736)  Lines/Drains/Airways     Drain                 Gastrostomy/Enterostomy 01/23/17 1113 Percutaneous endoscopic gastrostomy (PEG) LUQ feeding 1117 days         Urethral Catheter 02/08/20 1118 Non-latex 22 Fr. 6 days         Rectal Tube 02/13/20 2300 rectal tube w/ balloon (indicate number of mLs) 1 day          Airway                 Surgical Airway 03/19/18 1355 Shiley Cuffed 697 days          Peripheral Intravenous Line                 Peripheral IV - Single Lumen 11/22/19 1150 18 G Right Forearm 84 days         Peripheral IV - Single Lumen 02/12/20 0924 18 G;1 3/4 in Right Forearm 2 days         Peripheral IV - Single Lumen 02/12/20 0928 20 G;1 3/4 in Left Forearm 2 days              Significant Labs:    CBC/Anemia Profile:  Recent Labs   Lab 02/14/20  0250 02/15/20  0333   WBC 8.44 8.57   HGB 10.4* 10.7*   HCT 34.2* 34.4*    286   MCV 92 91   RDW 15.2* 15.2*        Chemistries:  Recent Labs   Lab 02/13/20  1127 02/14/20  0250 02/15/20  0333   NA  --  141 140   K 3.7 3.9 3.8   CL  --  105 104   CO2  --  26 27   BUN  --  12 10   CREATININE  --  0.4* 0.4*   CALCIUM  --  9.2 9.6   ALBUMIN  --  2.7* 2.9*   PROT  --  6.2 6.6   BILITOT  --  0.3 0.4   ALKPHOS  --  61 62   ALT  --  45* 51*   AST  --  52* 66*   MG  --  2.0 2.1   PHOS  --  2.0* 2.0*       ABG  Recent Labs   Lab 02/08/20  0906   PH 7.446   PO2 64*   PCO2 37.0   HCO3 25.5   BE 1     Assessment/Plan:     Neuro  ALS (amyotrophic lateral sclerosis)  With chronic associated pain  - Continue home lyrica, Duloxetine scheduled, Norco prn  - s/p tracheostomy 3/2018  - s/p PEG    Psychiatric  Anxiety  - Continue home Duloxetine and Clonazepam    ENT  Tracheostomy in place  - Chlorhexidine mouthwash    Derm  Skin breakdown  Candidal rash in sacral region with stage III coccyx wound   Continue treating with Nystatin  cream   Appreciate wound care assistance and recs    Pulmonary  Pneumonia  Left lower lobe PNA, started on Levaquin, Vanc, Aztreonam in ED  Infectious disease consulted, appreciate assistance  Switched to Vanc cefepime per ID recs.   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 ALS  - Chlorhexidine mouthwash bid  - Protonix BID  -ABG prn  - Bronchoscopy 2/9, cultures as above (see PNA)  - continue chest PT, cough assist q4h & PRN    Vent Mode: A/C  Oxygen Concentration (%):  [] 100  Resp Rate Total:  [14 br/min-23 br/min] 16 br/min  Vt Set:  [450 mL] 450 mL  PEEP/CPAP:  [5 cmH20] 5 cmH20  Pressure Support:  [0 cmH20] 0 cmH20  Mean Airway Pressure:  [9.2 hcW85-24 cmH20] 9.2 cmH20      Cardiac/Vascular  Atrial fibrillation  - Continue home Amiodarone 200mg    Renal/  Chronic indwelling Michele catheter  Chronic colonization with MDR Pseudomonas, ESBL  - Oxybutynin daily   - UCx consistent with colonization    GI  Constipation  Chronic, requires disimpaction by wife  - Continue miralax  - stooling regularly per nursing documentation    Other  Pressure injury of coccygeal region, stage 3  Appreciate wound care recs  Pt does not want to turn 2/2 it takes a long time for him to set up his eye-tracking computer again once he moves. Will continue to encourage turning.     Presence of externally removable percutaneous endoscopic gastrostomy (PEG) tube  Granulation tissue surrounding site, will  Monitor    Anasarca  On lasix 40 mg po at home prn edema  2/12: Single dose of IV lasix with good UOP  2/14: Started daily lasix IV 20 mg     Critical Care Daily Checklist:    A: Awake: RASS Goal/Actual Goal: RASS Goal: 0-->alert and calm  Actual: Paige Agitation Sedation Scale (RASS): Alert and calm   B: Spontaneous Breathing Trial Performed? Spon.  Breathing Trial Initiated?: Not initiated(pt. is vent dependent) (02/11/20 0919)   C: SAT & SBT Coordinated?  Vent dep                      D: Delirium: CAM-ICU Overall CAM-ICU: Negative   E: Early Mobility Performed? Yes   F: Feeding Goal: Goals: 1.) Pt to achieve/tolerate >75% EEN and EPN by RD follow up.   Status: Nutrition Goal Status: progressing towards goal   Current Diet Order   Procedures    Diet NPO      AS: Analgesia/Sedation none   T: Thromboembolic Prophylaxis lovenox   H: HOB > 300 Yes   U: Stress Ulcer Prophylaxis (if needed) yes   G: Glucose Control    B: Bowel Function Stool Occurrence: 1   I: Indwelling Catheter (Lines & Michele) Necessity    D: De-escalation of Antimicrobials/Pharmacotherapies Meropenem 5 days    Plan for the day/ETD     Code Status:  Family/Goals of Care: Full Code         Critical secondary to Patient has a condition that poses threat to life and bodily function: Severe Respiratory Distress      Critical care was time spent personally by me on the following activities: development of treatment plan with patient or surrogate and bedside caregivers, discussions with consultants, evaluation of patient's response to treatment, examination of patient, ordering and performing treatments and interventions, ordering and review of laboratory studies, ordering and review of radiographic studies, pulse oximetry, re-evaluation of patient's condition. This critical care time did not overlap with that of any other provider or involve time for any procedures.     Nick Zamudio MD  Critical Care Medicine  [facility]

## 2020-02-15 NOTE — PLAN OF CARE
CMICU DAILY GOALS       A: Awake    RASS: Goal - RASS Goal: 0-->alert and calm  Actual - RASS (Paige Agitation-Sedation Scale): 0-->alert and calm   Restraint necessity:    B: Breath   SBT: NA   C: Coordinate A & B, analgesics/sedatives   Pain: managed    SAT: NA  D: Delirium   CAM-ICU: Overall CAM-ICU: Negative  E: Early Mobility   MOVE Screen: Fail   Activity: Activity Management: activity clustered for rest period, activity adjusted per tolerance  FAS: Feeding/Nutrition   Diet order: Diet/Nutrition Received: tube feeding,   Fluid restriction:    T: Thrombus   DVT prophylaxis: VTE Required Core Measure: (SCDs) Sequential compression device initiated/maintained, Pharmacological prophylaxis initiated/maintained  H: HOB Elevation   Head of Bed (HOB): HOB at 30 degrees  U: Ulcer Prophylaxis   GI: yes  G: Glucose control   managed    S: Skin   Bundle compliance: yes   Bathing/Skin Care: back care, bath, chlorhexidine, bath, complete, dressed/undressed, foot care, incontinence care, linen changed Date: 2/14/2020  B: Bowel Function   diarrhea   I: Indwelling Catheters   Michele necessity:      Urethral Catheter 02/08/20 1118 Non-latex 22 Fr.-Reason for Continuing Urinary Catheterization: Urinary retention, Critically ill in ICU requiring intensive monitoring   CVC necessity: No   IPAD offered: Not appropriate  D: De-escalation Antibx   No  Plan for the day   Continue pulmonary treatments and support   Family/Goals of care/Code Status   Code Status: Full Code     No acute events throughout day, VS and assessment per flow sheet, patient progressing towards goals as tolerated, plan of care reviewed with Bayron Delgado and family, all concerns addressed, will continue to monitor.

## 2020-02-15 NOTE — SUBJECTIVE & OBJECTIVE
Interval History/Significant Events: as above    Review of Systems   Unable to perform ROS: Patient nonverbal     Objective:     Vital Signs (Most Recent):  Temp: 99 °F (37.2 °C) (02/15/20 0300)  Pulse: 60 (02/15/20 0736)  Resp: 20 (02/15/20 0736)  BP: 124/67 (02/15/20 0600)  SpO2: 95 % (02/15/20 0736) Vital Signs (24h Range):  Temp:  [97.9 °F (36.6 °C)-99 °F (37.2 °C)] 99 °F (37.2 °C)  Pulse:  [60-96] 60  Resp:  [14-28] 20  SpO2:  [93 %-100 %] 95 %  BP: (109-141)/(60-77) 124/67   Weight: 124.8 kg (275 lb 2.2 oz)  Body mass index is 41.83 kg/m².      Intake/Output Summary (Last 24 hours) at 2/15/2020 0846  Last data filed at 2/15/2020 0600  Gross per 24 hour   Intake 1505 ml   Output 2910 ml   Net -1405 ml       Physical Exam   Constitutional: He appears well-developed and well-nourished.   HENT:   Head: Normocephalic and atraumatic.   Mouth/Throat: Oropharynx is clear and moist.   Eyes: Pupils are equal, round, and reactive to light. No scleral icterus.   Neck: No JVD present. No tracheal deviation present.   Cardiovascular: Normal rate, regular rhythm, normal heart sounds and intact distal pulses.   Pulmonary/Chest:   Mechanical breath sounds. Equal bilateral, no rales    Abdominal: Soft. He exhibits no distension.   Musculoskeletal: He exhibits edema (pitting edema to all 4 extremities).   Neurological:   Dense quadriplegia, pt moves eyes only   Skin: Skin is warm and dry. Rash (erythema across cheeks, chest) noted.   Psychiatric:   Unable to assess       Vents:  Vent Mode: A/C (02/15/20 0736)  Ventilator Initiated: Yes (02/09/20 0004)  Set Rate: 14 BPM (02/15/20 0736)  Vt Set: 450 mL (02/15/20 0736)  Pressure Support: 0 cmH20 (02/15/20 0736)  PEEP/CPAP: 5 cmH20 (02/15/20 0736)  Oxygen Concentration (%): 21 (02/15/20 0736)  Peak Airway Pressure: 27 cmH2O (02/15/20 0736)  Plateau Pressure: 0 cmH20 (02/15/20 0736)  Total Ve: 17.9 mL (02/15/20 0736)  F/VT Ratio<105 (RSBI): (!) 68.26 (02/15/20  0736)  Lines/Drains/Airways     Drain                 Gastrostomy/Enterostomy 01/23/17 1113 Percutaneous endoscopic gastrostomy (PEG) LUQ feeding 1117 days         Urethral Catheter 02/08/20 1118 Non-latex 22 Fr. 6 days         Rectal Tube 02/13/20 2300 rectal tube w/ balloon (indicate number of mLs) 1 day          Airway                 Surgical Airway 03/19/18 1355 Shiley Cuffed 697 days          Peripheral Intravenous Line                 Peripheral IV - Single Lumen 11/22/19 1150 18 G Right Forearm 84 days         Peripheral IV - Single Lumen 02/12/20 0924 18 G;1 3/4 in Right Forearm 2 days         Peripheral IV - Single Lumen 02/12/20 0928 20 G;1 3/4 in Left Forearm 2 days              Significant Labs:    CBC/Anemia Profile:  Recent Labs   Lab 02/14/20  0250 02/15/20  0333   WBC 8.44 8.57   HGB 10.4* 10.7*   HCT 34.2* 34.4*    286   MCV 92 91   RDW 15.2* 15.2*        Chemistries:  Recent Labs   Lab 02/13/20  1127 02/14/20  0250 02/15/20  0333   NA  --  141 140   K 3.7 3.9 3.8   CL  --  105 104   CO2  --  26 27   BUN  --  12 10   CREATININE  --  0.4* 0.4*   CALCIUM  --  9.2 9.6   ALBUMIN  --  2.7* 2.9*   PROT  --  6.2 6.6   BILITOT  --  0.3 0.4   ALKPHOS  --  61 62   ALT  --  45* 51*   AST  --  52* 66*   MG  --  2.0 2.1   PHOS  --  2.0* 2.0*

## 2020-02-15 NOTE — PLAN OF CARE
CMICU DAILY GOALS       A: Awake    RASS: Goal - RASS Goal: 0-->alert and calm  Actual - RASS (Paige Agitation-Sedation Scale): 0-->alert and calm   Restraint necessity:  no  B: Breath   SBT: Not intubated   C: Coordinate A & B, analgesics/sedatives   Pain: managed    SAT: NA  D: Delirium   CAM-ICU: Overall CAM-ICU: Negative  E: Early Mobility   MOVE Screen: Pass   Activity: Activity Management: activity clustered for rest period, activity adjusted per tolerance  FAS: Feeding/Nutrition   Diet order: Diet/Nutrition Received: tube feeding,   Fluid restriction:    T: Thrombus   DVT prophylaxis: VTE Required Core Measure: Pharmacological prophylaxis initiated/maintained  H: HOB Elevation   Head of Bed (HOB): HOB at 45 degrees  U: Ulcer Prophylaxis   GI: yes  G: Glucose control   No BG monitoring ordered  S: Skin   Bundle compliance: yes   Bathing/Skin Care: back care, bath, chlorhexidine, bath, complete, bedtime care, dressed/undressed, foot care, incontinence care, linen changed Date: 02/14/20 @ 2300  B: Bowel Function   Bowel movement x2 via flexiseal  I: Indwelling Catheters   Michele necessity:      Urethral Catheter 02/08/20 1118 Non-latex 22 Fr.-Reason for Continuing Urinary Catheterization: Critically ill in ICU requiring intensive monitoring   CVC necessity: No   IPAD offered: No  D: De-escalation Antibx   No  Plan for the day   Continue present management; aiming home on Tuesday  Family/Goals of care/Code Status   Code Status: Full Code  VS and assessment per flow sheet, patient progressing towards goals as tolerated, plan of care reviewed with Bayron Delgado and caregiver, all concerns addressed, will continue to monitor.

## 2020-02-16 LAB
ALBUMIN SERPL BCP-MCNC: 3.3 G/DL (ref 3.5–5.2)
ALP SERPL-CCNC: 67 U/L (ref 55–135)
ALT SERPL W/O P-5'-P-CCNC: 66 U/L (ref 10–44)
ANION GAP SERPL CALC-SCNC: 11 MMOL/L (ref 8–16)
AST SERPL-CCNC: 92 U/L (ref 10–40)
BASOPHILS NFR BLD: 0 % (ref 0–1.9)
BILIRUB SERPL-MCNC: 0.4 MG/DL (ref 0.1–1)
BUN SERPL-MCNC: 10 MG/DL (ref 6–20)
CALCIUM SERPL-MCNC: 9.5 MG/DL (ref 8.7–10.5)
CHLORIDE SERPL-SCNC: 103 MMOL/L (ref 95–110)
CO2 SERPL-SCNC: 24 MMOL/L (ref 23–29)
CREAT SERPL-MCNC: 0.4 MG/DL (ref 0.5–1.4)
DIFFERENTIAL METHOD: ABNORMAL
EOSINOPHIL NFR BLD: 3 % (ref 0–8)
ERYTHROCYTE [DISTWIDTH] IN BLOOD BY AUTOMATED COUNT: 15.2 % (ref 11.5–14.5)
EST. GFR  (AFRICAN AMERICAN): >60 ML/MIN/1.73 M^2
EST. GFR  (NON AFRICAN AMERICAN): >60 ML/MIN/1.73 M^2
GLUCOSE SERPL-MCNC: 108 MG/DL (ref 70–110)
HCT VFR BLD AUTO: 37 % (ref 40–54)
HGB BLD-MCNC: 11.3 G/DL (ref 14–18)
HYPOCHROMIA BLD QL SMEAR: ABNORMAL
IMM GRANULOCYTES # BLD AUTO: ABNORMAL K/UL (ref 0–0.04)
IMM GRANULOCYTES NFR BLD AUTO: ABNORMAL % (ref 0–0.5)
LYMPHOCYTES NFR BLD: 34 % (ref 18–48)
MAGNESIUM SERPL-MCNC: 2.4 MG/DL (ref 1.6–2.6)
MCH RBC QN AUTO: 27.8 PG (ref 27–31)
MCHC RBC AUTO-ENTMCNC: 30.5 G/DL (ref 32–36)
MCV RBC AUTO: 91 FL (ref 82–98)
MONOCYTES NFR BLD: 11 % (ref 4–15)
NEUTROPHILS NFR BLD: 51 % (ref 38–73)
NEUTS BAND NFR BLD MANUAL: 1 %
NRBC BLD-RTO: 0 /100 WBC
PHOSPHATE SERPL-MCNC: 2.4 MG/DL (ref 2.7–4.5)
PLATELET # BLD AUTO: 297 K/UL (ref 150–350)
PLATELET BLD QL SMEAR: ABNORMAL
PMV BLD AUTO: 10.9 FL (ref 9.2–12.9)
POLYCHROMASIA BLD QL SMEAR: ABNORMAL
POTASSIUM SERPL-SCNC: 5 MMOL/L (ref 3.5–5.1)
PROT SERPL-MCNC: 7.6 G/DL (ref 6–8.4)
RBC # BLD AUTO: 4.07 M/UL (ref 4.6–6.2)
SODIUM SERPL-SCNC: 138 MMOL/L (ref 136–145)
WBC # BLD AUTO: 7.27 K/UL (ref 3.9–12.7)

## 2020-02-16 PROCEDURE — 25000003 PHARM REV CODE 250: Performed by: INTERNAL MEDICINE

## 2020-02-16 PROCEDURE — 99233 SBSQ HOSP IP/OBS HIGH 50: CPT | Mod: GC,,, | Performed by: INTERNAL MEDICINE

## 2020-02-16 PROCEDURE — 25000003 PHARM REV CODE 250: Performed by: STUDENT IN AN ORGANIZED HEALTH CARE EDUCATION/TRAINING PROGRAM

## 2020-02-16 PROCEDURE — 94668 MNPJ CHEST WALL SBSQ: CPT

## 2020-02-16 PROCEDURE — C9113 INJ PANTOPRAZOLE SODIUM, VIA: HCPCS | Performed by: STUDENT IN AN ORGANIZED HEALTH CARE EDUCATION/TRAINING PROGRAM

## 2020-02-16 PROCEDURE — 83735 ASSAY OF MAGNESIUM: CPT

## 2020-02-16 PROCEDURE — 80053 COMPREHEN METABOLIC PANEL: CPT

## 2020-02-16 PROCEDURE — 94761 N-INVAS EAR/PLS OXIMETRY MLT: CPT

## 2020-02-16 PROCEDURE — 99900035 HC TECH TIME PER 15 MIN (STAT)

## 2020-02-16 PROCEDURE — 94640 AIRWAY INHALATION TREATMENT: CPT

## 2020-02-16 PROCEDURE — 27000221 HC OXYGEN, UP TO 24 HOURS

## 2020-02-16 PROCEDURE — 99233 PR SUBSEQUENT HOSPITAL CARE,LEVL III: ICD-10-PCS | Mod: GC,,, | Performed by: INTERNAL MEDICINE

## 2020-02-16 PROCEDURE — 85027 COMPLETE CBC AUTOMATED: CPT

## 2020-02-16 PROCEDURE — 63600175 PHARM REV CODE 636 W HCPCS: Performed by: STUDENT IN AN ORGANIZED HEALTH CARE EDUCATION/TRAINING PROGRAM

## 2020-02-16 PROCEDURE — 84100 ASSAY OF PHOSPHORUS: CPT

## 2020-02-16 PROCEDURE — 94003 VENT MGMT INPAT SUBQ DAY: CPT

## 2020-02-16 PROCEDURE — 99900026 HC AIRWAY MAINTENANCE (STAT)

## 2020-02-16 PROCEDURE — 85007 BL SMEAR W/DIFF WBC COUNT: CPT

## 2020-02-16 PROCEDURE — 25000003 PHARM REV CODE 250: Performed by: EMERGENCY MEDICINE

## 2020-02-16 PROCEDURE — 25000242 PHARM REV CODE 250 ALT 637 W/ HCPCS: Performed by: INTERNAL MEDICINE

## 2020-02-16 PROCEDURE — 20000000 HC ICU ROOM

## 2020-02-16 PROCEDURE — 27200966 HC CLOSED SUCTION SYSTEM

## 2020-02-16 RX ORDER — HYDROCODONE BITARTRATE AND ACETAMINOPHEN 10; 325 MG/1; MG/1
1 TABLET ORAL EVERY 4 HOURS PRN
Status: DISCONTINUED | OUTPATIENT
Start: 2020-02-16 | End: 2020-02-16

## 2020-02-16 RX ORDER — FUROSEMIDE 40 MG/1
40 TABLET ORAL DAILY
Status: DISCONTINUED | OUTPATIENT
Start: 2020-02-17 | End: 2020-02-17 | Stop reason: HOSPADM

## 2020-02-16 RX ORDER — HYDROCODONE BITARTRATE AND ACETAMINOPHEN 10; 325 MG/1; MG/1
1 TABLET ORAL EVERY 4 HOURS PRN
Status: DISCONTINUED | OUTPATIENT
Start: 2020-02-16 | End: 2020-02-17 | Stop reason: HOSPADM

## 2020-02-16 RX ADMIN — AMIODARONE HYDROCHLORIDE 200 MG: 200 TABLET ORAL at 08:02

## 2020-02-16 RX ADMIN — CLONAZEPAM 0.5 MG: 0.5 TABLET ORAL at 03:02

## 2020-02-16 RX ADMIN — IPRATROPIUM BROMIDE AND ALBUTEROL SULFATE 3 ML: .5; 3 SOLUTION RESPIRATORY (INHALATION) at 07:02

## 2020-02-16 RX ADMIN — HYDROCODONE BITARTRATE AND ACETAMINOPHEN 1 TABLET: 10; 325 TABLET ORAL at 09:02

## 2020-02-16 RX ADMIN — CLONAZEPAM 0.5 MG: 0.5 TABLET ORAL at 08:02

## 2020-02-16 RX ADMIN — PREGABALIN 100 MG: 50 CAPSULE ORAL at 08:02

## 2020-02-16 RX ADMIN — MEROPENEM 2 G: 1 INJECTION, POWDER, FOR SOLUTION INTRAVENOUS at 01:02

## 2020-02-16 RX ADMIN — FUROSEMIDE 20 MG: 10 INJECTION, SOLUTION INTRAMUSCULAR; INTRAVENOUS at 08:02

## 2020-02-16 RX ADMIN — IPRATROPIUM BROMIDE AND ALBUTEROL SULFATE 3 ML: .5; 3 SOLUTION RESPIRATORY (INHALATION) at 03:02

## 2020-02-16 RX ADMIN — IPRATROPIUM BROMIDE AND ALBUTEROL SULFATE 3 ML: .5; 3 SOLUTION RESPIRATORY (INHALATION) at 11:02

## 2020-02-16 RX ADMIN — DULOXETINE 60 MG: 30 CAPSULE, DELAYED RELEASE ORAL at 08:02

## 2020-02-16 RX ADMIN — MICONAZOLE NITRATE: 20 OINTMENT TOPICAL at 10:02

## 2020-02-16 RX ADMIN — MEROPENEM 2 G: 1 INJECTION, POWDER, FOR SOLUTION INTRAVENOUS at 06:02

## 2020-02-16 RX ADMIN — OXYBUTYNIN CHLORIDE 5 MG: 5 SOLUTION ORAL at 03:02

## 2020-02-16 RX ADMIN — PANTOPRAZOLE SODIUM 40 MG: 40 INJECTION, POWDER, FOR SOLUTION INTRAVENOUS at 08:02

## 2020-02-16 RX ADMIN — OXYBUTYNIN CHLORIDE 5 MG: 5 SOLUTION ORAL at 08:02

## 2020-02-16 RX ADMIN — HYDROCODONE BITARTRATE AND ACETAMINOPHEN 1 TABLET: 10; 325 TABLET ORAL at 11:02

## 2020-02-16 RX ADMIN — IPRATROPIUM BROMIDE AND ALBUTEROL SULFATE 3 ML: .5; 3 SOLUTION RESPIRATORY (INHALATION) at 05:02

## 2020-02-16 RX ADMIN — PREGABALIN 100 MG: 50 CAPSULE ORAL at 03:02

## 2020-02-16 RX ADMIN — MICONAZOLE NITRATE: 20 OINTMENT TOPICAL at 08:02

## 2020-02-16 RX ADMIN — HYDROCODONE BITARTRATE AND ACETAMINOPHEN 1 TABLET: 10; 325 TABLET ORAL at 01:02

## 2020-02-16 RX ADMIN — CHLORHEXIDINE GLUCONATE 0.12% ORAL RINSE 15 ML: 1.2 LIQUID ORAL at 08:02

## 2020-02-16 RX ADMIN — ENOXAPARIN SODIUM 40 MG: 100 INJECTION SUBCUTANEOUS at 08:02

## 2020-02-16 RX ADMIN — MEROPENEM 2 G: 1 INJECTION, POWDER, FOR SOLUTION INTRAVENOUS at 10:02

## 2020-02-16 NOTE — PLAN OF CARE
CM spoke with pt's private caregiver at bedside. Confirmed pt's ventilator available in room and caregiver will ride in ambulance with pt.  [facility]    PLAN: Discharge home tomorrow, 2/17/20 after last dose of antibiotics. Transportation pickup at 1 PM. Caregiver and team notified.    CM entered transportation request in [facility] and spoke with Alon (Jeanie hayes) provided additional details of transport.    CM updated team and emailed M-F Case Management staff.    Stephanie Pruitt, RN [facility]  Case Management  [id number]

## 2020-02-16 NOTE — PLAN OF CARE
CMICU DAILY GOALS       A: Awake    RASS: Goal - RASS Goal: 0-->alert and calm  Actual - RASS (Paige Agitation-Sedation Scale): 0-->alert and calm   Restraint necessity:    B: Breath   SBT: Not attempted   C: Coordinate A & B, analgesics/sedatives   Pain: managed    SAT: Not attempted  D: Delirium   CAM-ICU: Overall CAM-ICU: Negative  E: Early Mobility   MOVE Screen: Fail   Activity: Activity Management: activity adjusted per tolerance  FAS: Feeding/Nutrition   Diet order: Diet/Nutrition Received: tube feeding,   Fluid restriction:    T: Thrombus   DVT prophylaxis: VTE Required Core Measure: Pharmacological prophylaxis initiated/maintained, (SCDs) Sequential compression device initiated/maintained  H: HOB Elevation   Head of Bed (HOB): HOB at 30-45 degrees  U: Ulcer Prophylaxis   GI: yes  G: Glucose control   managed    S: Skin   Bundle compliance: yes   Bathing/Skin Care: incontinence care Date: 2/15/20 night shift  B: Bowel Function   diarrhea   I: Indwelling Catheters   Michele necessity:      Urethral Catheter 02/08/20 1118 Non-latex 22 Fr.-Reason for Continuing Urinary Catheterization: Urinary retention   CVC necessity: No   IPAD offered: Not appropriate  D: De-escalation Antibx   No  Plan for the day   Patient requires 2 more doses of antibiotics. Plan to discharge home tomorrow with EMS transport following final dose.   Family/Goals of care/Code Status   Code Status: Full Code     No acute events throughout day, VS and assessment per flow sheet, patient progressing towards goals as tolerated, plan of care reviewed with Bayron Delgado and family, all concerns addressed, will continue to monitor.

## 2020-02-16 NOTE — SUBJECTIVE & OBJECTIVE
Interval History/Significant Events: NAEON    Review of Systems   Unable to perform ROS: Patient nonverbal     Objective:     Vital Signs (Most Recent):  Temp: 98.9 °F (37.2 °C) (02/16/20 0701)  Pulse: 90 (02/16/20 0800)  Resp: 20 (02/16/20 0800)  BP: 132/77 (02/16/20 0800)  SpO2: 100 % (02/16/20 0800) Vital Signs (24h Range):  Temp:  [98.1 °F (36.7 °C)-100.2 °F (37.9 °C)] 98.9 °F (37.2 °C)  Pulse:  [] 90  Resp:  [14-23] 20  SpO2:  [94 %-100 %] 100 %  BP: (108-146)/(59-83) 132/77   Weight: 124.8 kg (275 lb 2.2 oz)  Body mass index is 41.83 kg/m².      Intake/Output Summary (Last 24 hours) at 2/16/2020 0834  Last data filed at 2/16/2020 0800  Gross per 24 hour   Intake 1625 ml   Output 3128 ml   Net -1503 ml       Physical Exam   Constitutional: He appears well-developed and well-nourished.   HENT:   Head: Normocephalic and atraumatic.   Mouth/Throat: Oropharynx is clear and moist.   Eyes: Pupils are equal, round, and reactive to light. No scleral icterus.   Neck: No JVD present. No tracheal deviation present.   Cardiovascular: Normal rate, regular rhythm, normal heart sounds and intact distal pulses.   Pulmonary/Chest:   Mechanical breath sounds. Equal bilateral, no rales    Abdominal: Soft. He exhibits no distension.   Musculoskeletal: He exhibits edema.   Neurological:   Dense quadriplegia, pt moves eyes only   Skin: Skin is warm and dry. Rash (erythema across cheeks, chest) noted.   Psychiatric:   Unable to assess       Vents:  Vent Mode: A/C (02/16/20 0735)  Ventilator Initiated: Yes (02/09/20 0004)  Set Rate: 14 BPM (02/16/20 0735)  Vt Set: 450 mL (02/16/20 0735)  Pressure Support: 0 cmH20 (02/16/20 0735)  PEEP/CPAP: 5 cmH20 (02/16/20 0735)  Oxygen Concentration (%): 21 (02/16/20 0800)  Peak Airway Pressure: 21 cmH2O (02/16/20 0735)  Plateau Pressure: 0 cmH20 (02/16/20 0735)  Total Ve: 7.64 mL (02/16/20 0735)  F/VT Ratio<105 (RSBI): (!) 35.71 (02/16/20 0735)  Lines/Drains/Airways     Drain                  Gastrostomy/Enterostomy 01/23/17 1113 Percutaneous endoscopic gastrostomy (PEG) LUQ feeding 1118 days         Urethral Catheter 02/08/20 1118 Non-latex 22 Fr. 7 days         Rectal Tube 02/13/20 2300 rectal tube w/ balloon (indicate number of mLs) 2 days          Airway                 Surgical Airway 03/19/18 1355 Shiley Cuffed 698 days          Peripheral Intravenous Line                 Peripheral IV - Single Lumen 11/22/19 1150 18 G Right Forearm 85 days         Peripheral IV - Single Lumen 02/12/20 0924 18 G;1 3/4 in Right Forearm 3 days         Peripheral IV - Single Lumen 02/12/20 0928 20 G;1 3/4 in Left Forearm 3 days              Significant Labs:    CBC/Anemia Profile:  Recent Labs   Lab 02/15/20  0333 02/16/20  0436   WBC 8.57 7.27   HGB 10.7* 11.3*   HCT 34.4* 37.0*    297   MCV 91 91   RDW 15.2* 15.2*        Chemistries:  Recent Labs   Lab 02/15/20  0333 02/16/20  0436    138   K 3.8 5.0    103   CO2 27 24   BUN 10 10   CREATININE 0.4* 0.4*   CALCIUM 9.6 9.5   ALBUMIN 2.9* 3.3*   PROT 6.6 7.6   BILITOT 0.4 0.4   ALKPHOS 62 67   ALT 51* 66*   AST 66* 92*   MG 2.1 2.4   PHOS 2.0* 2.4*

## 2020-02-16 NOTE — PLAN OF CARE
CMICU DAILY GOALS       A: Awake    RASS: Goal - RASS Goal: 0-->alert and calm  Actual - RASS (Paige Agitation-Sedation Scale): 0-->alert and calm   Restraint necessity:    B: Breath   SBT: Not attempted   C: Coordinate A & B, analgesics/sedatives   Pain: managed    SAT: Not attempted  D: Delirium   CAM-ICU: Overall CAM-ICU: Negative  E: Early Mobility   MOVE Screen: Fail   Activity: Activity Management: activity adjusted per tolerance  FAS: Feeding/Nutrition   Diet order: Diet/Nutrition Received: tube feeding,   Fluid restriction:    T: Thrombus   DVT prophylaxis: VTE Required Core Measure: Pharmacological prophylaxis initiated/maintained  H: HOB Elevation   Head of Bed (HOB): HOB at 30-45 degrees  U: Ulcer Prophylaxis   GI: yes  G: Glucose control   managed    S: Skin   Bundle compliance: yes   Bathing/Skin Care: back care, bath, chlorhexidine, bath, complete, dressed/undressed, linen changed Date: 2/16/2020  B: Bowel Function   diarrhea   I: Indwelling Catheters   Michele necessity:      Urethral Catheter 02/08/20 1118 Non-latex 22 Fr.-Reason for Continuing Urinary Catheterization: Urinary retention, Critically ill in ICU requiring intensive monitoring   CVC necessity: No   IPAD offered: Not appropriate  D: De-escalation Antibx   No  Plan for the day   Continue abx and wound care  Family/Goals of care/Code Status   Code Status: Full Code     No acute events throughout day, VS and assessment per flow sheet, patient progressing towards goals as tolerated, plan of care reviewed with Bayron Delgado and family, all concerns addressed, will continue to monitor.

## 2020-02-16 NOTE — PROGRESS NOTES
[facility]  Critical Care Medicine  Progress Note    Patient Name: Bayron Delgado  MRN: [id number]  Admission Date: 2/8/2020  Hospital Length of Stay: 8 days  Code Status: Full Code  Attending Provider: Ade Ward MD  Primary Care Provider: Camille Bustillos NP   Principal Problem: Sepsis    Subjective:     HPI:  49 year old male with PMH of ALS, chronic hypoxemic hypercarbic respiratory failure now with trach and vent dependent who was BIB EMS from home for fevers and increased secretion. Per wife, she noticed that patient developed fever up to 101 F on Thursday. During this same period of time, she also started noticing increasing secretions from tracheostomy site and increasing O2 requirements. Fevers continued to persist despite Advil (does not want to give Tylenol with concerns for liver damage or Ibuprofen for allergic reaction) and reached as high as 105 F. Sputum became darker and appeared green. She also reports that he has had more watery stools, normally stools are soft to solid. With EMS, pt's sats were in the upper 80s on home vent settings of room air, improved with application of 4L/min O2.  While in the ED, patient has been HDS and started on Vanc, Aztreonam and Levo given PCN allergy. Influenza was negative. WBC of 14.42 and febrile to 101 F. CXR showing left lower lobe PNA. Respiratory cultures ordered.  Critical care was consulted for further management and stabilization of this patient in setting of chronic vent dependent ALS.     Of note, he has a history of of chronic MDR Pseudomonas and ESBL UTI, with no plan for treatment at this time. Wife was sick with bronchitis over the last week, no other sick contacts.     Hospital/ICU Course:  2/9: brochoscopy performed, cultures sent. Vanc and cefepime per ID recs.  Cultures positive for pseudomonas, proteus, and ESBL kleb. ID recommended meropenem for 5 day course.   2/12: Given one time lasix dose 40 mg, subsequently  had 3L uop.   2/13: NAEON. Minimal uop this am. Michele flushed  2/14: NAEON. UOP 1130. Day 3 of meropenem.   2/15: Started lasix yesterday with good uop (3015 cc).     Interval History/Significant Events: NAEON    Review of Systems   Unable to perform ROS: Patient nonverbal     Objective:     Vital Signs (Most Recent):  Temp: 98.9 °F (37.2 °C) (02/16/20 0701)  Pulse: 90 (02/16/20 0800)  Resp: 20 (02/16/20 0800)  BP: 132/77 (02/16/20 0800)  SpO2: 100 % (02/16/20 0800) Vital Signs (24h Range):  Temp:  [98.1 °F (36.7 °C)-100.2 °F (37.9 °C)] 98.9 °F (37.2 °C)  Pulse:  [] 90  Resp:  [14-23] 20  SpO2:  [94 %-100 %] 100 %  BP: (108-146)/(59-83) 132/77   Weight: 124.8 kg (275 lb 2.2 oz)  Body mass index is 41.83 kg/m².      Intake/Output Summary (Last 24 hours) at 2/16/2020 0834  Last data filed at 2/16/2020 0800  Gross per 24 hour   Intake 1625 ml   Output 3128 ml   Net -1503 ml       Physical Exam   Constitutional: He appears well-developed and well-nourished.   HENT:   Head: Normocephalic and atraumatic.   Mouth/Throat: Oropharynx is clear and moist.   Eyes: Pupils are equal, round, and reactive to light. No scleral icterus.   Neck: No JVD present. No tracheal deviation present.   Cardiovascular: Normal rate, regular rhythm, normal heart sounds and intact distal pulses.   Pulmonary/Chest:   Mechanical breath sounds. Equal bilateral, no rales    Abdominal: Soft. He exhibits no distension.   Musculoskeletal: He exhibits edema.   Neurological:   Dense quadriplegia, pt moves eyes only   Skin: Skin is warm and dry. Rash (erythema across cheeks, chest) noted.   Psychiatric:   Unable to assess       Vents:  Vent Mode: A/C (02/16/20 0735)  Ventilator Initiated: Yes (02/09/20 0004)  Set Rate: 14 BPM (02/16/20 0735)  Vt Set: 450 mL (02/16/20 0735)  Pressure Support: 0 cmH20 (02/16/20 0735)  PEEP/CPAP: 5 cmH20 (02/16/20 0735)  Oxygen Concentration (%): 21 (02/16/20 0800)  Peak Airway Pressure: 21 cmH2O (02/16/20 0735)  Plateau  Pressure: 0 cmH20 (02/16/20 0735)  Total Ve: 7.64 mL (02/16/20 0735)  F/VT Ratio<105 (RSBI): (!) 35.71 (02/16/20 0735)  Lines/Drains/Airways     Drain                 Gastrostomy/Enterostomy 01/23/17 1113 Percutaneous endoscopic gastrostomy (PEG) LUQ feeding 1118 days         Urethral Catheter 02/08/20 1118 Non-latex 22 Fr. 7 days         Rectal Tube 02/13/20 2300 rectal tube w/ balloon (indicate number of mLs) 2 days          Airway                 Surgical Airway 03/19/18 1355 Shiley Cuffed 698 days          Peripheral Intravenous Line                 Peripheral IV - Single Lumen 11/22/19 1150 18 G Right Forearm 85 days         Peripheral IV - Single Lumen 02/12/20 0924 18 G;1 3/4 in Right Forearm 3 days         Peripheral IV - Single Lumen 02/12/20 0928 20 G;1 3/4 in Left Forearm 3 days              Significant Labs:    CBC/Anemia Profile:  Recent Labs   Lab 02/15/20  0333 02/16/20  0436   WBC 8.57 7.27   HGB 10.7* 11.3*   HCT 34.4* 37.0*    297   MCV 91 91   RDW 15.2* 15.2*        Chemistries:  Recent Labs   Lab 02/15/20  0333 02/16/20  0436    138   K 3.8 5.0    103   CO2 27 24   BUN 10 10   CREATININE 0.4* 0.4*   CALCIUM 9.6 9.5   ALBUMIN 2.9* 3.3*   PROT 6.6 7.6   BILITOT 0.4 0.4   ALKPHOS 62 67   ALT 51* 66*   AST 66* 92*   MG 2.1 2.4   PHOS 2.0* 2.4*           ABG  No results for input(s): PH, PO2, PCO2, HCO3, BE in the last 168 hours.  Assessment/Plan:     Neuro  ALS (amyotrophic lateral sclerosis)  With chronic associated pain  - Continue home lyrica, Duloxetine scheduled, Norco prn  - s/p tracheostomy 3/2018  - s/p PEG    Psychiatric  Anxiety  - Continue home Duloxetine and Clonazepam    ENT  Tracheostomy in place  - Chlorhexidine mouthwash    Derm  Skin breakdown  Candidal rash in sacral region with stage III coccyx wound   Continue treating with Nystatin cream   Appreciate wound care assistance and recs    Pulmonary  Pneumonia  Left lower lobe PNA, started on Levaquin, Vanc,  Aztreonam in ED  Infectious disease consulted, appreciate assistance  Switched to Vanc cefepime per ID recs.   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 ALS  - Chlorhexidine mouthwash bid  - Protonix BID  -ABG prn  - Bronchoscopy 2/9, cultures as above (see PNA)  - continue chest PT, cough assist q4h & PRN    Vent Mode: A/C  Oxygen Concentration (%):  [] 100  Resp Rate Total:  [14 br/min-23 br/min] 16 br/min  Vt Set:  [450 mL] 450 mL  PEEP/CPAP:  [5 cmH20] 5 cmH20  Pressure Support:  [0 cmH20] 0 cmH20  Mean Airway Pressure:  [9.2 fuY00-82 cmH20] 9.2 cmH20      Cardiac/Vascular  Atrial fibrillation  - Continue home Amiodarone 200mg    Renal/  Chronic indwelling Michele catheter  Chronic colonization with MDR Pseudomonas, ESBL  - Oxybutynin daily   - UCx consistent with colonization    GI  Constipation  Chronic, requires disimpaction by wife  - Continue miralax  - stooling regularly per nursing documentation    Other  Pressure injury of coccygeal region, stage 3  Appreciate wound care recs  Pt does not want to turn 2/2 it takes a long time for him to set up his eye-tracking computer again once he moves. Will continue to encourage turning.     Presence of externally removable percutaneous endoscopic gastrostomy (PEG) tube  Granulation tissue surrounding site, will  Monitor    Anasarca  On lasix 40 mg po at home prn edema  2/12: Single dose of IV lasix with good UOP  2/14: Started daily lasix IV 20 mg       Critical Care Daily Checklist:    A: Awake: RASS Goal/Actual Goal: RASS Goal: 0-->alert and calm  Actual: Paige Agitation Sedation Scale (RASS): Alert and calm   B: Spontaneous Breathing Trial Performed? Spon. Breathing Trial Initiated?: Not initiated(pt. is vent dependent) (02/11/20 6036)   C: SAT & SBT Coordinated?  Vent dep                       D: Delirium: CAM-ICU Overall CAM-ICU: Negative   E: Early Mobility Performed? Yes   F: Feeding Goal: Goals: 1.) Pt to achieve/tolerate >75% EEN and EPN by RD follow up.   Status: Nutrition Goal Status: progressing towards goal   Current Diet Order   Procedures    Diet NPO      AS: Analgesia/Sedation none   T: Thromboembolic Prophylaxis Mechanical lovenox   H: HOB > 300 Yes   U: Stress Ulcer Prophylaxis (if needed) yes   G: Glucose Control    B: Bowel Function Stool Occurrence: 1   I: Indwelling Catheter (Lines & Michele) Necessity Michele PIV   D: De-escalation of Antimicrobials/Pharmacotherapies meropenem    Plan for the day/ETD     Code Status:  Family/Goals of Care: Full Code         Critical secondary to Patient has a condition that poses threat to life and bodily function: Severe Respiratory Distress and ALS      Critical care was time spent personally by me on the following activities: development of treatment plan with patient or surrogate and bedside caregivers, discussions with consultants, evaluation of patient's response to treatment, examination of patient, ordering and performing treatments and interventions, ordering and review of laboratory studies, ordering and review of radiographic studies, pulse oximetry, re-evaluation of patient's condition. This critical care time did not overlap with that of any other provider or involve time for any procedures.     Nick Zamudio MD  Critical Care Medicine  [facility]

## 2020-02-17 VITALS
HEART RATE: 86 BPM | DIASTOLIC BLOOD PRESSURE: 99 MMHG | WEIGHT: 275.13 LBS | HEIGHT: 68 IN | RESPIRATION RATE: 14 BRPM | TEMPERATURE: 99 F | SYSTOLIC BLOOD PRESSURE: 150 MMHG | OXYGEN SATURATION: 98 % | BODY MASS INDEX: 41.7 KG/M2

## 2020-02-17 LAB
ALBUMIN SERPL BCP-MCNC: 3.4 G/DL (ref 3.5–5.2)
ALP SERPL-CCNC: 66 U/L (ref 55–135)
ALT SERPL W/O P-5'-P-CCNC: 71 U/L (ref 10–44)
ANION GAP SERPL CALC-SCNC: 8 MMOL/L (ref 8–16)
AST SERPL-CCNC: 71 U/L (ref 10–40)
BASOPHILS # BLD AUTO: 0.12 K/UL (ref 0–0.2)
BASOPHILS NFR BLD: 1.4 % (ref 0–1.9)
BILIRUB SERPL-MCNC: 0.5 MG/DL (ref 0.1–1)
BUN SERPL-MCNC: 15 MG/DL (ref 6–20)
CALCIUM SERPL-MCNC: 9.4 MG/DL (ref 8.7–10.5)
CHLORIDE SERPL-SCNC: 102 MMOL/L (ref 95–110)
CO2 SERPL-SCNC: 28 MMOL/L (ref 23–29)
CREAT SERPL-MCNC: 0.4 MG/DL (ref 0.5–1.4)
DIFFERENTIAL METHOD: ABNORMAL
EOSINOPHIL # BLD AUTO: 0.2 K/UL (ref 0–0.5)
EOSINOPHIL NFR BLD: 2.3 % (ref 0–8)
ERYTHROCYTE [DISTWIDTH] IN BLOOD BY AUTOMATED COUNT: 15.5 % (ref 11.5–14.5)
EST. GFR  (AFRICAN AMERICAN): >60 ML/MIN/1.73 M^2
EST. GFR  (NON AFRICAN AMERICAN): >60 ML/MIN/1.73 M^2
GLUCOSE SERPL-MCNC: 108 MG/DL (ref 70–110)
HCT VFR BLD AUTO: 37.6 % (ref 40–54)
HGB BLD-MCNC: 11.3 G/DL (ref 14–18)
IMM GRANULOCYTES # BLD AUTO: 0.3 K/UL (ref 0–0.04)
IMM GRANULOCYTES NFR BLD AUTO: 3.4 % (ref 0–0.5)
LYMPHOCYTES # BLD AUTO: 2.1 K/UL (ref 1–4.8)
LYMPHOCYTES NFR BLD: 24.2 % (ref 18–48)
MAGNESIUM SERPL-MCNC: 2.4 MG/DL (ref 1.6–2.6)
MCH RBC QN AUTO: 27.5 PG (ref 27–31)
MCHC RBC AUTO-ENTMCNC: 30.1 G/DL (ref 32–36)
MCV RBC AUTO: 92 FL (ref 82–98)
MONOCYTES # BLD AUTO: 0.9 K/UL (ref 0.3–1)
MONOCYTES NFR BLD: 9.9 % (ref 4–15)
NEUTROPHILS # BLD AUTO: 5.1 K/UL (ref 1.8–7.7)
NEUTROPHILS NFR BLD: 58.8 % (ref 38–73)
NRBC BLD-RTO: 0 /100 WBC
PHOSPHATE SERPL-MCNC: 2.3 MG/DL (ref 2.7–4.5)
PLATELET # BLD AUTO: 309 K/UL (ref 150–350)
PMV BLD AUTO: 10.9 FL (ref 9.2–12.9)
POTASSIUM SERPL-SCNC: 4 MMOL/L (ref 3.5–5.1)
PROT SERPL-MCNC: 7.3 G/DL (ref 6–8.4)
RBC # BLD AUTO: 4.11 M/UL (ref 4.6–6.2)
SODIUM SERPL-SCNC: 138 MMOL/L (ref 136–145)
WBC # BLD AUTO: 8.73 K/UL (ref 3.9–12.7)

## 2020-02-17 PROCEDURE — 99900035 HC TECH TIME PER 15 MIN (STAT)

## 2020-02-17 PROCEDURE — 85025 COMPLETE CBC W/AUTO DIFF WBC: CPT

## 2020-02-17 PROCEDURE — 25000003 PHARM REV CODE 250: Performed by: INTERNAL MEDICINE

## 2020-02-17 PROCEDURE — 94668 MNPJ CHEST WALL SBSQ: CPT

## 2020-02-17 PROCEDURE — 25000003 PHARM REV CODE 250: Performed by: STUDENT IN AN ORGANIZED HEALTH CARE EDUCATION/TRAINING PROGRAM

## 2020-02-17 PROCEDURE — 63600175 PHARM REV CODE 636 W HCPCS: Performed by: STUDENT IN AN ORGANIZED HEALTH CARE EDUCATION/TRAINING PROGRAM

## 2020-02-17 PROCEDURE — 25000242 PHARM REV CODE 250 ALT 637 W/ HCPCS: Performed by: INTERNAL MEDICINE

## 2020-02-17 PROCEDURE — 84100 ASSAY OF PHOSPHORUS: CPT

## 2020-02-17 PROCEDURE — 99238 PR HOSPITAL DISCHARGE DAY,<30 MIN: ICD-10-PCS | Mod: GC,,, | Performed by: INTERNAL MEDICINE

## 2020-02-17 PROCEDURE — 27200966 HC CLOSED SUCTION SYSTEM

## 2020-02-17 PROCEDURE — 83735 ASSAY OF MAGNESIUM: CPT

## 2020-02-17 PROCEDURE — 94003 VENT MGMT INPAT SUBQ DAY: CPT

## 2020-02-17 PROCEDURE — 99238 HOSP IP/OBS DSCHRG MGMT 30/<: CPT | Mod: GC,,, | Performed by: INTERNAL MEDICINE

## 2020-02-17 PROCEDURE — C9113 INJ PANTOPRAZOLE SODIUM, VIA: HCPCS | Performed by: STUDENT IN AN ORGANIZED HEALTH CARE EDUCATION/TRAINING PROGRAM

## 2020-02-17 PROCEDURE — 99900026 HC AIRWAY MAINTENANCE (STAT)

## 2020-02-17 PROCEDURE — 94640 AIRWAY INHALATION TREATMENT: CPT

## 2020-02-17 PROCEDURE — 27000221 HC OXYGEN, UP TO 24 HOURS

## 2020-02-17 PROCEDURE — 94761 N-INVAS EAR/PLS OXIMETRY MLT: CPT

## 2020-02-17 PROCEDURE — 25000003 PHARM REV CODE 250: Performed by: EMERGENCY MEDICINE

## 2020-02-17 PROCEDURE — 80053 COMPREHEN METABOLIC PANEL: CPT

## 2020-02-17 RX ADMIN — MICONAZOLE NITRATE: 20 OINTMENT TOPICAL at 09:02

## 2020-02-17 RX ADMIN — DULOXETINE 60 MG: 30 CAPSULE, DELAYED RELEASE ORAL at 09:02

## 2020-02-17 RX ADMIN — ENOXAPARIN SODIUM 40 MG: 100 INJECTION SUBCUTANEOUS at 09:02

## 2020-02-17 RX ADMIN — MEROPENEM 2 G: 1 INJECTION, POWDER, FOR SOLUTION INTRAVENOUS at 09:02

## 2020-02-17 RX ADMIN — CHLORHEXIDINE GLUCONATE 0.12% ORAL RINSE 15 ML: 1.2 LIQUID ORAL at 09:02

## 2020-02-17 RX ADMIN — IPRATROPIUM BROMIDE AND ALBUTEROL SULFATE 3 ML: .5; 3 SOLUTION RESPIRATORY (INHALATION) at 04:02

## 2020-02-17 RX ADMIN — AMIODARONE HYDROCHLORIDE 200 MG: 200 TABLET ORAL at 09:02

## 2020-02-17 RX ADMIN — MEROPENEM 2 G: 1 INJECTION, POWDER, FOR SOLUTION INTRAVENOUS at 01:02

## 2020-02-17 RX ADMIN — FUROSEMIDE 40 MG: 40 TABLET ORAL at 09:02

## 2020-02-17 RX ADMIN — IPRATROPIUM BROMIDE AND ALBUTEROL SULFATE 3 ML: .5; 3 SOLUTION RESPIRATORY (INHALATION) at 07:02

## 2020-02-17 RX ADMIN — CLONAZEPAM 0.5 MG: 0.5 TABLET ORAL at 09:02

## 2020-02-17 RX ADMIN — PANTOPRAZOLE SODIUM 40 MG: 40 INJECTION, POWDER, FOR SOLUTION INTRAVENOUS at 09:02

## 2020-02-17 RX ADMIN — OXYBUTYNIN CHLORIDE 5 MG: 5 SOLUTION ORAL at 09:02

## 2020-02-17 RX ADMIN — PREGABALIN 100 MG: 50 CAPSULE ORAL at 09:02

## 2020-02-17 RX ADMIN — IPRATROPIUM BROMIDE AND ALBUTEROL SULFATE 3 ML: .5; 3 SOLUTION RESPIRATORY (INHALATION) at 11:02

## 2020-02-17 RX ADMIN — HYDROCODONE BITARTRATE AND ACETAMINOPHEN 1 TABLET: 10; 325 TABLET ORAL at 05:02

## 2020-02-17 RX ADMIN — HYDROCODONE BITARTRATE AND ACETAMINOPHEN 1 TABLET: 10; 325 TABLET ORAL at 09:02

## 2020-02-17 NOTE — ASSESSMENT & PLAN NOTE
Vent and Trach dependent 2/2 ALS  - Chlorhexidine mouthwash bid  - Protonix BID  - ABG prn  - Bronchoscopy 2/9, cultures as above (see PNA)  - continue chest PT, cough assist q4h & PRN    Vent Mode: A/C  Oxygen Concentration (%):  [21] 21  Resp Rate Total:  [14 br/min-18 br/min] 14 br/min  Vt Set:  [450 mL-500 mL] 500 mL  PEEP/CPAP:  [5 cmH20] 5 cmH20  Pressure Support:  [0 cmH20] 0 cmH20  Mean Airway Pressure:  [7.9 xpL12-90 cmH20] 7.9 cmH20

## 2020-02-17 NOTE — DISCHARGE SUMMARY
[facility]  Critical Care Medicine  Discharge Summary      Patient Name: Bayron Delgado  MRN: [id number]  Admission Date: 2/8/2020  Hospital Length of Stay: 9 days  Discharge Date and Time:  02/17/2020 1:31 PM  Attending Physician: Matt Hill*   Discharging Provider: Nick Zamudio MD  Primary Care Provider: Camille Bustillos NP  Reason for Admission: hypoxic respiratory failure    HPI:   49 year old male with PMH of ALS, chronic hypoxemic hypercarbic respiratory failure now with trach and vent dependent who was BIB EMS from home for fevers and increased secretion. Per wife, she noticed that patient developed fever up to 101 F on Thursday. During this same period of time, she also started noticing increasing secretions from tracheostomy site and increasing O2 requirements. Fevers continued to persist despite Advil (does not want to give Tylenol with concerns for liver damage or Ibuprofen for allergic reaction) and reached as high as 105 F. Sputum became darker and appeared green. She also reports that he has had more watery stools, normally stools are soft to solid. With EMS, pt's sats were in the upper 80s on home vent settings of room air, improved with application of 4L/min O2.  While in the ED, patient has been HDS and started on Vanc, Aztreonam and Levo given PCN allergy. Influenza was negative. WBC of 14.42 and febrile to 101 F. CXR showing left lower lobe PNA. Respiratory cultures ordered.  Critical care was consulted for further management and stabilization of this patient in setting of chronic vent dependent ALS.     Of note, he has a history of of chronic MDR Pseudomonas and ESBL UTI, with no plan for treatment at this time. Wife was sick with bronchitis over the last week, no other sick contacts.     Indwelling Lines/Drains at Time of Discharge:   Lines/Drains/Airways     Drain                 Gastrostomy/Enterostomy 01/23/17 1113 Percutaneous endoscopic  gastrostomy (PEG) LUQ feeding 1120 days         Urethral Catheter 02/08/20 1118 Non-latex 22 Fr. 9 days          Airway                 Surgical Airway 03/19/18 1355 Shiley Cuffed 700 days          Pressure Ulcer                 Pressure Injury 02/08/20 2100 Buttocks Stage 3 8 days              Hospital Course:   Bronchoscopy performed, cultures sent. ID consulted and Vanc and cefepime started per ID recs. Bronchoscopy cultures positive for pseudomonas, proteus, and ESBL kleb. ID subsequently recommended meropenem for 5 day course, and other antibiotics were discontinued. Patient noted to have decreased urine output and lasix was given with improvement output and improvement in edema. He completed his 5 day course of antibiotics and was discharged home.     Review of Systems   Unable to perform ROS: Patient nonverbal     Physical Exam   Constitutional: He appears well-developed and well-nourished.   HENT:   Head: Normocephalic and atraumatic.   Mouth/Throat: Oropharynx is clear and moist.   Eyes: Pupils are equal, round, and reactive to light. No scleral icterus.   Neck: No JVD present. No tracheal deviation present.   Cardiovascular: Normal rate, regular rhythm, normal heart sounds and intact distal pulses.   Pulmonary/Chest:   Mechanical breath sounds. Equal bilateral, no rales    Abdominal: Soft. He exhibits no distension.   Musculoskeletal: He exhibits edema.   Neurological:   Dense quadriplegia, pt moves eyes only   Skin: Skin is warm and dry. Rash (erythema across cheeks, chest) noted.       Consults (From admission, onward)        Status Ordering Provider     Case Management  Once     Provider:  (Not yet assigned)    Acknowledged COLLIN POLLOCK     Infectious Diseases  Once     Provider:  (Not yet assigned)    Completed COLLIN POLLOCK     Inpatient consult to Critical Care Medicine  Once     Provider:  (Not yet assigned)    Completed STEPHANIE WILSON     Inpatient consult to Midline team  Once      Provider:  (Not yet assigned)    Completed DIMA ROBERTSON     Inpatient consult to Registered Dietitian/Nutritionist  Once     Provider:  (Not yet assigned)    Completed COLLIN POLLOCK          Pending Diagnostic Studies:     None        Final Active Diagnoses:    Diagnosis Date Noted POA    Alteration in skin integrity in adult [R23.9] 02/10/2020 Yes    Alteration in skin integrity due to moisture [R23.9] 02/10/2020 Yes    Skin yeast infection [B37.2] 02/10/2020 Yes    Pressure injury of coccygeal region, stage 3 [L89.153] 02/10/2020 Yes    Hypocalcemia [E83.51] 02/09/2020 Yes    Hypokalemia [E87.6] 02/09/2020 Yes    Acute hypoxemic respiratory failure [J96.01] 02/08/2020 Yes    Pneumonia [J18.9] 02/08/2020 Yes    Skin breakdown [L90.9] 02/08/2020 Yes     Chronic    Constipation [K59.00] 02/08/2020 Yes     Chronic    Presence of externally removable percutaneous endoscopic gastrostomy (PEG) tube [Z93.1] 11/22/2019 Not Applicable    Tracheostomy in place [Z93.0] 06/21/2018 Not Applicable     Chronic    Chronic indwelling Michele catheter [Z96.0] 06/21/2018 Not Applicable    Anxiety [F41.9] 03/15/2018 Yes    Anasarca [R60.1] 03/14/2018 Yes    Atrial fibrillation [I48.91] 09/09/2015 Yes    ALS (amyotrophic lateral sclerosis) [G12.21] 06/05/2014 Yes      Problems Resolved During this Admission:    Diagnosis Date Noted Date Resolved POA    PRINCIPAL PROBLEM:  Sepsis [A41.9] 02/08/2020 02/12/2020 Yes    Preventive measure [Z29.9] 02/10/2020 02/12/2020 Not Applicable    Leukocytosis [D72.829] 02/08/2020 02/10/2020 Yes    Lactic acidosis [E87.2] 02/08/2020 02/09/2020 Yes     Neuro  ALS (amyotrophic lateral sclerosis)  With chronic associated pain  - Continue home lyrica, Duloxetine scheduled, Norco prn  - s/p tracheostomy 3/2018  - s/p PEG    Pulmonary  Pneumonia  Left lower lobe PNA, started on Levaquin, Vanc, Aztreonam in ED  Infectious disease consulted, appreciate assistance  Switched to Vanc  cefepime per ID recs.   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 ALS  - Chlorhexidine mouthwash bid  - Protonix BID  - ABG prn  - Bronchoscopy 2/9, cultures as above (see PNA)    Vent Mode: A/C  Oxygen Concentration (%):  [21] 21  Resp Rate Total:  [14 br/min-18 br/min] 14 br/min  Vt Set:  [450 mL-500 mL] 500 mL  PEEP/CPAP:  [5 cmH20] 5 cmH20  Pressure Support:  [0 cmH20] 0 cmH20  Mean Airway Pressure:  [7.9 apC26-81 cmH20] 7.9 cmH20      Discharged Condition: stable    Disposition: home      Patient Instructions:   No discharge procedures on file.  Medications:  Reconciled Home Medications:      Medication List      CHANGE how you take these medications    carvediloL 6.25 MG tablet  Commonly known as:  COREG  1 tablet (6.25 mg total) by Per G Tube route 2 (two) times daily.  What changed:  how much to take     clonazePAM 0.5 MG tablet  Commonly known as:  KLONOPIN  2 tablets (1 mg total) by Per G Tube route 4 (four) times daily.  What changed:  how much to take     HYDROcodone-acetaminophen  mg per tablet  Commonly known as:  NORCO  1 tablet by Per G Tube route every 6 (six) hours as needed for Pain.  What changed:  additional instructions        CONTINUE taking these medications    amiodarone 200 MG Tab  Commonly known as:  PACERONE  200 mg by Per G Tube route 2 (two) times daily.     diphenhydrAMINE 50 MG tablet  Commonly known as:  BENADRYL  Take 50 mg by mouth nightly as needed for Itching.     DULoxetine 60 MG capsule  Commonly known as:  CYMBALTA  Take 1 capsule (60 mg total) by mouth once daily.     famotidine 20 MG tablet  Commonly known as:  PEPCID  1 tablet (20 mg total) by Per G Tube route 2 (two) times daily.     furosemide 40 MG tablet  Commonly known as:  LASIX  Take 1 tablet (40 mg total) by mouth as needed (use to  increased to furosemide 40 mg twice per day for 5 days for excessive edema).     hydrOXYzine HCl 25 MG tablet  Commonly known as:  ATARAX  1 tablet (25 mg total) by Per G Tube route 4 (four) times daily as needed for Itching.     ISOSOURCE 1.5 YURY ORAL  by Per G Tube route 6 (six) times daily.     lidocaine 5 %  Commonly known as:  LIDODERM  Place 3 patches onto the skin once daily. Remove & Discard patch within 12 hours or as directed by MD     mineral oil liquid  Take 30 mLs by mouth daily as needed for Constipation.     mupirocin 2 % ointment  Commonly known as:  BACTROBAN  Apply topically 3 (three) times daily.     * nystatin cream  Commonly known as:  MYCOSTATIN  Apply 30 g topically 2 (two) times daily.     * nystatin powder  Commonly known as:  MYCOSTATIN  Apply 60 g topically 2 (two) times daily.     oxybutynin 15 MG Tr24  Commonly known as:  DITROPAN XL  Take 1 tablet (15 mg total) by mouth once daily.     polyethylene glycol 17 gram Pwpk  Commonly known as:  GLYCOLAX  Take by mouth.     potassium, sodium phosphates 280-160-250 mg Pwpk  Commonly known as:  PHOS-NAK  Take 1 packet by mouth 2 (two) times daily.     pregabalin 100 MG capsule  Commonly known as:  LYRICA  1 capsule (100 mg total) by Per G Tube route 3 (three) times daily.     prochlorperazine 10 MG tablet  Commonly known as:  COMPAZINE  Take 10 mg by mouth every evening.     scopolamine 1.3-1.5 mg (1 mg over 3 days)  Commonly known as:  TRANSDERM-SCOP  Place 1 patch onto the skin Every 3 (three) days.     Senna with Docusate Sodium 8.6-50 mg per tablet  Generic drug:  senna-docusate 8.6-50 mg  Take 7 tablets by mouth once daily.     silver nitrate applicators 75-25 % applicator  Apply topically 3 (three) times a week. Apply to granulation tissue PRN.     temazepam 15 mg Cap  Commonly known as:  RESTORIL  2 capsules (30 mg total) by Per G Tube route every evening.     traMADol 50 mg tablet  Commonly known as:  ULTRAM  Take 50 mg by mouth every 6  (six) hours.         * This list has 2 medication(s) that are the same as other medications prescribed for you. Read the directions carefully, and ask your doctor or other care provider to review them with you.            STOP taking these medications    levoFLOXacin 750 MG tablet  Commonly known as:  LEVAQUIN     metroNIDAZOLE 250 MG tablet  Commonly known as:  KAIDEN Zamudio MD  Critical Care Medicine  [facility]

## 2020-02-17 NOTE — PLAN OF CARE
No acute events overnight. UO only 350cc overnight MD aware. Lasix 40mg PO scheduled for 9am.     CMICU DAILY GOALS       A: Awake    RASS: Goal - RASS Goal: 0-->alert and calm  Actual - RASS (Paige Agitation-Sedation Scale): 0-->alert and calm   Restraint necessity:    B: Breath   SBT: Not attempted   C: Coordinate A & B, analgesics/sedatives   Pain: managed    SAT: Not attempted  D: Delirium   CAM-ICU: Overall CAM-ICU: Negative  E: Early Mobility   MOVE Screen: Fail   Activity: Activity Management: activity adjusted per tolerance  FAS: Feeding/Nutrition   Diet order: Diet/Nutrition Received: tube feeding,   Fluid restriction:    T: Thrombus   DVT prophylaxis: VTE Required Core Measure: Pharmacological prophylaxis initiated/maintained, (SCDs) Sequential compression device initiated/maintained  H: HOB Elevation   Head of Bed (HOB): HOB at 30-45 degrees  U: Ulcer Prophylaxis   GI: yes  G: Glucose control   managed    S: Skin   Bundle compliance: yes   Bathing/Skin Care: incontinence care Date: 2/16/2020. Pt refused bath this shift  B: Bowel Function   no issues   I: Indwelling Catheters   Michele necessity:      Urethral Catheter 02/08/20 1118 Non-latex 22 Fr.-Reason for Continuing Urinary Catheterization: Urinary retention, Critically ill in ICU requiring intensive monitoring   CVC necessity: No   IPAD offered: Not appropriate  D: De-escalation Antibx   No  Plan for the day   DC  Family/Goals of care/Code Status   Code Status: Full Code     No acute events throughout day, VS and assessment per flow sheet, patient progressing towards goals as tolerated, plan of care reviewed with Bayron Delgado and family, all concerns addressed, will continue to monitor.

## 2020-02-17 NOTE — CARE UPDATE
Patient placed on home vent triology with documented settings. Patient tolerating well. Will continue to monitor.

## 2020-02-17 NOTE — NURSING
AVS provided to patient's caregiver, no changes made to home meds and no additional meds ordered at time of discharge. No future appointments in place. Patient anxious to discharge. Flexi-seal removed and kanika-care and wound care performed prior to patient's discharge. Case management confirmed electronic order in [facility] for discharge transportation home for 1pm. Caregiver at bedside and RT placed patient on his home vent per Dr. Hill to ensure patient without any issues on home vent, patient tolerating. Awaiting transport arrival.

## 2020-02-17 NOTE — PLAN OF CARE
02/17/20 1444   Final Note   Assessment Type Final Discharge Note   Anticipated Discharge Disposition Home-Health   Hospital Follow Up  Appt(s) scheduled? Yes   Discharge plans and expectations educations in teach back method with documentation complete? Yes   Right Care Referral Info   Post Acute Recommendation Home-care   Referral Type Home Health   Facility Name [facility] [address] [place]  [address]   Post-Acute Status   Post-Acute Authorization Home Health/Hospice   Home Health/Hospice Status Set-up Complete   Discharge Delays None known at this time

## 2020-02-18 ENCOUNTER — TELEPHONE (OUTPATIENT)
Dept: NEUROLOGY | Facility: CLINIC | Age: 50
End: 2020-02-18

## 2020-02-18 NOTE — TELEPHONE ENCOUNTER
Call received from Perlita Delgado -requesting Md order for cough assist machine.  Perlita indicated their Cough assist is broken can't change the setting.  Company who provided cough assist- [employer] [employer] [phone].  Perlita indicated they have already reached out to the company and they are eligible for new machine (5yr wait time).    Perlita provided call back number of juan Flores [phone].      NINA MCCAIN

## 2020-02-19 ENCOUNTER — TELEPHONE (OUTPATIENT)
Dept: NEUROLOGY | Facility: CLINIC | Age: 50
End: 2020-02-19

## 2020-02-19 DIAGNOSIS — Z93.0 TRACHEOSTOMY IN PLACE: Chronic | ICD-10-CM

## 2020-02-19 DIAGNOSIS — G12.21 ALS (AMYOTROPHIC LATERAL SCLEROSIS): Primary | ICD-10-CM

## 2020-02-19 NOTE — TELEPHONE ENCOUNTER
Outgoing call to [facility] (Tel: [phone] Fax: [phone]). Spoke with Safia regarding order for new cough assist.      Danny notified.

## 2020-02-19 NOTE — PLAN OF CARE
[facility]    HOME HEALTH ORDERS  FACE TO FACE ENCOUNTER    Patient Name: Bayron Delgado  YOB: 1970    PCP: Camille Bustillos NP   PCP Address: [address] [place] [address] 2ND FLOOR / [facility] [place] [address]  PCP Phone Number: [phone]  PCP Fax: [phone]    Encounter Date: 02/19/2020    Admit to Home Health    Diagnoses:  Active Hospital Problems    Diagnosis  POA    Alteration in skin integrity in adult [R23.9]  Yes    Alteration in skin integrity due to moisture [R23.9]  Yes    Skin yeast infection [B37.2]  Yes    Pressure injury of coccygeal region, stage 3 [L89.153]  Yes    Hypocalcemia [E83.51]  Yes    Hypokalemia [E87.6]  Yes    Acute hypoxemic respiratory failure [J96.01]  Yes    Pneumonia [J18.9]  Yes    Skin breakdown [L90.9]  Yes     Chronic    Constipation [K59.00]  Yes     Chronic    Presence of externally removable percutaneous endoscopic gastrostomy (PEG) tube [Z93.1]  Not Applicable    Tracheostomy in place [Z93.0]  Not Applicable     Chronic    Chronic indwelling Michele catheter [Z96.0]  Not Applicable    Anxiety [F41.9]  Yes    Anasarca [R60.1]  Yes    Atrial fibrillation [I48.91]  Yes    ALS (amyotrophic lateral sclerosis) [G12.21]  Yes     Dx in 2014 with foot drop in the right leg.  He had fasciculations.  He was diagnosed at [facility] with Dr. Sauer as a second opinion.  He has no family history of ALS.  He did not take Riluzole.    He has been bed bound for about 18 months now.    Tracheostomy in March 2018.  Has a G-tube        Resolved Hospital Problems    Diagnosis Date Resolved POA    *Sepsis [A41.9] 02/12/2020 Yes    Preventive measure [Z29.9] 02/12/2020 Not Applicable    Leukocytosis [D72.829] 02/10/2020 Yes    Lactic acidosis [E87.2] 02/09/2020 Yes       No future appointments.        I have seen and examined this patient face to face today. My clinical findings that support the need for the home health skilled services and home  bound status are the following:  Weakness/numbness causing balance and gait disturbance due to Weakness/Debility and Neuromuscular weakness making it taxing to leave home.    Allergies:  Review of patient's allergies indicates:   Allergen Reactions    Atropine     Ibuprofen      A-fib    Latex, natural rubber     Penicillins Other (See Comments)     Per wife- his mother stated he was allergic to it.  Had redness associated with cefepime 3/14/2018       Diet: regular diet    Activities: activity as tolerated    Nursing:   SN to complete comprehensive assessment including routine vital signs. Instruct on disease process and s/s of complications to report to MD. Review/verify medication list sent home with the patient at time of discharge  and instruct patient/caregiver as needed. Frequency may be adjusted depending on start of care date.    Notify MD if SBP > 160 or < 90; DBP > 90 or < 50; HR > 120 or < 50; Temp > 101; Other:         CONSULTS:    Speech Therapy  to evaluate and treat for  Swallowing.  Aide to provide assistance with personal care, ADLs, and vital signs.    MISCELLANEOUS CARE:  PEG Care:  Instruct patient/caregiver to clean site.  Monitor skin integrity.  Routine Skin for Bedridden Patients: Instruct patient/caregiver to apply moisture barrier cream to all skin folds and wet areas in perineal area daily and after baths and all bowel movements.    WOUND CARE ORDERS  n/a      Medications: Review discharge medications with patient and family and provide education.      Discharge Medication List as of 2/17/2020 11:28 AM      CONTINUE these medications which have NOT CHANGED    Details   amiodarone (PACERONE) 200 MG Tab 200 mg by Per G Tube route 2 (two) times daily., Historical Med      carvedilol (COREG) 6.25 MG tablet 1 tablet (6.25 mg total) by Per G Tube route 2 (two) times daily., Starting Thu 3/22/2018, Until Sat 2/8/2020, Normal      clonazePAM (KLONOPIN) 0.5 MG tablet 2 tablets (1 mg total) by  Per G Tube route 4 (four) times daily., Starting Thu 12/5/2019, Until Fri 12/4/2020, Print      diphenhydrAMINE (BENADRYL) 50 MG tablet Take 50 mg by mouth nightly as needed for Itching., Historical Med      DULoxetine (CYMBALTA) 60 MG capsule Take 1 capsule (60 mg total) by mouth once daily., Starting Mon 12/16/2019, Until Tue 12/15/2020, Normal      famotidine (PEPCID) 20 MG tablet 1 tablet (20 mg total) by Per G Tube route 2 (two) times daily., Starting Wed 11/27/2019, Until Thu 11/26/2020, Normal      furosemide (LASIX) 40 MG tablet Take 1 tablet (40 mg total) by mouth as needed (use to increased to furosemide 40 mg twice per day for 5 days for excessive edema)., Starting Wed 12/18/2019, Until Thu 12/17/2020, Normal      HYDROcodone-acetaminophen (NORCO)  mg per tablet 1 tablet by Per G Tube route every 6 (six) hours as needed for Pain., Starting Tue 2/4/2020, Normal      hydrOXYzine HCl (ATARAX) 25 MG tablet 1 tablet (25 mg total) by Per G Tube route 4 (four) times daily as needed for Itching., Starting Mon 1/6/2020, Normal      LACTOSE-REDUCED FOOD/FIBER (ISOSOURCE 1.5 YURY ORAL) by Per G Tube route 6 (six) times daily. , Historical Med      lidocaine (LIDODERM) 5 % Place 3 patches onto the skin once daily. Remove & Discard patch within 12 hours or as directed by MD, Starting Wed 11/27/2019, Normal      mineral oil liquid Take 30 mLs by mouth daily as needed for Constipation., Historical Med      nystatin (MYCOSTATIN) cream Apply 30 g topically 2 (two) times daily., Historical Med      nystatin (MYCOSTATIN) powder Apply 60 g topically 2 (two) times daily., Historical Med      oxybutynin (DITROPAN XL) 15 MG TR24 Take 1 tablet (15 mg total) by mouth once daily., Starting Mon 1/6/2020, Normal      polyethylene glycol (GLYCOLAX) 17 gram PwPk Take by mouth., Historical Med      potassium, sodium phosphates (PHOS-NAK) 280-160-250 mg PwPk Take 1 packet by mouth 2 (two) times daily., Starting Wed 11/27/2019, OTC       pregabalin (LYRICA) 100 MG capsule 1 capsule (100 mg total) by Per G Tube route 3 (three) times daily., Starting Thu 3/22/2018, Until Sat 2/8/2020, Print      prochlorperazine (COMPAZINE) 10 MG tablet Take 10 mg by mouth every evening., Historical Med      scopolamine (TRANSDERM-SCOP) 1.3-1.5 mg (1 mg over 3 days) Place 1 patch onto the skin Every 3 (three) days., Starting Fri 3/23/2018, Normal      senna-docusate 8.6-50 mg (SENNA WITH DOCUSATE SODIUM) 8.6-50 mg per tablet Take 7 tablets by mouth once daily., Historical Med      temazepam (RESTORIL) 15 mg Cap 2 capsules (30 mg total) by Per G Tube route every evening., Starting Wed 11/27/2019, Normal      traMADol (ULTRAM) 50 mg tablet Take 50 mg by mouth every 6 (six) hours., Historical Med      metroNIDAZOLE (FLAGYL) 250 MG tablet Take 1 tablet (250 mg total) via PEG Tube every 8 (eight) hours., Normal      mupirocin (BACTROBAN) 2 % ointment Apply topically 3 (three) times daily., Starting Wed 11/6/2019, Normal      silver nitrate applicators 75-25 % applicator Apply topically 3 (three) times a week. Apply to granulation tissue PRN., Starting Fri 11/8/2019, Normal      levoFLOXacin (LEVAQUIN) 750 MG tablet Take 1 tablet (750mg total) via PEG Tube once daily, Normal             I certify that this patient is confined to his home and needs intermittent skilled nursing care and speech therapy.    Nasir Mortensen MD  PGY3 Medicine

## 2020-02-20 ENCOUNTER — TELEPHONE (OUTPATIENT)
Dept: NEUROLOGY | Facility: CLINIC | Age: 50
End: 2020-02-20

## 2020-02-20 PROCEDURE — G0180 PR HOME HEALTH MD CERTIFICATION: ICD-10-PCS | Mod: ,,, | Performed by: FAMILY MEDICINE

## 2020-02-20 PROCEDURE — G0180 MD CERTIFICATION HHA PATIENT: HCPCS | Mod: ,,, | Performed by: FAMILY MEDICINE

## 2020-02-20 NOTE — TELEPHONE ENCOUNTER
Faxed orders for cough assist sent to [facility] along with face sheet and Hospital D/C Summary from 2/17/2020. Dx: RAYMOND Contreras.     Outgoing call to vendor to verify that order received. Soheila indicated that  was a lunch. She would provide message for returned call.

## 2020-03-06 ENCOUNTER — TELEPHONE (OUTPATIENT)
Dept: NEUROLOGY | Facility: CLINIC | Age: 50
End: 2020-03-06

## 2020-03-06 ENCOUNTER — DOCUMENTATION ONLY (OUTPATIENT)
Dept: NEUROLOGY | Facility: CLINIC | Age: 50
End: 2020-03-06

## 2020-03-06 NOTE — TELEPHONE ENCOUNTER
Incoming call received from pt's wife Perlita requesting new Rx for Lyrica.  Please send to pharmacy of preference  [facility] [place] [facility].

## 2020-03-09 ENCOUNTER — TELEPHONE (OUTPATIENT)
Dept: NEUROLOGY | Facility: CLINIC | Age: 50
End: 2020-03-09

## 2020-03-09 ENCOUNTER — EXTERNAL HOME HEALTH (OUTPATIENT)
Dept: HOME HEALTH SERVICES | Facility: HOSPITAL | Age: 50
End: 2020-03-09
Payer: MEDICARE

## 2020-03-09 LAB — FUNGUS SPEC CULT: ABNORMAL

## 2020-03-09 NOTE — TELEPHONE ENCOUNTER
Returned call from Mrs. Delgado to inform that there are not any issues with patient's trach site. However, he does need an order for Enteral Nutrition sent to [facility], Patient has enough Isosource to last until tomorrow. Per patients request caregivers administer one muscle milk protein shake once per day.     Outgoing call to [facility] (Tel: [phone] Fax: [phone]). Spoke with Damion that stated he will contact Enteral Nutrition Dept and return call. RN Coordinator provided fax number to fax updated order.

## 2020-03-09 NOTE — TELEPHONE ENCOUNTER
Call received from BAO Gar regarding issues with patient enteral nutrition and trach. Attempted to call Sandra Garcia, patient's caregiver, for more exploratory discussion. Unable to leave voicemail.     Contacted Mrs. Bhat, patient's wife, to inform of call received. RN Coordinator requested that Perlita provide clinic number to caregiver so that she can discuss with Sandra in more detail. Wife indicated that she was not aware of issues and the caregiver should not have contacted anyone without discussing concerns with Perltia first.     Perlita noted that she will speak with Sandra and return call.

## 2020-03-20 ENCOUNTER — TELEPHONE (OUTPATIENT)
Dept: NEUROLOGY | Facility: CLINIC | Age: 50
End: 2020-03-20

## 2020-03-20 ENCOUNTER — DOCUMENTATION ONLY (OUTPATIENT)
Dept: NEUROLOGY | Facility: CLINIC | Age: 50
End: 2020-03-20

## 2020-03-20 DIAGNOSIS — N39.0 URINARY TRACT INFECTION ASSOCIATED WITH INDWELLING URETHRAL CATHETER, INITIAL ENCOUNTER: ICD-10-CM

## 2020-03-20 DIAGNOSIS — T83.511A URINARY TRACT INFECTION ASSOCIATED WITH INDWELLING URETHRAL CATHETER, INITIAL ENCOUNTER: ICD-10-CM

## 2020-03-20 DIAGNOSIS — G12.21 ALS (AMYOTROPHIC LATERAL SCLEROSIS): ICD-10-CM

## 2020-03-20 DIAGNOSIS — Z97.8 CHRONIC INDWELLING FOLEY CATHETER: ICD-10-CM

## 2020-03-20 DIAGNOSIS — F41.9 ANXIETY: ICD-10-CM

## 2020-03-20 DIAGNOSIS — K11.7 SIALORRHEA: ICD-10-CM

## 2020-03-20 DIAGNOSIS — Z93.1 PERCUTANEOUS ENDOSCOPIC GASTROSTOMY STATUS: ICD-10-CM

## 2020-03-20 DIAGNOSIS — R23.8 SKIN BREAKDOWN: Primary | Chronic | ICD-10-CM

## 2020-03-20 DIAGNOSIS — R11.2 NAUSEA AND VOMITING, INTRACTABILITY OF VOMITING NOT SPECIFIED, UNSPECIFIED VOMITING TYPE: ICD-10-CM

## 2020-03-20 RX ORDER — CLONAZEPAM 0.5 MG/1
TABLET ORAL
Qty: 120 TABLET | Refills: 4 | Status: SHIPPED | OUTPATIENT
Start: 2020-03-20 | End: 2020-07-24

## 2020-03-20 RX ORDER — SCOLOPAMINE TRANSDERMAL SYSTEM 1 MG/1
1 PATCH, EXTENDED RELEASE TRANSDERMAL
Qty: 1 PATCH | Refills: 0 | Status: CANCELLED | OUTPATIENT
Start: 2020-03-20

## 2020-03-20 RX ORDER — NYSTATIN 100000 U/G
30 CREAM TOPICAL 2 TIMES DAILY
Qty: 30 G | Refills: 3 | Status: SHIPPED | OUTPATIENT
Start: 2020-03-20 | End: 2022-06-15 | Stop reason: SDUPTHER

## 2020-03-20 RX ORDER — TEMAZEPAM 15 MG/1
30 CAPSULE ORAL NIGHTLY
Qty: 60 CAPSULE | Refills: 3 | Status: SHIPPED | OUTPATIENT
Start: 2020-03-20 | End: 2020-11-06

## 2020-03-20 RX ORDER — CLONAZEPAM 0.5 MG/1
TABLET ORAL
Qty: 120 TABLET | Refills: 4 | Status: CANCELLED | OUTPATIENT
Start: 2020-03-20

## 2020-03-20 RX ORDER — HYDROCODONE BITARTRATE AND ACETAMINOPHEN 10; 325 MG/1; MG/1
1 TABLET ORAL EVERY 6 HOURS PRN
Qty: 120 TABLET | Refills: 0 | Status: SHIPPED | OUTPATIENT
Start: 2020-03-20 | End: 2020-06-08 | Stop reason: SDUPTHER

## 2020-03-20 RX ORDER — NYSTATIN 100000 [USP'U]/G
60 POWDER TOPICAL 2 TIMES DAILY
Qty: 60 G | Refills: 3 | Status: SHIPPED | OUTPATIENT
Start: 2020-03-20 | End: 2020-06-03

## 2020-03-20 RX ORDER — PROCHLORPERAZINE MALEATE 10 MG
10 TABLET ORAL ONCE
Qty: 30 TABLET | Refills: 0 | Status: SHIPPED | OUTPATIENT
Start: 2020-03-20 | End: 2020-04-09

## 2020-03-20 RX ORDER — HYDROCODONE BITARTRATE AND ACETAMINOPHEN 10; 325 MG/1; MG/1
1 TABLET ORAL EVERY 6 HOURS PRN
Qty: 120 TABLET | Refills: 0 | Status: CANCELLED | OUTPATIENT
Start: 2020-03-20

## 2020-03-20 NOTE — PROGRESS NOTES
Perlita noted that patient has areas of skin breakdown on his tailbone and requested refill on Nystatin Cream and Powder.     Wife indicated that she is aware of the alert prescribing Compazine. Perlita stated that this is the only medication available to treat symptoms that they have tried to not cause A-fib.     Also, patient was recently hospitalized Dx: Pneumonia. He has been d/c and doing better resting at home.

## 2020-03-20 NOTE — TELEPHONE ENCOUNTER
Returned call to Giovanni Danny to discuss medication refills and enteral nutrition. LVM. Will try again later today.

## 2020-03-20 NOTE — TELEPHONE ENCOUNTER
VM messages received today at 9:09am and 10:55AM from pt's wife Perlita requesting return call regarding medication refills. Caller indicated she wanted refills she did want to run out of meds.        NINA MCCAIN

## 2020-03-31 ENCOUNTER — TELEPHONE (OUTPATIENT)
Dept: NEUROLOGY | Facility: CLINIC | Age: 50
End: 2020-03-31

## 2020-03-31 NOTE — TELEPHONE ENCOUNTER
Vm message received today 4:21pm from pt's wife Perlita [phone] indicating they still have not received pt's enteral nutrition from [facility].  Perlita indicated she was informed by [facility] to anticipate shipment to arrive on Friday 3/27 but it still has not arrived.  Perlita indicated she was going to place an order w/ [employer] until they can receive pt's enteral nutrition.  Perlita inquired if they could switch to reliable vendor for pt's enteral nutrition?       placed call to Perlita to let her know her message was received.    Perlita proceeded to inform  they should have received the shipment on 3/27 but have not.  Pt will be out of enteral nutrition on today.  Perlita indicated she was placing an order w/ [employer] to ensure pt had nutritional support.  Perlita indicated she does have high protein  Ensure and Premiere Protein drinks to provide pt until his shipment arrives.     LEO reached out to [facility] Tel: [phone]  Fax: [phone]   to f/u on status of shipment... The office closed at 5pm.      LEO let Perlita know will have to research obtaining another vendor for enteral nutrition.      Perlita also informed  pt has breakdown on his buttocks.  Pt is less agreeable to being turned because he can't see the television or use his AAC device.  He is now allowing for Perlita and sitters to turn him twice a day.  Perlita indicated they do have a lo air loss mattress but it is a cheaper version.      LEO informed Perlita, LEO will forward information to ALS BRANDIE Pepper RN.    Perlita verbalized understanding and agreement.      NINA MCCAIN

## 2020-04-01 ENCOUNTER — TELEPHONE (OUTPATIENT)
Dept: NEUROLOGY | Facility: CLINIC | Age: 50
End: 2020-04-01

## 2020-04-01 NOTE — TELEPHONE ENCOUNTER
E-mail sent to [facility] with [facility] along with facesheet to inquire if patient's insurance accepted by [facility]. Awaiting on reply.     [facility] where obtained current Low Air Loss Mattress. Initial mattress was received from [facility] but became worn and needing replacing. Two of 4 Decubitus have healed. Perlita is utilizing Triad Paste ordered off of [employer]... Effective in healing process. Ointment LMNOOP purchased off [employer] that contains Lidocaine and Barrier to help prevent infection... Effective well.

## 2020-04-01 NOTE — TELEPHONE ENCOUNTER
E-mail response from ANKIT Ching with [facility] to notify receipt of message and she is looking into case.     Will follow up.

## 2020-04-02 ENCOUNTER — TELEPHONE (OUTPATIENT)
Dept: NEUROLOGY | Facility: CLINIC | Age: 50
End: 2020-04-02

## 2020-04-02 NOTE — TELEPHONE ENCOUNTER
Case f/u regarding Enteral Nutrition w/[facility] Tel: [phone]  Fax: [phone]    Spoke anjum Russo in shipment tracking---- Alejandro indicated Enteral Nutrition was delivered on 4/1/20 at 3:33pm.  LEO inquired about the delay in delivery.  Alejandro indicated (the notes that he saw in the system--3/18 more info was needed from Md office.  3/27 5:42PM shipment was approved for delivery.  But it was after hours on a Friday--shipment held until next business day.   [place] has a two day shipping zone placing delivery on 4/1/20.      Alejandro was not able to comment on Perlita's concern regarding working on getting shipment since earlier this month or longer.        LEO left vm message for Perlita to confirm they received the shipment on yesterday and to make aware of what LEO was informed by [facility].

## 2020-04-02 NOTE — TELEPHONE ENCOUNTER
Incoming e-mail received from ANKIT Ching/ [facility].    Can you get Elysia to put in an RD note for his 6 containers bolus a day? There just isnt one that actually makes those recommendations but it seems like that is what he is doing at home. Medicare will cover once thats in for me to submit. However, Medicaid is saying his secondary is inactive; does he have an updated policy that was not added in Epic?  -ANKIT Ching, please verify patient's insurance with  and inform her of the above.     Stay Safe,  Je'na`, RN  ALS Clinic Coordinator

## 2020-04-08 ENCOUNTER — DOCUMENTATION ONLY (OUTPATIENT)
Dept: NUTRITION | Facility: CLINIC | Age: 50
End: 2020-04-08

## 2020-04-08 NOTE — PROGRESS NOTES
MNT Feeding Tube Recommendations:    Dx:  ALS    Pt will obtain adequate calories/protein via commercial standardized formula Isosource 1.5    IBW:  70 kg  Estimated calorie needs:  2100 (30 kcal/kg IBW/day)  Estimated protein needs:  70 gm (1.0 gm/kg IBW/day)  Estimated fluid needs:  2100 ml (1 ml/kcal)    Tube feeding via PEG with Isosource 1.5 (or equivalent) - bolus feeds as tolerated.  Goal is to provide 6 (cartons daily) + free water flushes before and after feeds.      6 cartons Isosource 1.5 daily will provide 2250 kcal, 102  gm protein, 22.8 gm dietary fiber, 1146 ml free water + additional water from flushes.      The home health agency will provide education on site care, enteral nutrition administration, etc; the vendor (ie [employer]) will provide actual enteral nutrition formula.

## 2020-04-09 ENCOUNTER — TELEPHONE (OUTPATIENT)
Dept: NEUROLOGY | Facility: CLINIC | Age: 50
End: 2020-04-09

## 2020-04-09 DIAGNOSIS — R11.2 NAUSEA AND VOMITING, INTRACTABILITY OF VOMITING NOT SPECIFIED, UNSPECIFIED VOMITING TYPE: ICD-10-CM

## 2020-04-09 DIAGNOSIS — G12.21 ALS (AMYOTROPHIC LATERAL SCLEROSIS): ICD-10-CM

## 2020-04-09 RX ORDER — PROCHLORPERAZINE MALEATE 10 MG
TABLET ORAL
Qty: 30 TABLET | Refills: 4 | Status: SHIPPED | OUTPATIENT
Start: 2020-04-09 | End: 2020-08-24

## 2020-04-09 RX ORDER — TRAMADOL HYDROCHLORIDE 50 MG/1
TABLET ORAL
Qty: 120 TABLET | Refills: 4 | Status: SHIPPED | OUTPATIENT
Start: 2020-04-18 | End: 2020-04-09 | Stop reason: SDUPTHER

## 2020-04-09 NOTE — TELEPHONE ENCOUNTER
Case f/u regarding Medicaid Waiver Services  LEO spoke with pt's wife Perlita.  Pt's services were discontinued d/t not providing updated requested information.    Perlita indicated she provided the  the information but the  either didn't review the information or didn't reach out to Perlita to provide status clarifying the needed information as well as a timeframe.  Services were discontinued without Perlita being notified.  Perlita did speak with a Medicaid  who informed he what updated information will need to provided to get services reinstated.   Bank statements (information on the exact dates the account(s) were opened)  Pay stubs (works for small company who does not provide paystubs).  Medicaid would not accept W-2s.   Perlita indicate the local bank lobby is not open, only drive through.  Services that can be done via drive through are limited.   Perlita indicated she will work on getting the proper paperwork.    There was nothing for LEO to offer to expedite reinstating care.        NINA MCCAIN

## 2020-04-09 NOTE — TELEPHONE ENCOUNTER
Jhony Perez,    Did you receive a response form Medicaid office or follow up from Mrs. Bhat regarding [place] Medicaid status?    Thanks in advance,  Lawrence, RN  ALS Clinic Coordinator

## 2020-04-12 LAB
ACID FAST MOD KINY STN SPEC: NORMAL
MYCOBACTERIUM SPEC QL CULT: NORMAL

## 2020-04-16 ENCOUNTER — DOCUMENT SCAN (OUTPATIENT)
Dept: HOME HEALTH SERVICES | Facility: HOSPITAL | Age: 50
End: 2020-04-16
Payer: MEDICARE

## 2020-04-20 ENCOUNTER — TELEPHONE (OUTPATIENT)
Dept: PULMONOLOGY | Facility: CLINIC | Age: 50
End: 2020-04-20

## 2020-04-20 DIAGNOSIS — L03.90 CELLULITIS, UNSPECIFIED CELLULITIS SITE: Primary | ICD-10-CM

## 2020-04-20 PROCEDURE — G0179 PR HOME HEALTH MD RECERTIFICATION: ICD-10-PCS | Mod: ,,, | Performed by: FAMILY MEDICINE

## 2020-04-20 PROCEDURE — G0179 MD RECERTIFICATION HHA PT: HCPCS | Mod: ,,, | Performed by: FAMILY MEDICINE

## 2020-04-20 RX ORDER — DOXYCYCLINE 100 MG/1
100 CAPSULE ORAL 2 TIMES DAILY
Qty: 14 CAPSULE | Refills: 0 | Status: SHIPPED | OUTPATIENT
Start: 2020-04-20 | End: 2020-04-27

## 2020-04-22 ENCOUNTER — EXTERNAL HOME HEALTH (OUTPATIENT)
Dept: HOME HEALTH SERVICES | Facility: HOSPITAL | Age: 50
End: 2020-04-22
Payer: MEDICARE

## 2020-04-22 ENCOUNTER — DOCUMENT SCAN (OUTPATIENT)
Dept: HOME HEALTH SERVICES | Facility: HOSPITAL | Age: 50
End: 2020-04-22
Payer: MEDICARE

## 2020-04-23 ENCOUNTER — EXTERNAL HOME HEALTH (OUTPATIENT)
Dept: HOME HEALTH SERVICES | Facility: HOSPITAL | Age: 50
End: 2020-04-23
Payer: MEDICARE

## 2020-04-28 ENCOUNTER — TELEPHONE (OUTPATIENT)
Dept: NEUROLOGY | Facility: CLINIC | Age: 50
End: 2020-04-28

## 2020-04-28 ENCOUNTER — DOCUMENTATION ONLY (OUTPATIENT)
Dept: NEUROLOGY | Facility: CLINIC | Age: 50
End: 2020-04-28

## 2020-04-28 RX ORDER — HYDROCODONE BITARTRATE AND ACETAMINOPHEN 10; 325 MG/1; MG/1
1 TABLET ORAL EVERY 6 HOURS PRN
Qty: 120 TABLET | Refills: 0 | Status: SHIPPED | OUTPATIENT
Start: 2020-04-28 | End: 2020-05-28

## 2020-04-28 NOTE — TELEPHONE ENCOUNTER
Vm message received today 8:22AM from pt's wife Perlita [phone] requesting refill of pt's Lortab, pt is almost out.  Request Rx be sent to [facility] pt's pharmacy of preference.        NINA MCCAIN

## 2020-04-28 NOTE — PROGRESS NOTES
Norco refill request sent to Dr. Espinal.     Spoke with Mrs. Bhat to inquire if Medicaid documentation submitted to . Perlita indicated that she is awaiting letter form bank to verify that account has not been open longer than 3 months.     She has spoken with [facility] representative about placing transfer to [facility] on hold until after Medicaid Eligibility received.

## 2020-05-11 ENCOUNTER — TELEPHONE (OUTPATIENT)
Dept: NEUROLOGY | Facility: CLINIC | Age: 50
End: 2020-05-11

## 2020-05-11 ENCOUNTER — DOCUMENTATION ONLY (OUTPATIENT)
Dept: NEUROLOGY | Facility: CLINIC | Age: 50
End: 2020-05-11

## 2020-05-11 DIAGNOSIS — Z93.1 PRESENCE OF EXTERNALLY REMOVABLE PERCUTANEOUS ENDOSCOPIC GASTROSTOMY (PEG) TUBE: ICD-10-CM

## 2020-05-11 DIAGNOSIS — G12.21 ALS (AMYOTROPHIC LATERAL SCLEROSIS): Primary | ICD-10-CM

## 2020-05-11 DIAGNOSIS — R13.12 OROPHARYNGEAL DYSPHAGIA: ICD-10-CM

## 2020-05-11 NOTE — PROGRESS NOTES
Patient is NPO due to dysphagia from ALS.  He has a feeding tube for nutrition.  He receives 100% of his daily nutrition and hydration via feeding tube.

## 2020-05-11 NOTE — TELEPHONE ENCOUNTER
"12:47PM  SW spoke with Cecilia lee/ [facility] (P) [phone] (F) [phone].  Confirmed updated information received regarding dx of dysphagia and need for enteral nutrition.  Confirmed documentation is sufficient for Medicare to cover enteral nutrition. No other needs verbalized at this time.        11:04AM  Documentation received and faxed to [facility].  Pt's wife Perlita made aware.          10:12AM  Vm message received from pt's wife Perlita [phone] requesting update to pt's diagnosis list to include Dysphagia.  Perlita indicated she was informed by [facility] (P) [phone] (F) [phone]  pt must have a dx of dysphagia for Medicare to cover his enteral nutrition.    Please provide in Md note/Harvinder note "pt requires enteral nutrition d/t dx of dysphagia."                NINA MCCAIN  "

## 2020-05-18 ENCOUNTER — TELEPHONE (OUTPATIENT)
Dept: NEUROLOGY | Facility: CLINIC | Age: 50
End: 2020-05-18

## 2020-05-18 NOTE — TELEPHONE ENCOUNTER
Vm message received today 7:52AM from pt's wife Perlita indicating [facility] has not sent pt's Enteral Nutrition and requesting if the request to add Dysphagia to Dx and update was sent to [facility].  LEO informed Perlita Dx was added, update was faxed to [facility].  SW was informed by [facility] Enteral Nutrition Rep the information faxed was sufficient for continued Medicare coverage.      Regarding Medicaid   Reinstated Medicaid d/t COVID -19 retro-acitve from April 2020.    Only thing,, Perlita is not sure how much longer pt will have Medicaid     Not sure if she will have the Waiver services reinstated.either as one of the workers was stealing (silverware) from their home.  LEO inquired if she reported the individual and if she was going to look into getting another agency?  Perlita indicated she did report it and will look into getting another agency however she is just not sure moving forward if it will be worth the trouble since she is uncertain how much longer pt will have Medicaid.      Regarding Enteral Nuturtion  The only reason she didn't f/u with [facility] previously was because she would have had to pay out of pocket upfront. Since Medicaid has been reinstated would like to move forward with [place] ASAP.

## 2020-05-26 DIAGNOSIS — T83.511A URINARY TRACT INFECTION ASSOCIATED WITH INDWELLING URETHRAL CATHETER, INITIAL ENCOUNTER: ICD-10-CM

## 2020-05-26 DIAGNOSIS — G12.21 ALS (AMYOTROPHIC LATERAL SCLEROSIS): ICD-10-CM

## 2020-05-26 DIAGNOSIS — N39.0 URINARY TRACT INFECTION ASSOCIATED WITH INDWELLING URETHRAL CATHETER, INITIAL ENCOUNTER: ICD-10-CM

## 2020-05-26 DIAGNOSIS — Z97.8 CHRONIC INDWELLING FOLEY CATHETER: ICD-10-CM

## 2020-05-26 DIAGNOSIS — F41.9 ANXIETY: ICD-10-CM

## 2020-05-26 DIAGNOSIS — Z93.1 PERCUTANEOUS ENDOSCOPIC GASTROSTOMY STATUS: ICD-10-CM

## 2020-05-27 RX ORDER — TEMAZEPAM 30 MG/1
CAPSULE ORAL
Qty: 30 CAPSULE | Refills: 3 | Status: SHIPPED | OUTPATIENT
Start: 2020-05-27 | End: 2020-09-28

## 2020-06-03 DIAGNOSIS — R23.8 SKIN BREAKDOWN: Chronic | ICD-10-CM

## 2020-06-03 DIAGNOSIS — G12.21 ALS (AMYOTROPHIC LATERAL SCLEROSIS): ICD-10-CM

## 2020-06-03 RX ORDER — NYSTATIN 100000 [USP'U]/G
POWDER TOPICAL
Qty: 60 G | Refills: 10 | Status: SHIPPED | OUTPATIENT
Start: 2020-06-03 | End: 2022-04-07

## 2020-06-08 ENCOUNTER — TELEPHONE (OUTPATIENT)
Dept: NEUROLOGY | Facility: CLINIC | Age: 50
End: 2020-06-08

## 2020-06-08 RX ORDER — HYDROCODONE BITARTRATE AND ACETAMINOPHEN 10; 325 MG/1; MG/1
1 TABLET ORAL EVERY 6 HOURS PRN
Qty: 120 TABLET | Refills: 0 | Status: SHIPPED | OUTPATIENT
Start: 2020-06-08 | End: 2020-07-15 | Stop reason: SDUPTHER

## 2020-06-08 NOTE — TELEPHONE ENCOUNTER
Vm message received on this morning 6/8 8:14AM and 6/6 @ 10:22AM from Pt's wife Perlita [phone] requesting new Rx for Lortab, pt is out.   Perlita indicated pharmacy sent Md a request for scopolamine patch refill and Md denied it.      Spoke w/ pt's wife Perlita to confirm request.    Perlita indicated she spoke with Dr. Fuentes who called in rx for scopolamine patch .      Pt still needs Rx for Lortab--Loves pharmacy as indicated in pt med profile.        Perlita requested ALS Clinic appt.

## 2020-06-10 ENCOUNTER — TELEPHONE (OUTPATIENT)
Dept: NEUROLOGY | Facility: CLINIC | Age: 50
End: 2020-06-10

## 2020-06-10 NOTE — TELEPHONE ENCOUNTER
Vm message received on 6/9 3:23pm from pt's wife Perlita requesting refill on pt's amiodarone (PACERONE) 200 MG Tab.  Perlita indicated pt is out and needs medication for A-fib.  Requested refill request be sent to [facility].    Spoke with pt's wife Perlita  to see if pt had an  internal medicine/PCP following or a cardiologist following pt?  Perlita indicated pt does not have a PCP or Cardiologist. Perlita does not know who the PCP listed in pt's medical record is (Camille Bustillos NP), claims she does not recall pt seeing this person.    Perlita indicated it is very difficult to get pt to any other f/u appts (gets to ALS Clinic and eye doc).  LEO verbalized understanding and informed will forward message to MD. ADÁN LMSW

## 2020-06-12 ENCOUNTER — DOCUMENTATION ONLY (OUTPATIENT)
Dept: NEUROLOGY | Facility: CLINIC | Age: 50
End: 2020-06-12

## 2020-06-12 RX ORDER — CARVEDILOL 6.25 MG/1
6.25 TABLET ORAL 2 TIMES DAILY
Qty: 60 TABLET | Refills: 11 | OUTPATIENT
Start: 2020-06-12 | End: 2021-06-12

## 2020-06-12 RX ORDER — AMIODARONE HYDROCHLORIDE 200 MG/1
200 TABLET ORAL 2 TIMES DAILY
Status: CANCELLED | OUTPATIENT
Start: 2020-06-12

## 2020-06-12 NOTE — PROGRESS NOTES
Spoke with Felipe, Pharmacist, with [facility] regarding refill request for Coreg and Pacerone.

## 2020-06-12 NOTE — TELEPHONE ENCOUNTER
Medication request sent to wrong physician.  As a neurologist, I cannot manage cardiac medications.

## 2020-06-12 NOTE — TELEPHONE ENCOUNTER
Vm message received from pt's wife Perlita   [phone]   today 9:56AM   Requesting medication refill on Coreg as well.  Pt is out of medication.            Vm message received on 6/9 3:23pm from pt's wife Perlita requesting refill on pt's amiodarone (PACERONE) 200 MG Tab.  Perlita indicated pt is out and needs medication for A-fib.  Requested refill request be sent to [facility].     Spoke with pt's wife Perlita  to see if pt had an  internal medicine/PCP following or a cardiologist following pt?  Perlita indicated pt does not have a PCP or Cardiologist.      Please call.        NINA MCCAIN

## 2020-06-16 ENCOUNTER — TELEPHONE (OUTPATIENT)
Dept: FAMILY MEDICINE | Facility: CLINIC | Age: 50
End: 2020-06-16

## 2020-06-16 ENCOUNTER — TELEPHONE (OUTPATIENT)
Dept: NEUROLOGY | Facility: CLINIC | Age: 50
End: 2020-06-16

## 2020-06-16 NOTE — TELEPHONE ENCOUNTER
[facility] Please notify them that I have never seen this pt before. I was scheduled to see him but he was a no show. ty   Med denied.

## 2020-06-16 NOTE — TELEPHONE ENCOUNTER
Dion Dunn LMSW  Caller: Perlita (wife) @ [phone] (Today,  8:28 AM)  Perlita said she's returning your call     SW reached out to Perlita again however she was unavailable, vm message left.  Will attempt out reach again today.                      8:29AM  Vm message received on 6/15 5:46PM from pt's wife Perlita [phone].  Audio qualify of vm was very poor.    SW reached out to Perlita this AM regarding message and to obtain more information.  Phone connection was poor (her voice went in/out).  Perlita did manage to indicate   Pt's eye lids and mouth were quivering, urine was dark.  SW inquired if changing locations would enhance call quality.  Call ended.  LEO tried to call Perlita back 2x.  Left vm message.             NINA MCCAIN

## 2020-06-16 NOTE — TELEPHONE ENCOUNTER
Returned call to Perlita, patient's wife, regarding c/o thick yellow sputum from trach site and blood tinged pusy urine x 2 days. Afebrile. Chills or quivering of patient's chin.  Watery, mucus stool with odor noted on Saturday.     Spouse administered extra dose of Klonopin on yesterday to help alleviate symptoms of twitching around mouth and eyes. Perlita noted patient slept through the night and denies twitching on today.     RN Coordinator advised to transport patient to ER for eval and treatment. Perlita stated patient does not wish to go to hospital. Spouse went into detail that hospital treats the patient as if he is terminal and does not complete a full work-up if patient presents to ED without fever.     Perlita is concerned about trach site and requested [facility], [place], collect sputum culture. Wife indicated that [facility] drops the specimen off to [facility] in [place] for processing. RN Coordinator attempted to contact nurse (Tel: [phone]).     Returned call to Perlita that stated she will text [place] to deliver specimen to [facility].

## 2020-06-17 ENCOUNTER — TELEPHONE (OUTPATIENT)
Dept: NEUROLOGY | Facility: CLINIC | Age: 50
End: 2020-06-17

## 2020-06-17 NOTE — TELEPHONE ENCOUNTER
E-mail received from Ashley with [facility].      Hi! I hope this finds you well and youve been able to stay safe through the virus. I wanted to see how I can get a copy of Bayron Hall most recent chart notes? The last notes I have is from June of 2019 from a local nurse practitioner that was seeing him. If your able to send them to me I can get them through this email or on our fax at [phone] -Ashley      RN Coordinator sent via Epic Fax.

## 2020-06-17 NOTE — TELEPHONE ENCOUNTER
I called pharmacy as requested , they were able to find which provider had ordered it . Was a mistake when reached out to .

## 2020-06-18 ENCOUNTER — DOCUMENT SCAN (OUTPATIENT)
Dept: HOME HEALTH SERVICES | Facility: HOSPITAL | Age: 50
End: 2020-06-18
Payer: MEDICARE

## 2020-06-19 PROCEDURE — G0179 PR HOME HEALTH MD RECERTIFICATION: ICD-10-PCS | Mod: ,,, | Performed by: INTERNAL MEDICINE

## 2020-06-19 PROCEDURE — G0179 MD RECERTIFICATION HHA PT: HCPCS | Mod: ,,, | Performed by: INTERNAL MEDICINE

## 2020-06-22 ENCOUNTER — EXTERNAL HOME HEALTH (OUTPATIENT)
Dept: HOME HEALTH SERVICES | Facility: HOSPITAL | Age: 50
End: 2020-06-22
Payer: MEDICARE

## 2020-06-23 NOTE — TELEPHONE ENCOUNTER
I have been out of the office most of the month.  Sorry for the delay.      I absolutely cannot take over managing cardiac medication.  This is far outside the scope of my practice.  I recognize it is difficult to manage getting to doctors appointments, however cardiologists generally do not need to see people frequently.  I also advise ALL of our ALS patients to have a primary care physician since neurologists are not trained in primary care.  If they are having difficulty finding doctors, we can set them up with our network.  Many cardiologists and primary care doctors at [facility] are also doing virtual visits right now, which should make it a lot easier for Bayron to see someone.

## 2020-07-06 ENCOUNTER — TELEPHONE (OUTPATIENT)
Dept: NEUROLOGY | Facility: CLINIC | Age: 50
End: 2020-07-06

## 2020-07-06 NOTE — TELEPHONE ENCOUNTER
Vm message received from pt's wife Perlita today 3:39PM requesting New Rx /refill for lasix and phos-nak.                 NINA MCCAIN

## 2020-07-07 ENCOUNTER — TELEPHONE (OUTPATIENT)
Dept: NEUROLOGY | Facility: CLINIC | Age: 50
End: 2020-07-07

## 2020-07-07 NOTE — TELEPHONE ENCOUNTER
Left Voicemail Message to inform patient's wife, Perlita, to schedule appt with Cardiologist and Primary Care Doctor to manage medications not prescribed by [facility]. Also, informed Perlita of message from Dr. Gray dated 6/23 @10AM.        Lawrence Boateng RN, BSN, BS  ALS Clinical Care Coordinator  [phone]

## 2020-07-15 ENCOUNTER — TELEPHONE (OUTPATIENT)
Dept: NEUROLOGY | Facility: CLINIC | Age: 50
End: 2020-07-15

## 2020-07-15 NOTE — TELEPHONE ENCOUNTER
Ana,    Incoming call from Beverly, Caregiver, requesting a   letter of medical level of needs form to get transportation   Mr. Delgado has an eye appt on tomorrow and needing letter ASAP.     Attempted to contact Beverly to discuss in more detail. ([phone])      Please call patient's caregiver to assist with this matter.           Lawrence Boateng RN, BSN, BS  ALS Clinical Care Coordinator  [phone]

## 2020-07-15 NOTE — TELEPHONE ENCOUNTER
19:21AM  Regarding pain medication.  Return call received from pt's wife Perlita, pain med clarified,  Request is for [place].  Refill request routed to Dr. Espinal.   informed Perlita Pepper RN ALS CC will f/u regarding ALS clinic appt.            9:40AM  Vm message received  7/14 @ 9:48PM from pt's wife Perlita [phone]  Requesting refill on lortab and requesting to schedule an ALS Clinic appt.      Per epic review of medication-Lortab not noted.  Will defer to HARJINDER Pepper, CC regarding medication and ALS Clinic appt.  LEO did leave Perlita message regarding medication clarification.          NINA MCCAIN

## 2020-07-16 ENCOUNTER — SOCIAL WORK (OUTPATIENT)
Dept: NEUROLOGY | Facility: CLINIC | Age: 50
End: 2020-07-16

## 2020-07-17 ENCOUNTER — TELEPHONE (OUTPATIENT)
Dept: NEUROLOGY | Facility: CLINIC | Age: 50
End: 2020-07-17

## 2020-07-17 NOTE — TELEPHONE ENCOUNTER
Vm messages received 1:04pm and 2:58pm from pt's wife Perlita --vms were very distorted and difficult to understand.  It sounded as if she was requesting Norco.  Per Epic review, Dr. Espinal provided refill to [facility] on 7/15.    Sw did leave Perlita a vm (2) indicating her vm was distorted and difficult to understand.   SW informed Perlita about the [place] refill provided.   SW called Perlita twice to make sure her concerns were addressed.            Case f/u regarding Transportation.  Medical Necessity formed returned to [facility] for [place] Medicaid Transportation.          NINA MCCAIN

## 2020-07-23 ENCOUNTER — TELEPHONE (OUTPATIENT)
Dept: NEUROLOGY | Facility: CLINIC | Age: 50
End: 2020-07-23

## 2020-07-23 NOTE — TELEPHONE ENCOUNTER
Returned call to Perlita, patient's spouse, x 2 (7/16 and 7/22). Left voicemail message requesting return call to discuss ALS Clinic appt.

## 2020-07-24 DIAGNOSIS — Z93.1 PERCUTANEOUS ENDOSCOPIC GASTROSTOMY STATUS: ICD-10-CM

## 2020-07-24 DIAGNOSIS — Z97.8 CHRONIC INDWELLING FOLEY CATHETER: ICD-10-CM

## 2020-07-24 DIAGNOSIS — T83.511A URINARY TRACT INFECTION ASSOCIATED WITH INDWELLING URETHRAL CATHETER, INITIAL ENCOUNTER: ICD-10-CM

## 2020-07-24 DIAGNOSIS — G12.21 ALS (AMYOTROPHIC LATERAL SCLEROSIS): ICD-10-CM

## 2020-07-24 DIAGNOSIS — N39.0 URINARY TRACT INFECTION ASSOCIATED WITH INDWELLING URETHRAL CATHETER, INITIAL ENCOUNTER: ICD-10-CM

## 2020-07-24 DIAGNOSIS — F41.9 ANXIETY: ICD-10-CM

## 2020-07-24 RX ORDER — CLONAZEPAM 0.5 MG/1
TABLET ORAL
Qty: 120 TABLET | Refills: 4 | Status: SHIPPED | OUTPATIENT
Start: 2020-07-24 | End: 2020-08-24 | Stop reason: SDUPTHER

## 2020-07-24 RX ORDER — CLONAZEPAM 0.5 MG/1
TABLET ORAL
Qty: 120 TABLET | Refills: 5 | OUTPATIENT
Start: 2020-07-24

## 2020-08-17 ENCOUNTER — EXTERNAL HOME HEALTH (OUTPATIENT)
Dept: HOME HEALTH SERVICES | Facility: HOSPITAL | Age: 50
End: 2020-08-17
Payer: MEDICARE

## 2020-08-18 PROCEDURE — G0179 MD RECERTIFICATION HHA PT: HCPCS | Mod: ,,, | Performed by: INTERNAL MEDICINE

## 2020-08-18 PROCEDURE — G0179 PR HOME HEALTH MD RECERTIFICATION: ICD-10-PCS | Mod: ,,, | Performed by: INTERNAL MEDICINE

## 2020-08-24 ENCOUNTER — TELEPHONE (OUTPATIENT)
Dept: NEUROLOGY | Facility: CLINIC | Age: 50
End: 2020-08-24

## 2020-08-24 DIAGNOSIS — R11.2 NAUSEA AND VOMITING, INTRACTABILITY OF VOMITING NOT SPECIFIED, UNSPECIFIED VOMITING TYPE: ICD-10-CM

## 2020-08-24 DIAGNOSIS — G12.21 ALS (AMYOTROPHIC LATERAL SCLEROSIS): ICD-10-CM

## 2020-08-24 DIAGNOSIS — Z93.1 PERCUTANEOUS ENDOSCOPIC GASTROSTOMY STATUS: ICD-10-CM

## 2020-08-24 DIAGNOSIS — T83.511A URINARY TRACT INFECTION ASSOCIATED WITH INDWELLING URETHRAL CATHETER, INITIAL ENCOUNTER: ICD-10-CM

## 2020-08-24 DIAGNOSIS — F41.9 ANXIETY: ICD-10-CM

## 2020-08-24 DIAGNOSIS — Z97.8 CHRONIC INDWELLING FOLEY CATHETER: ICD-10-CM

## 2020-08-24 DIAGNOSIS — N39.0 URINARY TRACT INFECTION ASSOCIATED WITH INDWELLING URETHRAL CATHETER, INITIAL ENCOUNTER: ICD-10-CM

## 2020-08-24 RX ORDER — PROCHLORPERAZINE MALEATE 10 MG
TABLET ORAL
Qty: 30 TABLET | Refills: 5 | Status: SHIPPED | OUTPATIENT
Start: 2020-08-24 | End: 2021-03-05

## 2020-08-24 RX ORDER — HYDROCODONE BITARTRATE AND ACETAMINOPHEN 10; 325 MG/1; MG/1
1 TABLET ORAL EVERY 6 HOURS PRN
Qty: 120 TABLET | Refills: 0 | Status: SHIPPED | OUTPATIENT
Start: 2020-08-24 | End: 2020-10-05 | Stop reason: SDUPTHER

## 2020-08-24 RX ORDER — PREGABALIN 100 MG/1
CAPSULE ORAL
Qty: 90 CAPSULE | Refills: 4 | Status: SHIPPED | OUTPATIENT
Start: 2020-08-24 | End: 2021-02-01

## 2020-08-24 RX ORDER — CLONAZEPAM 0.5 MG/1
TABLET ORAL
Qty: 120 TABLET | Refills: 3 | Status: SHIPPED | OUTPATIENT
Start: 2020-08-24 | End: 2020-12-01 | Stop reason: SDUPTHER

## 2020-08-25 NOTE — TELEPHONE ENCOUNTER
Voicemail received from patient requesting Norco Rx. Left message to inform Perlita that prescription e-scribed to [facility] on yesterday, 8/24.     Also, patient requested ALS Clinic appt. Advised patient's wife, Perlita, to inform of month would like appt.       Received call from Perlita:    Patient doing well.    Botox Injections received in eye lids to help patient to use AAC Device. Patient to follow up with botox every 3 months.     Sent patient portal text signup (Cell: [phone]).    Mrs. Bhat will schedule appt with Cardiologist that she is established to obtain Mr. Wagner medications not prescribed by ALS Clinic.

## 2020-09-25 ENCOUNTER — TELEPHONE (OUTPATIENT)
Dept: NEUROLOGY | Facility: CLINIC | Age: 50
End: 2020-09-25

## 2020-09-25 DIAGNOSIS — Z93.1 PERCUTANEOUS ENDOSCOPIC GASTROSTOMY STATUS: ICD-10-CM

## 2020-09-25 DIAGNOSIS — T83.511A URINARY TRACT INFECTION ASSOCIATED WITH INDWELLING URETHRAL CATHETER, INITIAL ENCOUNTER: ICD-10-CM

## 2020-09-25 DIAGNOSIS — F41.9 ANXIETY: ICD-10-CM

## 2020-09-25 DIAGNOSIS — N39.0 URINARY TRACT INFECTION ASSOCIATED WITH INDWELLING URETHRAL CATHETER, INITIAL ENCOUNTER: ICD-10-CM

## 2020-09-25 DIAGNOSIS — Z97.8 CHRONIC INDWELLING FOLEY CATHETER: ICD-10-CM

## 2020-09-25 DIAGNOSIS — G12.21 ALS (AMYOTROPHIC LATERAL SCLEROSIS): ICD-10-CM

## 2020-09-25 NOTE — TELEPHONE ENCOUNTER
Outgoing call to Perlita to discuss ALS Clinic appt. LVM to inquire if virtual or in-person visit desired.     Awaiting return call.

## 2020-09-28 ENCOUNTER — TELEPHONE (OUTPATIENT)
Dept: NEUROLOGY | Facility: CLINIC | Age: 50
End: 2020-09-28

## 2020-09-28 RX ORDER — TEMAZEPAM 30 MG/1
CAPSULE ORAL
Qty: 30 CAPSULE | Refills: 4 | Status: SHIPPED | OUTPATIENT
Start: 2020-09-28 | End: 2020-10-05 | Stop reason: SDUPTHER

## 2020-09-28 NOTE — TELEPHONE ENCOUNTER
Vm message received 9/23 3:30pm from pt's wife Perlita requesting return call regarding ALS Clinic inquiring if virtual visit can be set up for next week.  Please call.                   NINA MCCAIN

## 2020-09-29 ENCOUNTER — TELEPHONE (OUTPATIENT)
Dept: NEUROLOGY | Facility: CLINIC | Age: 50
End: 2020-09-29

## 2020-09-29 NOTE — TELEPHONE ENCOUNTER
Vm message received from pt's wife Perlita inquiring about request for pt's sleeping medication ---temazepam (RESTORIL)- filled at [facility].   Pended to Dr. Gray 9/24.    Perlita inquired about ALS Clinic Virtual Visit--She indicated she is off on Mon and Tues ---and work the rest of the week.                NINA MCCAIN

## 2020-10-05 ENCOUNTER — TELEPHONE (OUTPATIENT)
Dept: NEUROLOGY | Facility: CLINIC | Age: 50
End: 2020-10-05

## 2020-10-05 DIAGNOSIS — Z93.1 PERCUTANEOUS ENDOSCOPIC GASTROSTOMY STATUS: ICD-10-CM

## 2020-10-05 DIAGNOSIS — F41.9 ANXIETY: ICD-10-CM

## 2020-10-05 DIAGNOSIS — G12.21 ALS (AMYOTROPHIC LATERAL SCLEROSIS): ICD-10-CM

## 2020-10-05 DIAGNOSIS — N39.0 URINARY TRACT INFECTION ASSOCIATED WITH INDWELLING URETHRAL CATHETER, INITIAL ENCOUNTER: ICD-10-CM

## 2020-10-05 DIAGNOSIS — Z97.8 CHRONIC INDWELLING FOLEY CATHETER: ICD-10-CM

## 2020-10-05 DIAGNOSIS — T83.511A URINARY TRACT INFECTION ASSOCIATED WITH INDWELLING URETHRAL CATHETER, INITIAL ENCOUNTER: ICD-10-CM

## 2020-10-05 RX ORDER — HYDROCODONE BITARTRATE AND ACETAMINOPHEN 10; 325 MG/1; MG/1
1 TABLET ORAL EVERY 6 HOURS PRN
Qty: 120 TABLET | Refills: 0 | Status: SHIPPED | OUTPATIENT
Start: 2020-10-05 | End: 2020-10-21

## 2020-10-05 RX ORDER — TEMAZEPAM 30 MG/1
CAPSULE ORAL
Qty: 30 CAPSULE | Refills: 4 | Status: CANCELLED | OUTPATIENT
Start: 2020-10-05

## 2020-10-05 RX ORDER — TEMAZEPAM 30 MG/1
CAPSULE ORAL
Qty: 30 CAPSULE | Refills: 2 | Status: SHIPPED | OUTPATIENT
Start: 2020-10-05 | End: 2020-11-06

## 2020-10-05 RX ORDER — HYDROCODONE BITARTRATE AND ACETAMINOPHEN 10; 325 MG/1; MG/1
1 TABLET ORAL EVERY 6 HOURS PRN
Qty: 120 TABLET | Refills: 0 | Status: CANCELLED | OUTPATIENT
Start: 2020-10-05

## 2020-10-05 RX ORDER — TRAMADOL HYDROCHLORIDE 50 MG/1
50 TABLET ORAL EVERY 6 HOURS PRN
Qty: 120 TABLET | Refills: 3 | Status: CANCELLED | OUTPATIENT
Start: 2020-10-05

## 2020-10-05 NOTE — TELEPHONE ENCOUNTER
Vm message received this morning 8:40AM from pt's wife Perlita refill request for   Sleeping pills, may need new Rx for Lortab and Tamadol.

## 2020-10-06 ENCOUNTER — DOCUMENTATION ONLY (OUTPATIENT)
Dept: NEUROLOGY | Facility: CLINIC | Age: 50
End: 2020-10-06

## 2020-10-06 NOTE — TELEPHONE ENCOUNTER
Message received from RT Ashley with [facility]:    Hey Girl,    I hope this finds you well, and I miss seeing you all so much! I was wondering if Bayron Delgado has had any updated chart notes since the November 6th visit of 2019? If so can we please get a copy of those? Either to my email or fax to [phone] Will be fine.   Ashley       RN Coordinator sent reply to Ashley to inform that patient has not been seen since 11/2019. However, we are communicating with patient's wife, Perlita, regarding appt.

## 2020-10-17 PROCEDURE — G0179 MD RECERTIFICATION HHA PT: HCPCS | Mod: ,,, | Performed by: INTERNAL MEDICINE

## 2020-10-17 PROCEDURE — G0179 PR HOME HEALTH MD RECERTIFICATION: ICD-10-PCS | Mod: ,,, | Performed by: INTERNAL MEDICINE

## 2020-10-19 ENCOUNTER — EXTERNAL HOME HEALTH (OUTPATIENT)
Dept: HOME HEALTH SERVICES | Facility: HOSPITAL | Age: 50
End: 2020-10-19
Payer: MEDICARE

## 2020-10-19 ENCOUNTER — TELEPHONE (OUTPATIENT)
Dept: NEUROLOGY | Facility: CLINIC | Age: 50
End: 2020-10-19

## 2020-10-19 NOTE — TELEPHONE ENCOUNTER
Vm message received 10/1920 9:37AM FROM PT'S wife Perlita  requesting return call regarding scheduling pt's ALS Clinic appt, requested virtual visit.  Perlita indicated pt's respiratory company will need updated clinic notes or they will no long be able to provide trach supplies.  Please call.    SW left vm message for Perlita letting her know HARJINDER Pepper ALS CC will f/u with her regarding an appt upon her return on Tuesday.        NINA MCCAIN

## 2020-10-21 DIAGNOSIS — T83.511A URINARY TRACT INFECTION ASSOCIATED WITH INDWELLING URETHRAL CATHETER, INITIAL ENCOUNTER: ICD-10-CM

## 2020-10-21 DIAGNOSIS — Z93.1 PERCUTANEOUS ENDOSCOPIC GASTROSTOMY STATUS: ICD-10-CM

## 2020-10-21 DIAGNOSIS — G12.21 ALS (AMYOTROPHIC LATERAL SCLEROSIS): ICD-10-CM

## 2020-10-21 DIAGNOSIS — N39.0 URINARY TRACT INFECTION ASSOCIATED WITH INDWELLING URETHRAL CATHETER, INITIAL ENCOUNTER: ICD-10-CM

## 2020-10-21 DIAGNOSIS — Z97.8 CHRONIC INDWELLING FOLEY CATHETER: ICD-10-CM

## 2020-10-21 DIAGNOSIS — F41.9 ANXIETY: ICD-10-CM

## 2020-10-21 RX ORDER — LIDOCAINE 50 MG/G
PATCH TOPICAL
Qty: 120 PATCH | Refills: 4 | Status: SHIPPED | OUTPATIENT
Start: 2020-10-21

## 2020-10-22 ENCOUNTER — TELEPHONE (OUTPATIENT)
Dept: NEUROLOGY | Facility: CLINIC | Age: 50
End: 2020-10-22

## 2020-10-22 NOTE — TELEPHONE ENCOUNTER
----- Message from Radha Olmos sent at 10/21/2020 12:09 PM CDT -----  Contact: Perlita (wife) @ [phone]  Calling to request a Virtual appt asap.  Pt is not able to get anymore  trach supplies until he is seen because the need clinical notes.  He is almost out of supplies.  Pls call asap.

## 2020-10-22 NOTE — TELEPHONE ENCOUNTER
Outgoing call to patient's wife, Perlita. Requesting ALS Clinic appt. RN Coordinator informed wife that it's important to keep quarterly ALS Clinic appt to prevent delay in obtaining equipment, medication, etc.     Sent message to Dr. Fuentes to request virtual ALS Clinic appt.     Perlita indicated that odor from trach stoma site. She contributes smell to colonization of AROs.     Couple planning 25th Wedding Anniversary for Saturday, 10/24. The celebration has grown into a large event. RN Coordinator inquired if family and friends plan to wear mask. Perlita indicated that mask are not required and family have been really conscientious of not coming around patient when have any type of cold symptoms. RN Coordinator and Perlita RN, discussed asymptomatic cases. Perlita stated the majority of the asymptomatic cases are of young people and she is not too concerned about it for the event. RN Coordinator reinterated importance of staying safe and reminding Perlita that COVID-19 does not discriminate.  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 She is now working 5 nights on and 2 days off then 5 nights off and 2 days on. Wished couple a Happy 25th Wedding Anniversary on behalf of Team.     Sent [employer] text signup via Epic. Perlita noted text received with link. Provided patient activation code and information regarding how to access Virtual Visit appt. Perlita stated she plans to use her iphone to access visit.

## 2020-11-06 ENCOUNTER — TELEPHONE (OUTPATIENT)
Dept: NEUROLOGY | Facility: CLINIC | Age: 50
End: 2020-11-06

## 2020-11-06 DIAGNOSIS — G12.21 ALS (AMYOTROPHIC LATERAL SCLEROSIS): ICD-10-CM

## 2020-11-06 DIAGNOSIS — Z97.8 CHRONIC INDWELLING FOLEY CATHETER: ICD-10-CM

## 2020-11-06 DIAGNOSIS — T83.511A URINARY TRACT INFECTION ASSOCIATED WITH INDWELLING URETHRAL CATHETER, INITIAL ENCOUNTER: ICD-10-CM

## 2020-11-06 DIAGNOSIS — F41.9 ANXIETY: ICD-10-CM

## 2020-11-06 DIAGNOSIS — Z93.1 PERCUTANEOUS ENDOSCOPIC GASTROSTOMY STATUS: ICD-10-CM

## 2020-11-06 DIAGNOSIS — N39.0 URINARY TRACT INFECTION ASSOCIATED WITH INDWELLING URETHRAL CATHETER, INITIAL ENCOUNTER: ICD-10-CM

## 2020-11-06 RX ORDER — TEMAZEPAM 30 MG/1
CAPSULE ORAL
Qty: 30 CAPSULE | Refills: 2 | Status: SHIPPED | OUTPATIENT
Start: 2020-11-20 | End: 2020-12-01 | Stop reason: SDUPTHER

## 2020-11-06 NOTE — TELEPHONE ENCOUNTER
VM message received on 11/6 8:17AM   From pt's wife Perlita --  Pt needs new Rx for Temazepam (Restoril) and request refill for Hydrocodone-acetaminophen (NORCO).

## 2020-11-10 ENCOUNTER — TELEPHONE (OUTPATIENT)
Dept: NEUROLOGY | Facility: CLINIC | Age: 50
End: 2020-11-10

## 2020-11-10 NOTE — TELEPHONE ENCOUNTER
Message received from Ashley with [facility] following up on chart notes completed by Dr. Fuentes on 10/23. Faxed progress notes, per her request.     -Awaiting MD signature

## 2020-11-11 ENCOUNTER — DOCUMENT SCAN (OUTPATIENT)
Dept: HOME HEALTH SERVICES | Facility: HOSPITAL | Age: 50
End: 2020-11-11
Payer: MEDICARE

## 2020-12-01 DIAGNOSIS — Z97.8 CHRONIC INDWELLING FOLEY CATHETER: ICD-10-CM

## 2020-12-01 DIAGNOSIS — Z93.1 PERCUTANEOUS ENDOSCOPIC GASTROSTOMY STATUS: ICD-10-CM

## 2020-12-01 DIAGNOSIS — N39.0 URINARY TRACT INFECTION ASSOCIATED WITH INDWELLING URETHRAL CATHETER, INITIAL ENCOUNTER: ICD-10-CM

## 2020-12-01 DIAGNOSIS — G12.21 ALS (AMYOTROPHIC LATERAL SCLEROSIS): ICD-10-CM

## 2020-12-01 DIAGNOSIS — F41.9 ANXIETY: ICD-10-CM

## 2020-12-01 DIAGNOSIS — T83.511A URINARY TRACT INFECTION ASSOCIATED WITH INDWELLING URETHRAL CATHETER, INITIAL ENCOUNTER: ICD-10-CM

## 2020-12-01 RX ORDER — CLONAZEPAM 0.5 MG/1
TABLET ORAL
Qty: 120 TABLET | Refills: 3 | Status: SHIPPED | OUTPATIENT
Start: 2020-12-05 | End: 2021-01-29

## 2020-12-01 RX ORDER — TEMAZEPAM 30 MG/1
CAPSULE ORAL
Qty: 30 CAPSULE | Refills: 2 | Status: SHIPPED | OUTPATIENT
Start: 2020-12-01 | End: 2021-03-05

## 2020-12-08 ENCOUNTER — TELEPHONE (OUTPATIENT)
Dept: NEUROLOGY | Facility: CLINIC | Age: 50
End: 2020-12-08

## 2020-12-09 NOTE — TELEPHONE ENCOUNTER
Message sent to ANKIT Ching with [facility] regarding Enteral Nutrition Rx.     Forwarded copy of most recent clinicals.

## 2020-12-16 PROCEDURE — G0179 PR HOME HEALTH MD RECERTIFICATION: ICD-10-PCS | Mod: ,,, | Performed by: INTERNAL MEDICINE

## 2020-12-16 PROCEDURE — G0179 MD RECERTIFICATION HHA PT: HCPCS | Mod: ,,, | Performed by: INTERNAL MEDICINE

## 2020-12-17 DIAGNOSIS — G12.21 ALS (AMYOTROPHIC LATERAL SCLEROSIS): ICD-10-CM

## 2020-12-17 DIAGNOSIS — F41.9 ANXIETY: ICD-10-CM

## 2020-12-18 RX ORDER — DULOXETIN HYDROCHLORIDE 60 MG/1
CAPSULE, DELAYED RELEASE ORAL
Qty: 30 CAPSULE | Refills: 1 | Status: SHIPPED | OUTPATIENT
Start: 2020-12-18 | End: 2020-12-23 | Stop reason: SDUPTHER

## 2020-12-18 NOTE — TELEPHONE ENCOUNTER
Patient has not been seen by neurology since Nov 2019.  Will provide 2 months worth of medication.  If he would like neurology to manage these medications, will need to make arrangements to restart his visits for further refills.

## 2020-12-22 ENCOUNTER — EXTERNAL HOME HEALTH (OUTPATIENT)
Dept: HOME HEALTH SERVICES | Facility: HOSPITAL | Age: 50
End: 2020-12-22
Payer: MEDICARE

## 2021-01-06 ENCOUNTER — TELEPHONE (OUTPATIENT)
Dept: NEUROLOGY | Facility: CLINIC | Age: 51
End: 2021-01-06

## 2021-01-06 DIAGNOSIS — Z93.1 PERCUTANEOUS ENDOSCOPIC GASTROSTOMY STATUS: ICD-10-CM

## 2021-01-06 DIAGNOSIS — G12.21 ALS (AMYOTROPHIC LATERAL SCLEROSIS): ICD-10-CM

## 2021-01-06 DIAGNOSIS — N39.0 URINARY TRACT INFECTION ASSOCIATED WITH INDWELLING URETHRAL CATHETER, INITIAL ENCOUNTER: ICD-10-CM

## 2021-01-06 DIAGNOSIS — Z97.8 CHRONIC INDWELLING FOLEY CATHETER: ICD-10-CM

## 2021-01-06 DIAGNOSIS — F41.9 ANXIETY: ICD-10-CM

## 2021-01-06 DIAGNOSIS — T83.511A URINARY TRACT INFECTION ASSOCIATED WITH INDWELLING URETHRAL CATHETER, INITIAL ENCOUNTER: ICD-10-CM

## 2021-01-06 RX ORDER — FAMOTIDINE 20 MG/1
TABLET, FILM COATED ORAL
Qty: 60 TABLET | Refills: 2 | Status: SHIPPED | OUTPATIENT
Start: 2021-01-06 | End: 2021-03-15 | Stop reason: SDUPTHER

## 2021-01-06 RX ORDER — OXYBUTYNIN CHLORIDE 15 MG/1
TABLET, EXTENDED RELEASE ORAL
Qty: 30 TABLET | Refills: 2 | Status: SHIPPED | OUTPATIENT
Start: 2021-01-06 | End: 2021-03-25

## 2021-01-06 RX ORDER — HYDROXYZINE HYDROCHLORIDE 25 MG/1
TABLET, FILM COATED ORAL
Qty: 120 TABLET | Refills: 2 | Status: SHIPPED | OUTPATIENT
Start: 2021-01-06 | End: 2021-03-15 | Stop reason: SDUPTHER

## 2021-01-07 ENCOUNTER — TELEPHONE (OUTPATIENT)
Dept: NEUROLOGY | Facility: CLINIC | Age: 51
End: 2021-01-07

## 2021-01-08 ENCOUNTER — TELEPHONE (OUTPATIENT)
Dept: NEUROLOGY | Facility: CLINIC | Age: 51
End: 2021-01-08

## 2021-01-29 DIAGNOSIS — F41.9 ANXIETY: ICD-10-CM

## 2021-01-29 DIAGNOSIS — G12.21 ALS (AMYOTROPHIC LATERAL SCLEROSIS): ICD-10-CM

## 2021-01-29 RX ORDER — CLONAZEPAM 0.5 MG/1
TABLET ORAL
Qty: 120 TABLET | Refills: 4 | Status: SHIPPED | OUTPATIENT
Start: 2021-01-29 | End: 2021-07-01 | Stop reason: SDUPTHER

## 2021-01-30 DIAGNOSIS — G12.21 ALS (AMYOTROPHIC LATERAL SCLEROSIS): ICD-10-CM

## 2021-02-01 RX ORDER — PREGABALIN 100 MG/1
CAPSULE ORAL
Qty: 90 CAPSULE | Refills: 4 | Status: SHIPPED | OUTPATIENT
Start: 2021-02-01 | End: 2021-02-09 | Stop reason: SDUPTHER

## 2021-02-09 ENCOUNTER — TELEPHONE (OUTPATIENT)
Dept: NEUROLOGY | Facility: CLINIC | Age: 51
End: 2021-02-09

## 2021-02-09 DIAGNOSIS — G12.21 ALS (AMYOTROPHIC LATERAL SCLEROSIS): ICD-10-CM

## 2021-02-09 DIAGNOSIS — F41.9 ANXIETY: ICD-10-CM

## 2021-02-09 RX ORDER — HYDROCODONE BITARTRATE AND ACETAMINOPHEN 10; 325 MG/1; MG/1
1 TABLET ORAL EVERY 6 HOURS PRN
Qty: 120 TABLET | Refills: 0 | Status: SHIPPED | OUTPATIENT
Start: 2021-02-09 | End: 2021-03-09 | Stop reason: SDUPTHER

## 2021-02-09 RX ORDER — PREGABALIN 100 MG/1
CAPSULE ORAL
Qty: 90 CAPSULE | Refills: 4 | Status: SHIPPED | OUTPATIENT
Start: 2021-02-09 | End: 2021-07-01 | Stop reason: SDUPTHER

## 2021-02-12 ENCOUNTER — PATIENT OUTREACH (OUTPATIENT)
Dept: HOME HEALTH SERVICES | Facility: HOSPITAL | Age: 51
End: 2021-02-12

## 2021-02-14 PROCEDURE — G0179 PR HOME HEALTH MD RECERTIFICATION: ICD-10-PCS | Mod: ,,, | Performed by: INTERNAL MEDICINE

## 2021-02-14 PROCEDURE — G0179 MD RECERTIFICATION HHA PT: HCPCS | Mod: ,,, | Performed by: INTERNAL MEDICINE

## 2021-02-17 ENCOUNTER — EXTERNAL HOME HEALTH (OUTPATIENT)
Dept: HOME HEALTH SERVICES | Facility: HOSPITAL | Age: 51
End: 2021-02-17
Payer: MEDICARE

## 2021-02-25 DIAGNOSIS — R11.2 NAUSEA AND VOMITING, INTRACTABILITY OF VOMITING NOT SPECIFIED, UNSPECIFIED VOMITING TYPE: ICD-10-CM

## 2021-02-25 DIAGNOSIS — G12.21 ALS (AMYOTROPHIC LATERAL SCLEROSIS): ICD-10-CM

## 2021-02-25 DIAGNOSIS — F41.9 ANXIETY: ICD-10-CM

## 2021-02-25 DIAGNOSIS — N39.0 URINARY TRACT INFECTION ASSOCIATED WITH INDWELLING URETHRAL CATHETER, INITIAL ENCOUNTER: ICD-10-CM

## 2021-02-25 DIAGNOSIS — Z93.1 PERCUTANEOUS ENDOSCOPIC GASTROSTOMY STATUS: ICD-10-CM

## 2021-02-25 DIAGNOSIS — T83.511A URINARY TRACT INFECTION ASSOCIATED WITH INDWELLING URETHRAL CATHETER, INITIAL ENCOUNTER: ICD-10-CM

## 2021-02-25 DIAGNOSIS — Z97.8 CHRONIC INDWELLING FOLEY CATHETER: ICD-10-CM

## 2021-03-05 RX ORDER — PROCHLORPERAZINE MALEATE 10 MG
TABLET ORAL
Qty: 30 TABLET | Refills: 10 | Status: SHIPPED | OUTPATIENT
Start: 2021-03-05 | End: 2022-01-19

## 2021-03-05 RX ORDER — TEMAZEPAM 30 MG/1
CAPSULE ORAL
Qty: 30 CAPSULE | Refills: 2 | Status: SHIPPED | OUTPATIENT
Start: 2021-03-05 | End: 2021-06-01

## 2021-03-15 DIAGNOSIS — N39.0 URINARY TRACT INFECTION ASSOCIATED WITH INDWELLING URETHRAL CATHETER, INITIAL ENCOUNTER: ICD-10-CM

## 2021-03-15 DIAGNOSIS — G12.21 ALS (AMYOTROPHIC LATERAL SCLEROSIS): ICD-10-CM

## 2021-03-15 DIAGNOSIS — T83.511A URINARY TRACT INFECTION ASSOCIATED WITH INDWELLING URETHRAL CATHETER, INITIAL ENCOUNTER: ICD-10-CM

## 2021-03-15 DIAGNOSIS — F41.9 ANXIETY: ICD-10-CM

## 2021-03-15 DIAGNOSIS — Z93.1 PERCUTANEOUS ENDOSCOPIC GASTROSTOMY STATUS: ICD-10-CM

## 2021-03-15 DIAGNOSIS — Z97.8 CHRONIC INDWELLING FOLEY CATHETER: ICD-10-CM

## 2021-03-22 ENCOUNTER — TELEPHONE (OUTPATIENT)
Dept: NEUROLOGY | Facility: CLINIC | Age: 51
End: 2021-03-22

## 2021-03-23 DIAGNOSIS — G12.21 ALS (AMYOTROPHIC LATERAL SCLEROSIS): ICD-10-CM

## 2021-03-23 DIAGNOSIS — N39.0 URINARY TRACT INFECTION ASSOCIATED WITH INDWELLING URETHRAL CATHETER, INITIAL ENCOUNTER: ICD-10-CM

## 2021-03-23 DIAGNOSIS — T83.511A URINARY TRACT INFECTION ASSOCIATED WITH INDWELLING URETHRAL CATHETER, INITIAL ENCOUNTER: ICD-10-CM

## 2021-03-23 DIAGNOSIS — F41.9 ANXIETY: ICD-10-CM

## 2021-03-23 DIAGNOSIS — Z93.1 PERCUTANEOUS ENDOSCOPIC GASTROSTOMY STATUS: ICD-10-CM

## 2021-03-23 DIAGNOSIS — Z97.8 CHRONIC INDWELLING FOLEY CATHETER: ICD-10-CM

## 2021-03-24 RX ORDER — HYDROXYZINE HYDROCHLORIDE 25 MG/1
TABLET, FILM COATED ORAL
Qty: 120 TABLET | Refills: 2 | Status: SHIPPED | OUTPATIENT
Start: 2021-03-24 | End: 2021-07-22

## 2021-03-24 RX ORDER — FAMOTIDINE 20 MG/1
TABLET, FILM COATED ORAL
Qty: 60 TABLET | Refills: 2 | Status: SHIPPED | OUTPATIENT
Start: 2021-03-24 | End: 2022-03-15

## 2021-03-25 ENCOUNTER — TELEPHONE (OUTPATIENT)
Dept: NEUROLOGY | Facility: CLINIC | Age: 51
End: 2021-03-25

## 2021-03-25 RX ORDER — OXYBUTYNIN CHLORIDE 15 MG/1
TABLET, EXTENDED RELEASE ORAL
Qty: 30 TABLET | Refills: 2 | Status: SHIPPED | OUTPATIENT
Start: 2021-03-25 | End: 2022-03-15

## 2021-03-26 ENCOUNTER — TELEPHONE (OUTPATIENT)
Dept: NEUROLOGY | Facility: CLINIC | Age: 51
End: 2021-03-26

## 2021-04-15 PROCEDURE — G0179 PR HOME HEALTH MD RECERTIFICATION: ICD-10-PCS | Mod: ,,, | Performed by: INTERNAL MEDICINE

## 2021-04-15 PROCEDURE — G0179 MD RECERTIFICATION HHA PT: HCPCS | Mod: ,,, | Performed by: INTERNAL MEDICINE

## 2021-04-23 ENCOUNTER — TELEPHONE (OUTPATIENT)
Dept: NEUROLOGY | Facility: CLINIC | Age: 51
End: 2021-04-23

## 2021-04-26 ENCOUNTER — TELEPHONE (OUTPATIENT)
Dept: NEUROLOGY | Facility: CLINIC | Age: 51
End: 2021-04-26

## 2021-04-26 ENCOUNTER — PATIENT OUTREACH (OUTPATIENT)
Dept: HOME HEALTH SERVICES | Facility: HOSPITAL | Age: 51
End: 2021-04-26

## 2021-04-26 DIAGNOSIS — G12.21 ALS (AMYOTROPHIC LATERAL SCLEROSIS): ICD-10-CM

## 2021-04-26 DIAGNOSIS — F41.9 ANXIETY: ICD-10-CM

## 2021-04-26 RX ORDER — HYDROCODONE BITARTRATE AND ACETAMINOPHEN 10; 325 MG/1; MG/1
1 TABLET ORAL EVERY 6 HOURS PRN
Qty: 120 TABLET | Refills: 0 | Status: SHIPPED | OUTPATIENT
Start: 2021-04-26 | End: 2021-07-01 | Stop reason: SDUPTHER

## 2021-04-26 RX ORDER — HYDROCODONE BITARTRATE AND ACETAMINOPHEN 10; 325 MG/1; MG/1
1 TABLET ORAL EVERY 6 HOURS PRN
Qty: 120 TABLET | Refills: 0 | Status: CANCELLED | OUTPATIENT
Start: 2021-04-26

## 2021-04-27 ENCOUNTER — EXTERNAL HOME HEALTH (OUTPATIENT)
Dept: HOME HEALTH SERVICES | Facility: HOSPITAL | Age: 51
End: 2021-04-27
Payer: MEDICARE

## 2021-05-03 ENCOUNTER — TELEPHONE (OUTPATIENT)
Dept: NEUROLOGY | Facility: CLINIC | Age: 51
End: 2021-05-03

## 2021-05-20 ENCOUNTER — TELEPHONE (OUTPATIENT)
Dept: NEUROLOGY | Facility: CLINIC | Age: 51
End: 2021-05-20

## 2021-05-20 DIAGNOSIS — R13.12 OROPHARYNGEAL DYSPHAGIA: ICD-10-CM

## 2021-05-20 DIAGNOSIS — G12.21 ALS (AMYOTROPHIC LATERAL SCLEROSIS): Primary | ICD-10-CM

## 2021-05-24 ENCOUNTER — TELEPHONE (OUTPATIENT)
Dept: NEUROLOGY | Facility: CLINIC | Age: 51
End: 2021-05-24

## 2021-05-25 DIAGNOSIS — G12.21 ALS (AMYOTROPHIC LATERAL SCLEROSIS): Primary | ICD-10-CM

## 2021-06-14 PROCEDURE — G0179 PR HOME HEALTH MD RECERTIFICATION: ICD-10-PCS | Mod: ,,, | Performed by: INTERNAL MEDICINE

## 2021-06-14 PROCEDURE — G0179 MD RECERTIFICATION HHA PT: HCPCS | Mod: ,,, | Performed by: INTERNAL MEDICINE

## 2021-07-06 ENCOUNTER — PATIENT OUTREACH (OUTPATIENT)
Dept: HOME HEALTH SERVICES | Facility: HOSPITAL | Age: 51
End: 2021-07-06

## 2021-07-08 ENCOUNTER — EXTERNAL HOME HEALTH (OUTPATIENT)
Dept: HOME HEALTH SERVICES | Facility: HOSPITAL | Age: 51
End: 2021-07-08
Payer: MEDICARE

## 2021-07-13 DIAGNOSIS — G12.21 ALS (AMYOTROPHIC LATERAL SCLEROSIS): ICD-10-CM

## 2021-07-13 DIAGNOSIS — N39.0 URINARY TRACT INFECTION ASSOCIATED WITH INDWELLING URETHRAL CATHETER, INITIAL ENCOUNTER: ICD-10-CM

## 2021-07-13 DIAGNOSIS — Z93.1 PERCUTANEOUS ENDOSCOPIC GASTROSTOMY STATUS: ICD-10-CM

## 2021-07-13 DIAGNOSIS — T83.511A URINARY TRACT INFECTION ASSOCIATED WITH INDWELLING URETHRAL CATHETER, INITIAL ENCOUNTER: ICD-10-CM

## 2021-07-13 DIAGNOSIS — F41.9 ANXIETY: ICD-10-CM

## 2021-07-13 DIAGNOSIS — Z97.8 CHRONIC INDWELLING FOLEY CATHETER: ICD-10-CM

## 2021-07-22 RX ORDER — HYDROXYZINE HYDROCHLORIDE 25 MG/1
TABLET, FILM COATED ORAL
Qty: 120 TABLET | Refills: 2 | Status: SHIPPED | OUTPATIENT
Start: 2021-07-22 | End: 2022-01-31

## 2021-08-05 ENCOUNTER — TELEPHONE (OUTPATIENT)
Dept: PHYSICAL MEDICINE AND REHAB | Facility: CLINIC | Age: 51
End: 2021-08-05

## 2021-08-05 ENCOUNTER — TELEPHONE (OUTPATIENT)
Dept: NEUROLOGY | Facility: CLINIC | Age: 51
End: 2021-08-05

## 2021-08-05 DIAGNOSIS — F41.9 ANXIETY: ICD-10-CM

## 2021-08-05 DIAGNOSIS — G12.21 ALS (AMYOTROPHIC LATERAL SCLEROSIS): ICD-10-CM

## 2021-08-05 RX ORDER — HYDROCODONE BITARTRATE AND ACETAMINOPHEN 10; 325 MG/1; MG/1
1 TABLET ORAL EVERY 6 HOURS PRN
Qty: 120 TABLET | Refills: 0 | Status: SHIPPED | OUTPATIENT
Start: 2021-08-05 | End: 2021-09-11

## 2021-08-13 PROCEDURE — G0179 PR HOME HEALTH MD RECERTIFICATION: ICD-10-PCS | Mod: ,,, | Performed by: INTERNAL MEDICINE

## 2021-08-13 PROCEDURE — G0179 MD RECERTIFICATION HHA PT: HCPCS | Mod: ,,, | Performed by: INTERNAL MEDICINE

## 2021-08-23 ENCOUNTER — PATIENT OUTREACH (OUTPATIENT)
Dept: HOME HEALTH SERVICES | Facility: HOSPITAL | Age: 51
End: 2021-08-23

## 2021-08-24 ENCOUNTER — EXTERNAL HOME HEALTH (OUTPATIENT)
Dept: HOME HEALTH SERVICES | Facility: HOSPITAL | Age: 51
End: 2021-08-24
Payer: MEDICARE

## 2021-10-12 PROCEDURE — G0179 MD RECERTIFICATION HHA PT: HCPCS | Mod: ,,, | Performed by: INTERNAL MEDICINE

## 2021-10-12 PROCEDURE — G0179 PR HOME HEALTH MD RECERTIFICATION: ICD-10-PCS | Mod: ,,, | Performed by: INTERNAL MEDICINE

## 2021-10-14 ENCOUNTER — PATIENT OUTREACH (OUTPATIENT)
Dept: HOME HEALTH SERVICES | Facility: HOSPITAL | Age: 51
End: 2021-10-14

## 2021-10-15 ENCOUNTER — EXTERNAL HOME HEALTH (OUTPATIENT)
Dept: HOME HEALTH SERVICES | Facility: HOSPITAL | Age: 51
End: 2021-10-15
Payer: MEDICARE

## 2021-10-21 ENCOUNTER — PATIENT MESSAGE (OUTPATIENT)
Dept: NEUROLOGY | Facility: CLINIC | Age: 51
End: 2021-10-21
Payer: MEDICARE

## 2021-11-02 ENCOUNTER — PATIENT OUTREACH (OUTPATIENT)
Dept: HOME HEALTH SERVICES | Facility: HOSPITAL | Age: 51
End: 2021-11-02
Payer: MEDICARE

## 2021-11-02 ENCOUNTER — EXTERNAL HOME HEALTH (OUTPATIENT)
Dept: HOME HEALTH SERVICES | Facility: HOSPITAL | Age: 51
End: 2021-11-02
Payer: MEDICARE

## 2021-11-29 ENCOUNTER — TELEPHONE (OUTPATIENT)
Dept: NEUROLOGY | Facility: CLINIC | Age: 51
End: 2021-11-29
Payer: MEDICARE

## 2021-12-09 ENCOUNTER — PATIENT OUTREACH (OUTPATIENT)
Dept: HOME HEALTH SERVICES | Facility: HOSPITAL | Age: 51
End: 2021-12-09
Payer: MEDICARE

## 2021-12-10 ENCOUNTER — EXTERNAL HOME HEALTH (OUTPATIENT)
Dept: HOME HEALTH SERVICES | Facility: HOSPITAL | Age: 51
End: 2021-12-10
Payer: MEDICARE

## 2021-12-11 PROCEDURE — G0179 MD RECERTIFICATION HHA PT: HCPCS | Mod: ,,, | Performed by: INTERNAL MEDICINE

## 2021-12-11 PROCEDURE — G0179 PR HOME HEALTH MD RECERTIFICATION: ICD-10-PCS | Mod: ,,, | Performed by: INTERNAL MEDICINE

## 2021-12-22 ENCOUNTER — TELEPHONE (OUTPATIENT)
Dept: NEUROLOGY | Facility: CLINIC | Age: 51
End: 2021-12-22
Payer: MEDICARE

## 2021-12-22 RX ORDER — TRAMADOL HYDROCHLORIDE 50 MG/1
50-100 TABLET ORAL 3 TIMES DAILY PRN
Qty: 180 TABLET | Refills: 1 | Status: SHIPPED | OUTPATIENT
Start: 2021-12-22 | End: 2022-02-25

## 2021-12-28 ENCOUNTER — EXTERNAL HOME HEALTH (OUTPATIENT)
Dept: HOME HEALTH SERVICES | Facility: HOSPITAL | Age: 51
End: 2021-12-28
Payer: MEDICARE

## 2021-12-28 ENCOUNTER — PATIENT OUTREACH (OUTPATIENT)
Dept: HOME HEALTH SERVICES | Facility: HOSPITAL | Age: 51
End: 2021-12-28
Payer: MEDICARE

## 2022-01-01 NOTE — ASSESSMENT & PLAN NOTE
- Hgb 11.7 (baseline 9.8-10.4)  - possibly hemoconcentration / volume depletion     - trend h/h daily   
- hx of PAfib  - HR ranging between 69-78  - resume home dosing of amiodarone and carvedilol   - maintain on telemetry   - trend electrolytes daily and replete electrolytes as indicated   
- urine culture collected + for VRE, ESBL, and multidrug resistant pseudomonas. Patient on targeted antibiotic therapy per culture data by Infectious Disease.  Will defer to Infectious Disease for antimicrobial management and attempt to set up patient for outpatient antibiotics if at all possible.  Renal ultrasound is negative.  
- urine culture collected + for VRE, ESBL, and multidrug resistant pseudomonas. Patient on targeted antibiotic therapy per culture data by Infectious Disease.  Will defer to Infectious Disease for antimicrobial management and attempt to set up patient for outpatient antibiotics if at all possible.  Renal ultrasound is negative.  
- urine culture collected + for VRE, ESBL, and multidrug resistant pseudomonas. Patient on targeted antibiotic therapy per culture data by Infectious Disease.  Will defer to Infectious Disease for antimicrobial management and attempt to set up patient for outpatient antibiotics if at all possible.  Will check renal ultrasound to rule out potential nidus of infection within the kidney itself.  
- urine culture collected 05/16/18 + for VRE, ESBL, and multidrug resistant pseudomonas. Consult ID and defer to them for management. Appears hemodynamically stable. Will need PICC for long term IV ABX.    
Atrial Fibrillation controlled currently with Amiodarone and carvedilol. [id number] score 4. Anticoagulation not indicated currently d/t ALS.  Adjust medications for rate control as described in bradycardia above.    
Atrial Fibrillation controlled currently with Amiodarone. [id number] score 4. Anticoagulation not indicated currently d/t ALS    
Atrial Fibrillation controlled currently with Amiodarone. [id number] score 4. Anticoagulation not indicated currently d/t ALS    
Atrial Fibrillation controlled currently with Amiodarone. [id number] score 4. Anticoagulation not indicated currently d/t ALS    
Chronic indwelling Munoz catheter  - presents with lethargy and recurrent urinary symptoms of penile discharge and foul smelling urine  - afebrile, no leukocytosis, appears nontoxic  - urine culture collected 05/16/18 + for VRE, ESBL, and multidrug resistant pseudomonas   - sensitive to gentamicin - started while in ED  - pharmacy consulted to assist with dosing  - exchange munoz catheter   - ID consult pending   - repeat blood and urine cultures pending - f/u on results   - gentle hydration overnight and reassess fluid status in the AM   
Chronic problem, controlled. Will continue chronic medication(s), BZD and SSRI and monitor for any changes, adjusting as needed.     
Chronic problem. Related to severe immobility. Continue lasix and monitor I/Os.    
Continue routine percutaneous gastrostomy care.  Continue tube feeds per home regimen.    
Depression  - continue home clonazepam and duloxetine dosing   
New diagnosis, likely secondary to beta-blocker and amiodarone.  Will decrease amiodarone dose to 200 mg daily and hold beta-blocker for heart rate less than 50 or blood pressure less than 90/50 defer consult Cardiology for the moment.    
Patient's anemia is currently controlled. S/p 0 units of PRBCs. Etiology likely d/t malnutrition  Current CBC reviewed-   Lab Results   Component Value Date    HGB 10.0 (L) 06/23/2018    HCT 30.2 (L) 06/23/2018     Monitor serial CBC and transfuse if patient becomes hemodynamically unstable, symptomatic or H/H drops below 7/21.     
Patient's anemia is currently controlled. S/p 0 units of PRBCs. Etiology likely d/t malnutrition  Current CBC reviewed-   Lab Results   Component Value Date    HGB 11.0 (L) 06/24/2018    HCT 33.3 (L) 06/24/2018     Monitor serial CBC and transfuse if patient becomes hemodynamically unstable, symptomatic or H/H drops below 7/21.     
Patient's anemia is currently controlled. S/p 0 units of PRBCs. Etiology likely d/t malnutrition  Current CBC reviewed-   Lab Results   Component Value Date    HGB 11.1 (L) 06/22/2018    HCT 33.2 (L) 06/22/2018     Monitor serial CBC and transfuse if patient becomes hemodynamically unstable, symptomatic or H/H drops below 7/21.     
Patient's anemia is currently controlled. S/p 0 units of PRBCs. Etiology likely d/t malnutrition  Current CBC reviewed-   Lab Results   Component Value Date    HGB 11.4 (L) 06/21/2018    HCT 34.9 (L) 06/21/2018     Monitor serial CBC and transfuse if patient becomes hemodynamically unstable, symptomatic or H/H drops below 7/21.     
Patient's vent settings, CXR were reviewed.    Vent Mode: A/C  Oxygen Concentration (%):  [30-40] 30  Resp Rate Total:  [14 br/min-20 br/min] 15 br/min  Vt Set:  [500 mL] 500 mL  PEEP/CPAP:  [5 cmH20] 5 cmH20  Pressure Support:  [0 cmH20] 0 cmH20  Mean Airway Pressure:  [9.3 tbO01-52 cmH20] 9.3 cmH20     Atelectasis appears improved.  Continue scheduled cough assist q.4 hours and monitor patient closely.  No symptoms of pneumonia.  Patient very high risk for pneumonia given his history of multidrug resistant organisms, trach status, and impaired ventilatory effort.      
Patient's vent settings, CXR were reviewed.    Vent Mode: A/C  Oxygen Concentration (%):  [30-40] 40  Resp Rate Total:  [14 br/min-19 br/min] 16 br/min  Vt Set:  [500 mL] 500 mL  PEEP/CPAP:  [5 cmH20] 5 cmH20  Pressure Support:  [0 cmH20] 0 cmH20  Mean Airway Pressure:  [9.4 cmH20-9.9 cmH20] 9.4 cmH20     Patient with increased atelectasis again noted.  Placed order for continued cough assist q.4 hours and started patient on scheduled bronchodilators.  Continue vent settings at the same.      
Patient's vent settings, CXR were reviewed.    Vent Mode: A/C  Oxygen Concentration (%):  [30] 30  Resp Rate Total:  [14 br/min-20 br/min] 18 br/min  Vt Set:  [500 mL] 500 mL  PEEP/CPAP:  [5 cmH20] 5 cmH20  Pressure Support:  [0 cmH20] 0 cmH20  Mean Airway Pressure:  [10 ivU50-94 cmH20] 11 cmH20     Patient with increased atelectasis today.  Encouraged use of cough assist.    Will adjust vent management as follows- Continue same management for now. Use cough assist.      
Patient's vent settings, CXR were reviewed.    Vent Mode: A/C  Oxygen Concentration (%):  [30] 30  Resp Rate Total:  [14 br/min-22 br/min] 17 br/min  Vt Set:  [500 mL] 500 mL  PEEP/CPAP:  [5 cmH20] 5 cmH20  Pressure Support:  [0 cmH20] 0 cmH20  Mean Airway Pressure:  [9.2 cmH20-10 cmH20] 10 cmH20    Will adjust vent management as follows- Continue same management for now. Use cough assist.      
Routine care    
Routine care    
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 Noted cough assist. Patient seen by Dr. Fuentes as an outpatient.  No acute inpatient needs at this point in time.   
Ventilator dependence  - resume home ventilator settings  - titrate oxygen as needed to maintain saturations greater than 92%  - duo nebs Q4 PRN SOB / wheezing  - offloading techniques and turn Q 2  - routine trach care    - passive ROM   
With UTI- Change per reccs    
With UTI- Change per reccs    
No

## 2022-01-17 DIAGNOSIS — R11.2 NAUSEA AND VOMITING, INTRACTABILITY OF VOMITING NOT SPECIFIED, UNSPECIFIED VOMITING TYPE: ICD-10-CM

## 2022-01-17 DIAGNOSIS — G12.21 ALS (AMYOTROPHIC LATERAL SCLEROSIS): ICD-10-CM

## 2022-01-19 RX ORDER — PROCHLORPERAZINE MALEATE 10 MG
TABLET ORAL
Qty: 30 TABLET | Refills: 11 | Status: SHIPPED | OUTPATIENT
Start: 2022-01-19

## 2022-01-26 ENCOUNTER — TELEPHONE (OUTPATIENT)
Dept: NEUROLOGY | Facility: CLINIC | Age: 52
End: 2022-01-26
Payer: MEDICARE

## 2022-01-26 RX ORDER — HYDROCODONE BITARTRATE AND ACETAMINOPHEN 10; 325 MG/1; MG/1
1 TABLET ORAL EVERY 6 HOURS PRN
Qty: 120 TABLET | Refills: 0 | Status: SHIPPED | OUTPATIENT
Start: 2022-01-26 | End: 2022-02-25

## 2022-01-28 DIAGNOSIS — F41.9 ANXIETY: ICD-10-CM

## 2022-01-28 DIAGNOSIS — Z97.8 CHRONIC INDWELLING FOLEY CATHETER: ICD-10-CM

## 2022-01-28 DIAGNOSIS — N39.0 URINARY TRACT INFECTION ASSOCIATED WITH INDWELLING URETHRAL CATHETER, INITIAL ENCOUNTER: ICD-10-CM

## 2022-01-28 DIAGNOSIS — G12.21 ALS (AMYOTROPHIC LATERAL SCLEROSIS): ICD-10-CM

## 2022-01-28 DIAGNOSIS — T83.511A URINARY TRACT INFECTION ASSOCIATED WITH INDWELLING URETHRAL CATHETER, INITIAL ENCOUNTER: ICD-10-CM

## 2022-01-28 DIAGNOSIS — Z93.1 PERCUTANEOUS ENDOSCOPIC GASTROSTOMY STATUS: ICD-10-CM

## 2022-01-31 RX ORDER — HYDROXYZINE HYDROCHLORIDE 25 MG/1
TABLET, FILM COATED ORAL
Qty: 120 TABLET | Refills: 5 | Status: SHIPPED | OUTPATIENT
Start: 2022-01-31 | End: 2022-08-17

## 2022-02-09 PROCEDURE — G0179 MD RECERTIFICATION HHA PT: HCPCS | Mod: ,,, | Performed by: INTERNAL MEDICINE

## 2022-02-09 PROCEDURE — G0179 PR HOME HEALTH MD RECERTIFICATION: ICD-10-PCS | Mod: ,,, | Performed by: INTERNAL MEDICINE

## 2022-02-10 ENCOUNTER — EXTERNAL HOME HEALTH (OUTPATIENT)
Dept: HOME HEALTH SERVICES | Facility: HOSPITAL | Age: 52
End: 2022-02-10
Payer: MEDICARE

## 2022-02-25 RX ORDER — TRAMADOL HYDROCHLORIDE 50 MG/1
TABLET ORAL
Qty: 180 TABLET | Refills: 1 | Status: SHIPPED | OUTPATIENT
Start: 2022-02-26 | End: 2022-03-02 | Stop reason: SDUPTHER

## 2022-03-02 RX ORDER — TRAMADOL HYDROCHLORIDE 50 MG/1
TABLET ORAL
Qty: 180 TABLET | Refills: 1 | Status: CANCELLED | OUTPATIENT
Start: 2022-03-02

## 2022-03-02 RX ORDER — HYDROCODONE BITARTRATE AND ACETAMINOPHEN 10; 325 MG/1; MG/1
1 TABLET ORAL EVERY 6 HOURS PRN
Refills: 0 | Status: CANCELLED | OUTPATIENT
Start: 2022-03-02

## 2022-03-02 RX ORDER — TRAMADOL HYDROCHLORIDE 50 MG/1
50-100 TABLET ORAL 3 TIMES DAILY PRN
Qty: 180 TABLET | Refills: 2 | Status: SHIPPED | OUTPATIENT
Start: 2022-03-03 | End: 2022-07-28

## 2022-03-02 RX ORDER — HYDROCODONE BITARTRATE AND ACETAMINOPHEN 10; 325 MG/1; MG/1
1 TABLET ORAL EVERY 6 HOURS PRN
Qty: 120 TABLET | Refills: 0 | Status: SHIPPED | OUTPATIENT
Start: 2022-03-03 | End: 2022-04-05 | Stop reason: SDUPTHER

## 2022-03-02 NOTE — TELEPHONE ENCOUNTER
Dr. Espinal,    The Norco Rx was not included on the patient's medication profile. Uncertain why medications are being removed from medication list.     Thanks,  Shantelle`

## 2022-03-13 DIAGNOSIS — T83.511A URINARY TRACT INFECTION ASSOCIATED WITH INDWELLING URETHRAL CATHETER, INITIAL ENCOUNTER: ICD-10-CM

## 2022-03-13 DIAGNOSIS — N39.0 URINARY TRACT INFECTION ASSOCIATED WITH INDWELLING URETHRAL CATHETER, INITIAL ENCOUNTER: ICD-10-CM

## 2022-03-13 DIAGNOSIS — Z93.1 PERCUTANEOUS ENDOSCOPIC GASTROSTOMY STATUS: ICD-10-CM

## 2022-03-13 DIAGNOSIS — G12.21 ALS (AMYOTROPHIC LATERAL SCLEROSIS): ICD-10-CM

## 2022-03-13 DIAGNOSIS — F41.9 ANXIETY: ICD-10-CM

## 2022-03-13 DIAGNOSIS — Z97.8 CHRONIC INDWELLING FOLEY CATHETER: ICD-10-CM

## 2022-03-15 RX ORDER — OXYBUTYNIN CHLORIDE 15 MG/1
TABLET, EXTENDED RELEASE ORAL
Qty: 30 TABLET | Refills: 2 | Status: SHIPPED | OUTPATIENT
Start: 2022-03-15

## 2022-03-15 RX ORDER — FAMOTIDINE 20 MG/1
TABLET, FILM COATED ORAL
Qty: 60 TABLET | Refills: 2 | Status: SHIPPED | OUTPATIENT
Start: 2022-03-15

## 2022-04-05 ENCOUNTER — TELEPHONE (OUTPATIENT)
Dept: NEUROLOGY | Facility: CLINIC | Age: 52
End: 2022-04-05
Payer: MEDICARE

## 2022-04-05 NOTE — TELEPHONE ENCOUNTER
Hosea,   Message received from patient's wife, Perlita. Requesting medication transportation medical necessity statement and refill on [place] for Bayron.        Please go to this website and submit a medical necessity form and a level of needs form for Bayron so we can get transportation to and from Eye Dr visits in the ambulance.   [email or web address]  Select menu, healthcare provider, standard form, medical necessity form (fill out and submit), and level of needs form (fill out and submit).    The Medicaid transportation is requiring a level of needs form and a medical necessity form submitted in order to transport Bayron anywhere.    Thanks.

## 2022-04-07 DIAGNOSIS — G12.21 ALS (AMYOTROPHIC LATERAL SCLEROSIS): ICD-10-CM

## 2022-04-07 DIAGNOSIS — R23.8 SKIN BREAKDOWN: Chronic | ICD-10-CM

## 2022-04-07 RX ORDER — NYSTATIN 100000 [USP'U]/G
POWDER TOPICAL
Qty: 60 G | Refills: 10 | Status: SHIPPED | OUTPATIENT
Start: 2022-04-07

## 2022-04-10 PROCEDURE — G0179 MD RECERTIFICATION HHA PT: HCPCS | Mod: ,,, | Performed by: INTERNAL MEDICINE

## 2022-04-10 PROCEDURE — G0179 PR HOME HEALTH MD RECERTIFICATION: ICD-10-PCS | Mod: ,,, | Performed by: INTERNAL MEDICINE

## 2022-04-18 ENCOUNTER — TELEPHONE (OUTPATIENT)
Dept: NEUROLOGY | Facility: CLINIC | Age: 52
End: 2022-04-18
Payer: MEDICARE

## 2022-04-18 NOTE — TELEPHONE ENCOUNTER
Returned call to [facility] and confirmed Dr. Gray still following patient and provided fax number to ALS clinic [phone]

## 2022-04-18 NOTE — TELEPHONE ENCOUNTER
----- Message from Jeanette Luis sent at 4/18/2022  1:24 PM CDT -----  Contact: GREGORIA KINNEY calling in regards to patient needed a new script. Requesting call back       [facility] @[phone]

## 2022-04-19 ENCOUNTER — TELEPHONE (OUTPATIENT)
Dept: NEUROLOGY | Facility: CLINIC | Age: 52
End: 2022-04-19
Payer: MEDICARE

## 2022-04-19 DIAGNOSIS — F41.9 ANXIETY: ICD-10-CM

## 2022-04-19 DIAGNOSIS — G12.21 ALS (AMYOTROPHIC LATERAL SCLEROSIS): ICD-10-CM

## 2022-04-19 RX ORDER — DULOXETIN HYDROCHLORIDE 60 MG/1
60 CAPSULE, DELAYED RELEASE ORAL DAILY
Qty: 30 CAPSULE | Refills: 3 | Status: SHIPPED | OUTPATIENT
Start: 2022-04-19 | End: 2022-09-15 | Stop reason: SDUPTHER

## 2022-04-19 NOTE — TELEPHONE ENCOUNTER
Enteral Nutrition refill request received from [facility] and forwarded to Dr. Gray for review and signature.     Received message from Perlita Delgado requesting refill on Cymbalta 60mg. Will send to Dr. Gray for approval as well.         Tomasa Boateng RN, BSN, BS  ALS Clinical Care Coordinator  [phone]

## 2022-05-04 ENCOUNTER — EXTERNAL HOME HEALTH (OUTPATIENT)
Dept: HOME HEALTH SERVICES | Facility: HOSPITAL | Age: 52
End: 2022-05-04
Payer: MEDICARE

## 2022-05-16 ENCOUNTER — TELEPHONE (OUTPATIENT)
Dept: PHYSICAL MEDICINE AND REHAB | Facility: CLINIC | Age: 52
End: 2022-05-16
Payer: MEDICARE

## 2022-05-16 NOTE — TELEPHONE ENCOUNTER
----- Message from Taisha Benton sent at 5/16/2022 12:23 PM CDT -----  Regarding: Pt refill  Contact: Perlita @ [phone]  Rx Refill/Request    Is this a Refill or New Rx: refill    Rx Name and Strength: HYDROcodone-acetaminophen (NORCO)  mg per tablet    Preferred Pharmacy with phone number:   [facility] - [place], [place] - [address]  [address]  [place] [address]  Phone: [phone] Fax: [phone]      Communication Preference: Perlita @ [phone]  Additional Information:

## 2022-06-09 PROCEDURE — G0179 MD RECERTIFICATION HHA PT: HCPCS | Mod: ,,, | Performed by: INTERNAL MEDICINE

## 2022-06-09 PROCEDURE — G0179 PR HOME HEALTH MD RECERTIFICATION: ICD-10-PCS | Mod: ,,, | Performed by: INTERNAL MEDICINE

## 2022-06-14 RX ORDER — HYDROCODONE BITARTRATE AND ACETAMINOPHEN 10; 325 MG/1; MG/1
1 TABLET ORAL EVERY 6 HOURS PRN
Qty: 120 TABLET | Refills: 0 | Status: SHIPPED | OUTPATIENT
Start: 2022-06-16 | End: 2022-07-18 | Stop reason: SDUPTHER

## 2022-06-14 NOTE — TELEPHONE ENCOUNTER
----- Message from Marcos Arreguin sent at 6/14/2022  8:40 AM CDT -----  Contact: Perlita ( spouse )@ [phone]  Several Attempts:  Caller requesting a return phone call about a refill on   ( HYDROcodone-acetaminophen (NORCO)  mg per tablet) pls call or send a new script to:     [facility] - [place], [place] - [address]  [address]  [place] [address]  Phone: [phone] Fax: [phone]    PATIENT IS VERY LOW ON MEDICATION ( HE'LL BE OUT BY Thursday )

## 2022-06-15 DIAGNOSIS — G12.21 ALS (AMYOTROPHIC LATERAL SCLEROSIS): ICD-10-CM

## 2022-06-15 DIAGNOSIS — R23.8 SKIN BREAKDOWN: Chronic | ICD-10-CM

## 2022-06-17 RX ORDER — NYSTATIN 100000 U/G
30 CREAM TOPICAL 2 TIMES DAILY
Qty: 30 G | Refills: 3 | Status: SHIPPED | OUTPATIENT
Start: 2022-06-17

## 2022-06-22 ENCOUNTER — EXTERNAL HOME HEALTH (OUTPATIENT)
Dept: HOME HEALTH SERVICES | Facility: HOSPITAL | Age: 52
End: 2022-06-22
Payer: MEDICARE

## 2022-07-18 ENCOUNTER — PATIENT MESSAGE (OUTPATIENT)
Dept: NEUROLOGY | Facility: CLINIC | Age: 52
End: 2022-07-18
Payer: MEDICARE

## 2022-07-18 RX ORDER — HYDROCODONE BITARTRATE AND ACETAMINOPHEN 10; 325 MG/1; MG/1
1 TABLET ORAL EVERY 6 HOURS PRN
Qty: 120 TABLET | Refills: 0 | Status: SHIPPED | OUTPATIENT
Start: 2022-07-18 | End: 2022-08-15 | Stop reason: SDUPTHER

## 2022-08-04 ENCOUNTER — TELEPHONE (OUTPATIENT)
Dept: PULMONOLOGY | Facility: CLINIC | Age: 52
End: 2022-08-04
Payer: MEDICARE

## 2022-08-04 NOTE — TELEPHONE ENCOUNTER
Spoke with Shailesh [facility] in regards to sending  in regards to order.  My name and office number was provided during there call.   verbalized he understands.

## 2022-08-04 NOTE — TELEPHONE ENCOUNTER
----- Message from Alla Morrow sent at 8/4/2022 10:39 AM CDT -----  Regarding: Follow up fax order  Contact: Jael([facility] )[phone] ex.[phone]  Caller Jael([facility] )requesting a call back regarding a standard written order that was fax July 29,2022.

## 2022-08-08 DIAGNOSIS — T83.511A URINARY TRACT INFECTION ASSOCIATED WITH INDWELLING URETHRAL CATHETER, INITIAL ENCOUNTER: ICD-10-CM

## 2022-08-08 DIAGNOSIS — N39.0 URINARY TRACT INFECTION ASSOCIATED WITH INDWELLING URETHRAL CATHETER, INITIAL ENCOUNTER: ICD-10-CM

## 2022-08-08 DIAGNOSIS — Z97.8 CHRONIC INDWELLING FOLEY CATHETER: ICD-10-CM

## 2022-08-08 DIAGNOSIS — Z93.1 PERCUTANEOUS ENDOSCOPIC GASTROSTOMY STATUS: ICD-10-CM

## 2022-08-08 DIAGNOSIS — G12.21 ALS (AMYOTROPHIC LATERAL SCLEROSIS): ICD-10-CM

## 2022-08-08 DIAGNOSIS — F41.9 ANXIETY: ICD-10-CM

## 2022-08-15 ENCOUNTER — DOCUMENT SCAN (OUTPATIENT)
Dept: HOME HEALTH SERVICES | Facility: HOSPITAL | Age: 52
End: 2022-08-15
Payer: MEDICARE

## 2022-08-15 DIAGNOSIS — R13.12 OROPHARYNGEAL DYSPHAGIA: ICD-10-CM

## 2022-08-15 DIAGNOSIS — G12.21 ALS (AMYOTROPHIC LATERAL SCLEROSIS): Primary | ICD-10-CM

## 2022-08-17 RX ORDER — HYDROXYZINE HYDROCHLORIDE 25 MG/1
TABLET, FILM COATED ORAL
Qty: 120 TABLET | Refills: 5 | Status: SHIPPED | OUTPATIENT
Start: 2022-08-17

## 2022-08-26 ENCOUNTER — OFFICE VISIT (OUTPATIENT)
Dept: NEUROLOGY | Facility: CLINIC | Age: 52
End: 2022-08-26
Payer: MEDICARE

## 2022-08-26 ENCOUNTER — PATIENT MESSAGE (OUTPATIENT)
Dept: NEUROLOGY | Facility: CLINIC | Age: 52
End: 2022-08-26

## 2022-08-26 DIAGNOSIS — K11.7 SIALORRHEA: ICD-10-CM

## 2022-08-26 DIAGNOSIS — R47.1 DYSARTHRIA: ICD-10-CM

## 2022-08-26 DIAGNOSIS — G12.21 ALS (AMYOTROPHIC LATERAL SCLEROSIS): ICD-10-CM

## 2022-08-26 DIAGNOSIS — N32.89 BLADDER SPASMS: ICD-10-CM

## 2022-08-26 DIAGNOSIS — Z91.89 AT HIGH RISK FOR DEEP VENOUS THROMBOSIS: ICD-10-CM

## 2022-08-26 DIAGNOSIS — I48.0 PAROXYSMAL ATRIAL FIBRILLATION: ICD-10-CM

## 2022-08-26 DIAGNOSIS — R13.12 OROPHARYNGEAL DYSPHAGIA: ICD-10-CM

## 2022-08-26 PROCEDURE — 99215 OFFICE O/P EST HI 40 MIN: CPT | Mod: 95,GV,, | Performed by: PSYCHIATRY & NEUROLOGY

## 2022-08-26 PROCEDURE — 99215 PR OFFICE/OUTPT VISIT, EST, LEVL V, 40-54 MIN: ICD-10-PCS | Mod: 95,GV,, | Performed by: PSYCHIATRY & NEUROLOGY

## 2022-08-26 RX ORDER — BACLOFEN 10 MG/1
5 TABLET ORAL 3 TIMES DAILY
Qty: 45 TABLET | Refills: 11 | Status: SHIPPED | OUTPATIENT
Start: 2022-08-26 | End: 2023-08-26

## 2022-08-26 RX ORDER — GLYCOPYRROLATE 1 MG/1
1 TABLET ORAL 3 TIMES DAILY
Qty: 90 TABLET | Refills: 4 | Status: SHIPPED | OUTPATIENT
Start: 2022-08-26 | End: 2022-09-25

## 2022-08-26 RX ORDER — FLUTICASONE PROPIONATE 50 MCG
1 SPRAY, SUSPENSION (ML) NASAL DAILY
COMMUNITY

## 2022-08-26 RX ORDER — CARVEDILOL 6.25 MG/1
6.25 TABLET ORAL 2 TIMES DAILY
Qty: 60 TABLET | Refills: 11 | Status: SHIPPED | OUTPATIENT
Start: 2022-08-26 | End: 2023-08-26

## 2022-08-26 NOTE — LETTER
August 28, 2022        Shantelle Boateng, [facility] - Neurology 7th Fl  [address]  [facility] [address]  Phone: [phone]  Fax: [phone]   Patient: Bayron Delgado   MR Number: [id number]   YOB: 1970   Date of Visit: 8/26/2022     Dear Shantelle,    Please find attached the clinic note for Mr. Delgado.  He needs  to help with financial aide and to fill out DNR paperwork.    Sincerely,      Shanice Costa MD            CC    No Recipients    Enclosure

## 2022-08-26 NOTE — PROGRESS NOTES
NEUROLOGY  [facility]      Patient Name:  Bayron Delgado  :  1970  MR #:  [id number]  Acct #:  [id number]    Date of Clinic Follow Up: 2022  Name of Neurologist: Shanice Costa    Other Physicians:  No primary care provider on file. (Primary Care Physician); No ref. provider found (Referring)    The patient location is: Home   The chief complaint leading to consultation is: ALS  Visit type: Virtual visit with synchronous audio and video  Total time spent with patient: 50 minutes   Each patient to whom he or she provides medical services by telemedicine is:  (1) informed of the relationship between the physician and patient and the respective role of any other health care provider with respect to management of the patient; and (2) notified that he or she may decline to receive medical services by telemedicine and may withdraw from such care at any time.    Notes:       Chief Complaint: ALS follow-up      History of Present Illness (HPI):  Bayron Delgado is a 51 y.o. male here today for follow up via video visit  of motor neuron disease. Patient has difficulty operating AAC device due to difficulty with eye movements, he has a trach in place- unable to speak    Here with: wife    He is losing his ability to smile, he needs constant eye drops to keep eyes lubricated, difficult to keep eyes open. He has difficulty with using his communication device due to the same. Difficult to say how his mood is. Only slight movement of mouth and eyes. Lives at home with wife, 16 year old daughter and has help during the day. Wife is a registered nurse. She works during the day.    Strength:  Complete loss of strength involving extremities     Dysarthria:  Unable to speak ( trach in place, patient is ventilator dependent)    Dysphagia: PEG tube dependent     Sialorrhea:  Excess salivation and secretions from nose --> with cough assist/ suction device secretions are suctioned. He is on a scopolamine  "patch. Previously reaction with atropine- atrial fibrillation. Never tried glycopyrrolate.    Constipation:  He is on a bowel regimen program to help him have a bowel movement, on Miralax, mineral oil, has regular bowel movements.     Sleep:  Some nights he sleeps very well and some nights he does not sleep well. Wife is not sure why his sleep is disturbed on some nights     Gait:   Cannot walk     Falls:  None    Breathing:  Connected to ventilator, stable, trach in place    Bladder:  Has a catheter in place, he has foul smelling urine, has had resistant bacterial colonization, he has bladder spasms - flushing and Oxybutynin help. He is given increased amounts of water through his feeding tube.     Pain:  He is in pain- likely  intermittent throughout the day cries during the day. Wife gives him Tramadol 50 mg TID and 100 mg at night. Pain is in his hips, knees and shoulders, he is also on Lortab and they also apply Lidocaine patches to help. Lortab 10 mg 4 times/ day.     Mood:  Up and down, has days when he cries a lot.    PBA:  He did have significant PBA in the past, now his emotionality is more appropriate    Memory:  Intact but hard to evaluate given difficulty with communication       Additional help for care: Round the clock care, causing financial burden on family.     Skin: 2 areas of breakdown have been noted by wife, these wounds heal and then resolve with time. Wife and caregivers use cornstartch powder to protect his skin, over his abdomen on the right fanily has noted multiple "warty" lesions all over which are growing considerably, there is constant drainage from these lesions. These came on a year ago    Additionally wife has noted his heart rate increases to 140s, he has been diagnosed with atrial fibrillation in the past, he refused xarelto previously and was on coreg.    Treatment to date:  Never tried Riluzole and Radicava    Review of Systems:   See HPI    ALSFRS:      ALS Functional Rating " Scale Revised        Item 1: SPEECH   4 ? Normal speech process   3 ? Detectable speech disturbance   2 ? Intelligible with repeating   1 ? Speech combined with non-vocal communication   0 ?x Loss of useful speech   Item 2: SALIVATION   4 ? Normal   3 ? Slight but definite excess of saliva in mouth; may have nighttime drooling   2 ? Moderately excessive saliva; may have minimal drooling (during the day)   1 ?x Marked excess of saliva with some drooling   0 ? Marked drooling; requires constant tissue or handkerchief   Item 3: SWALLOWING   4 ? Normal eating habits   3 ? Early eating problems - occasional choking   2 ? Dietary consistency changes   1 ? Needs supplement tube feeding   0 ?x NPO (exclusively parenteral or enteral feeding)   Item 4: HANDWRITING   4 ? Normal   3 ? Slow or sloppy: all words are legible   2 ? Not all words are legible   1 ? Able to  pen, but unable to write   0 ?x Unable to  pen   Item 5a: CUTTING FOOD AND HANDLING UTENSILS   Patients without gastrostomy ? Use 5b if >50% is through g-tube   4 ? Normal   3 ? Somewhat slow and clumsy, but no help needed   2 ? Can cut most foods (>50%), although slow and clumsy; some help needed   1 ? Food must be cut by someone, but can still feed slowly   0 ? Needs to be fed   Item 5b: CUTTING FOOD AND HANDLING UTENSILS   Patients with gastrostomy ? 5b option is used if the patient has a gastrostomy and only if it is the primary method (more than 50%) of eating .   4 ? Normal   3 ? Clumsy, but able to perform all manipulations independently   2 ? Some help needed with closures and fasteners   1 ? Provides minimal assistance to caregiver   0 ?x Unable to perform any aspect of task     Item 6: DRESSING AND HYGIENE   4 ? Normal function   3 ? Independent and complete self-care with effort or decreased efficiency   2 ? Intermittent assistance or substitute methods   1 ? Needs attendant for self-care   0 ?x Total dependence   Item 7: TURNING IN BED AND  ADJUSTING BED CLOTHES   4 ? Normal function   3 ? Somewhat slow and clumsy, but no help needed   2 ? Can turn alone, or adjust sheets, but with great difficulty   1 ? Can initiate, but not turn or adjust sheets alone   0 ?x Helpless   Item 8: WALKING   4 ? Normal   3 ? Early ambulation difficulties   2 ? Walks with assistance   1 ? Non-ambulatory functional movement   0 ?x No purposeful leg movement   Item 9: CLIMBING STAIRS   4 ? Normal   3 ? Slow   2 ? Mild unsteadiness or fatigue   1 ? Needs assistance   0 ?x Cannot do   Item 10: DYSPNEA   4 ? None   3 ? Occurs when walking   2 ? Occurs with one or more of the following: eating, bathing, dressing (ADL)   1 ? Occurs at rest: difficulty breathing when either sitting or lying   0 ?x Significant difficulty: considering using mechanical respiratory support   Item 11: ORTHOPNEA   4 ? None   3 ? Some difficulty sleeping at night due to shortness of breath, does not routinely use more than two pillows   2 ? Needs extra pillows in order to sleep (more than two)   1 ? Can only sleep sitting up   0 ?x Unable to sleep without mechanical assistance   Item 12: RESPIRATORY INSUFFICIENCY   4 ? None   3 ? Intermittent use of BiPAP   2 ? Continuous use of BiPAP during the night   1 ? Continuous use of BiPAP during day & night   0 ?x Invasive mechanical ventilation by intubation or tracheostomy       Total: 1        Past Medical, Surgical, Family & Social History:   Reviewed and updated.    Home Medications:     Current Outpatient Medications:     fluticasone propionate (FLONASE) 50 mcg/actuation nasal spray, 1 spray by Each Nostril route once daily., Disp: , Rfl:     baclofen (LIORESAL) 10 MG tablet, Take 0.5 tablets (5 mg total) by mouth 3 (three) times daily., Disp: 45 tablet, Rfl: 11    carvediloL (COREG) 6.25 MG tablet, 1 tablet (6.25 mg total) by Per G Tube route 2 (two) times daily., Disp: 60 tablet, Rfl: 11    clonazePAM (KLONOPIN) 0.5 MG tablet, TAKE 1 TO 2 TABLETS BY  "MOUTH EVERY 6 HOURS AS NEEDED FOR ANXIETY PER peg tube THANK YOU!, Disp: 120 tablet, Rfl: 2    diphenhydrAMINE (BENADRYL) 50 MG tablet, Take 50 mg by mouth nightly as needed for Itching., Disp: , Rfl:     DULoxetine (CYMBALTA) 60 MG capsule, Take 1 capsule (60 mg total) by mouth once daily., Disp: 30 capsule, Rfl: 3    famotidine (PEPCID) 20 MG tablet, 1 TAB PER PEG TUBE TWICE DAILY "THANK YOU", Disp: 60 tablet, Rfl: 2    glycopyrrolate (ROBINUL) 1 mg Tab, Take 1 tablet (1 mg total) by mouth 3 (three) times daily., Disp: 90 tablet, Rfl: 4    HYDROcodone-acetaminophen (NORCO)  mg per tablet, Take 1 tablet by mouth every 6 (six) hours as needed (severe pain)., Disp: 120 tablet, Rfl: 0    hydrOXYzine HCL (ATARAX) 25 MG tablet, 1 TAB PER PEG TUBE 4 TIMES A DAY AS NEEDED FOR ITCHING "THANK YOU", Disp: 120 tablet, Rfl: 5    LACTOSE-REDUCED FOOD/FIBER (ISOSOURCE 1.5 YURY ORAL), by Per G Tube route 6 (six) times daily. , Disp: , Rfl:     lidocaine (LIDODERM) 5 %, PLACE 3 PATCHES ONTO THE SKIN ONCE DAILY. REMOVE & DISCARD PATCH WITHIN 12 HOURS OR AS DIRECTED BY MD "THANK YOU", Disp: 120 patch, Rfl: 4    mineral oil liquid, Take 30 mLs by mouth daily as needed for Constipation., Disp: , Rfl:     mupirocin (BACTROBAN) 2 % ointment, Apply topically 3 (three) times daily., Disp: 2 Tube, Rfl: 0    NYAMYC powder, APPLY TOPICALLY TO AFFECTED AREAS DAILY AS NEEDED "THANK YOU", Disp: 60 g, Rfl: 10    nystatin (MYCOSTATIN) cream, Apply 30 g topically 2 (two) times daily., Disp: 30 g, Rfl: 3    oxybutynin (DITROPAN XL) 15 MG TR24, TAKE 1 TABLET PER PEG TUBE EACH DAY "THANK YOU", Disp: 30 tablet, Rfl: 2    polyethylene glycol (GLYCOLAX) 17 gram PwPk, Take by mouth., Disp: , Rfl:     pregabalin (LYRICA) 100 MG capsule, TAKE 1 CAPSULE PER PEG TUBE 3 TIMES A DAY "THANK YOU", Disp: 90 capsule, Rfl: 3    prochlorperazine (COMPAZINE) 10 MG tablet, TAKE 1 TABLET BY MOUTH EACH EVENING "THANK YOU", Disp: 30 tablet, Rfl: 11    rivaroxaban " "(XARELTO) 10 mg Tab, Take 1 tablet (10 mg total) by mouth daily with dinner or evening meal., Disp: 90 tablet, Rfl: 4    scopolamine (TRANSDERM-SCOP) 1.3-1.5 mg (1 mg over 3 days), APPLY 1 PATCH TOPICALLY EVERY 3 DAYS "THANK YOU", Disp: 10 patch, Rfl: 11    senna-docusate 8.6-50 mg (SENNA WITH DOCUSATE SODIUM) 8.6-50 mg per tablet, Take 7 tablets by mouth once daily., Disp: , Rfl:     silver nitrate applicators 75-25 % applicator, Apply topically 3 (three) times a week. Apply to granulation tissue PRN., Disp: 100 applicator, Rfl: 3    temazepam (RESTORIL) 30 mg capsule, TAKE 1 CAPSULE PER PEG TUBE EACH EVENING "THANK YOU", Disp: 30 capsule, Rfl: 2    traMADoL (ULTRAM) 50 mg tablet, Take 1-2 tablets ( mg total) by mouth 3 (three) times daily as needed for Pain., Disp: 180 tablet, Rfl: 1    Physical Examination:  There were no vitals taken for this visit. Virtual visit        GENERAL:  General appearance: Well, non-toxic appearing.  No apparent distress.  Respiratory:  Trach in place   Extremities: Ankle edema noted     MENTAL STATUS:  Alertness, attention span & concentration: normal.  Language: normal.  Orientation to self, place & time:  unable to assess given difficulty with communication, patient follows commands but has limitation of EOM and eye closure, he is unable to operate AAC device        SPEECH:  Aphasic, trach in place    CRANIAL NERVES:  Cranial Nerves II-XII were examined.  II - Visual fields: difficulty with assessment due to reasons mentioned above and virtual evaluation   III, IV, VI: PERRL, EOMI- limited with eye gaze to the left, unable to close bilateral eyes completely  V - Facial sensation: normal.  VII - Face symmetry & mobility: asymmetric, nasolabial fold flattened on the right   VIII - Hearing: normal.  IX, X - Palate: unable to assess   XI - Shoulder shrug: unable to shrug shoulders bilaterally   XII - Tongue protrusion: unable to move tongue    GROSS MOTOR:  Gait & station:  does " not walk   Abnormal movements: none.  Finger-nose & Heel-knee-shin: unable to perform due to quadriplegia  Rapid alternating movements & drift: unable to perform due to quadriplegia    Patient moves bilateral upper and lower extremities strongly and antigravity  Unable to perform graded exam during video visit    REFLEXES:    Unable to perform reflex testing during video visit     SENSORY:  Light touch: Normal throughout.      Diagnostic Data Reviewed:       Results for orders placed or performed during the hospital encounter of 04/02/14   CT Head Without Contrast    Narrative    Comparison study: None    FINDINGS    Axial scans of the head were obtained without IV contrast.  The ventricles, sulci, fissures are within normal limits in appearance for the patient's age.  There is no acute intracranial hemorrhage. There is no intracranial mass effect.  There is no acute major vascular territory infarct evident. The calvarium  is   intact, the mastoids well pneumatized, and the visualized paranasal sinuses clear.    IMPRESSION    No acute intracranial findings.      Electronically signed by: JOHNSON GARCIA MD  Date:     04/02/14  Time:    16:13              Assessment and Plan:    Bayron Delgado is a 51 y.o.male here for follow up of ALS.  I and the members of the multidisciplinary team have assessed Bayron Delgado and have made the following recommendations:    Problem List Items Addressed This Visit          Neuro    ALS (amyotrophic lateral sclerosis)    Overview     Dx in 2014 with foot drop in the right leg.  He had fasciculations.  He was diagnosed at [facility] with Dr. Sauer as a second opinion.  He has no family history of ALS.  He did not take Riluzole.    He has been bed bound for about 18 months now.    Tracheostomy in March 2018.  Has a G-tube         Current Assessment & Plan     -ALS-FRS is 1  - Patient has a trach- he is ventilator dependent  - S/p PEG tube in place  - In hospice currently, has health aides  helping him, financial burden is high , I will contact  to see if we can help family  - Add baclofen for bladder spasms         Dysarthria    Overview     Has AAC         Current Assessment & Plan     Has AAC            ENT    Sialorrhea    Current Assessment & Plan     -Start Glycopyrrolate 1 mg TID            Cardiac/Vascular    Atrial fibrillation    Current Assessment & Plan     Discussed restarting carvedilol, patient agreeable  -patient had refused xarelto in the past, recommended that he restart the same, he agrees to restart the drug            Renal/    Bladder spasms    Current Assessment & Plan     - Start Baclofen 5 mg TID  - He is also on oxybutynin            GI    Oropharyngeal dysphagia    Current Assessment & Plan     2/2 ALS  -PEG tube is in place          Other Visit Diagnoses       At high risk for deep venous thrombosis        Relevant Medications    rivaroxaban (XARELTO) 10 mg Tab              The patient will return to clinic in 3 months.       55 minutes spent during the visit with the patient, in addition this time includes time spent reviewing prior records, clinical notes, imaging if obtained and blood work on the day of the visit. The total time spent was all on the day of the visit.     This note was created with voice recognition software.  Grammatical, syntax and spelling errors may be inevitable.      Shanice Costa M.D

## 2022-08-28 PROBLEM — K11.7 SIALORRHEA: Status: ACTIVE | Noted: 2022-08-28

## 2022-08-28 PROBLEM — N32.89 BLADDER SPASMS: Status: ACTIVE | Noted: 2022-08-28

## 2022-08-29 NOTE — ASSESSMENT & PLAN NOTE
-ALS-FRS is 1  - Patient has a trach- he is ventilator dependent  - S/p PEG tube in place  - In hospice currently, has health aides helping him, financial burden is high , I will contact  to see if we can help family  - Add baclofen for bladder spasms

## 2022-08-29 NOTE — ASSESSMENT & PLAN NOTE
Discussed restarting carvedilol, patient agreeable  -patient had refused xarelto in the past, recommended that he restart the same, he agrees to restart the drug

## 2022-09-03 ENCOUNTER — PATIENT MESSAGE (OUTPATIENT)
Dept: NEUROLOGY | Facility: CLINIC | Age: 52
End: 2022-09-03
Payer: MEDICARE

## 2022-09-09 ENCOUNTER — PATIENT MESSAGE (OUTPATIENT)
Dept: NEUROLOGY | Facility: CLINIC | Age: 52
End: 2022-09-09
Payer: MEDICARE

## 2022-09-12 ENCOUNTER — TELEPHONE (OUTPATIENT)
Dept: NEUROLOGY | Facility: CLINIC | Age: 52
End: 2022-09-12
Payer: MEDICARE

## 2022-09-12 NOTE — TELEPHONE ENCOUNTER
Wound Care Information received from Nurse Ruthie Westfall.     Its the wound care nurse. We just spoke on the phone. I tried calling the patient but the number went straight to voicemail. Just wanted to leave the information with you as well in case, she does not return my call before I leave for the day. Items she can use for the smell would be Vashe or Dakins wound solution, both can be ordered from [employer]. Vashe is more gentler on the skin than Dakins. I have used Metronidazole gel on really malodorous fungating tumors to help with the smell but a prescription would be needed for that. Dressing for absorbency would include ABD pads, mextra superabsorbent foam dressing, exudry, etc. Just would need a paper tape or medipore tape to hold dressing in place.     Dressing frequency will depend on the amount of drainage, I would start with daily and then she can determine if it needs to be change BID or every other day, etc. How to use the wound solutions. Moisten gauze( just needs to be damp) with either wound solution. Cover with dry gauze and whatever absorbent dressing she chooses. Secure with a gentle tape.       Information conveyed to Perlita Delgado. Dr. Igor oliveira.       Tomasa Boateng RN, BSN, BS  ALS Clinical Care Coordinator  [phone]

## 2022-09-13 DIAGNOSIS — Z71.1 CONCERN ABOUT STD IN MALE WITHOUT DIAGNOSIS: Primary | ICD-10-CM

## 2022-09-16 ENCOUNTER — DOCUMENT SCAN (OUTPATIENT)
Dept: HOME HEALTH SERVICES | Facility: HOSPITAL | Age: 52
End: 2022-09-16
Payer: MEDICARE

## 2022-09-20 ENCOUNTER — DOCUMENTATION ONLY (OUTPATIENT)
Dept: NEUROLOGY | Facility: CLINIC | Age: 52
End: 2022-09-20
Payer: MEDICARE

## 2022-09-20 NOTE — PROGRESS NOTES
"  SW submitted signed document via fax from Dr. Costa back to   [facility] [place].     Your fax has been successfully sent to [phone] at [phone].  ------------------------------------------------------------  From: [id number]  ------------------------------------------------------------  9/20/2022 12:54:44 PM Transmission Record   Sent to [phone] with remote ID "[id number]"   Result: (0/339;0/0) Success   Page record: 1 - 2   Elapsed time: 01:05 on channel 54      "

## 2023-07-24 NOTE — PLAN OF CARE
CMICU DAILY GOALS       A: Awake    RASS: Goal -    Actual - RASS (Paige Agitation-Sedation Scale): 0-->alert and calm   Restraint necessity:    B: Breath   SBT: Not intubated   C: Coordinate A & B, analgesics/sedatives   Pain: managed    SAT: NA  D: Delirium   CAM-ICU: Overall CAM-ICU: Negative  E: Early Mobility   MOVE Screen: Fail   Activity: Activity Management: activity adjusted per tolerance  FAS: Feeding/Nutrition   Diet order: Diet/Nutrition Received: tube feeding,   Fluid restriction:    T: Thrombus   DVT prophylaxis: VTE Required Core Measure: Pharmacological prophylaxis initiated/maintained  H: HOB Elevation   Head of Bed (HOB): HOB at 30 degrees  U: Ulcer Prophylaxis   GI: yes  G: Glucose control   managed    S: Skin   Bundle compliance: yes   Bathing/Skin Care: incontinence care Date: 2/10/20  B: Bowel Function   no issues   I: Indwelling Catheters   Michele necessity:      Urethral Catheter 02/08/20 1118 Non-latex 22 Fr.-Reason for Continuing Urinary Catheterization: Critically ill in ICU requiring intensive monitoring   CVC necessity: No   IPAD offered: Yes  D: De-escalation Antibx   No  Plan for the day   Monitor respiratory status. Maintain abx treatment.   Family/Goals of care/Code Status   Code Status: Full Code     No acute events throughout day, VS and assessment per flow sheet, patient progressing towards goals as tolerated, plan of care reviewed with Bayron Delgado and family, all concerns addressed, will continue to monitor.     Elidel Counseling: Patient may experience a mild burning sensation during topical application. Elidel is not approved in children less than 2 years of age. There have been case reports of hematologic and skin malignancies in patients using topical calcineurin inhibitors although causality is questionable.

## 2024-04-15 NOTE — TELEPHONE ENCOUNTER
E-mail sent to ANKIT Ching with [employer] to inform that patient's wife, Perlita, stated that they desire to proceed with receiving Enteral Nutrition via [employer].        15-Apr-2024 12:57

## 2025-01-23 NOTE — TELEPHONE ENCOUNTER
Incoming call from Mrs. Bhat requesting treatment for patient.     Per Perlita, patient c/o diarrhea and fever of 102 that began last night, 2/6. Also, green phlegm expelled with cough assist and suction. Mr. Verma c/o stomach cramping and pain. Diarrhea has a distinct odor but not a C-Diff smell.     Urine is concentrated and waleska in color. Perlita noted that when patient is administered any medications containing aspirin that his urine usually will have a blood tinged. Spouse unsure if irritating bladder wall due to flushing of munoz cath 6 or more times a day to keep cath patent.     RN Coordinator inquired if she or caregivers have been sick lately. Perlita indicated that she was diagnosed with Bronchitis 1 month ago and was prescribed two rounds of antibiotic and steroids. Currently, she has a severe case of Conjunctivitis with nodules attacking cornea of eye, per Perlita. Also, she has been caring for patients at her workplace that are on contact precaution for Gastrointestinal  Diagnoses. Nevertheless, Perlita noted that she has been very careful not to come in contact with Mr. Delgado for over a month. She became emotional during call bc she has not been able to hug, kiss, or care for her  during the last 4 weeks.     She is requesting Levaquin and Flagyl or Zithromax and Flagyl to treat patients symptoms.     Medicaid  Perlita was en route to [place] Medicaid Office to turn in requested documents. She indicated that state is attempting to terminate medicaid benefits. Perlita is taking documents to prove patient does not have additional income to his disability.   
Waiting to get advisement from pulmonology.  I am unable to make recommendations on treatment of these conditions.  
Render In Strict Bullet Format?: No
Plan: Pt had a good response to 5-fluorouracil. Pt states that he stopped taking blood thinners because both sites started bleeding more. Pt was instructed to begin taking blood thinners again. F/U in 2 months after spots are healed.
Discontinue Regimen: 5FU
Detail Level: Zone
Initiate Treatment: Mupirocin 2 % topical ointment BID

## (undated) DEVICE — ELECTRODE REM PLYHSV RETURN 9

## (undated) DEVICE — CORD BIPOLAR 12 FOOT

## (undated) DEVICE — TRAY MINOR GEN SURG

## (undated) DEVICE — SUT SILK 2-0 SH 18IN BLACK

## (undated) DEVICE — NDL HYPO REG 25G X 1 1/2

## (undated) DEVICE — SUT VICRYL 3-0 27 SH

## (undated) DEVICE — GAUZE SPONGE PEANUT STRL

## (undated) DEVICE — TRACH TUBE CUFF FLEX DISP 8.5

## (undated) DEVICE — ELECTRODE BLADE INSULATED 1 IN

## (undated) DEVICE — SEE MEDLINE ITEM 154981

## (undated) DEVICE — GOWN SURGICAL X-LARGE

## (undated) DEVICE — SEE MEDLINE ITEM 152622

## (undated) DEVICE — SHEET EENT SPLIT